# Patient Record
Sex: FEMALE | Race: WHITE | NOT HISPANIC OR LATINO | Employment: OTHER | URBAN - METROPOLITAN AREA
[De-identification: names, ages, dates, MRNs, and addresses within clinical notes are randomized per-mention and may not be internally consistent; named-entity substitution may affect disease eponyms.]

---

## 2017-01-03 DIAGNOSIS — F41.9 ANXIETY: ICD-10-CM

## 2017-01-03 DIAGNOSIS — G47.00 INSOMNIA, UNSPECIFIED TYPE: ICD-10-CM

## 2017-01-03 RX ORDER — ZOLPIDEM TARTRATE 10 MG/1
10 TABLET ORAL NIGHTLY PRN
Qty: 30 TABLET | Refills: 0 | OUTPATIENT
Start: 2017-01-03 | End: 2017-01-27 | Stop reason: SDUPTHER

## 2017-01-03 RX ORDER — LORAZEPAM 1 MG/1
1 TABLET ORAL EVERY 8 HOURS PRN
Qty: 90 TABLET | Refills: 0 | OUTPATIENT
Start: 2017-01-03 | End: 2017-02-28 | Stop reason: SDUPTHER

## 2017-01-03 NOTE — TELEPHONE ENCOUNTER
----- Message from Rodriguez Harrell sent at 1/3/2017  9:35 AM EST -----  Contact: pt  Pt calling and would like a refill on RX sent into Pharmacy. Please advise.     zolpidem (AMBIEN) 10 MG tablet    LORazepam (ATIVAN) 1 MG tablet      [facility] to :  [facility] [place]     Pt# [phone]

## 2017-01-04 ENCOUNTER — TELEPHONE (OUTPATIENT)
Dept: GASTROENTEROLOGY | Facility: CLINIC | Age: 62
End: 2017-01-04

## 2017-01-04 NOTE — TELEPHONE ENCOUNTER
----- Message from Los Zhou sent at 1/4/2017 10:18 AM EST -----  Regarding: REFILL MEDS  Contact: [phone]   PT CALLED FOR REFILL ON hyoscyamine (LEVSIN) 0.125 MG SL tablet    PHARM#[phone]

## 2017-01-04 NOTE — TELEPHONE ENCOUNTER
Call to pt.  Advise that due to annual appt.  Scheduled with RAJEEV Payne, for 1/13/17 @ 1300.  Hyoscyamine escribed to [facility].

## 2017-01-05 ENCOUNTER — TELEPHONE (OUTPATIENT)
Dept: INTERNAL MEDICINE | Facility: CLINIC | Age: 62
End: 2017-01-05

## 2017-01-05 DIAGNOSIS — F41.9 ANXIETY: ICD-10-CM

## 2017-01-05 DIAGNOSIS — G47.00 INSOMNIA, UNSPECIFIED TYPE: ICD-10-CM

## 2017-01-05 NOTE — TELEPHONE ENCOUNTER
----- Message from Kitty Barrgia sent at 1/5/2017  2:31 PM EST -----  Contact: Pt (Blake)  Pt called for a refill with   - LORazepam (ATIVAN) 1 MG tablet    - zolpidem (AMBIEN) 10 MG tablet       Receipt wasn't confirmed by pharmacy.       [facility] [address] [place] [address] [facility] [phone] [facility] [phone]

## 2017-01-10 ENCOUNTER — OFFICE VISIT (OUTPATIENT)
Dept: INTERNAL MEDICINE | Facility: CLINIC | Age: 62
End: 2017-01-10

## 2017-01-10 VITALS
WEIGHT: 202.6 LBS | BODY MASS INDEX: 28.36 KG/M2 | HEIGHT: 71 IN | DIASTOLIC BLOOD PRESSURE: 84 MMHG | SYSTOLIC BLOOD PRESSURE: 144 MMHG

## 2017-01-10 DIAGNOSIS — M79.7 FIBROMYALGIA, PRIMARY: ICD-10-CM

## 2017-01-10 DIAGNOSIS — G89.29 OTHER CHRONIC PAIN: Primary | ICD-10-CM

## 2017-01-10 DIAGNOSIS — G43.009 MIGRAINE WITHOUT AURA AND WITHOUT STATUS MIGRAINOSUS, NOT INTRACTABLE: ICD-10-CM

## 2017-01-10 DIAGNOSIS — G47.00 INSOMNIA, UNSPECIFIED TYPE: ICD-10-CM

## 2017-01-10 DIAGNOSIS — J30.2 SEASONAL ALLERGIC RHINITIS, UNSPECIFIED ALLERGIC RHINITIS TRIGGER: ICD-10-CM

## 2017-01-10 DIAGNOSIS — K58.0 IRRITABLE BOWEL SYNDROME WITH DIARRHEA: ICD-10-CM

## 2017-01-10 DIAGNOSIS — E55.9 VITAMIN D DEFICIENCY: ICD-10-CM

## 2017-01-10 DIAGNOSIS — F41.9 ANXIETY: ICD-10-CM

## 2017-01-10 PROCEDURE — 99214 OFFICE O/P EST MOD 30 MIN: CPT | Performed by: INTERNAL MEDICINE

## 2017-01-10 RX ORDER — BUSPIRONE HYDROCHLORIDE 30 MG/1
30 TABLET ORAL 2 TIMES DAILY
Qty: 60 TABLET | Refills: 6 | Status: SHIPPED | OUTPATIENT
Start: 2017-01-10 | End: 2017-10-10

## 2017-01-10 RX ORDER — BUSPIRONE HYDROCHLORIDE 30 MG/1
30 TABLET ORAL 2 TIMES DAILY
Qty: 60 TABLET | Refills: 6 | Status: SHIPPED | OUTPATIENT
Start: 2017-01-10 | End: 2017-01-10 | Stop reason: SDUPTHER

## 2017-01-10 RX ORDER — THYROID,PORK 48.75 MG
1 TABLET ORAL DAILY
COMMUNITY
Start: 2016-10-31 | End: 2017-10-10

## 2017-01-10 RX ORDER — CREAM BASE NO. 9
CREAM (GRAM) MISCELLANEOUS
Refills: 1 | COMMUNITY
Start: 2016-12-05 | End: 2018-06-07

## 2017-01-10 NOTE — PROGRESS NOTES
"Subjective   Maggie Malhotra is a 61 y.o. female here for   Chief Complaint   Patient presents with   • Med Management     4 month follow-up for continued use of Zolpidem, Hydrocodone, & Lexapro   • Headache   • Allergic Rhinitis   .    Vitals:    01/10/17 1426   BP: 144/84   BP Location: Left arm   Patient Position: Sitting   Cuff Size: Adult   Weight: 202 lb 9.6 oz (91.9 kg)   Height: 71\" (180.3 cm)       Anxiety   Presents for follow-up visit. Symptoms include excessive worry, insomnia and nervous/anxious behavior. Patient reports no palpitations or shortness of breath. Symptoms occur most days. The severity of symptoms is moderate. The quality of sleep is fair.       Insomnia   This is a chronic problem. The current episode started more than 1 year ago. The problem occurs constantly. The problem has been unchanged. Associated symptoms include fatigue. Pertinent negatives include no chills or fever.        Encounter Diagnoses   Name Primary?   • Other chronic pain Yes   • Seasonal allergic rhinitis, unspecified allergic rhinitis trigger    • Insomnia, unspecified type    • Vitamin D deficiency    • Migraine without aura and without status migrainosus, not intractable    • Irritable bowel syndrome with diarrhea    • Fibromyalgia, primary    • Anxiety        The following portions of the patient's history were reviewed and updated as appropriate: allergies, current medications, past social history and problem list.    Review of Systems   Constitutional: Positive for fatigue. Negative for chills and fever.   HENT: Positive for postnasal drip.    Respiratory: Negative for shortness of breath.    Cardiovascular: Negative for palpitations and leg swelling.   Allergic/Immunologic: Positive for environmental allergies.   Psychiatric/Behavioral: Positive for sleep disturbance. Negative for dysphoric mood. The patient is nervous/anxious and has insomnia.        Objective   Physical Exam   Constitutional: She is oriented " to person, place, and time. She appears well-developed and well-nourished. No distress.   Cardiovascular: Normal rate, regular rhythm and normal heart sounds.    Pulmonary/Chest: No respiratory distress. She has no wheezes. She has no rales. She exhibits no tenderness.   Musculoskeletal: Normal range of motion. She exhibits no edema.   Neurological: She is alert and oriented to person, place, and time. No cranial nerve deficit. She exhibits normal muscle tone. Coordination normal.   Psychiatric: She has a normal mood and affect. Her behavior is normal.   Nursing note and vitals reviewed.      Assessment/Plan   Problem List Items Addressed This Visit        Unprioritized    Chronic pain - Primary    Migraine    IBS (irritable bowel syndrome)    Relevant Medications    busPIRone (BUSPAR) 30 MG tablet    Vitamin D deficiency    Insomnia    Allergic rhinitis    Fibromyalgia, primary      Other Visit Diagnoses     Anxiety        Relevant Medications    busPIRone (BUSPAR) 30 MG tablet       Diffuse joint pain - needing 1/2-1 hydrocodone daily.   Allergic rhinitis - need mucinex bid & daily antihis.    Insomnia - still needing ambien nightly.   Anxiety is chronic (every night at 8 pm), but she wants to try new medication.  Trial of buspar bid (may not need ativan).  Call if problems.     She is getting multiple meds from dr bartholomew.  I do not have research on the safety of these alternative medications.  She feels better with these meds, & wants to continue them.  She will need to chk breasts carefully weekly & continue yearly mammo (discussed today).

## 2017-01-10 NOTE — MR AVS SNAPSHOT
Maggie Malhotra   1/10/2017 2:00 PM   Office Visit    Dept Phone:  [phone]   Encounter #:  [id number]    Provider:  Kimberly Lozano MD   Department:  [facility] INTERNAL MEDICINE                Your Full Care Plan              Today's Medication Changes          These changes are accurate as of: 1/10/17  3:22 PM.  If you have any questions, ask your nurse or doctor.               New Medication(s)Ordered:     busPIRone 30 MG tablet   Commonly known as:  BUSPAR   Take 1 tablet by mouth 2 (Two) Times a Day.   Started by:  Kimberly Lozano MD         Medication(s)that have changed:     ThyroidTHROID 48.75 MG tablet tablet   Generic drug:  Thyroid   Take 1 tablet by mouth Daily.   What changed:  Another medication with the same name was removed. Continue taking this medication, and follow the directions you see here.   Changed by:  Kimberly Lozano MD       Unable to find   Take 1 each by mouth daily. Compound Drug-Progesterone 150 mg   What changed:  Another medication with the same name was removed. Continue taking this medication, and follow the directions you see here.   Changed by:  Curt Churchill MA         Stop taking medication(s)listed here:     ciprofloxacin 250 MG tablet   Commonly known as:  CIPRO   Stopped by:  Kimberly Lozano MD           dicyclomine 10 MG capsule   Commonly known as:  BENTYL   Stopped by:  Kimberly Lozano MD           NEXIUM 40 MG capsule   Generic drug:  esomeprazole   Stopped by:  Kimberly Lozano MD           phenazopyridine 200 MG tablet   Commonly known as:  PYRIDIUM   Stopped by:  Kimberly Lozano MD                Where to Get Your Medications      These medications were sent to [facility] [address] [place] [address] [facility] - [phone]  - [phone]   [id number] [place] [address]     Phone:  [phone]     busPIRone 30 MG tablet                  Your Updated Medication  List          This list is accurate as of: 1/10/17  3:22 PM.  Always use your most recent med list.                busPIRone 30 MG tablet   Commonly known as:  BUSPAR   Take 1 tablet by mouth 2 (Two) Times a Day.       DHEA PO       escitalopram 20 MG tablet   Commonly known as:  LEXAPRO   1.5 pill daily       gabapentin 600 MG tablet   Commonly known as:  NEURONTIN   TAKE 1 TABLET DAILY       HRT Base cream       * HRT SUPPORT PO       * HRT SUPPORT PO       * HRT SUPPORT PO       HYDROcodone-acetaminophen  MG per tablet   Commonly known as:  NORCO   Take 1 tablet by mouth Every 6 (Six) Hours As Needed for moderate pain (4-6).       hyoscyamine 0.125 MG SL tablet   Commonly known as:  LEVSIN   Dissolve one to 2 tabs every 6 hrs sublingual for abdominal spasms       LORazepam 1 MG tablet   Commonly known as:  ATIVAN   Take 1 tablet by mouth Every 8 (Eight) Hours As Needed for anxiety.       multivitamin tablet tablet       pregabalin 100 MG capsule   Commonly known as:  LYRICA   Take 1 capsule by mouth 3 (three) times a day.       promethazine 25 MG tablet   Commonly known as:  PHENERGAN   TAKE 1 TABLET EVERY 6 HOURS AS NEEDED FOR NAUSEA OR VOMITING       ThyroidTHROID 48.75 MG tablet tablet   Generic drug:  Thyroid       Unable to find       zolpidem 10 MG tablet   Commonly known as:  AMBIEN   Take 1 tablet by mouth At Night As Needed for sleep.       * Notice:  This list has 3 medication(s) that are the same as other medications prescribed for you. Read the directions carefully, and ask your doctor or other care provider to review them with you.            You Were Diagnosed With        Codes Comments    Other chronic pain    -  Primary ICD-10-CM: G89.29  ICD-9-CM: 338.29     Seasonal allergic rhinitis, unspecified allergic rhinitis trigger     ICD-10-CM: J30.2  ICD-9-CM: 477.9     Insomnia, unspecified type     ICD-10-CM: G47.00  ICD-9-CM: 780.52     Vitamin D deficiency     ICD-10-CM: E55.9  ICD-9-CM:  268.9     Migraine without aura and without status migrainosus, not intractable     ICD-10-CM: G43.009  ICD-9-CM: 346.10     Irritable bowel syndrome with diarrhea     ICD-10-CM: K58.0  ICD-9-CM: 564.1     Fibromyalgia, primary     ICD-10-CM: M79.7  ICD-9-CM: 729.1     Anxiety     ICD-10-CM: F41.9  ICD-9-CM: 300.00       Instructions     None    Patient Instructions History      Upcoming Appointments     Visit Type Date Time Department    OFFICE VISIT 1/10/2017  2:00 PM [facility]    FOLLOW UP 1/13/2017  1:00 PM [facility]    FOLLOW UP 5/11/2017  2:00 PM [employer] Signup     Our records indicate that you have an active [employer] account.    You can view your After Visit Summary by going to Maimaibao and logging in with your [employer] username and password.  If you don't have a [employer] username and password but a parent or guardian has access to your record, the parent or guardian should login with their own [employer] username and password and access your record to view the After Visit Summary.    If you have questions, you can email [email or web address] or call [phone] to talk to our [employer] staff.  Remember, [employer] is NOT to be used for urgent needs.  For medical emergencies, dial 911.               Other Info from Your Visit           Your Appointments     Jan 13, 2017  1:00 PM EST   Follow Up with RAJEEV Carter   [facility] GASTROENTEROLOG[facility] (--)    [address]  [facility] [address] [phone]           Arrive 15 minutes prior to appointment.            May 11, 2017  2:00 PM EDT   Follow Up with Kimberly Lozano MD   [facility] INTERNAL MEDICINE (--)    [address]  [facility] [address] [phone]           Arrive 15 minutes prior to appointment.              Allergies     Celexa [Citalopram Hydrobromide]  Other (See Comments)    Headache    Codeine       "Cymbalta [Duloxetine Hcl]  Nausea Only, Other (See Comments)    Fatigue/Pain    Erythromycin      Influenza Vaccines      Midazolam        Reason for Visit     Med Management 4 month follow-up for continued use of Zolpidem, Hydrocodone, & Lexapro    Headache     Allergic Rhinitis           Vital Signs     Blood Pressure Height Weight Last Menstrual Period Body Mass Index Smoking Status    144/84 (BP Location: Left arm, Patient Position: Sitting, Cuff Size: Adult) 71\" (180.3 cm) 202 lb 9.6 oz (91.9 kg) 01/01/2006 28.26 kg/m2 Never Smoker      Problems and Diagnoses Noted     Nasal inflammation due to allergen    Chronic pain    Fibromyalgia, primary    Spasmodic colon    Difficulty falling or staying asleep    Migraines    Vitamin D deficiency    Anxiety problem            "

## 2017-01-13 ENCOUNTER — OFFICE VISIT (OUTPATIENT)
Dept: GASTROENTEROLOGY | Facility: CLINIC | Age: 62
End: 2017-01-13

## 2017-01-13 VITALS
DIASTOLIC BLOOD PRESSURE: 84 MMHG | SYSTOLIC BLOOD PRESSURE: 144 MMHG | WEIGHT: 199 LBS | BODY MASS INDEX: 27.86 KG/M2 | HEIGHT: 71 IN

## 2017-01-13 DIAGNOSIS — K58.0 IRRITABLE BOWEL SYNDROME WITH DIARRHEA: Primary | ICD-10-CM

## 2017-01-13 PROCEDURE — 99213 OFFICE O/P EST LOW 20 MIN: CPT | Performed by: NURSE PRACTITIONER

## 2017-01-13 RX ORDER — DICYCLOMINE HCL 20 MG
20 TABLET ORAL
Qty: 90 TABLET | Refills: 5
Start: 2017-01-13 | End: 2017-02-12

## 2017-01-13 NOTE — MR AVS SNAPSHOT
Joandylan FRIAS Roscoe   1/13/2017 1:00 PM   Office Visit    Dept Phone:  [phone]   Encounter #:  [id number]    Provider:  RAJEEV Carter   Department:  [facility]STRO[facility]OLOG[facility]                Your Full Care Plan              Today's Medication Changes          These changes are accurate as of: 1/13/17  1:46 PM.  If you have any questions, ask your nurse or doctor.               New Medication(s)Ordered:     dicyclomine 20 MG tablet   Commonly known as:  BENTYL   Take 1 tablet by mouth 4 (Four) Times a Day Before Meals & at Bedtime As Needed (cramping) for up to 30 days.   Started by:  RAJEEV Carter            Where to Get Your Medications      These medications were sent to [facility] - [place], [place] - [address] [place] [address] - [phone] [facility] [phone] [address] [address]     Phone:  [phone]     hyoscyamine 0.125 MG SL tablet         Information about where to get these medications is not yet available     ! Ask your nurse or doctor about these medications     dicyclomine 20 MG tablet                  Your Updated Medication List          This list is accurate as of: 1/13/17  1:46 PM.  Always use your most recent med list.                busPIRone 30 MG tablet   Commonly known as:  BUSPAR   Take 1 tablet by mouth 2 (Two) Times a Day.       DHEA PO       dicyclomine 20 MG tablet   Commonly known as:  BENTYL   Take 1 tablet by mouth 4 (Four) Times a Day Before Meals & at Bedtime As Needed (cramping) for up to 30 days.       escitalopram 20 MG tablet   Commonly known as:  LEXAPRO   1.5 pill daily       gabapentin 600 MG tablet   Commonly known as:  NEURONTIN   TAKE 1 TABLET DAILY       HRT Base cream       * HRT SUPPORT PO       * HRT SUPPORT PO       * HRT SUPPORT PO       HYDROcodone-acetaminophen  MG per tablet   Commonly known as:  NORCO   Take 1 tablet by mouth Every 6 (Six) Hours As Needed  for moderate pain (4-6).       hyoscyamine 0.125 MG SL tablet   Commonly known as:  LEVSIN   Dissolve one to 2 tabs every 6 hrs sublingual for abdominal spasms       LORazepam 1 MG tablet   Commonly known as:  ATIVAN   Take 1 tablet by mouth Every 8 (Eight) Hours As Needed for anxiety.       multivitamin tablet tablet       pregabalin 100 MG capsule   Commonly known as:  LYRICA   Take 1 capsule by mouth 3 (three) times a day.       promethazine 25 MG tablet   Commonly known as:  PHENERGAN   TAKE 1 TABLET EVERY 6 HOURS AS NEEDED FOR NAUSEA OR VOMITING       ThyroidTHROID 48.75 MG tablet tablet   Generic drug:  Thyroid       zolpidem 10 MG tablet   Commonly known as:  AMBIEN   Take 1 tablet by mouth At Night As Needed for sleep.       * Notice:  This list has 3 medication(s) that are the same as other medications prescribed for you. Read the directions carefully, and ask your doctor or other care provider to review them with you.            You Were Diagnosed With        Codes Comments    Irritable bowel syndrome with diarrhea    -  Primary ICD-10-CM: K58.0  ICD-9-CM: 564.1 c-scope 6/14 with IH, tics, HY polyps, tort colon.  Diarrhea resolved.  Using bentyl/levsin prn for cramping.  Sxs better after senior living.  Trial IBGard sugg      Instructions     None    Patient Instructions History      Upcoming Appointments     Visit Type Date Time Department    FOLLOW UP 1/13/2017  1:00 PM [facility]    FOLLOW UP 5/11/2017  2:00 PM [facility]      [employer] Signup     Our records indicate that you have an active [employer] ([employer]) account.    You can view your After Visit Summary by going to Anpro21 and logging in with your [employer] username and password.  If you don't have a [employer] username and password but a parent or guardian has access to your record, the parent or guardian should login with their own [employer] username and password and access your record to view the After Visit  "Summary.    If you have questions, you can email [email or web address] or call [phone] to talk to our MyChart staff.  Remember, [employer] is NOT to be used for urgent needs.  For medical emergencies, dial 911.               Other Info from Your Visit           Your Appointments     May 11, 2017  2:00 PM EDT   Follow Up with Kimberly Lozano MD   [facility] INTERNAL MEDICINE (--)    [address] [place] [address] [phone]           Arrive 15 minutes prior to appointment.              Allergies     Celexa [Citalopram Hydrobromide]  Other (See Comments)    Headache    Codeine      Cymbalta [Duloxetine Hcl]  Nausea Only, Other (See Comments)    Fatigue/Pain    Erythromycin      Influenza Vaccines      Midazolam        Reason for Visit     abdominal spasms ways to help.      Vital Signs     Blood Pressure Height Weight Last Menstrual Period Body Mass Index Smoking Status    144/84 71\" (180.3 cm) 199 lb (90.3 kg) 01/01/2006 27.75 kg/m2 Never Smoker      Problems and Diagnoses Noted     Spasmodic colon        "

## 2017-01-27 DIAGNOSIS — G47.00 INSOMNIA, UNSPECIFIED TYPE: ICD-10-CM

## 2017-01-27 RX ORDER — ZOLPIDEM TARTRATE 10 MG/1
10 TABLET ORAL NIGHTLY PRN
Qty: 30 TABLET | Refills: 0 | OUTPATIENT
Start: 2017-01-27 | End: 2017-02-28 | Stop reason: SDUPTHER

## 2017-01-27 NOTE — TELEPHONE ENCOUNTER
----- Message from Maribel Rothman sent at 1/27/2017  1:09 PM EST -----  Contact: pt - Dr mcgrath's pt - RE: Rx refill  Pt calling and would like a refill on Rx        zolpidem (AMBIEN) 10 MG tablet 30 tablet        Sig - Route: Take 1 tablet by mouth At Night As Needed for sleep. - [address] [address] [facility] - [phone]  - [phone] (Phone)  [phone] (Fax)      Pt # [phone] - Ok to  when ready

## 2017-01-31 ENCOUNTER — TELEPHONE (OUTPATIENT)
Dept: INTERNAL MEDICINE | Facility: CLINIC | Age: 62
End: 2017-01-31

## 2017-01-31 NOTE — TELEPHONE ENCOUNTER
----- Message from Beata Bourgeois sent at 1/31/2017 11:54 AM EST -----  Contact: Patient  Patient called regarding her prescription for     zolpidem (AMBIEN) 10 MG tablet    Our records indicate it was phoned into pharmacy Friday evening, but pharmacy telling patient they have no record of it.  Please advise.     Patient:  [phone]    Pharmacy:  [address] [address] [facility] [phone] [facility] [phone]

## 2017-02-01 DIAGNOSIS — G47.00 INSOMNIA, UNSPECIFIED TYPE: ICD-10-CM

## 2017-02-01 RX ORDER — ZOLPIDEM TARTRATE 10 MG/1
TABLET ORAL
Qty: 30 TABLET | Refills: 0 | OUTPATIENT
Start: 2017-02-01

## 2017-02-07 RX ORDER — DICYCLOMINE HYDROCHLORIDE 10 MG/1
CAPSULE ORAL
Qty: 720 CAPSULE | Refills: 0 | OUTPATIENT
Start: 2017-02-07

## 2017-02-20 DIAGNOSIS — R52 PAIN: ICD-10-CM

## 2017-02-20 RX ORDER — PREGABALIN 100 MG/1
100 CAPSULE ORAL 3 TIMES DAILY
Qty: 90 CAPSULE | Refills: 0 | Status: SHIPPED | OUTPATIENT
Start: 2017-02-20 | End: 2017-05-11 | Stop reason: SDUPTHER

## 2017-02-20 NOTE — TELEPHONE ENCOUNTER
Dr. Vega (covering provider for Dr. Lozano),    Please review refill request to be sent to mail order only.    Last OV: 1/10/17    Last rx given: 16 for 270 tabs w/ 1 refills  Si capsule po tid     Patient reported on 1/10/17 that she take one  (1) 100 mg capsule by mouth daily    ZHANNA: ordered (ready for review)    Thank you,    Denny

## 2017-02-20 NOTE — TELEPHONE ENCOUNTER
----- Message from Beata Bourgeois sent at 2/20/2017 12:36 PM EST -----  Contact: Patient  Patient called stating her mail order pharmacy was to contact us for refill on     pregabalin (LYRICA) 100 MG capsule; will have to be a new Rx, no refills.     Patient:  [phone]    Pharmacy:  [facility] - [address] [phone] [facility] [phone] FX    Patient is running very low on medication.  Can we call in 30-day supply to local pharmacy.  Please advise.      [facility] [address] [place] [address] [facility] [phone] [facility] [phone] FX

## 2017-02-28 DIAGNOSIS — F41.9 ANXIETY: ICD-10-CM

## 2017-02-28 DIAGNOSIS — G47.00 INSOMNIA, UNSPECIFIED TYPE: ICD-10-CM

## 2017-02-28 RX ORDER — ZOLPIDEM TARTRATE 10 MG/1
TABLET ORAL
Qty: 30 TABLET | Refills: 0 | OUTPATIENT
Start: 2017-02-28

## 2017-02-28 RX ORDER — LORAZEPAM 1 MG/1
1 TABLET ORAL EVERY 8 HOURS PRN
Qty: 90 TABLET | Refills: 0 | OUTPATIENT
Start: 2017-02-28 | End: 2017-04-04 | Stop reason: SDUPTHER

## 2017-02-28 RX ORDER — ZOLPIDEM TARTRATE 10 MG/1
10 TABLET ORAL NIGHTLY PRN
Qty: 30 TABLET | Refills: 0 | OUTPATIENT
Start: 2017-02-28 | End: 2017-03-23 | Stop reason: SDUPTHER

## 2017-02-28 RX ORDER — LORAZEPAM 1 MG/1
TABLET ORAL
Qty: 90 TABLET | Refills: 0 | OUTPATIENT
Start: 2017-02-28

## 2017-02-28 NOTE — TELEPHONE ENCOUNTER
----- Message from Beata Bourgeois sent at 2/28/2017 11:21 AM EST -----  Contact: Patient  Dr. Lozano patient    Patient called requesting refills on     LORazepam (ATIVAN) 1 MG tablet  zolpidem (AMBIEN) 10 MG tablet    30-day supply please.     Patient:  [phone]    Pharmacy:  [address] [address] [facility] [phone] [facility] [phone]

## 2017-02-28 NOTE — TELEPHONE ENCOUNTER
Called in lorazepam 1mg and zolpidem 10 mg into [facility] pharmacy. Left voicemail on Rx voicemail.

## 2017-03-23 DIAGNOSIS — G47.00 INSOMNIA, UNSPECIFIED TYPE: ICD-10-CM

## 2017-03-23 RX ORDER — ZOLPIDEM TARTRATE 10 MG/1
10 TABLET ORAL NIGHTLY PRN
Qty: 30 TABLET | Refills: 0 | OUTPATIENT
Start: 2017-03-23 | End: 2017-04-25 | Stop reason: SDUPTHER

## 2017-03-23 NOTE — TELEPHONE ENCOUNTER
----- Message from Rodriguez Harrell sent at 3/23/2017 12:57 PM EDT -----  Contact: pt  Pt calling and would like a refill on RX sent into Pharmacy. Please advise.     zolpidem (AMBIEN) 10 MG tablet    Send to :  [facility] [address] [place] [address] [facility] [phone] [facility] [phone] FX.    Pt# [phone]

## 2017-03-23 NOTE — TELEPHONE ENCOUNTER
Please review refill request:    Last OV: 1-10-17  Last Refill: 2/28/17  ZHANNA: Good until 5-20-17    Thank you,    Von

## 2017-04-04 DIAGNOSIS — F41.9 ANXIETY: ICD-10-CM

## 2017-04-04 RX ORDER — LORAZEPAM 1 MG/1
1 TABLET ORAL EVERY 8 HOURS PRN
Qty: 90 TABLET | Refills: 0 | OUTPATIENT
Start: 2017-04-04 | End: 2017-05-11 | Stop reason: SDUPTHER

## 2017-04-04 NOTE — TELEPHONE ENCOUNTER
----- Message from Aniyah Espino MA sent at 4/4/2017 10:11 AM EDT -----  Pt calling for refill    Lorazepam 1mg    [facility] #[phone]    lov 1/10  Next appt 5/11

## 2017-04-04 NOTE — TELEPHONE ENCOUNTER
Please review refill request.    Last OV:  1/10/17    Last Refill: 2/28/17    ZHANNA: Good until 5/20/17    Thank you,    Von

## 2017-04-13 DIAGNOSIS — G89.29 OTHER CHRONIC PAIN: ICD-10-CM

## 2017-04-13 RX ORDER — HYDROCODONE BITARTRATE AND ACETAMINOPHEN 10; 325 MG/1; MG/1
1 TABLET ORAL EVERY 6 HOURS PRN
Qty: 100 TABLET | Refills: 0 | Status: SHIPPED | OUTPATIENT
Start: 2017-04-13 | End: 2017-07-26 | Stop reason: SDUPTHER

## 2017-04-13 NOTE — TELEPHONE ENCOUNTER
Please review refill request:    Last OV: 1/10/17    Last Refill: 12/16/16    ZHANNA: Good until 5/20/17    Thank you,    Von

## 2017-04-13 NOTE — TELEPHONE ENCOUNTER
----- Message from Beata Bourgeois sent at 4/13/2017 10:01 AM EDT -----  Contact: Patient  Patient called requesting refill on     HYDROcodone-acetaminophen (NORCO)  MG per tablet    Please call when ready to be picked up    Patient:  [phone]

## 2017-04-25 DIAGNOSIS — G47.00 INSOMNIA, UNSPECIFIED TYPE: ICD-10-CM

## 2017-04-25 RX ORDER — ZOLPIDEM TARTRATE 10 MG/1
10 TABLET ORAL NIGHTLY PRN
Qty: 30 TABLET | Refills: 0 | OUTPATIENT
Start: 2017-04-25 | End: 2017-05-30 | Stop reason: SDUPTHER

## 2017-04-25 NOTE — TELEPHONE ENCOUNTER
----- Message from Rodriguez Harrell sent at 4/25/2017 10:58 AM EDT -----  Contact: pt  Pt calling and would like a refill on RX sent into Pharmacy. Please advise.     zolpidem (AMBIEN) 10 MG tablet    Send to :  [facility] [address] [place] [address] [facility] [phone] [facility] [phone] FX    Pt# [phone] ok to gilma

## 2017-05-11 ENCOUNTER — OFFICE VISIT (OUTPATIENT)
Dept: INTERNAL MEDICINE | Facility: CLINIC | Age: 62
End: 2017-05-11

## 2017-05-11 VITALS
WEIGHT: 194 LBS | BODY MASS INDEX: 27.16 KG/M2 | DIASTOLIC BLOOD PRESSURE: 90 MMHG | SYSTOLIC BLOOD PRESSURE: 134 MMHG | HEIGHT: 71 IN

## 2017-05-11 DIAGNOSIS — IMO0001 ELEVATED BLOOD PRESSURE: ICD-10-CM

## 2017-05-11 DIAGNOSIS — G89.29 OTHER CHRONIC PAIN: ICD-10-CM

## 2017-05-11 DIAGNOSIS — M79.7 FIBROMYALGIA, PRIMARY: ICD-10-CM

## 2017-05-11 DIAGNOSIS — G43.009 MIGRAINE WITHOUT AURA AND WITHOUT STATUS MIGRAINOSUS, NOT INTRACTABLE: ICD-10-CM

## 2017-05-11 DIAGNOSIS — G47.00 INSOMNIA, UNSPECIFIED TYPE: Primary | ICD-10-CM

## 2017-05-11 DIAGNOSIS — F41.9 ANXIETY: ICD-10-CM

## 2017-05-11 DIAGNOSIS — R52 PAIN: ICD-10-CM

## 2017-05-11 DIAGNOSIS — F39 MOOD DISORDER (HCC): ICD-10-CM

## 2017-05-11 PROCEDURE — 99214 OFFICE O/P EST MOD 30 MIN: CPT | Performed by: INTERNAL MEDICINE

## 2017-05-11 RX ORDER — GABAPENTIN 600 MG/1
600 TABLET ORAL 3 TIMES DAILY
Qty: 270 TABLET | Refills: 3 | Status: SHIPPED | OUTPATIENT
Start: 2017-05-11 | End: 2017-10-10

## 2017-05-11 RX ORDER — TRAZODONE HYDROCHLORIDE 100 MG/1
100 TABLET ORAL NIGHTLY
Qty: 30 TABLET | Refills: 6 | Status: SHIPPED | OUTPATIENT
Start: 2017-05-11 | End: 2017-10-10

## 2017-05-11 RX ORDER — PREGABALIN 100 MG/1
100 CAPSULE ORAL 2 TIMES DAILY
Qty: 180 CAPSULE | Refills: 3 | Status: SHIPPED | OUTPATIENT
Start: 2017-05-11 | End: 2017-10-10

## 2017-05-11 RX ORDER — LORAZEPAM 1 MG/1
1 TABLET ORAL EVERY 8 HOURS PRN
Qty: 90 TABLET | Refills: 3 | OUTPATIENT
Start: 2017-05-11 | End: 2017-05-18 | Stop reason: SDUPTHER

## 2017-05-18 DIAGNOSIS — F41.9 ANXIETY: ICD-10-CM

## 2017-05-21 DIAGNOSIS — F39 MOOD DISORDER (HCC): ICD-10-CM

## 2017-05-22 RX ORDER — LORAZEPAM 1 MG/1
TABLET ORAL
Qty: 90 TABLET | Refills: 1 | OUTPATIENT
Start: 2017-05-22 | End: 2017-07-31 | Stop reason: SDUPTHER

## 2017-05-22 RX ORDER — ESCITALOPRAM OXALATE 20 MG/1
TABLET ORAL
Qty: 135 TABLET | Refills: 1 | Status: SHIPPED | OUTPATIENT
Start: 2017-05-22 | End: 2017-10-10

## 2017-05-30 DIAGNOSIS — G47.00 INSOMNIA, UNSPECIFIED TYPE: ICD-10-CM

## 2017-05-31 RX ORDER — ZOLPIDEM TARTRATE 10 MG/1
10 TABLET ORAL NIGHTLY PRN
Qty: 30 TABLET | Refills: 0 | OUTPATIENT
Start: 2017-05-31 | End: 2017-06-26 | Stop reason: SDUPTHER

## 2017-06-26 DIAGNOSIS — G47.00 INSOMNIA, UNSPECIFIED TYPE: ICD-10-CM

## 2017-06-30 ENCOUNTER — TELEPHONE (OUTPATIENT)
Dept: INTERNAL MEDICINE | Facility: CLINIC | Age: 62
End: 2017-06-30

## 2017-06-30 RX ORDER — ZOLPIDEM TARTRATE 10 MG/1
TABLET ORAL
Qty: 30 TABLET | Refills: 2 | OUTPATIENT
Start: 2017-06-30 | End: 2017-09-27 | Stop reason: SDUPTHER

## 2017-06-30 NOTE — TELEPHONE ENCOUNTER
----- Message from Beata Bourgeois sent at 6/30/2017  9:14 AM EDT -----  Contact: Patient  Patient called inquiring about refill on     zolpidem (AMBIEN) 10 MG tablet    Please advise.  Patient has 2 pills left.      Patient:  [phone]; can leave voice mail, and would like to be called when script called in.    Pharmacy:  [address] [address] [facility] [phone] [facility] [phone]

## 2017-07-26 DIAGNOSIS — G89.29 OTHER CHRONIC PAIN: ICD-10-CM

## 2017-07-26 NOTE — TELEPHONE ENCOUNTER
----- Message from Rodriguez Harrell sent at 7/26/2017 12:46 PM EDT -----  Contact: pt  Pt calling would like refill on rx. Please call when ready for pickup.    HYDROcodone-acetaminophen (NORCO)  MG per tablet          Pt would also like a refill for her zolpidem (AMBIEN) 10 MG tablet,       to [facility] [address] [place] [address] [facility] [phone] [facility] [phone]     [phone]

## 2017-07-26 NOTE — TELEPHONE ENCOUNTER
Please review refill request:    Last OV: 5/11/17    Last prescribed: 4/13/17    ZHANNA: Good until 9/21/17    Thank you

## 2017-07-27 RX ORDER — HYDROCODONE BITARTRATE AND ACETAMINOPHEN 10; 325 MG/1; MG/1
1 TABLET ORAL EVERY 6 HOURS PRN
Qty: 100 TABLET | Refills: 0 | Status: SHIPPED | OUTPATIENT
Start: 2017-07-27 | End: 2017-10-10 | Stop reason: SDUPTHER

## 2017-07-31 DIAGNOSIS — F41.9 ANXIETY: ICD-10-CM

## 2017-07-31 RX ORDER — LORAZEPAM 1 MG/1
1 TABLET ORAL EVERY 8 HOURS PRN
Qty: 90 TABLET | Refills: 1 | OUTPATIENT
Start: 2017-07-31 | End: 2017-10-10 | Stop reason: CLARIF

## 2017-07-31 NOTE — TELEPHONE ENCOUNTER
Please review refill request:    Last OV: 5/11/17    Last Prescribed: 5/22/17    ZHANNA: Good until 9/21/17    Thank you

## 2017-07-31 NOTE — TELEPHONE ENCOUNTER
----- Message from Tash Morales sent at 7/31/2017 11:35 AM EDT -----  Pt is requesting a refill on her LORazepam (ATIVAN) 1 MG tablet. [facility] is the [facility] chart. Pt's call back number is [phone]. Thank you!

## 2017-08-18 ENCOUNTER — TELEPHONE (OUTPATIENT)
Dept: INTERNAL MEDICINE | Facility: CLINIC | Age: 62
End: 2017-08-18

## 2017-08-18 DIAGNOSIS — F41.9 ANXIETY: ICD-10-CM

## 2017-08-18 DIAGNOSIS — M62.838 MUSCLE SPASM: ICD-10-CM

## 2017-08-18 DIAGNOSIS — G62.9 NEUROPATHY: Primary | ICD-10-CM

## 2017-08-18 RX ORDER — DIAZEPAM 5 MG/1
5 TABLET ORAL EVERY 12 HOURS PRN
Qty: 60 TABLET | Refills: 0 | OUTPATIENT
Start: 2017-08-18 | End: 2017-09-19 | Stop reason: SDUPTHER

## 2017-08-18 NOTE — TELEPHONE ENCOUNTER
----- Message from Beata Bourgeois sent at 8/18/2017 11:31 AM EDT -----  Contact: Patient  Patient called requesting refill on     LORazepam (ATIVAN) 1 MG tablet    She would like Dr. Lozano to call her regarding her anxiety.  Is there anything else to take besides the lorazepam?  Please advise.     Patient:  [phone]    Pharmacy:  [address] [address] [facility] [phone] [facility] [phone]

## 2017-08-18 NOTE — TELEPHONE ENCOUNTER
I called and spoke to Ms. Malhotra to see about her requesting a refill on Ativan since a refill was just called in on 7/31/17 of 90 tablets w/ 1 additional refill.     She stated that she she has been taking 4 tablets daily because she is becoming very anxious at night around 8 pm and having abdominal spasms. I told her that she is to take up to 3 tablets daily only if needed. She states that she is down to 8 pills.     I told her that her pharmacy will not fill her last refill any sooner since she just got a refill a little over two weeks ago and she was giving a 30 day supply (90 tablets).     Please Advise.    Thank you

## 2017-08-18 NOTE — TELEPHONE ENCOUNTER
Discussed at length.  She took ativan 4/day b/c severe IBS - no better with levsin & bentyl.  I advised her NOT to take anxiety pill for IBS.  She should have come into office if problems.  She states she has been doing very well prior to this.  She gets muscle cramps & still has anxiety (but too early to get refill of lorazepam).  Ok to stop lorazepam & try low dose valium 5mg - 1/2 to one bid.  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  Valium will prevent withdrawal symptoms.  She needs to see neuro (referred today) for persistent neuropathy in spite of multiple meds.  She also needs to see psychiatry for treatment of anxiety.  I had given her buspar last January to help her wean off lorazepam, but she did not remember taking buspar.  Then she states it didn't help her anxiety.  I advised again - need to wean off controlled meds!!  She may need to go to pain center.

## 2017-09-19 DIAGNOSIS — G62.9 NEUROPATHY: ICD-10-CM

## 2017-09-19 DIAGNOSIS — F41.9 ANXIETY: ICD-10-CM

## 2017-09-19 DIAGNOSIS — M62.838 MUSCLE SPASM: ICD-10-CM

## 2017-09-19 RX ORDER — DIAZEPAM 5 MG/1
5 TABLET ORAL EVERY 12 HOURS PRN
Qty: 60 TABLET | Refills: 0 | OUTPATIENT
Start: 2017-09-19 | End: 2017-10-23 | Stop reason: SDUPTHER

## 2017-09-19 NOTE — TELEPHONE ENCOUNTER
Please review refill request for Diazepam 5 mg:    Ms. Malhotra was also prescribed Lorazepam on 7/31/17 #90 w/ 1 refill    Last OV: 5/11/12017 (next OV 10/10/17)    Last Prescribed: 8/18/17    ZHANNA: Ordered    Thank you,    Denny

## 2017-09-19 NOTE — TELEPHONE ENCOUNTER
----- Message from Aniyah Espino MA sent at 9/19/2017  1:35 PM EDT -----  Pt calling for refill    Diazepam 5mg    lov 5/11  Next appt 10/10    [facility] # [phone]

## 2017-09-27 ENCOUNTER — TELEPHONE (OUTPATIENT)
Dept: INTERNAL MEDICINE | Facility: CLINIC | Age: 62
End: 2017-09-27

## 2017-09-27 DIAGNOSIS — G47.00 INSOMNIA, UNSPECIFIED TYPE: ICD-10-CM

## 2017-09-27 RX ORDER — ZOLPIDEM TARTRATE 10 MG/1
10 TABLET ORAL NIGHTLY PRN
Qty: 30 TABLET | Refills: 2 | OUTPATIENT
Start: 2017-09-27 | End: 2017-12-19 | Stop reason: SDUPTHER

## 2017-09-27 NOTE — TELEPHONE ENCOUNTER
Patient calling to obtain refill for generic Ambien. Due the 28th.     Last OV 5/11  Next OV 10/10/2017  Gautam 9/20/2017    Uses [facility].

## 2017-10-10 ENCOUNTER — OFFICE VISIT (OUTPATIENT)
Dept: INTERNAL MEDICINE | Facility: CLINIC | Age: 62
End: 2017-10-10

## 2017-10-10 VITALS
RESPIRATION RATE: 17 BRPM | HEART RATE: 82 BPM | BODY MASS INDEX: 24.81 KG/M2 | DIASTOLIC BLOOD PRESSURE: 82 MMHG | SYSTOLIC BLOOD PRESSURE: 126 MMHG | WEIGHT: 177.2 LBS | OXYGEN SATURATION: 97 % | HEIGHT: 71 IN

## 2017-10-10 DIAGNOSIS — G89.29 OTHER CHRONIC PAIN: Primary | ICD-10-CM

## 2017-10-10 DIAGNOSIS — G47.00 INSOMNIA, UNSPECIFIED TYPE: ICD-10-CM

## 2017-10-10 DIAGNOSIS — R11.0 NAUSEA: ICD-10-CM

## 2017-10-10 DIAGNOSIS — E55.9 VITAMIN D DEFICIENCY: ICD-10-CM

## 2017-10-10 DIAGNOSIS — G89.29 OTHER CHRONIC PAIN: ICD-10-CM

## 2017-10-10 DIAGNOSIS — R41.3 MEMORY DISORDER: ICD-10-CM

## 2017-10-10 DIAGNOSIS — F39 MOOD DISORDER (HCC): ICD-10-CM

## 2017-10-10 PROCEDURE — 99214 OFFICE O/P EST MOD 30 MIN: CPT | Performed by: INTERNAL MEDICINE

## 2017-10-10 RX ORDER — PROMETHAZINE HYDROCHLORIDE 25 MG/1
25 TABLET ORAL EVERY 6 HOURS PRN
Qty: 20 TABLET | Refills: 1 | Status: SHIPPED | OUTPATIENT
Start: 2017-10-10 | End: 2018-07-27 | Stop reason: SDUPTHER

## 2017-10-10 RX ORDER — MULTIVITAMIN WITH IRON
1 TABLET ORAL DAILY
COMMUNITY
End: 2019-08-21

## 2017-10-10 RX ORDER — HYDROCODONE BITARTRATE AND ACETAMINOPHEN 10; 325 MG/1; MG/1
1 TABLET ORAL EVERY 6 HOURS PRN
Qty: 100 TABLET | Refills: 0 | Status: SHIPPED | OUTPATIENT
Start: 2017-10-10 | End: 2018-01-29 | Stop reason: SDUPTHER

## 2017-10-10 NOTE — PROGRESS NOTES
"Subjective   Maggie Malhotra is a 62 y.o. female here for   Chief Complaint   Patient presents with   • Anxiety     5 month follow-up   • Insomnia   • Pain   .    Vitals:    10/10/17 1121   BP: 126/82   BP Location: Left arm   Patient Position: Sitting   Cuff Size: Adult   Pulse: 82   Resp: 17   SpO2: 97%   Weight: 177 lb 3.2 oz (80.4 kg)   Height: 71\" (180.3 cm)       Body mass index is 24.71 kg/(m^2).    Anxiety   Presents for follow-up visit. Symptoms include insomnia and nervous/anxious behavior. Patient reports no chest pain, depressed mood, palpitations or shortness of breath.       Insomnia   This is a chronic problem. The current episode started more than 1 year ago. The problem has been gradually improving. Associated symptoms include arthralgias, fatigue and myalgias. Pertinent negatives include no chest pain, chills, coughing or fever.   Pain   This is a chronic problem. The current episode started more than 1 year ago. The problem occurs constantly. The problem has been unchanged. Associated symptoms include arthralgias, fatigue and myalgias. Pertinent negatives include no chest pain, chills, coughing or fever.   Fatigue   This is a chronic problem. The current episode started more than 1 year ago. The problem occurs constantly. The problem has been unchanged. Associated symptoms include arthralgias, fatigue and myalgias. Pertinent negatives include no chest pain, chills, coughing or fever.        The following portions of the patient's history were reviewed and updated as appropriate: allergies, current medications, past social history and problem list.    Review of Systems   Constitutional: Positive for fatigue. Negative for chills and fever.   Respiratory: Negative for cough, shortness of breath and wheezing.    Cardiovascular: Negative for chest pain, palpitations and leg swelling.   Musculoskeletal: Positive for arthralgias and myalgias.   Psychiatric/Behavioral: Positive for sleep disturbance. " Negative for dysphoric mood. The patient is nervous/anxious and has insomnia.      She stopped lexapro & gabapentin.    Objective   Physical Exam   Constitutional: She appears well-developed and well-nourished. No distress.   Cardiovascular: Normal rate, regular rhythm and normal heart sounds.    Pulmonary/Chest: No respiratory distress. She has no wheezes. She has no rales. She exhibits no tenderness.   Musculoskeletal: She exhibits no edema.   Psychiatric: She has a normal mood and affect. Her behavior is normal.   Nursing note and vitals reviewed.      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    Mood disorder  Comments:  still with anxiety, but no worse off lexapro & gabapentin    Insomnia, unspecified type  Comments:  better with low sugar/gluten diet     Other chronic pain  -     HYDROcodone-acetaminophen (NORCO)  MG per tablet; Take 1 tablet by mouth Every 6 (Six) Hours As Needed for Moderate Pain .    Nausea  Comments:  sporadically-ok to use phenergan prn  Orders:  -     promethazine (PHENERGAN) 25 MG tablet; Take 1 tablet by mouth Every 6 (Six) Hours As Needed for Nausea or Vomiting.    Vitamin D deficiency  Comments:  D=34    Memory disorder  Comments:  neuro recommends neuropsych testing & brain MRI if sx not better with referral to dr hutchinson    Other orders  -     Progesterone Micronized (PROGESTERONE PO); Take 600 mg by mouth Every Night.  -     Magnesium 250 MG tablet; Take 1 tablet by mouth Daily.  -     POTASSIUM PO; Take 50 mg by mouth Daily.     All records from dr rankin reviewed today.

## 2017-10-23 DIAGNOSIS — F41.9 ANXIETY: ICD-10-CM

## 2017-10-23 DIAGNOSIS — M62.838 MUSCLE SPASM: ICD-10-CM

## 2017-10-23 DIAGNOSIS — G62.9 NEUROPATHY: ICD-10-CM

## 2017-10-23 RX ORDER — DIAZEPAM 5 MG/1
5 TABLET ORAL EVERY 12 HOURS PRN
Qty: 60 TABLET | Refills: 0 | OUTPATIENT
Start: 2017-10-23 | End: 2017-11-22 | Stop reason: SDUPTHER

## 2017-10-23 NOTE — TELEPHONE ENCOUNTER
Please review refill request:    Last OV: 10/10/17    Last Prescribed: 9/19/17 # 60 w/ 0 refills    ZHANNA: Good until 12/19/17    Thank you

## 2017-10-23 NOTE — TELEPHONE ENCOUNTER
----- Message from Aniyah Espino MA sent at 10/23/2017 11:26 AM EDT -----  Pt calling for refill    Diazepam 5mg    lov 10/100  Next appt 1/2018    [facility] [place] #[phone]    Pt#[phone]

## 2017-11-22 DIAGNOSIS — G62.9 NEUROPATHY: ICD-10-CM

## 2017-11-22 DIAGNOSIS — M62.838 MUSCLE SPASM: ICD-10-CM

## 2017-11-22 DIAGNOSIS — F41.9 ANXIETY: ICD-10-CM

## 2017-11-22 RX ORDER — DIAZEPAM 5 MG/1
5 TABLET ORAL EVERY 12 HOURS PRN
Qty: 60 TABLET | Refills: 0 | OUTPATIENT
Start: 2017-11-22 | End: 2017-12-19 | Stop reason: SDUPTHER

## 2017-11-22 NOTE — TELEPHONE ENCOUNTER
----- Message from Aniyah Yeh sent at 11/22/2017  9:45 AM EST -----  Contact: Patient  Patient called requesting refill. Please advise.    diazePAM (VALIUM) 5 MG tablet Hormone Cream Base (HRT BASE  zolpidem (AMBIEN) 10 MG tablet      [address] [address] [facility] [phone] [facility] [phone] FX    Pt# [phone]

## 2017-11-22 NOTE — TELEPHONE ENCOUNTER
Please review refill request:    Last Ov:10/10/17    Last Prescribed: Valium 5 mg 10/23/17    Patient was prescribed Zolpidem on 9/27/17 # 30 w/ 2 refills (She still has refills on file)    ZHANNA: Good until 12/19/17    Thank you,    Von

## 2017-12-19 ENCOUNTER — TELEPHONE (OUTPATIENT)
Dept: INTERNAL MEDICINE | Facility: CLINIC | Age: 62
End: 2017-12-19

## 2017-12-19 DIAGNOSIS — G47.00 INSOMNIA, UNSPECIFIED TYPE: ICD-10-CM

## 2017-12-19 DIAGNOSIS — F41.9 ANXIETY: ICD-10-CM

## 2017-12-19 DIAGNOSIS — G62.9 NEUROPATHY: ICD-10-CM

## 2017-12-19 DIAGNOSIS — M62.838 MUSCLE SPASM: ICD-10-CM

## 2017-12-19 RX ORDER — ZOLPIDEM TARTRATE 10 MG/1
10 TABLET ORAL NIGHTLY PRN
Qty: 30 TABLET | Refills: 2 | OUTPATIENT
Start: 2017-12-19 | End: 2018-03-15 | Stop reason: SDUPTHER

## 2017-12-19 RX ORDER — DIAZEPAM 5 MG/1
5 TABLET ORAL EVERY 12 HOURS PRN
Qty: 60 TABLET | Refills: 0 | OUTPATIENT
Start: 2017-12-19 | End: 2018-01-19 | Stop reason: SDUPTHER

## 2017-12-19 NOTE — TELEPHONE ENCOUNTER
----- Message from Aniyah Espino MA sent at 12/19/2017  2:04 PM EST -----  Pt calling for refill    Ambien 10mg  Diazepam 5mg    lov 10/10  Next appt 1/30/2018    [facility] # [phone]

## 2018-01-18 DIAGNOSIS — F41.9 ANXIETY: ICD-10-CM

## 2018-01-18 DIAGNOSIS — M62.838 MUSCLE SPASM: ICD-10-CM

## 2018-01-18 DIAGNOSIS — G62.9 NEUROPATHY: ICD-10-CM

## 2018-01-18 NOTE — TELEPHONE ENCOUNTER
----- Message from Beata Bourgeois sent at 1/18/2018  9:55 AM EST -----  Contact: Patient   Patient called requesting refill on     diazePAM (VALIUM) 5 MG tablet      Patient:  [phone]    [address] [address] [facility] [phone] [facility] [phone]

## 2018-01-19 RX ORDER — DIAZEPAM 5 MG/1
5 TABLET ORAL EVERY 12 HOURS PRN
Qty: 60 TABLET | Refills: 0 | OUTPATIENT
Start: 2018-01-19 | End: 2018-03-07 | Stop reason: SDUPTHER

## 2018-01-19 NOTE — TELEPHONE ENCOUNTER
Please review refill request:    Last OV:  10/10/17    Last Prescribed: 12/19/17    ZHANNA: Ordered    Thank you,    Von

## 2018-01-29 DIAGNOSIS — G89.29 OTHER CHRONIC PAIN: ICD-10-CM

## 2018-01-29 RX ORDER — HYDROCODONE BITARTRATE AND ACETAMINOPHEN 10; 325 MG/1; MG/1
1 TABLET ORAL EVERY 6 HOURS PRN
Qty: 100 TABLET | Refills: 0 | Status: SHIPPED | OUTPATIENT
Start: 2018-01-29 | End: 2018-05-21 | Stop reason: SDUPTHER

## 2018-01-29 NOTE — TELEPHONE ENCOUNTER
----- Message from Kitty Barriga sent at 1/29/2018  2:40 PM EST -----  Contact: pt  Pt has gone back to work.    Pt calling would like refill on     RX: HYDROcodone-acetaminophen (NORCO)  MG per tablet    Please call when ready for pickup      Pt#[phone] can leave message.    Advised 2-3 days before refill is ready and WE WILL CALL

## 2018-01-29 NOTE — TELEPHONE ENCOUNTER
Please review refill request:    Last OV: 10/10/17    Last Prescribed: 10/10/17    ZHANNA: Ordered    Thank you,    Von

## 2018-02-07 ENCOUNTER — OFFICE VISIT (OUTPATIENT)
Dept: INTERNAL MEDICINE | Facility: CLINIC | Age: 63
End: 2018-02-07

## 2018-02-07 VITALS
BODY MASS INDEX: 25.23 KG/M2 | DIASTOLIC BLOOD PRESSURE: 98 MMHG | SYSTOLIC BLOOD PRESSURE: 142 MMHG | WEIGHT: 180.2 LBS | HEIGHT: 71 IN

## 2018-02-07 DIAGNOSIS — F39 MOOD DISORDER (HCC): ICD-10-CM

## 2018-02-07 DIAGNOSIS — Z11.59 SCREENING FOR VIRAL DISEASE: ICD-10-CM

## 2018-02-07 DIAGNOSIS — R41.3 MEMORY DISORDER: ICD-10-CM

## 2018-02-07 DIAGNOSIS — R03.0 BLOOD PRESSURE ELEVATED WITHOUT HISTORY OF HTN: ICD-10-CM

## 2018-02-07 DIAGNOSIS — G89.29 OTHER CHRONIC PAIN: Primary | ICD-10-CM

## 2018-02-07 DIAGNOSIS — R53.82 CHRONIC FATIGUE: ICD-10-CM

## 2018-02-07 DIAGNOSIS — J30.2 SEASONAL ALLERGIC RHINITIS, UNSPECIFIED CHRONICITY, UNSPECIFIED TRIGGER: ICD-10-CM

## 2018-02-07 DIAGNOSIS — M79.7 FIBROMYALGIA, PRIMARY: ICD-10-CM

## 2018-02-07 DIAGNOSIS — G47.00 INSOMNIA, UNSPECIFIED TYPE: ICD-10-CM

## 2018-02-07 DIAGNOSIS — Z78.0 MENOPAUSE: ICD-10-CM

## 2018-02-07 PROCEDURE — 99214 OFFICE O/P EST MOD 30 MIN: CPT | Performed by: INTERNAL MEDICINE

## 2018-02-07 RX ORDER — NORTRIPTYLINE HYDROCHLORIDE 25 MG/1
1 CAPSULE ORAL DAILY
COMMUNITY
Start: 2018-02-06 | End: 2018-06-07 | Stop reason: DRUGHIGH

## 2018-02-07 NOTE — PROGRESS NOTES
"Subjective   Maggie Malhotra is a 62 y.o. female here for   Chief Complaint   Patient presents with   • Anxiety   • Insomnia   • Pain   .    Vitals:    02/07/18 0857 02/07/18 0930   BP: 148/100 142/98   BP Location: Left arm    Patient Position: Sitting    Cuff Size: Adult    Weight: 81.7 kg (180 lb 3.2 oz)    Height: 180.3 cm (71\")        Body mass index is 25.13 kg/(m^2).    Anxiety   Presents for follow-up visit. Symptoms include insomnia and nervous/anxious behavior (better). Patient reports no chest pain, palpitations or shortness of breath.       Insomnia   This is a chronic problem. The current episode started more than 1 year ago. The problem has been unchanged. Associated symptoms include arthralgias (knees) and fatigue. Pertinent negatives include no chest pain, chills, coughing or fever.   Pain   This is a chronic problem. The current episode started more than 1 year ago. The problem occurs constantly. The problem has been unchanged. Associated symptoms include arthralgias (knees) and fatigue. Pertinent negatives include no chest pain, chills, coughing or fever.        The following portions of the patient's history were reviewed and updated as appropriate: allergies, current medications, past social history and problem list.    Review of Systems   Constitutional: Positive for fatigue. Negative for chills and fever.   Respiratory: Negative for cough, shortness of breath and wheezing.    Cardiovascular: Negative for chest pain, palpitations and leg swelling.   Musculoskeletal: Positive for arthralgias (knees) and back pain.   Psychiatric/Behavioral: Positive for dysphoric mood (mild) and sleep disturbance. The patient is nervous/anxious (better) and has insomnia.        Objective   Physical Exam   Constitutional: She appears well-developed and well-nourished. No distress.   Cardiovascular: Normal rate, regular rhythm and normal heart sounds.    Pulmonary/Chest: No respiratory distress. She has no " wheezes. She has no rales. She exhibits no tenderness.   Musculoskeletal: She exhibits no edema.   Psychiatric: She has a normal mood and affect. Her behavior is normal.   Nursing note and vitals reviewed.      Assessment/Plan   Diagnoses and all orders for this visit:    Other chronic pain  Comments:  reggie knees & low back - needing hydrocodone 1/2-1 daily - call if worse    Seasonal allergic rhinitis, unspecified chronicity, unspecified trigger  Comments:  stable    Fibromyalgia, primary  Comments:  still with pain & fatigue    Insomnia, unspecified type  Comments:  still needing ambien nightly    Memory disorder  Comments:  improved since return to work    Mood disorder  Comments:  improved - ok to use valium prn but not if driving - call if problems    Blood pressure elevated without history of HTN  Comments:  need low salt diet & daily exercise - need weekly chk & call if bp over 140/90  Orders:  -     CBC Auto Differential; Future  -     Comprehensive Metabolic Panel; Future  -     Lipid Panel; Future  -     Urinalysis With / Microscopic If Indicated - Urine, Clean Catch; Future    Chronic fatigue  -     CBC Auto Differential; Future  -     Comprehensive Metabolic Panel; Future  -     TSH; Future    Screening for viral disease  -     Hepatitis C antibody; Future    Menopause  -     DEXA Bone Density Axial; Future    Other orders  -     nortriptyline (PAMELOR) 25 MG capsule; Take 1 capsule by mouth Daily.  -     Progesterone Micronized (PROGESTERONE PO); TAKE TWO CAPSULES BY MOUTH AT BEDTIME  -     Unable to find; Compound Drug Testosterone 20mg/g / Estradiol 1.4  -     cholecalciferol (VITAMIN D3) 47040 units capsule; Take 10,000 Units by mouth Daily.

## 2018-02-16 ENCOUNTER — LAB (OUTPATIENT)
Dept: LAB | Facility: HOSPITAL | Age: 63
End: 2018-02-16

## 2018-02-16 DIAGNOSIS — R53.82 CHRONIC FATIGUE: ICD-10-CM

## 2018-02-16 DIAGNOSIS — Z11.59 SCREENING FOR VIRAL DISEASE: ICD-10-CM

## 2018-02-16 DIAGNOSIS — R03.0 BLOOD PRESSURE ELEVATED WITHOUT HISTORY OF HTN: ICD-10-CM

## 2018-02-16 LAB
ALBUMIN SERPL-MCNC: 4.1 G/DL (ref 3.5–5.2)
ALBUMIN/GLOB SERPL: 1.5 G/DL
ALP SERPL-CCNC: 61 U/L (ref 39–117)
ALT SERPL W P-5'-P-CCNC: 17 U/L (ref 1–33)
ANION GAP SERPL CALCULATED.3IONS-SCNC: 9.8 MMOL/L
AST SERPL-CCNC: 24 U/L (ref 1–32)
BACTERIA UR QL AUTO: ABNORMAL /HPF
BASOPHILS # BLD AUTO: 0.04 10*3/MM3 (ref 0–0.2)
BASOPHILS NFR BLD AUTO: 0.6 % (ref 0–1.5)
BILIRUB SERPL-MCNC: 0.4 MG/DL (ref 0.1–1.2)
BILIRUB UR QL STRIP: NEGATIVE
BUN BLD-MCNC: 8 MG/DL (ref 8–23)
BUN/CREAT SERPL: 9.2 (ref 7–25)
CALCIUM SPEC-SCNC: 9.1 MG/DL (ref 8.6–10.5)
CHLORIDE SERPL-SCNC: 96 MMOL/L (ref 98–107)
CHOLEST SERPL-MCNC: 146 MG/DL (ref 0–200)
CLARITY UR: CLEAR
CO2 SERPL-SCNC: 29.2 MMOL/L (ref 22–29)
COLOR UR: YELLOW
CREAT BLD-MCNC: 0.87 MG/DL (ref 0.57–1)
DEPRECATED RDW RBC AUTO: 45.6 FL (ref 37–54)
EOSINOPHIL # BLD AUTO: 0.26 10*3/MM3 (ref 0–0.7)
EOSINOPHIL NFR BLD AUTO: 4 % (ref 0.3–6.2)
ERYTHROCYTE [DISTWIDTH] IN BLOOD BY AUTOMATED COUNT: 12.3 % (ref 11.7–13)
GFR SERPL CREATININE-BSD FRML MDRD: 66 ML/MIN/1.73
GLOBULIN UR ELPH-MCNC: 2.8 GM/DL
GLUCOSE BLD-MCNC: 103 MG/DL (ref 65–99)
GLUCOSE UR STRIP-MCNC: NEGATIVE MG/DL
HCT VFR BLD AUTO: 43.6 % (ref 35.6–45.5)
HCV AB SER DONR QL: NORMAL
HDLC SERPL-MCNC: 73 MG/DL (ref 40–60)
HGB BLD-MCNC: 14.4 G/DL (ref 11.9–15.5)
HGB UR QL STRIP.AUTO: ABNORMAL
HYALINE CASTS UR QL AUTO: ABNORMAL /LPF
IMM GRANULOCYTES # BLD: 0 10*3/MM3 (ref 0–0.03)
IMM GRANULOCYTES NFR BLD: 0 % (ref 0–0.5)
KETONES UR QL STRIP: NEGATIVE
LDLC SERPL CALC-MCNC: 62 MG/DL (ref 0–100)
LDLC/HDLC SERPL: 0.85 {RATIO}
LEUKOCYTE ESTERASE UR QL STRIP.AUTO: NEGATIVE
LYMPHOCYTES # BLD AUTO: 2.04 10*3/MM3 (ref 0.9–4.8)
LYMPHOCYTES NFR BLD AUTO: 31.7 % (ref 19.6–45.3)
MCH RBC QN AUTO: 33.2 PG (ref 26.9–32)
MCHC RBC AUTO-ENTMCNC: 33 G/DL (ref 32.4–36.3)
MCV RBC AUTO: 100.5 FL (ref 80.5–98.2)
MONOCYTES # BLD AUTO: 0.77 10*3/MM3 (ref 0.2–1.2)
MONOCYTES NFR BLD AUTO: 12 % (ref 5–12)
NEUTROPHILS # BLD AUTO: 3.33 10*3/MM3 (ref 1.9–8.1)
NEUTROPHILS NFR BLD AUTO: 51.7 % (ref 42.7–76)
NITRITE UR QL STRIP: NEGATIVE
PH UR STRIP.AUTO: 7.5 [PH] (ref 5–8)
PLATELET # BLD AUTO: 274 10*3/MM3 (ref 140–500)
PMV BLD AUTO: 9.1 FL (ref 6–12)
POTASSIUM BLD-SCNC: 4.5 MMOL/L (ref 3.5–5.2)
PROT SERPL-MCNC: 6.9 G/DL (ref 6–8.5)
PROT UR QL STRIP: NEGATIVE
RBC # BLD AUTO: 4.34 10*6/MM3 (ref 3.9–5.2)
RBC # UR: ABNORMAL /HPF
REF LAB TEST METHOD: ABNORMAL
SODIUM BLD-SCNC: 135 MMOL/L (ref 136–145)
SP GR UR STRIP: <=1.005 (ref 1–1.03)
SQUAMOUS #/AREA URNS HPF: ABNORMAL /HPF
TRIGL SERPL-MCNC: 56 MG/DL (ref 0–150)
TSH SERPL DL<=0.05 MIU/L-ACNC: 3.75 MIU/ML (ref 0.27–4.2)
UROBILINOGEN UR QL STRIP: ABNORMAL
VLDLC SERPL-MCNC: 11.2 MG/DL (ref 5–40)
WBC NRBC COR # BLD: 6.44 10*3/MM3 (ref 4.5–10.7)
WBC UR QL AUTO: ABNORMAL /HPF

## 2018-02-16 PROCEDURE — 84443 ASSAY THYROID STIM HORMONE: CPT

## 2018-02-16 PROCEDURE — 36415 COLL VENOUS BLD VENIPUNCTURE: CPT

## 2018-02-16 PROCEDURE — 86803 HEPATITIS C AB TEST: CPT

## 2018-02-16 PROCEDURE — 80061 LIPID PANEL: CPT

## 2018-02-16 PROCEDURE — 80053 COMPREHEN METABOLIC PANEL: CPT

## 2018-02-16 PROCEDURE — 81001 URINALYSIS AUTO W/SCOPE: CPT

## 2018-02-16 PROCEDURE — 85025 COMPLETE CBC W/AUTO DIFF WBC: CPT

## 2018-03-07 DIAGNOSIS — G62.9 NEUROPATHY: ICD-10-CM

## 2018-03-07 DIAGNOSIS — M62.838 MUSCLE SPASM: ICD-10-CM

## 2018-03-07 DIAGNOSIS — F41.9 ANXIETY: ICD-10-CM

## 2018-03-07 RX ORDER — DIAZEPAM 5 MG/1
5 TABLET ORAL EVERY 12 HOURS PRN
Qty: 60 TABLET | Refills: 2 | OUTPATIENT
Start: 2018-03-07 | End: 2018-04-30 | Stop reason: SDUPTHER

## 2018-03-07 NOTE — TELEPHONE ENCOUNTER
----- Message from Maribel Rothman sent at 3/7/2018 10:26 AM EST -----  Contact: pt - Dr Lozano's pt - RE: script  Pt calling [facility] would like a refill on Rx      diazePAM (VALIUM) 5 MG tablet 60 tablet      Sig - Route: Take 1 tablet by mouth Every 12 (Twelve) Hours As Needed for Anxiety or Sleep. - [address] [address] [facility] [phone] [facility] [phone] FX    Pt # [phone]

## 2018-03-15 DIAGNOSIS — G47.00 INSOMNIA, UNSPECIFIED TYPE: ICD-10-CM

## 2018-03-15 RX ORDER — ZOLPIDEM TARTRATE 10 MG/1
10 TABLET ORAL NIGHTLY PRN
Qty: 30 TABLET | Refills: 2 | OUTPATIENT
Start: 2018-03-15 | End: 2018-06-07 | Stop reason: SDUPTHER

## 2018-03-15 NOTE — TELEPHONE ENCOUNTER
----- Message from Kitty Barriga sent at 3/15/2018  9:34 AM EDT -----  Contact: pt  Pt is calling for a refill     FOR  zolpidem (AMBIEN) 10 MG tablet      Patient requests RX SENT TO   [facility] [address] [place] [id number] [facility] [phone] [facility] [phone] FX      Caller# [phone]    Please and thank you.

## 2018-04-23 ENCOUNTER — TELEPHONE (OUTPATIENT)
Dept: INTERNAL MEDICINE | Facility: CLINIC | Age: 63
End: 2018-04-23

## 2018-04-30 DIAGNOSIS — G62.9 NEUROPATHY: ICD-10-CM

## 2018-04-30 DIAGNOSIS — M62.838 MUSCLE SPASM: ICD-10-CM

## 2018-04-30 DIAGNOSIS — F41.9 ANXIETY: ICD-10-CM

## 2018-04-30 RX ORDER — DIAZEPAM 5 MG/1
TABLET ORAL
Qty: 60 TABLET | Refills: 2 | OUTPATIENT
Start: 2018-04-30 | End: 2018-09-17 | Stop reason: SDUPTHER

## 2018-05-21 DIAGNOSIS — G89.29 OTHER CHRONIC PAIN: ICD-10-CM

## 2018-05-21 RX ORDER — HYDROCODONE BITARTRATE AND ACETAMINOPHEN 10; 325 MG/1; MG/1
1 TABLET ORAL EVERY 6 HOURS PRN
Qty: 100 TABLET | Refills: 0 | Status: SHIPPED | OUTPATIENT
Start: 2018-05-21 | End: 2018-09-04 | Stop reason: SDUPTHER

## 2018-05-21 NOTE — TELEPHONE ENCOUNTER
----- Message from Kitty Barriga sent at 5/21/2018 10:38 AM EDT -----  Contact: Pt  Pt calling would like refill on     RX: HYDROcodone-acetaminophen (NORCO)  MG per tablet    Please call when ready for pickup      Pt#[phone]  LM     Advised 2-3 days before refill is ready and WE WILL CALL

## 2018-06-07 ENCOUNTER — OFFICE VISIT (OUTPATIENT)
Dept: INTERNAL MEDICINE | Facility: CLINIC | Age: 63
End: 2018-06-07

## 2018-06-07 VITALS
DIASTOLIC BLOOD PRESSURE: 82 MMHG | HEIGHT: 71 IN | BODY MASS INDEX: 26.12 KG/M2 | SYSTOLIC BLOOD PRESSURE: 136 MMHG | WEIGHT: 186.6 LBS

## 2018-06-07 DIAGNOSIS — F39 MOOD DISORDER (HCC): ICD-10-CM

## 2018-06-07 DIAGNOSIS — D75.89 INCREASED MCV: ICD-10-CM

## 2018-06-07 DIAGNOSIS — G89.29 OTHER CHRONIC PAIN: Primary | ICD-10-CM

## 2018-06-07 DIAGNOSIS — R53.82 CHRONIC FATIGUE: ICD-10-CM

## 2018-06-07 DIAGNOSIS — R03.0 BLOOD PRESSURE ELEVATED WITHOUT HISTORY OF HTN: ICD-10-CM

## 2018-06-07 DIAGNOSIS — G47.00 INSOMNIA, UNSPECIFIED TYPE: ICD-10-CM

## 2018-06-07 DIAGNOSIS — M79.7 FIBROMYALGIA, PRIMARY: ICD-10-CM

## 2018-06-07 LAB
FOLATE SERPL-MCNC: 17.58 NG/ML (ref 4.78–24.2)
TSH SERPL DL<=0.05 MIU/L-ACNC: 1.59 MIU/ML (ref 0.27–4.2)
VIT B12 BLD-MCNC: 1246 PG/ML (ref 211–946)

## 2018-06-07 PROCEDURE — 82607 VITAMIN B-12: CPT | Performed by: INTERNAL MEDICINE

## 2018-06-07 PROCEDURE — 84443 ASSAY THYROID STIM HORMONE: CPT | Performed by: INTERNAL MEDICINE

## 2018-06-07 PROCEDURE — 82746 ASSAY OF FOLIC ACID SERUM: CPT | Performed by: INTERNAL MEDICINE

## 2018-06-07 PROCEDURE — 36415 COLL VENOUS BLD VENIPUNCTURE: CPT | Performed by: INTERNAL MEDICINE

## 2018-06-07 PROCEDURE — 99214 OFFICE O/P EST MOD 30 MIN: CPT | Performed by: INTERNAL MEDICINE

## 2018-06-07 RX ORDER — ZOLPIDEM TARTRATE 10 MG/1
10 TABLET ORAL NIGHTLY PRN
Qty: 30 TABLET | Refills: 2 | OUTPATIENT
Start: 2018-06-07 | End: 2018-09-04 | Stop reason: SDUPTHER

## 2018-06-07 RX ORDER — NORTRIPTYLINE HYDROCHLORIDE 50 MG/1
50 CAPSULE ORAL NIGHTLY
COMMUNITY
Start: 2018-04-19 | End: 2018-12-06

## 2018-06-07 RX ORDER — NORTRIPTYLINE HYDROCHLORIDE 50 MG/1
50 CAPSULE ORAL NIGHTLY
COMMUNITY
End: 2018-06-07
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
## 2018-06-11 DIAGNOSIS — G47.00 INSOMNIA, UNSPECIFIED TYPE: ICD-10-CM

## 2018-06-11 RX ORDER — ZOLPIDEM TARTRATE 10 MG/1
TABLET ORAL
Qty: 30 TABLET | Refills: 2 | OUTPATIENT
Start: 2018-06-11

## 2018-07-26 ENCOUNTER — TELEPHONE (OUTPATIENT)
Dept: INTERNAL MEDICINE | Facility: CLINIC | Age: 63
End: 2018-07-26

## 2018-07-26 NOTE — TELEPHONE ENCOUNTER
----- Message from Maribel Rothman sent at 7/26/2018 12:56 PM EDT -----  Contact: pt - Dr Lozano's pt - RE: Rx refill  Pt calling and would like a return call regarding pt having a stomach bug, Pt has vomiting, diarrhea, cramping no fever. Pt is drinking 7-up. Pt would like a Rx called to pt's pharmacy. Could you please call Rx to pharmacy? Please advise. Thanks      promethazine (PHENERGAN) 25 MG tablet 20 tablet     Sig - Route: Take 1 tablet by mouth Every 6 (Six) Hours As Needed for Nausea or Vomiting. - Oral   Sent to pharmacy as: promethazine 25 MG PO tablet     [address] [address] [facility] [phone] [facility] [phone] FX    Pt # [phone]

## 2018-07-27 DIAGNOSIS — R11.0 NAUSEA: ICD-10-CM

## 2018-07-27 RX ORDER — PROMETHAZINE HYDROCHLORIDE 25 MG/1
25 TABLET ORAL EVERY 6 HOURS PRN
Qty: 20 TABLET | Refills: 0 | OUTPATIENT
Start: 2018-07-27 | End: 2018-12-06

## 2018-09-04 DIAGNOSIS — G47.00 INSOMNIA, UNSPECIFIED TYPE: ICD-10-CM

## 2018-09-04 DIAGNOSIS — G89.29 OTHER CHRONIC PAIN: ICD-10-CM

## 2018-09-04 RX ORDER — HYDROCODONE BITARTRATE AND ACETAMINOPHEN 10; 325 MG/1; MG/1
1 TABLET ORAL EVERY 8 HOURS PRN
Qty: 15 TABLET | Refills: 0 | Status: SHIPPED | OUTPATIENT
Start: 2018-09-04 | End: 2018-10-09 | Stop reason: SDUPTHER

## 2018-09-04 RX ORDER — ZOLPIDEM TARTRATE 10 MG/1
10 TABLET ORAL NIGHTLY PRN
Qty: 30 TABLET | Refills: 2 | OUTPATIENT
Start: 2018-09-04 | End: 2018-11-29 | Stop reason: SDUPTHER

## 2018-09-04 NOTE — TELEPHONE ENCOUNTER
----- Message from Maribel Rothman sent at 9/4/2018 11:40 AM EDT -----  Contact: pt - Dr Lozano's pt - RE: Rx refill  Pt calling and would like a refill on Rx    zolpidem (AMBIEN) 10 MG tablet 30 tablet     Sig - Route: Take 1 tablet by mouth At Night As Needed for Sleep. - Oral     HYDROcodone-acetaminophen (NORCO)  MG per tablet 100 tablet     Pt # [phone]  pls lm if no answer    Last OV   06/07  Next OV 11/13  ZHANNA done 05/21  please review med refill and advise

## 2018-09-17 DIAGNOSIS — F41.9 ANXIETY: ICD-10-CM

## 2018-09-17 DIAGNOSIS — M62.838 MUSCLE SPASM: ICD-10-CM

## 2018-09-17 DIAGNOSIS — G62.9 NEUROPATHY: ICD-10-CM

## 2018-09-17 RX ORDER — DIAZEPAM 5 MG/1
TABLET ORAL
Qty: 60 TABLET | Refills: 2 | OUTPATIENT
Start: 2018-09-17 | End: 2019-02-21 | Stop reason: SDUPTHER

## 2018-09-19 ENCOUNTER — HOSPITAL ENCOUNTER (EMERGENCY)
Facility: HOSPITAL | Age: 63
Discharge: HOME | End: 2018-09-19
Attending: EMERGENCY MEDICINE | Admitting: EMERGENCY MEDICINE

## 2018-09-19 ENCOUNTER — APPOINTMENT (OUTPATIENT)
Dept: GENERAL RADIOLOGY | Facility: HOSPITAL | Age: 63
End: 2018-09-19

## 2018-09-19 ENCOUNTER — APPOINTMENT (OUTPATIENT)
Dept: CARDIOLOGY | Facility: HOSPITAL | Age: 63
End: 2018-09-19
Attending: EMERGENCY MEDICINE

## 2018-09-19 VITALS
HEART RATE: 96 BPM | TEMPERATURE: 98.6 F | HEIGHT: 71 IN | OXYGEN SATURATION: 99 % | WEIGHT: 190 LBS | RESPIRATION RATE: 18 BRPM | SYSTOLIC BLOOD PRESSURE: 152 MMHG | DIASTOLIC BLOOD PRESSURE: 99 MMHG | BODY MASS INDEX: 26.6 KG/M2

## 2018-09-19 DIAGNOSIS — R55 SYNCOPE, UNSPECIFIED SYNCOPE TYPE: Primary | ICD-10-CM

## 2018-09-19 LAB
AMPHET+METHAMPHET UR QL: NEGATIVE
ANION GAP SERPL CALCULATED.3IONS-SCNC: 13.3 MMOL/L
BACTERIA UR QL AUTO: ABNORMAL /HPF
BARBITURATES UR QL SCN: NEGATIVE
BASOPHILS # BLD AUTO: 0.03 10*3/MM3 (ref 0–0.2)
BASOPHILS NFR BLD AUTO: 0.4 % (ref 0–1.5)
BENZODIAZ UR QL SCN: POSITIVE
BILIRUB UR QL STRIP: NEGATIVE
BUN BLD-MCNC: 12 MG/DL (ref 8–23)
BUN/CREAT SERPL: 12.8 (ref 7–25)
CALCIUM SPEC-SCNC: 8.7 MG/DL (ref 8.6–10.5)
CANNABINOIDS SERPL QL: NEGATIVE
CHLORIDE SERPL-SCNC: 97 MMOL/L (ref 98–107)
CLARITY UR: CLEAR
CO2 SERPL-SCNC: 26.7 MMOL/L (ref 22–29)
COCAINE UR QL: NEGATIVE
COLOR UR: YELLOW
CREAT BLD-MCNC: 0.94 MG/DL (ref 0.57–1)
D DIMER PPP FEU-MCNC: 0.54 MCGFEU/ML (ref 0–0.49)
DEPRECATED RDW RBC AUTO: 46.3 FL (ref 37–54)
EOSINOPHIL # BLD AUTO: 0.17 10*3/MM3 (ref 0–0.7)
EOSINOPHIL NFR BLD AUTO: 2.4 % (ref 0.3–6.2)
ERYTHROCYTE [DISTWIDTH] IN BLOOD BY AUTOMATED COUNT: 12.6 % (ref 11.7–13)
GFR SERPL CREATININE-BSD FRML MDRD: 60 ML/MIN/1.73
GLUCOSE BLD-MCNC: 140 MG/DL (ref 65–99)
GLUCOSE UR STRIP-MCNC: NEGATIVE MG/DL
HCT VFR BLD AUTO: 43.5 % (ref 35.6–45.5)
HGB BLD-MCNC: 14.5 G/DL (ref 11.9–15.5)
HGB UR QL STRIP.AUTO: NEGATIVE
HYALINE CASTS UR QL AUTO: ABNORMAL /LPF
IMM GRANULOCYTES # BLD: 0 10*3/MM3 (ref 0–0.03)
IMM GRANULOCYTES NFR BLD: 0 % (ref 0–0.5)
INR PPP: 0.97 (ref 0.9–1.1)
KETONES UR QL STRIP: NEGATIVE
LEUKOCYTE ESTERASE UR QL STRIP.AUTO: ABNORMAL
LYMPHOCYTES # BLD AUTO: 2.21 10*3/MM3 (ref 0.9–4.8)
LYMPHOCYTES NFR BLD AUTO: 31.4 % (ref 19.6–45.3)
MCH RBC QN AUTO: 33.1 PG (ref 26.9–32)
MCHC RBC AUTO-ENTMCNC: 33.3 G/DL (ref 32.4–36.3)
MCV RBC AUTO: 99.3 FL (ref 80.5–98.2)
METHADONE UR QL SCN: NEGATIVE
MONOCYTES # BLD AUTO: 0.69 10*3/MM3 (ref 0.2–1.2)
MONOCYTES NFR BLD AUTO: 9.8 % (ref 5–12)
NEUTROPHILS # BLD AUTO: 3.94 10*3/MM3 (ref 1.9–8.1)
NEUTROPHILS NFR BLD AUTO: 56 % (ref 42.7–76)
NITRITE UR QL STRIP: NEGATIVE
OPIATES UR QL: POSITIVE
OXYCODONE UR QL SCN: NEGATIVE
PH UR STRIP.AUTO: 7.5 [PH] (ref 5–8)
PLATELET # BLD AUTO: 227 10*3/MM3 (ref 140–500)
PMV BLD AUTO: 9.2 FL (ref 6–12)
POTASSIUM BLD-SCNC: 3.9 MMOL/L (ref 3.5–5.2)
PROT UR QL STRIP: ABNORMAL
PROTHROMBIN TIME: 12.7 SECONDS (ref 11.7–14.2)
RBC # BLD AUTO: 4.38 10*6/MM3 (ref 3.9–5.2)
RBC # UR: ABNORMAL /HPF
REF LAB TEST METHOD: ABNORMAL
SODIUM BLD-SCNC: 137 MMOL/L (ref 136–145)
SP GR UR STRIP: 1.01 (ref 1–1.03)
SQUAMOUS #/AREA URNS HPF: ABNORMAL /HPF
TROPONIN T SERPL-MCNC: <0.01 NG/ML (ref 0–0.03)
UROBILINOGEN UR QL STRIP: ABNORMAL
WBC NRBC COR # BLD: 7.04 10*3/MM3 (ref 4.5–10.7)
WBC UR QL AUTO: ABNORMAL /HPF

## 2018-09-19 PROCEDURE — 81001 URINALYSIS AUTO W/SCOPE: CPT | Performed by: EMERGENCY MEDICINE

## 2018-09-19 PROCEDURE — 99284 EMERGENCY DEPT VISIT MOD MDM: CPT

## 2018-09-19 PROCEDURE — 80307 DRUG TEST PRSMV CHEM ANLYZR: CPT | Performed by: EMERGENCY MEDICINE

## 2018-09-19 PROCEDURE — 93005 ELECTROCARDIOGRAM TRACING: CPT | Performed by: EMERGENCY MEDICINE

## 2018-09-19 PROCEDURE — 71046 X-RAY EXAM CHEST 2 VIEWS: CPT

## 2018-09-19 PROCEDURE — 85379 FIBRIN DEGRADATION QUANT: CPT | Performed by: EMERGENCY MEDICINE

## 2018-09-19 PROCEDURE — 80048 BASIC METABOLIC PNL TOTAL CA: CPT | Performed by: EMERGENCY MEDICINE

## 2018-09-19 PROCEDURE — 84484 ASSAY OF TROPONIN QUANT: CPT | Performed by: EMERGENCY MEDICINE

## 2018-09-19 PROCEDURE — 85025 COMPLETE CBC W/AUTO DIFF WBC: CPT | Performed by: EMERGENCY MEDICINE

## 2018-09-19 PROCEDURE — 0296T HC EXT ECG > 48HR TO 21 DAY RCRD W/CONECT INTL RCRD: CPT

## 2018-09-19 PROCEDURE — 93010 ELECTROCARDIOGRAM REPORT: CPT | Performed by: INTERNAL MEDICINE

## 2018-09-19 PROCEDURE — 85610 PROTHROMBIN TIME: CPT | Performed by: EMERGENCY MEDICINE

## 2018-09-19 RX ORDER — SODIUM CHLORIDE 0.9 % (FLUSH) 0.9 %
10 SYRINGE (ML) INJECTION AS NEEDED
Status: DISCONTINUED | OUTPATIENT
Start: 2018-09-19 | End: 2018-09-19 | Stop reason: HOSPADM

## 2018-09-19 NOTE — ED PROVIDER NOTES
" EMERGENCY DEPARTMENT ENCOUNTER    Room Number:  12/12  Date seen:  9/19/2018  Time seen: 9:27 AM  PCP: Kimberly Lozano MD  Historian: patient, patient's co-workers      HPI:  Chief Complaint: syncope  Context: Maggie Malhotra is a 63 y.o. female who presents to the ED s/p multiple syncopal episodes while at work. Pt states that she was reading notes and she becaem very hot and lightheaded suddenly. Her co-workers state that she was sitting at her desk at her computer and she \"slumped over\". They were able to arouse her but then she would \"go out of it\" again. This happened multiple times so they put her on a stretcher and brought her down to the ER. Pt does not think she ever lost consciousness entirely. She is also very diaphoretic. Pt's co-workers state that she complained of feeling \"a little dizzy\" before work.  She has no other complaints at this time.     Pain Location: n/a  Radiation: n/a  Quality: syncope  Intensity/Severity: moderate  Duration: just prior to arrival  Onset quality: sudden  Timing: constant  Progression: improving  Aggravating Factors: none  Alleviating Factors: none  Previous Episodes: none  Treatment before arrival: none  Associated Symptoms: lightheadedness, diaphoresis, dizziness    PAST MEDICAL HISTORY  Active Ambulatory Problems     Diagnosis Date Noted   • Chronic pain 04/19/2016   • IBS (irritable bowel syndrome) 04/19/2016   • Vitamin D deficiency 04/19/2016   • Insomnia 04/19/2016   • Allergic rhinitis 04/19/2016   • Fibromyalgia, primary 01/10/2017   • Mood disorder (CMS/[facility]) 05/11/2017   • Elevated blood pressure 05/11/2017   • Memory disorder 10/10/2017   • Blood pressure elevated without history of HTN 02/07/2018   • Chronic fatigue 02/07/2018   • Increased MCV 06/07/2018     Resolved Ambulatory Problems     Diagnosis Date Noted   • Migraine 04/19/2016     Past Medical History:   Diagnosis Date   • Allergic rhinitis    • Anxiety    • Backache    • Broken foot 06/01/2002 "   • Chronic pain    • Depression    • Fatigue    • Fibromyalgia, primary    • Guillain Barré syndrome (CMS/HCC)    • H/O mammogram 01/01/2012   • H/O: pneumonia    • History of broken leg 01/01/1998   • Hypotension    • IBS (irritable bowel syndrome)    • Insomnia    • Migraine    • Multiple joint pain    • Night sweat    • Vitamin D deficiency          PAST SURGICAL HISTORY  Past Surgical History:   Procedure Laterality Date   • BREAST AUGMENTATION BILATERAL MASTOPEXY Bilateral 01/01/1998   • COLONOSCOPY N/A 05/18/2010    Dr. Gurmeet Justice   • COLONOSCOPY W/ BIOPSIES  06/27/2014    Non-thrombosed external hemorrhoids, Diverticulosis in the sigmoid colon, two 4 to 5 mm polyps in the sigmoid colon and the descending colon (pathology: Hyperplastic polyp), Tortuous colon, Internal hemorrhoids   • ENDOSCOPY N/A 06/27/2014    Z-line irregular, 38 cm from the incisors, Gastritis, Gastric polyps, Duodenal erosions without bleeding    • ENDOSCOPY N/A 05/18/2010    Dr. Gurmeet Justice   • HYSTERECTOMY N/A 02/01/2006   • LIPOSUCTION N/A 04/01/2001   • PAP SMEAR N/A 07/10/2013   • TOE SURGERY Right 01/01/1995   • TONSILLECTOMY N/A 01/01/1964   • TRACHEAL SURGERY N/A 01/01/1986    [facility]   • TRACHEOSTOMY           FAMILY HISTORY  Family History   Problem Relation Age of Onset   • Heart failure Father    • Other Father         Lung failure         SOCIAL HISTORY  Social History     Social History   • Marital status:      Spouse name: N/A   • Number of children: N/A   • Years of education: N/A     Occupational History   • Not on file.     Social History Main Topics   • Smoking status: Never Smoker   • Smokeless tobacco: Never Used   • Alcohol use 1.8 oz/week     3 Cans of beer per week      Comment: Moderate   • Drug use: No   • Sexual activity: Not Currently     Partners: Male     Other Topics Concern   • Not on file     Social History Narrative   • No narrative on file         ALLERGIES  Celexa [citalopram  hydrobromide]; Codeine; Cymbalta [duloxetine hcl]; Erythromycin; Influenza vaccines; and Midazolam        REVIEW OF SYSTEMS  Review of Systems   Constitutional: Positive for diaphoresis. Negative for fever.   HENT: Negative for sore throat.    Respiratory: Negative for shortness of breath.    Cardiovascular: Negative for chest pain.   Gastrointestinal: Negative for abdominal pain.   Endocrine: Negative for polyuria.   Genitourinary: Negative for dysuria.   Musculoskeletal: Negative for neck pain.   Skin: Negative for rash.   Neurological: Positive for dizziness, syncope and light-headedness. Negative for headaches.   All other systems reviewed and are negative.           PHYSICAL EXAM  ED Triage Vitals [09/19/18 0926]   Temp Heart Rate Resp BP SpO2   -- 103 18 131/87 --      Temp src Heart Rate Source Patient Position BP Location FiO2 (%)   -- Monitor -- -- --         GENERAL: diaphoretic  HENT: nares patent  EYES: no scleral icterus  CV: regular rhythm, tachycardic, no murmurs, 2+ radial pulses bilaterally  RESPIRATORY: normal effort, CTAB  ABDOMEN: soft, non-tender  MUSCULOSKELETAL: no deformity  NEURO: alert, PHILLIPS, FC  SKIN: warm, dry    Vital signs and nursing notes reviewed.          LAB RESULTS  Recent Results (from the past 24 hour(s))   Basic Metabolic Panel    Collection Time: 09/19/18 10:00 AM   Result Value Ref Range    Glucose 140 (H) 65 - 99 mg/dL    BUN 12 8 - 23 mg/dL    Creatinine 0.94 0.57 - 1.00 mg/dL    Sodium 137 136 - 145 mmol/L    Potassium 3.9 3.5 - 5.2 mmol/L    Chloride 97 (L) 98 - 107 mmol/L    CO2 26.7 22.0 - 29.0 mmol/L    Calcium 8.7 8.6 - 10.5 mg/dL    eGFR Non African Amer 60 (L) >60 mL/min/1.73    BUN/Creatinine Ratio 12.8 7.0 - 25.0    Anion Gap 13.3 mmol/L   Protime-INR    Collection Time: 09/19/18 10:00 AM   Result Value Ref Range    Protime 12.7 11.7 - 14.2 Seconds    INR 0.97 0.90 - 1.10   Troponin    Collection Time: 09/19/18 10:00 AM   Result Value Ref Range    Troponin T <0.010  0.000 - 0.030 ng/mL   D-dimer, Quantitative    Collection Time: 09/19/18 10:00 AM   Result Value Ref Range    D-Dimer, Quantitative 0.54 (H) 0.00 - 0.49 MCGFEU/mL   CBC Auto Differential    Collection Time: 09/19/18 10:00 AM   Result Value Ref Range    WBC 7.04 4.50 - 10.70 10*3/mm3    RBC 4.38 3.90 - 5.20 10*6/mm3    Hemoglobin 14.5 11.9 - 15.5 g/dL    Hematocrit 43.5 35.6 - 45.5 %    MCV 99.3 (H) 80.5 - 98.2 fL    MCH 33.1 (H) 26.9 - 32.0 pg    MCHC 33.3 32.4 - 36.3 g/dL    RDW 12.6 11.7 - 13.0 %    RDW-SD 46.3 37.0 - 54.0 fl    MPV 9.2 6.0 - 12.0 fL    Platelets 227 140 - 500 10*3/mm3    Neutrophil % 56.0 42.7 - 76.0 %    Lymphocyte % 31.4 19.6 - 45.3 %    Monocyte % 9.8 5.0 - 12.0 %    Eosinophil % 2.4 0.3 - 6.2 %    Basophil % 0.4 0.0 - 1.5 %    Immature Grans % 0.0 0.0 - 0.5 %    Neutrophils, Absolute 3.94 1.90 - 8.10 10*3/mm3    Lymphocytes, Absolute 2.21 0.90 - 4.80 10*3/mm3    Monocytes, Absolute 0.69 0.20 - 1.20 10*3/mm3    Eosinophils, Absolute 0.17 0.00 - 0.70 10*3/mm3    Basophils, Absolute 0.03 0.00 - 0.20 10*3/mm3    Immature Grans, Absolute 0.00 0.00 - 0.03 10*3/mm3   Urine Drug Screen - Urine, Clean Catch    Collection Time: 09/19/18 11:44 AM   Result Value Ref Range    Amphet/Methamphet, Screen Negative Negative    Barbiturates Screen, Urine Negative Negative    Benzodiazepine Screen, Urine Positive (A) Negative    Cocaine Screen, Urine Negative Negative    Opiate Screen Positive (A) Negative    THC, Screen, Urine Negative Negative    Methadone Screen, Urine Negative Negative    Oxycodone Screen, Urine Negative Negative   Urinalysis With Microscopic If Indicated (No Culture) - Urine, Clean Catch    Collection Time: 09/19/18 11:44 AM   Result Value Ref Range    Color, UA Yellow Yellow, Straw    Appearance, UA Clear Clear    pH, UA 7.5 5.0 - 8.0    Specific Gravity, UA 1.014 1.005 - 1.030    Glucose, UA Negative Negative    Ketones, UA Negative Negative    Bilirubin, UA Negative Negative    Blood, UA  Negative Negative    Protein, UA 30 mg/dL (1+) (A) Negative    Leuk Esterase, UA Trace (A) Negative    Nitrite, UA Negative Negative    Urobilinogen, UA 0.2 E.U./dL 0.2 - 1.0 E.U./dL   Urinalysis, Microscopic Only - Urine, Clean Catch    Collection Time: 09/19/18 11:44 AM   Result Value Ref Range    RBC, UA 3-5 (A) None Seen, 0-2 /HPF    WBC, UA 3-5 (A) None Seen, 0-2 /HPF    Bacteria, UA 1+ (A) None Seen /HPF    Squamous Epithelial Cells, UA 7-12 (A) None Seen, 0-2 /HPF    Hyaline Casts, UA 0-2 None Seen /LPF    Methodology Manual Light Microscopy        Ordered the above labs and reviewed the results.        RADIOLOGY  XR Chest 2 View   Final Result         Reviewed CXR which shows nothing acute. Independently viewed by me. Interpreted by radiologist.      Ordered the above noted radiological studies. Reviewed by me in PACS.          PROCEDURES  Procedures        EKG:           EKG time: 0926  Rhythm/Rate: SR, 100  P waves and NV: ALYIN  QRS, axis: low voltage   ST and T waves: normal     Interpreted Contemporaneously by me, independently viewed  AYLIN new, rate faster compared to prior 5/18/11      MEDICATIONS GIVEN IN ER  Medications   sodium chloride 0.9 % flush 10 mL (not administered)       PROGRESS AND CONSULTS     0933  Ordered labs, EKG, and CXR for further evaluation.    1056  Rechecked Pt who is resting comfortably. She appears well at this time. Informed her that her EKG and labs are unremarkable. I informed Pt that I would like to keep her to be monitored overnight. Pt would like to be discharged and follow up with cardiology in stead. I informed Pt that I will speak to [facility] and see if they would e ok with this plan and possibly place Pt on a heart monitor. Pt understands and agrees to these plans. All questions answered.       1058  Placed call to cardiology for consult.     1100  At bedside to perform cardiac US. She has a good EF and no pericardial effusions.     1109  Discussed Pt's case with Dr. Arango  (cardiology) who strongly recommends that Pt stay in the hospital. If Pt insists that she go home, then she would like Pt to be placed with an event monitor.     1113  Rechecked Pt and informed her of Dr. Arango's recommendations. Pt agrees to stay in the hospital to be monitored.   Placed call to A for admission.     1130  Pt came out of her room and told me that she does not want to stay. She would like to be discharged with an event monitor instead and she will follow up with cardiology herself. I emphasized that Pt is at risk for a life threatening cardiac event. She verbalized understanding of these risks and would still like to be discharged.   Ordered UA and DUS for further evaluation.    1320  An event monitor is unavailable. A zio-patch will be placed instead.    1343  Zio patch being placed on patient. She will be discharged after placement is complete.    1423  Rechecked Pt and discussed plans for discharge. Instructed Pt to call cardiology to schedule a follow up apt. Instructed her to return to the ER if she has another syncopal episode or if she develops CP or other new or worsening symptoms. Pt understands and agrees to all plans. All questions answered.     MEDICAL DECISION MAKING      MDM  Number of Diagnoses or Management Options  Syncope, unspecified syncope type:   Diagnosis management comments: DDX includes aortic stenosis, arrhytmia, PE, ACS, TAD, SAH. She reports having no associated sx such as CP, SOA, HA or abd pain. Age adjusted dimer is negative. Given recurrent syncope and first time episode, I recommended admission. Pt declines. She initially agreed but then later said she's too claustrophobic to be admitted and in a room. She has unsual behavior and seems to provide suboptimal information. She states that she does not smoke yet her colleague told me that she smokes regularly. She also has UDS positive for benzos and opioids. I believe that patient may have drug dependency contributing to  her behavior. I discussed in detail r/b/a of admission vs home. She adamantly wants home so I called cardiology. They recommended admission too but if she wants to go home, they want event monitor. I was able to get a Zio patch. Gave good return precautions.        Amount and/or Complexity of Data Reviewed  Clinical lab tests: ordered and reviewed (Troponin negative. D-dimer=0.54, UDS positive for benzos and opiates)  Tests in the radiology section of CPT®: ordered and reviewed (CXR shows nothing acute.)  Tests in the medicine section of CPT®: ordered and reviewed (See EKG note.)  Discuss the patient with other providers: yes (Dr. Arango)  Independent visualization of images, tracings, or specimens: yes (CXR negative)               DIAGNOSIS  Final diagnoses:   Syncope, unspecified syncope type         DISPOSITION  DISCHARGE    Patient discharged in stable condition.    Reviewed implications of results, diagnosis, meds, responsibility to follow up, warning signs and symptoms of possible worsening, potential complications and reasons to return to ER, including new or worsening symptoms.    Patient/Family voiced understanding of above instructions.    Discussed plan for discharge, as there is no emergent indication for admission. Patient referred to primary care provider for BP management due to today's BP. Pt/family is agreeable and understands need for follow up and repeat testing.  Pt is aware that discharge does not mean that nothing is wrong but it indicates no emergency is present that requires admission and they must continue care with follow-up as given below or physician of their choice.     FOLLOW-UP  [facility]  [address]  [facility] [id number] [phone]  Schedule an appointment as soon as possible for a visit   for follow up of your Zio patch report and syncope         Medication List      No changes were made to your prescriptions during this  visit.             Latest Documented Vital Signs:  As of 2:24 PM  BP- 152/99 HR- 96 Temp- 98.6 °F (37 °C) (Tympanic) O2 sat- 100%        --  Documentation assistance provided by celestina Hartman for Dr. Rock MD.  Information recorded by the scribe was done at my direction and has been verified and validated by me.     Charlotte Hartmna  09/19/18 3655       Jairo Wilkerson II, MD  09/19/18 [id number]

## 2018-09-28 DIAGNOSIS — G89.29 OTHER CHRONIC PAIN: ICD-10-CM

## 2018-09-28 RX ORDER — HYDROCODONE BITARTRATE AND ACETAMINOPHEN 10; 325 MG/1; MG/1
1 TABLET ORAL EVERY 8 HOURS PRN
Refills: 0 | OUTPATIENT
Start: 2018-09-28

## 2018-10-08 ENCOUNTER — TELEPHONE (OUTPATIENT)
Dept: CARDIOLOGY | Facility: CLINIC | Age: 63
End: 2018-10-08

## 2018-10-09 ENCOUNTER — OFFICE VISIT (OUTPATIENT)
Dept: INTERNAL MEDICINE | Facility: CLINIC | Age: 63
End: 2018-10-09

## 2018-10-09 VITALS
OXYGEN SATURATION: 96 % | HEART RATE: 100 BPM | BODY MASS INDEX: 26.22 KG/M2 | WEIGHT: 188 LBS | DIASTOLIC BLOOD PRESSURE: 92 MMHG | SYSTOLIC BLOOD PRESSURE: 142 MMHG

## 2018-10-09 DIAGNOSIS — J30.2 SEASONAL ALLERGIC RHINITIS, UNSPECIFIED TRIGGER: ICD-10-CM

## 2018-10-09 DIAGNOSIS — R03.0 BLOOD PRESSURE ELEVATED WITHOUT HISTORY OF HTN: ICD-10-CM

## 2018-10-09 DIAGNOSIS — R42 DIZZINESS: Primary | ICD-10-CM

## 2018-10-09 DIAGNOSIS — G89.29 OTHER CHRONIC PAIN: ICD-10-CM

## 2018-10-09 DIAGNOSIS — R53.82 CHRONIC FATIGUE: ICD-10-CM

## 2018-10-09 DIAGNOSIS — G47.00 INSOMNIA, UNSPECIFIED TYPE: ICD-10-CM

## 2018-10-09 DIAGNOSIS — M79.7 FIBROMYALGIA, PRIMARY: ICD-10-CM

## 2018-10-09 PROCEDURE — 99214 OFFICE O/P EST MOD 30 MIN: CPT | Performed by: INTERNAL MEDICINE

## 2018-10-09 RX ORDER — HYDROCODONE BITARTRATE AND ACETAMINOPHEN 10; 325 MG/1; MG/1
1 TABLET ORAL EVERY 8 HOURS PRN
Qty: 100 TABLET | Refills: 0 | Status: SHIPPED | OUTPATIENT
Start: 2018-10-09 | End: 2019-01-31 | Stop reason: SDUPTHER

## 2018-10-09 NOTE — PROGRESS NOTES
Subjective   Maggie Malhotar is a 63 y.o. female here for   Chief Complaint   Patient presents with   • Pain   • Insomnia   • URI   .    Vitals:    10/09/18 1437   BP: 142/92   BP Location: Left arm   Patient Position: Sitting   Cuff Size: Adult   Pulse: 100   SpO2: 96%   Weight: 85.3 kg (188 lb)       Body mass index is 26.22 kg/m².    Pain   This is a chronic problem. The current episode started more than 1 year ago. The problem occurs constantly. The problem has been unchanged. Associated symptoms include congestion, coughing and fatigue. Pertinent negatives include no chest pain, chills or fever.   Insomnia   This is a chronic problem. The current episode started more than 1 year ago. The problem occurs constantly. The problem has been unchanged. Associated symptoms include congestion, coughing and fatigue. Pertinent negatives include no chest pain, chills or fever.   URI    This is a new problem. The current episode started yesterday. The problem has been unchanged. There has been no fever. Associated symptoms include congestion, coughing and wheezing. Pertinent negatives include no chest pain, ear pain or sneezing.        The following portions of the patient's history were reviewed and updated as appropriate: allergies, current medications, past social history and problem list.    Review of Systems   Constitutional: Positive for fatigue. Negative for chills and fever.   HENT: Positive for congestion and postnasal drip. Negative for ear pain and sneezing.    Respiratory: Positive for cough, shortness of breath and wheezing.    Cardiovascular: Negative for chest pain, palpitations and leg swelling.   Psychiatric/Behavioral: Negative for dysphoric mood and sleep disturbance. The patient has insomnia. The patient is not nervous/anxious.      She had dizziness - in ER - holter abnormal - she wants further eval.    Objective   Physical Exam   Constitutional: She appears well-developed and well-nourished. No  distress.   HENT:   Head: Normocephalic.   Right Ear: External ear normal. Tympanic membrane is bulging. Tympanic membrane is not erythematous.   Left Ear: External ear normal. Tympanic membrane is bulging. Tympanic membrane is not erythematous.   Nose: Right sinus exhibits no frontal sinus tenderness. Left sinus exhibits no frontal sinus tenderness.   Mouth/Throat: Oropharynx is clear and moist. No oropharyngeal exudate.   Neck: Normal range of motion. Neck supple.   Cardiovascular: Normal rate, regular rhythm and normal heart sounds.    Pulmonary/Chest: Effort normal and breath sounds normal. No respiratory distress. She has no wheezes. She has no rales. She exhibits no tenderness.   Musculoskeletal: She exhibits no edema.   Lymphadenopathy:     She has no cervical adenopathy.   Psychiatric: She has a normal mood and affect. Her behavior is normal.   Nursing note and vitals reviewed.      Assessment/Plan   Diagnoses and all orders for this visit:    Dizziness  Comments:  in the past - seen in ER 9/19- cardiac? - need eval by cardiologist (holter done in ER) - go back to ER if recurrent sx - need carotid u/s (info given)  Orders:  -     Ambulatory Referral to Cardiology    Fibromyalgia, primary  Comments:  chronic pain - ok to use hydrocodone sparingly    Seasonal allergic rhinitis, unspecified trigger  Comments:  head & chest congestion, but normal exam today - need to suppress cough (tessalon perles at home) -need max allergy meds -call if worse    Other chronic pain  Comments:  ok to use hydrocodone sparingly  Orders:  -     HYDROcodone-acetaminophen (NORCO)  MG per tablet; Take 1 tablet by mouth Every 8 (Eight) Hours As Needed for Severe Pain .    Chronic fatigue    Blood pressure elevated without history of HTN  Comments:  need low salt diet -call if bp over 130/80    Insomnia, unspecified type    Other orders  -     custom compounded cream; Apply  topically to the appropriate area as directed.  Estrogen/testosterone

## 2018-10-11 PROCEDURE — 0298T HOLTER MONITOR - 72 HOUR UP TO 21 DAY: CPT | Performed by: INTERNAL MEDICINE

## 2018-10-17 ENCOUNTER — TRANSCRIBE ORDERS (OUTPATIENT)
Dept: ADMINISTRATIVE | Facility: HOSPITAL | Age: 63
End: 2018-10-17

## 2018-10-17 DIAGNOSIS — Z13.6 SCREENING FOR CARDIOVASCULAR CONDITION: Primary | ICD-10-CM

## 2018-10-25 ENCOUNTER — TELEPHONE (OUTPATIENT)
Dept: CARDIOLOGY | Facility: CLINIC | Age: 63
End: 2018-10-25

## 2018-10-25 ENCOUNTER — HOSPITAL ENCOUNTER (OUTPATIENT)
Dept: CARDIOLOGY | Facility: HOSPITAL | Age: 63
Discharge: HOME | End: 2018-10-25
Attending: INTERNAL MEDICINE | Admitting: INTERNAL MEDICINE

## 2018-10-25 VITALS
HEIGHT: 70 IN | DIASTOLIC BLOOD PRESSURE: 94 MMHG | BODY MASS INDEX: 27.06 KG/M2 | SYSTOLIC BLOOD PRESSURE: 152 MMHG | WEIGHT: 189 LBS | HEART RATE: 87 BPM

## 2018-10-25 DIAGNOSIS — Z13.6 SCREENING FOR CARDIOVASCULAR CONDITION: ICD-10-CM

## 2018-10-25 LAB
BH CV VAS BP LEFT ARM: NORMAL MMHG
BH CV VAS BP RIGHT ARM: NORMAL MMHG
BH CV XLRA MEAS LEFT ICA/CCA RATIO: 0.71
BH CV XLRA MEAS LEFT MID CCA PSV: NORMAL CM/SEC
BH CV XLRA MEAS LEFT MID ICA PSV: NORMAL CM/SEC
BH CV XLRA MEAS LEFT PROX ECA PSV: NORMAL CM/SEC
BH CV XLRA MEAS RIGHT ICA/CCA RATIO: 0.69
BH CV XLRA MEAS RIGHT MID CCA PSV: NORMAL CM/SEC
BH CV XLRA MEAS RIGHT MID ICA PSV: NORMAL CM/SEC
BH CV XLRA MEAS RIGHT PROX ECA PSV: NORMAL CM/SEC

## 2018-10-25 PROCEDURE — 93799 UNLISTED CV SVC/PROCEDURE: CPT

## 2018-11-29 DIAGNOSIS — G47.00 INSOMNIA, UNSPECIFIED TYPE: ICD-10-CM

## 2018-11-30 RX ORDER — ZOLPIDEM TARTRATE 10 MG/1
TABLET ORAL
Qty: 30 TABLET | Refills: 2 | OUTPATIENT
Start: 2018-11-30 | End: 2019-02-21 | Stop reason: SDUPTHER

## 2018-12-06 ENCOUNTER — OFFICE VISIT (OUTPATIENT)
Dept: CARDIOLOGY | Facility: CLINIC | Age: 63
End: 2018-12-06

## 2018-12-06 VITALS
DIASTOLIC BLOOD PRESSURE: 100 MMHG | HEIGHT: 71 IN | RESPIRATION RATE: 16 BRPM | WEIGHT: 196 LBS | SYSTOLIC BLOOD PRESSURE: 158 MMHG | HEART RATE: 96 BPM | BODY MASS INDEX: 27.44 KG/M2

## 2018-12-06 DIAGNOSIS — M79.7 FIBROMYALGIA, PRIMARY: ICD-10-CM

## 2018-12-06 DIAGNOSIS — R42 DIZZINESS: ICD-10-CM

## 2018-12-06 DIAGNOSIS — G61.0 GUILLAIN-BARRE SYNDROME (HCC): ICD-10-CM

## 2018-12-06 DIAGNOSIS — I10 ESSENTIAL HYPERTENSION: Primary | ICD-10-CM

## 2018-12-06 PROCEDURE — 99204 OFFICE O/P NEW MOD 45 MIN: CPT | Performed by: INTERNAL MEDICINE

## 2018-12-06 PROCEDURE — 93000 ELECTROCARDIOGRAM COMPLETE: CPT | Performed by: INTERNAL MEDICINE

## 2018-12-06 NOTE — PROGRESS NOTES
PATIENTINFORMATION    Date of Office Visit: 2018  Encounter Provider: Adriana Maravilla MD  Place of Service: [facility] CARDIOLOGY  Patient Name: Maggie Malhotra  : 1955    Subjective:     Encounter Date:2018      Patient ID: Maggie Malhotra is a 63 y.o. female.      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 She has no known heart disease. She had a stress echo in  which showed normal LV systolic function without significant valvular heart disease, and she had no evidence of ischemia. She had an exercise stress test in  which was normal. In 2018, she was at work. She had just eaten breakfast. She had sudden onset of feeling unwell. She was in a cold sweat. Co-workers said she was really pale. She was dizzy. She felt a little short of breath but did not have any palpitations. She went to the emergency room where her troponin was negative. Basic metabolic panel showed a blood sugar of 140 but was otherwise pretty unremarkable, and CBC was unremarkable. D-dimer was mildly elevated. She was discharged with a Zio patch which was normal. She had carotid artery ultrasound which was also normal. She has not had any recurrence of these symptoms.      Review of Systems   Constitution: Positive for malaise/fatigue. Negative for fever, weight gain and weight loss.   HENT: Negative for ear pain, hearing loss, nosebleeds and sore throat.    Eyes: Negative for double vision, pain, vision loss in left eye and vision loss in right eye.   Cardiovascular:        See history of present illness.   Respiratory: Negative for cough, shortness of breath, sleep disturbances due to breathing, snoring and wheezing.    Endocrine: Positive for heat intolerance. Negative for cold intolerance and polyuria.   Skin: Negative for itching, poor wound healing and rash.  "  Musculoskeletal: Positive for joint pain and myalgias. Negative for joint swelling.   Gastrointestinal: Negative for abdominal pain, diarrhea, hematochezia, nausea and vomiting.   Genitourinary: Negative for hematuria and hesitancy.   Neurological: Negative for numbness, paresthesias and seizures.   Psychiatric/Behavioral: Negative for depression. The patient is not nervous/anxious.            ECG 12 Lead  Date/Time: 12/6/2018 10:37 AM  Performed by: Adriana Maravilla MD  Authorized by: Adriana Maravilla MD   Comparison: compared with previous ECG from 5/18/2011  Similar to previous ECG  Rhythm: sinus rhythm  BPM: 96  Conduction: conduction normal  ST Segments: ST segments normal  T Waves: T waves normal  Other findings: PRWP  Clinical impression: normal ECG               Objective:     /100 (BP Location: Right arm, Patient Position: Sitting, Cuff Size: Adult)   Pulse 96   Resp 16   Ht 180.3 cm (71\")   Wt 88.9 kg (196 lb)   LMP 01/01/2006 (LMP Unknown)   BMI 27.34 kg/m²  Body mass index is 27.34 kg/m².     Physical Exam   Constitutional: She appears well-developed.   HENT:   Head: Normocephalic and atraumatic.   Eyes: Conjunctivae and lids are normal. Pupils are equal, round, and reactive to light. Lids are everted and swept, no foreign bodies found.   Neck: Normal range of motion. No JVD present. Carotid bruit is not present. No tracheal deviation present. No thyroid mass present.   Cardiovascular: Normal rate, regular rhythm and normal heart sounds.   Pulses:       Dorsalis pedis pulses are 2+ on the right side, and 2+ on the left side.   Pulmonary/Chest: Effort normal and breath sounds normal.   Abdominal: Normal appearance and bowel sounds are normal.   Musculoskeletal: Normal range of motion.   Neurological: She is alert. She has normal strength.   Skin: Skin is warm, dry and intact.   Psychiatric: She has a normal mood and affect. Her behavior is normal.   Vitals reviewed.        "   Assessment/Plan:       1.  Dizziness.  She had this episode of dizziness associated with breaking out into a cold sweat and being pale.  She did not have palpitations with it.  She did wear a Zio patch which did not show arrhythmia, but she did not have a reoccurrence of the symptoms while the Zio patch was on.  I am going to check an echocardiogram.  If that is normal, I thierry not do any further cardiac workup at this time.  2.  History of Guillain-Peterson syndrome with residual fibromyalgia and neuropathy.    I will go over the results of her echo when it is available and see her back as needed if that is normal.      Orders Placed This Encounter   Procedures   • ECG 12 Lead     This order was created via procedure documentation   • Adult Transthoracic Echo Complete W/ Cont if Necessary Per Protocol     Standing Status:   Future     Standing Expiration Date:   12/6/2019     Order Specific Question:   Reason for exam?     Answer:   Palpitations        Discharge Medications           Accurate as of 12/6/18 11:49 AM. If you have any questions, ask your nurse or doctor.               Continue These Medications      Instructions Start Date   cholecalciferol 39583 units capsule  Commonly known as:  VITAMIN D3   10,000 Units, Oral, Daily      CUSTOM CREAM BUILDER   Topical, Estrogen/testosterone       CUSTOM MED BUILDER   Testosterone 20mg/g / Estradiol 1.4      DHEA PO   1 tablet, Oral, Daily      diazePAM 5 MG tablet  Commonly known as:  VALIUM   TAKE 1 TABLET BY MOUTH EVERY 12 HOURS AS NEEDED FOR ANXIETY OR SLEEP      HYDROcodone-acetaminophen  MG per tablet  Commonly known as:  NORCO   1 tablet, Oral, Every 8 Hours PRN      Magnesium 250 MG tablet   1 tablet, Oral, Daily      multivitamin tablet tablet   1 tablet, Oral, Daily      POTASSIUM PO   50 mg, Oral, Daily      PROGESTERONE PO   TAKE TWO CAPSULES BY MOUTH AT BEDTIME      zolpidem 10 MG tablet  Commonly known as:  AMBIEN   TAKE 1 TABLET BY MOUTH NIGHTLY  AS NEEDED FOR SLEEP         Stop These Medications    nortriptyline 50 MG capsule  Commonly known as:  PAMELOR  Stopped by:  Adriana Maravilla MD     promethazine 25 MG tablet  Commonly known as:  PHENERGAN  Stopped by:  MD Adriana Pickett MD  12/06/18  11:49 AM

## 2018-12-12 ENCOUNTER — TELEPHONE (OUTPATIENT)
Dept: CARDIOLOGY | Facility: CLINIC | Age: 63
End: 2018-12-12

## 2018-12-12 ENCOUNTER — HOSPITAL ENCOUNTER (OUTPATIENT)
Dept: CARDIOLOGY | Facility: HOSPITAL | Age: 63
Discharge: HOME | End: 2018-12-12
Attending: INTERNAL MEDICINE | Admitting: INTERNAL MEDICINE

## 2018-12-12 VITALS
HEART RATE: 129 BPM | DIASTOLIC BLOOD PRESSURE: 98 MMHG | BODY MASS INDEX: 27.44 KG/M2 | SYSTOLIC BLOOD PRESSURE: 152 MMHG | OXYGEN SATURATION: 98 % | WEIGHT: 196 LBS | HEIGHT: 71 IN

## 2018-12-12 DIAGNOSIS — R42 DIZZINESS: ICD-10-CM

## 2018-12-12 DIAGNOSIS — I10 ESSENTIAL HYPERTENSION: ICD-10-CM

## 2018-12-12 LAB
AORTIC DIMENSIONLESS INDEX: 0.7 (DI)
BH CV ECHO MEAS - ACS: 2 CM
BH CV ECHO MEAS - AO MAX PG (FULL): 4.1 MMHG
BH CV ECHO MEAS - AO MAX PG: 9.1 MMHG
BH CV ECHO MEAS - AO MEAN PG (FULL): 1 MMHG
BH CV ECHO MEAS - AO MEAN PG: 4 MMHG
BH CV ECHO MEAS - AO ROOT AREA (BSA CORRECTED): 1.7
BH CV ECHO MEAS - AO ROOT AREA: 9.6 CM^2
BH CV ECHO MEAS - AO ROOT DIAM: 3.5 CM
BH CV ECHO MEAS - AO V2 MAX: 151 CM/SEC
BH CV ECHO MEAS - AO V2 MEAN: 97.1 CM/SEC
BH CV ECHO MEAS - AO V2 VTI: 26.6 CM
BH CV ECHO MEAS - AVA(I,A): 2.4 CM^2
BH CV ECHO MEAS - AVA(I,D): 2.4 CM^2
BH CV ECHO MEAS - AVA(V,A): 2.6 CM^2
BH CV ECHO MEAS - AVA(V,D): 2.6 CM^2
BH CV ECHO MEAS - BSA(HAYCOCK): 2.1 M^2
BH CV ECHO MEAS - BSA: 2.1 M^2
BH CV ECHO MEAS - BZI_BMI: 27.3 KILOGRAMS/M^2
BH CV ECHO MEAS - BZI_METRIC_HEIGHT: 180.3 CM
BH CV ECHO MEAS - BZI_METRIC_WEIGHT: 88.9 KG
BH CV ECHO MEAS - EDV(CUBED): 68.9 ML
BH CV ECHO MEAS - EDV(MOD-SP2): 72 ML
BH CV ECHO MEAS - EDV(MOD-SP4): 77 ML
BH CV ECHO MEAS - EDV(TEICH): 74.2 ML
BH CV ECHO MEAS - EF(CUBED): 77.3 %
BH CV ECHO MEAS - EF(MOD-BP): 67 %
BH CV ECHO MEAS - EF(MOD-SP2): 72.2 %
BH CV ECHO MEAS - EF(MOD-SP4): 66.2 %
BH CV ECHO MEAS - EF(TEICH): 69.9 %
BH CV ECHO MEAS - ESV(CUBED): 15.6 ML
BH CV ECHO MEAS - ESV(MOD-SP2): 20 ML
BH CV ECHO MEAS - ESV(MOD-SP4): 26 ML
BH CV ECHO MEAS - ESV(TEICH): 22.3 ML
BH CV ECHO MEAS - FS: 39 %
BH CV ECHO MEAS - IVS/LVPW: 1
BH CV ECHO MEAS - IVSD: 0.9 CM
BH CV ECHO MEAS - LAT PEAK E' VEL: 10 CM/SEC
BH CV ECHO MEAS - LV DIASTOLIC VOL/BSA (35-75): 36.8 ML/M^2
BH CV ECHO MEAS - LV MASS(C)D: 114.1 GRAMS
BH CV ECHO MEAS - LV MASS(C)DI: 54.6 GRAMS/M^2
BH CV ECHO MEAS - LV MAX PG: 5 MMHG
BH CV ECHO MEAS - LV MEAN PG: 3 MMHG
BH CV ECHO MEAS - LV SYSTOLIC VOL/BSA (12-30): 12.4 ML/M^2
BH CV ECHO MEAS - LV V1 MAX: 112 CM/SEC
BH CV ECHO MEAS - LV V1 MEAN: 74.3 CM/SEC
BH CV ECHO MEAS - LV V1 VTI: 18.8 CM
BH CV ECHO MEAS - LVIDD: 4.1 CM
BH CV ECHO MEAS - LVIDS: 2.5 CM
BH CV ECHO MEAS - LVLD AP2: 7.9 CM
BH CV ECHO MEAS - LVLD AP4: 7.8 CM
BH CV ECHO MEAS - LVLS AP2: 6.3 CM
BH CV ECHO MEAS - LVLS AP4: 7 CM
BH CV ECHO MEAS - LVOT AREA (M): 3.5 CM^2
BH CV ECHO MEAS - LVOT AREA: 3.5 CM^2
BH CV ECHO MEAS - LVOT DIAM: 2.1 CM
BH CV ECHO MEAS - LVPWD: 0.9 CM
BH CV ECHO MEAS - MED PEAK E' VEL: 10 CM/SEC
BH CV ECHO MEAS - MV A DUR: 0.12 SEC
BH CV ECHO MEAS - MV A MAX VEL: 120 CM/SEC
BH CV ECHO MEAS - MV DEC SLOPE: 723.5 CM/SEC^2
BH CV ECHO MEAS - MV DEC TIME: 0.12 SEC
BH CV ECHO MEAS - MV E MAX VEL: 79.5 CM/SEC
BH CV ECHO MEAS - MV E/A: 0.66
BH CV ECHO MEAS - MV MAX PG: 8 MMHG
BH CV ECHO MEAS - MV MEAN PG: 3 MMHG
BH CV ECHO MEAS - MV P1/2T MAX VEL: 77.3 CM/SEC
BH CV ECHO MEAS - MV P1/2T: 31.3 MSEC
BH CV ECHO MEAS - MV V2 MAX: 141 CM/SEC
BH CV ECHO MEAS - MV V2 MEAN: 76.8 CM/SEC
BH CV ECHO MEAS - MV V2 VTI: 18 CM
BH CV ECHO MEAS - MVA P1/2T LCG: 2.8 CM^2
BH CV ECHO MEAS - MVA(P1/2T): 7 CM^2
BH CV ECHO MEAS - MVA(VTI): 3.6 CM^2
BH CV ECHO MEAS - PA ACC TIME: 0.15 SEC
BH CV ECHO MEAS - PA MAX PG (FULL): 2.2 MMHG
BH CV ECHO MEAS - PA MAX PG: 4.8 MMHG
BH CV ECHO MEAS - PA PR(ACCEL): 12.4 MMHG
BH CV ECHO MEAS - PA V2 MAX: 109 CM/SEC
BH CV ECHO MEAS - PULM A REVS DUR: 0.11 SEC
BH CV ECHO MEAS - PULM A REVS VEL: 56.3 CM/SEC
BH CV ECHO MEAS - PULM DIAS VEL: 44.9 CM/SEC
BH CV ECHO MEAS - PULM S/D: 1.6
BH CV ECHO MEAS - PULM SYS VEL: 73.3 CM/SEC
BH CV ECHO MEAS - PVA(V,A): 1.5 CM^2
BH CV ECHO MEAS - PVA(V,D): 1.5 CM^2
BH CV ECHO MEAS - QP/QS: 0.37
BH CV ECHO MEAS - RV MAX PG: 2.6 MMHG
BH CV ECHO MEAS - RV MEAN PG: 1 MMHG
BH CV ECHO MEAS - RV V1 MAX: 80.5 CM/SEC
BH CV ECHO MEAS - RV V1 MEAN: 47.3 CM/SEC
BH CV ECHO MEAS - RV V1 VTI: 12.1 CM
BH CV ECHO MEAS - RVOT AREA: 2 CM^2
BH CV ECHO MEAS - RVOT DIAM: 1.6 CM
BH CV ECHO MEAS - SI(AO): 122.4 ML/M^2
BH CV ECHO MEAS - SI(CUBED): 25.5 ML/M^2
BH CV ECHO MEAS - SI(LVOT): 31.1 ML/M^2
BH CV ECHO MEAS - SI(MOD-SP2): 24.9 ML/M^2
BH CV ECHO MEAS - SI(MOD-SP4): 24.4 ML/M^2
BH CV ECHO MEAS - SI(TEICH): 24.8 ML/M^2
BH CV ECHO MEAS - SV(AO): 255.9 ML
BH CV ECHO MEAS - SV(CUBED): 53.3 ML
BH CV ECHO MEAS - SV(LVOT): 65.1 ML
BH CV ECHO MEAS - SV(MOD-SP2): 52 ML
BH CV ECHO MEAS - SV(MOD-SP4): 51 ML
BH CV ECHO MEAS - SV(RVOT): 24.3 ML
BH CV ECHO MEAS - SV(TEICH): 51.9 ML
BH CV ECHO MEAS - TAPSE (>1.6): 2.1 CM2
BH CV ECHO MEASUREMENTS AVERAGE E/E' RATIO: 7.95
BH CV VAS BP RIGHT ARM: NORMAL MMHG
BH CV XLRA - RV BASE: 2.6 CM
BH CV XLRA - TDI S': 29 CM/SEC
LEFT ATRIUM VOLUME INDEX: 14 ML/M2
LEFT ATRIUM VOLUME: 26 CM3
MAXIMAL PREDICTED HEART RATE: 158 BPM
STRESS TARGET HR: 134 BPM

## 2018-12-12 PROCEDURE — 93306 TTE W/DOPPLER COMPLETE: CPT

## 2018-12-12 PROCEDURE — 93306 TTE W/DOPPLER COMPLETE: CPT | Performed by: INTERNAL MEDICINE

## 2018-12-12 RX ORDER — CARVEDILOL 6.25 MG/1
6.25 TABLET ORAL 2 TIMES DAILY
Qty: 60 TABLET | Refills: 11 | Status: SHIPPED | OUTPATIENT
Start: 2018-12-12 | End: 2019-08-15

## 2018-12-12 NOTE — TELEPHONE ENCOUNTER
Echo 12/12/18  · Left ventricular systolic function is normal.  · Left ventricular diastolic dysfunction (grade I) consistent with impaired relaxation.  · Calculated EF = 67.0%.      I called and spoke with her.  I gave her the echo results.  She is nervous about taking the 6.25 of Coreg twice a day.  When she went in for echo today her blood pressure was really high and she was tachycardic but she thinks it might be because she overdid it yesterday.  I told her was okay to cut the tablets in half and try starting at 3.125 twice a day.    She needs a follow-up with me or Anna in January some time.  I think she prefers the [facility] location.

## 2018-12-12 NOTE — TELEPHONE ENCOUNTER
Called and spoke with patient she has already left [facility]. Notified her of the addition of coreg.  Patient stated she thinks she was just nervous this am and that is what increased her bp.  instructed patient to check bp twice a day and let us know if she becomes dizzy or if her bp is too low.    Discussed with Dr Maravilla that the patient is already gone from [facility] and she stated we will wait on the EKG for now and see how the coreg does.    Aniyah Prado RN  Triage nurse

## 2018-12-12 NOTE — TELEPHONE ENCOUNTER
"12/12/18  9:05 AM  Maggie Malhotra  1955    Home Phone [phone]   Mobile [phone]       Maggie Malhotra is a patient of Dr Maravilla.  Dmitriy from the Echo lab at [place] is calling in to report pts bp is 186/102 hr 129.  He stated the patient is \"feeling fine,\" he will proceed with the Echo    Patient is not on any bp meds    Aniyah Prado RN  Triage nurse    "

## 2019-01-24 ENCOUNTER — OFFICE VISIT (OUTPATIENT)
Dept: CARDIOLOGY | Facility: CLINIC | Age: 64
End: 2019-01-24

## 2019-01-24 VITALS
SYSTOLIC BLOOD PRESSURE: 142 MMHG | BODY MASS INDEX: 27.44 KG/M2 | HEIGHT: 71 IN | DIASTOLIC BLOOD PRESSURE: 86 MMHG | WEIGHT: 196 LBS | RESPIRATION RATE: 16 BRPM | HEART RATE: 85 BPM

## 2019-01-24 DIAGNOSIS — I10 ESSENTIAL HYPERTENSION: Primary | ICD-10-CM

## 2019-01-24 DIAGNOSIS — K58.0 IRRITABLE BOWEL SYNDROME WITH DIARRHEA: ICD-10-CM

## 2019-01-24 PROCEDURE — 93000 ELECTROCARDIOGRAM COMPLETE: CPT | Performed by: INTERNAL MEDICINE

## 2019-01-24 PROCEDURE — 99213 OFFICE O/P EST LOW 20 MIN: CPT | Performed by: INTERNAL MEDICINE

## 2019-01-24 NOTE — PROGRESS NOTES
PATIENTINFORMATION    Date of Office Visit: 2019  Encounter Provider: Adriana Maravilla MD  Place of Service: [facility] CARDIOLOGY  Patient Name: Maggie Malhotra  : 1955    Subjective:     Encounter Date:2019      Patient ID: Maggie Malhotra is a 63 y.o. female.      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 This has left her wit some neuropathy and fibromyalgia. She has no known heart disease. She had a stress echo in  which showed normal LV systolic function without significant valvular heart disease, and she had no evidence of ischemia. She had an exercise stress test in  which was normal. In 2018, she was at work. She had just eaten breakfast. She had sudden onset of feeling unwell. She was in a cold sweat. Co-workers said she was really pale. She was dizzy. She felt a little short of breath but did not have any palpitations. She went to the emergency room where her troponin was negative. Basic metabolic panel showed a blood sugar of 140 but was otherwise pretty unremarkable, and CBC was unremarkable. D-dimer was mildly elevated. She was discharged with a Zio patch which was normal. She had carotid artery ultrasound which was also normal.  In 2018 she had an echocardiogram which showed normal LV systolic function with ejection fraction of 67% and grade 1 diastolic dysfunction.    She denies any palpitations or shortness of breath.  She gets edema in her and intermittently which she attributes to her history of the Guyon barré syndrome.    Review of Systems   Constitution: Positive for malaise/fatigue. Negative for fever, weight gain and weight loss.   HENT: Negative for ear pain, hearing loss, nosebleeds and sore throat.    Eyes: Negative for double vision, pain, vision loss in left eye and vision loss in right eye.   Cardiovascular:        See history of present illness.  "  Respiratory: Negative for cough, shortness of breath, sleep disturbances due to breathing, snoring and wheezing.    Endocrine: Positive for heat intolerance. Negative for cold intolerance and polyuria.   Skin: Negative for itching, poor wound healing and rash.   Musculoskeletal: Positive for joint pain and myalgias. Negative for joint swelling.   Gastrointestinal: Negative for abdominal pain, diarrhea, hematochezia, nausea and vomiting.   Genitourinary: Negative for hematuria and hesitancy.   Neurological: Negative for numbness, paresthesias and seizures.   Psychiatric/Behavioral: Negative for depression. The patient is not nervous/anxious.            ECG 12 Lead  Date/Time: 1/24/2019 12:28 PM  Performed by: Adriana Maravilla MD  Authorized by: Adriana Maravilla MD   Comparison: compared with previous ECG from 12/6/2018  Similar to previous ECG  Rhythm: sinus rhythm  BPM: 85  Conduction: conduction normal  ST Segments: ST segments normal  T Waves: T waves normal  Other findings: PRWP  Clinical impression: abnormal ECG               Objective:     /86 (BP Location: Right arm, Patient Position: Sitting, Cuff Size: Adult)   Pulse 85   Resp 16   Ht 180.3 cm (71\")   Wt 88.9 kg (196 lb)   LMP 01/01/2006 (LMP Unknown)   BMI 27.34 kg/m²  Body mass index is 27.34 kg/m².     Physical Exam   Constitutional: She appears well-developed.   HENT:   Head: Normocephalic and atraumatic.   Eyes: Conjunctivae and lids are normal. Pupils are equal, round, and reactive to light. Lids are everted and swept, no foreign bodies found.   Neck: Normal range of motion. No JVD present. Carotid bruit is not present. No tracheal deviation present. No thyroid mass present.   Cardiovascular: Normal rate, regular rhythm and normal heart sounds.   Pulses:       Dorsalis pedis pulses are 2+ on the right side, and 2+ on the left side.   Pulmonary/Chest: Effort normal and breath sounds normal.   Abdominal: Normal appearance and bowel sounds " are normal.   Musculoskeletal: Normal range of motion.   Neurological: She is alert. She has normal strength.   Skin: Skin is warm, dry and intact.   Psychiatric: She has a normal mood and affect. Her behavior is normal.   Vitals reviewed.          Assessment/Plan:     1.  Dizziness.  She had this episode of dizziness associated with breaking out into a cold sweat and being pale.  She did not have palpitations with it.  She did wear a Zio patch which did not show arrhythmia, but she did not have a reoccurrence of the symptoms while the Zio patch was on.   she had a normal echocardiogram December 2018.  Long as she's not having a recurrence, I'm not going to do anything different at this time.  2.  Hypertension.      Orders Placed This Encounter   Procedures   • ECG 12 Lead     This order was created via procedure documentation        Discharge Medications           Accurate as of 1/24/19 11:59 PM. If you have any questions, ask your nurse or doctor.               Continue These Medications      Instructions Start Date   carvedilol 6.25 MG tablet  Commonly known as:  COREG   6.25 mg, Oral, 2 Times Daily      cholecalciferol 55064 units capsule  Commonly known as:  VITAMIN D3   10,000 Units, Oral, Daily      CUSTOM CREAM BUILDER   Topical, Estrogen/testosterone       CUSTOM MED BUILDER   Testosterone 20mg/g / Estradiol 1.4      diazePAM 5 MG tablet  Commonly known as:  VALIUM   TAKE 1 TABLET BY MOUTH EVERY 12 HOURS AS NEEDED FOR ANXIETY OR SLEEP      HYDROcodone-acetaminophen  MG per tablet  Commonly known as:  NORCO   1 tablet, Oral, Every 8 Hours PRN      Magnesium 250 MG tablet   1 tablet, Oral, Daily      multivitamin tablet tablet   1 tablet, Oral, Daily      POTASSIUM PO   50 mg, Oral, Daily      PROGESTERONE PO   TAKE TWO CAPSULES BY MOUTH AT BEDTIME      zolpidem 10 MG tablet  Commonly known as:  AMBIEN   TAKE 1 TABLET BY MOUTH NIGHTLY AS NEEDED FOR SLEEP         Stop These Medications    DHEA  PO  Stopped by:  MD Adriana Pickett MD  01/28/19  1:35 PM

## 2019-01-28 PROBLEM — I10 ESSENTIAL HYPERTENSION: Status: ACTIVE | Noted: 2017-05-11

## 2019-01-31 ENCOUNTER — OFFICE VISIT (OUTPATIENT)
Dept: INTERNAL MEDICINE | Facility: CLINIC | Age: 64
End: 2019-01-31

## 2019-01-31 VITALS
SYSTOLIC BLOOD PRESSURE: 124 MMHG | BODY MASS INDEX: 27.41 KG/M2 | RESPIRATION RATE: 16 BRPM | DIASTOLIC BLOOD PRESSURE: 80 MMHG | OXYGEN SATURATION: 98 % | TEMPERATURE: 98.5 F | HEART RATE: 89 BPM | HEIGHT: 71 IN | WEIGHT: 195.8 LBS

## 2019-01-31 DIAGNOSIS — Z00.00 ANNUAL PHYSICAL EXAM: Primary | ICD-10-CM

## 2019-01-31 DIAGNOSIS — R53.82 CHRONIC FATIGUE: ICD-10-CM

## 2019-01-31 DIAGNOSIS — J30.2 SEASONAL ALLERGIC RHINITIS, UNSPECIFIED TRIGGER: ICD-10-CM

## 2019-01-31 DIAGNOSIS — G89.29 OTHER CHRONIC PAIN: ICD-10-CM

## 2019-01-31 DIAGNOSIS — G61.0 GUILLAIN-BARRE SYNDROME (HCC): ICD-10-CM

## 2019-01-31 DIAGNOSIS — E55.9 VITAMIN D DEFICIENCY: ICD-10-CM

## 2019-01-31 DIAGNOSIS — G47.00 INSOMNIA, UNSPECIFIED TYPE: ICD-10-CM

## 2019-01-31 DIAGNOSIS — M79.7 FIBROMYALGIA, PRIMARY: ICD-10-CM

## 2019-01-31 DIAGNOSIS — K58.0 IRRITABLE BOWEL SYNDROME WITH DIARRHEA: ICD-10-CM

## 2019-01-31 DIAGNOSIS — I10 ESSENTIAL HYPERTENSION: ICD-10-CM

## 2019-01-31 DIAGNOSIS — F41.9 ANXIETY: ICD-10-CM

## 2019-01-31 DIAGNOSIS — Z12.11 COLON CANCER SCREENING: ICD-10-CM

## 2019-01-31 PROBLEM — F39 MOOD DISORDER: Status: RESOLVED | Noted: 2017-05-11 | Resolved: 2019-01-31

## 2019-01-31 LAB
ALBUMIN SERPL-MCNC: 4 G/DL (ref 3.5–5.2)
ALBUMIN/GLOB SERPL: 1.4 G/DL
ALP SERPL-CCNC: 57 U/L (ref 39–117)
ALT SERPL W P-5'-P-CCNC: 39 U/L (ref 1–33)
AMORPH URATE CRY URNS QL MICRO: ABNORMAL /HPF
ANION GAP SERPL CALCULATED.3IONS-SCNC: 10.1 MMOL/L
AST SERPL-CCNC: 41 U/L (ref 1–32)
BACTERIA UR QL AUTO: ABNORMAL /HPF
BILIRUB SERPL-MCNC: 0.4 MG/DL (ref 0.1–1.2)
BILIRUB UR QL STRIP: NEGATIVE
BUN BLD-MCNC: 10 MG/DL (ref 8–23)
BUN/CREAT SERPL: 10.5 (ref 7–25)
CALCIUM SPEC-SCNC: 10.2 MG/DL (ref 8.6–10.5)
CHLORIDE SERPL-SCNC: 95 MMOL/L (ref 98–107)
CHOLEST SERPL-MCNC: 169 MG/DL (ref 0–200)
CLARITY UR: ABNORMAL
CO2 SERPL-SCNC: 29.9 MMOL/L (ref 22–29)
COLOR UR: YELLOW
CREAT BLD-MCNC: 0.95 MG/DL (ref 0.57–1)
GFR SERPL CREATININE-BSD FRML MDRD: 60 ML/MIN/1.73
GLOBULIN UR ELPH-MCNC: 2.8 GM/DL
GLUCOSE BLD-MCNC: 92 MG/DL (ref 65–99)
GLUCOSE UR STRIP-MCNC: NEGATIVE MG/DL
HDLC SERPL-MCNC: 65 MG/DL (ref 40–60)
HEMOCCULT STL QL IA: NEGATIVE
HGB UR QL STRIP.AUTO: ABNORMAL
HYALINE CASTS UR QL AUTO: ABNORMAL /LPF
KETONES UR QL STRIP: NEGATIVE
LDLC SERPL CALC-MCNC: 62 MG/DL (ref 0–100)
LDLC/HDLC SERPL: 0.96 {RATIO}
LEUKOCYTE ESTERASE UR QL STRIP.AUTO: NEGATIVE
MUCOUS THREADS URNS QL MICRO: ABNORMAL /HPF
NITRITE UR QL STRIP: NEGATIVE
PH UR STRIP.AUTO: 7 [PH] (ref 5–8)
POTASSIUM BLD-SCNC: 4.2 MMOL/L (ref 3.5–5.2)
PROT SERPL-MCNC: 6.8 G/DL (ref 6–8.5)
PROT UR QL STRIP: NEGATIVE
RBC # UR: ABNORMAL /HPF
REF LAB TEST METHOD: ABNORMAL
SODIUM BLD-SCNC: 135 MMOL/L (ref 136–145)
SP GR UR STRIP: 1.02 (ref 1–1.03)
SQUAMOUS #/AREA URNS HPF: ABNORMAL /HPF
TRIGL SERPL-MCNC: 209 MG/DL (ref 0–150)
UROBILINOGEN UR QL STRIP: ABNORMAL
VLDLC SERPL-MCNC: 41.8 MG/DL (ref 5–40)
WBC UR QL AUTO: ABNORMAL /HPF

## 2019-01-31 PROCEDURE — 80061 LIPID PANEL: CPT | Performed by: INTERNAL MEDICINE

## 2019-01-31 PROCEDURE — 36415 COLL VENOUS BLD VENIPUNCTURE: CPT | Performed by: INTERNAL MEDICINE

## 2019-01-31 PROCEDURE — 81001 URINALYSIS AUTO W/SCOPE: CPT | Performed by: INTERNAL MEDICINE

## 2019-01-31 PROCEDURE — 82274 ASSAY TEST FOR BLOOD FECAL: CPT | Performed by: INTERNAL MEDICINE

## 2019-01-31 PROCEDURE — 80053 COMPREHEN METABOLIC PANEL: CPT | Performed by: INTERNAL MEDICINE

## 2019-01-31 PROCEDURE — 99396 PREV VISIT EST AGE 40-64: CPT | Performed by: INTERNAL MEDICINE

## 2019-01-31 RX ORDER — NORTRIPTYLINE HYDROCHLORIDE 50 MG/1
1 CAPSULE ORAL DAILY
COMMUNITY
Start: 2019-01-25 | End: 2019-06-07

## 2019-01-31 RX ORDER — HYDROCODONE BITARTRATE AND ACETAMINOPHEN 10; 325 MG/1; MG/1
1 TABLET ORAL EVERY 8 HOURS PRN
Qty: 100 TABLET | Refills: 0 | Status: SHIPPED | OUTPATIENT
Start: 2019-01-31 | End: 2019-05-28 | Stop reason: SDUPTHER

## 2019-01-31 NOTE — PROGRESS NOTES
"Subjective   Maggie Malhotra is a 63 y.o. female here for   Chief Complaint   Patient presents with   • Annual Exam   • Anxiety   • Insomnia   • Pain   .    Vitals:    01/31/19 1032   BP: 124/80   BP Location: Right arm   Patient Position: Sitting   Cuff Size: Adult   Pulse: 89   Resp: 16   Temp: 98.5 °F (36.9 °C)   TempSrc: Temporal   SpO2: 98%   Weight: 88.8 kg (195 lb 12.8 oz)   Height: 180.3 cm (71\")       Body mass index is 27.31 kg/m².    History of Present Illness     The following portions of the patient's history were reviewed and updated as appropriate: allergies, current medications, past social history and problem list.    Review of Systems   Constitutional: Positive for fatigue. Negative for appetite change, chills, fever and unexpected weight change.   HENT: Positive for congestion and postnasal drip. Negative for ear pain, mouth sores, sore throat, tinnitus, trouble swallowing and voice change.    Eyes: Negative for pain and visual disturbance.   Respiratory: Negative for cough, choking, shortness of breath and wheezing.    Cardiovascular: Negative for chest pain, palpitations and leg swelling.   Gastrointestinal: Negative for abdominal pain, blood in stool, constipation, diarrhea, nausea and vomiting.   Endocrine: Negative for cold intolerance, heat intolerance, polydipsia and polyuria.   Genitourinary: Positive for enuresis. Negative for difficulty urinating, dysuria, flank pain, frequency, hematuria and urgency.   Musculoskeletal: Positive for arthralgias, back pain, myalgias, neck pain and neck stiffness. Negative for gait problem and joint swelling.   Skin: Negative for color change and rash.   Allergic/Immunologic: Positive for environmental allergies. Negative for food allergies and immunocompromised state.   Neurological: Positive for numbness. Negative for dizziness, tremors, seizures, syncope, speech difficulty, weakness and headaches.   Hematological: Negative for adenopathy. Does not " bruise/bleed easily.   Psychiatric/Behavioral: Positive for sleep disturbance. Negative for agitation, confusion, decreased concentration, dysphoric mood and suicidal ideas. The patient is nervous/anxious.        Objective   Physical Exam   Constitutional: She appears well-developed and well-nourished.   HENT:   Right Ear: Hearing, tympanic membrane, external ear and ear canal normal.   Left Ear: Hearing, tympanic membrane, external ear and ear canal normal.   Nose: Right sinus exhibits no maxillary sinus tenderness and no frontal sinus tenderness. Left sinus exhibits no maxillary sinus tenderness and no frontal sinus tenderness.   Eyes: Conjunctivae, EOM and lids are normal. Pupils are equal, round, and reactive to light.   Neck: Trachea normal. Neck supple. No JVD present. Carotid bruit is not present. No tracheal deviation present. No thyroid mass and no thyromegaly present.   Cardiovascular: Normal rate, regular rhythm, S1 normal and S2 normal. Exam reveals no gallop and no friction rub.   No murmur heard.  Pulses:       Carotid pulses are 2+ on the right side, and 2+ on the left side.       Radial pulses are 2+ on the right side, and 2+ on the left side.        Dorsalis pedis pulses are 2+ on the right side, and 2+ on the left side.        Posterior tibial pulses are 2+ on the right side, and 2+ on the left side.   Pulmonary/Chest: Effort normal and breath sounds normal. Chest wall is not dull to percussion. Right breast exhibits no inverted nipple, no mass, no nipple discharge, no skin change and no tenderness. Left breast exhibits no inverted nipple, no mass, no nipple discharge, no skin change and no tenderness.   Abdominal: Soft. Normal aorta and bowel sounds are normal. She exhibits no abdominal bruit. There is no hepatosplenomegaly. There is no tenderness. There is no rebound and no guarding. No hernia.   Genitourinary: Rectum normal. Rectal exam shows no mass and guaiac negative stool.   Musculoskeletal:  Normal range of motion. She exhibits no edema.   Lymphadenopathy:     She has no cervical adenopathy.     She has no axillary adenopathy.        Right: No supraclavicular adenopathy present.        Left: No supraclavicular adenopathy present.   Neurological: She is alert. She has normal strength. No cranial nerve deficit or sensory deficit. She displays a negative Romberg sign.   Reflex Scores:       Patellar reflexes are 2+ on the right side and 2+ on the left side.  Skin: Skin is warm and dry.   Nursing note and vitals reviewed.      Assessment/Plan   Diagnoses and all orders for this visit:    Annual physical exam  -     Comprehensive Metabolic Panel; Future  -     Lipid Panel; Future  -     Urinalysis With Microscopic If Indicated (No Culture) - Urine, Clean Catch; Future  -     Comprehensive Metabolic Panel  -     Lipid Panel  -     Urinalysis With Microscopic If Indicated (No Culture) - Urine, Clean Catch  -     Urinalysis, Microscopic Only - Urine, Clean Catch; Future  -     Urinalysis, Microscopic Only - Urine, Clean Catch    Other chronic pain  Comments:  ok to use hydrocodone sparingly  Orders:  -     HYDROcodone-acetaminophen (NORCO)  MG per tablet; Take 1 tablet by mouth Every 8 (Eight) Hours As Needed for Severe Pain .    Essential hypertension  Comments:  controlled with carvediol - call if bp over 140/90    Seasonal allergic rhinitis, unspecified trigger  Comments:  controlled    Vitamin D deficiency    Irritable bowel syndrome with diarrhea  Comments:  controlled    Guillain-Sherman syndrome (CMS/HCC)  Comments:  f/u with neuro (not getting vaccines anymore)    Fibromyalgia, primary  Comments:  still with sx - ok to use pain med prn only    Insomnia, unspecified type  Comments:  controlled    Anxiety  Comments:  controlled    Chronic fatigue  Comments:  call if worse    Colon cancer screening  -     POC FECAL OCCULT BLOOD BY IMMUNOASSAY    Other orders  -     nortriptyline (PAMELOR) 50 MG  capsule; Take 1 capsule by mouth Daily.     CPE today (no pap b/c done by GYN).      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

## 2019-01-31 NOTE — PATIENT INSTRUCTIONS
Health Maintenance, Female  Adopting a healthy lifestyle and getting preventive care can go a long way to promote health and wellness. Talk with your health care provider about what schedule of regular examinations is right for you. This is a good chance for you to check in with your provider about disease prevention and staying healthy.  In between checkups, there are plenty of things you can do on your own. Experts have done a lot of research about which lifestyle changes and preventive measures are most likely to keep you healthy. Ask your health care provider for more information.  Weight and diet  Eat a healthy diet  · Be sure to include plenty of vegetables, fruits, low-fat dairy products, and lean protein.  · Do not eat a lot of foods high in solid fats, added sugars, or salt.  · Get regular exercise. This is one of the most important things you can do for your health.  ? Most adults should exercise for at least 150 minutes each week. The exercise should increase your heart rate and make you sweat (moderate-intensity exercise).  ? Most adults should also do strengthening exercises at least twice a week. This is in addition to the moderate-intensity exercise.    Maintain a healthy weight  · Body mass index (BMI) is a measurement that can be used to identify possible weight problems. It estimates body fat based on height and weight. Your health care provider can help determine your BMI and help you achieve or maintain a healthy weight.  · For females 20 years of age and older:  ? A BMI below 18.5 is considered underweight.  ? A BMI of 18.5 to 24.9 is normal.  ? A BMI of 25 to 29.9 is considered overweight.  ? A BMI of 30 and above is considered obese.    Watch levels of cholesterol and blood lipids  · You should start having your blood tested for lipids and cholesterol at 20 years of age, then have this test every 5 years.  · You may need to have your cholesterol levels checked more often if:  ? Your lipid or  cholesterol levels are high.  ? You are older than 50 years of age.  ? You are at high risk for heart disease.    Cancer screening  Lung Cancer  · Lung cancer screening is recommended for adults 55-80 years old who are at high risk for lung cancer because of a history of smoking.  · A yearly low-dose CT scan of the lungs is recommended for people who:  ? Currently smoke.  ? Have quit within the past 15 years.  ? Have at least a 30-pack-year history of smoking. A pack year is smoking an average of one pack of cigarettes a day for 1 year.  · Yearly screening should continue until it has been 15 years since you quit.  · Yearly screening should stop if you develop a health problem that would prevent you from having lung cancer treatment.    Breast Cancer  · Practice breast self-awareness. This means understanding how your breasts normally appear and feel.  · It also means doing regular breast self-exams. Let your health care provider know about any changes, no matter how small.  · If you are in your 20s or 30s, you should have a clinical breast exam (CBE) by a health care provider every 1-3 years as part of a regular health exam.  · If you are 40 or older, have a CBE every year. Also consider having a breast X-ray (mammogram) every year.  · If you have a family history of breast cancer, talk to your health care provider about genetic screening.  · If you are at high risk for breast cancer, talk to your health care provider about having an MRI and a mammogram every year.  · Breast cancer gene (BRCA) assessment is recommended for women who have family members with BRCA-related cancers. BRCA-related cancers include:  ? Breast.  ? Ovarian.  ? Tubal.  ? Peritoneal cancers.  · Results of the assessment will determine the need for genetic counseling and BRCA1 and BRCA2 testing.    Cervical Cancer  Your health care provider may recommend that you be screened regularly for cancer of the pelvic organs (ovaries, uterus, and  vagina). This screening involves a pelvic examination, including checking for microscopic changes to the surface of your cervix (Pap test). You may be encouraged to have this screening done every 3 years, beginning at age 21.  · For women ages 30-65, health care providers may recommend pelvic exams and Pap testing every 3 years, or they may recommend the Pap and pelvic exam, combined with testing for human papilloma virus (HPV), every 5 years. Some types of HPV increase your risk of cervical cancer. Testing for HPV may also be done on women of any age with unclear Pap test results.  · Other health care providers may not recommend any screening for nonpregnant women who are considered low risk for pelvic cancer and who do not have symptoms. Ask your health care provider if a screening pelvic exam is right for you.  · If you have had past treatment for cervical cancer or a condition that could lead to cancer, you need Pap tests and screening for cancer for at least 20 years after your treatment. If Pap tests have been discontinued, your risk factors (such as having a new sexual partner) need to be reassessed to determine if screening should resume. Some women have medical problems that increase the chance of getting cervical cancer. In these cases, your health care provider may recommend more frequent screening and Pap tests.    Colorectal Cancer  · This type of cancer can be detected and often prevented.  · Routine colorectal cancer screening usually begins at 50 years of age and continues through 75 years of age.  · Your health care provider may recommend screening at an earlier age if you have risk factors for colon cancer.  · Your health care provider may also recommend using home test kits to check for hidden blood in the stool.  · A small camera at the end of a tube can be used to examine your colon directly (sigmoidoscopy or colonoscopy). This is done to check for the earliest forms of colorectal  cancer.  · Routine screening usually begins at age 50.  · Direct examination of the colon should be repeated every 5-10 years through 75 years of age. However, you may need to be screened more often if early forms of precancerous polyps or small growths are found.    Skin Cancer  · Check your skin from head to toe regularly.  · Tell your health care provider about any new moles or changes in moles, especially if there is a change in a mole's shape or color.  · Also tell your health care provider if you have a mole that is larger than the size of a pencil eraser.  · Always use sunscreen. Apply sunscreen liberally and repeatedly throughout the day.  · Protect yourself by wearing long sleeves, pants, a wide-brimmed hat, and sunglasses whenever you are outside.    Heart disease, diabetes, and high blood pressure  · High blood pressure causes heart disease and increases the risk of stroke. High blood pressure is more likely to develop in:  ? People who have blood pressure in the high end of the normal range (130-139/85-89 mm Hg).  ? People who are overweight or obese.  ? People who are .  · If you are 18-39 years of age, have your blood pressure checked every 3-5 years. If you are 40 years of age or older, have your blood pressure checked every year. You should have your blood pressure measured twice--once when you are at a hospital or clinic, and once when you are not at a hospital or clinic. Record the average of the two measurements. To check your blood pressure when you are not at a hospital or clinic, you can use:  ? An automated blood pressure machine at a pharmacy.  ? A home blood pressure monitor.  · If you are between 55 years and 79 years old, ask your health care provider if you should take aspirin to prevent strokes.  · Have regular diabetes screenings. This involves taking a blood sample to check your fasting blood sugar level.  ? If you are at a normal weight and have a low risk for  diabetes, have this test once every three years after 45 years of age.  ? If you are overweight and have a high risk for diabetes, consider being tested at a younger age or more often.  Preventing infection  Hepatitis B  · If you have a higher risk for hepatitis B, you should be screened for this virus. You are considered at high risk for hepatitis B if:  ? You were born in a country where hepatitis B is common. Ask your health care provider which countries are considered high risk.  ? Your parents were born in a high-risk country, and you have not been immunized against hepatitis B (hepatitis B vaccine).  ? You have HIV or AIDS.  ? You use needles to inject street drugs.  ? You live with someone who has hepatitis B.  ? You have had sex with someone who has hepatitis B.  ? You get hemodialysis treatment.  ? You take certain medicines for conditions, including cancer, organ transplantation, and autoimmune conditions.    Hepatitis C  · Blood testing is recommended for:  ? Everyone born from 1945 through 1965.  ? Anyone with known risk factors for hepatitis C.    Sexually transmitted infections (STIs)  · You should be screened for sexually transmitted infections (STIs) including gonorrhea and chlamydia if:  ? You are sexually active and are younger than 24 years of age.  ? You are older than 24 years of age and your health care provider tells you that you are at risk for this type of infection.  ? Your sexual activity has changed since you were last screened and you are at an increased risk for chlamydia or gonorrhea. Ask your health care provider if you are at risk.  · If you do not have HIV, but are at risk, it may be recommended that you take a prescription medicine daily to prevent HIV infection. This is called pre-exposure prophylaxis (PrEP). You are considered at risk if:  ? You are sexually active and do not regularly use condoms or know the HIV status of your partner(s).  ? You take drugs by injection.  ? You  are sexually active with a partner who has HIV.    Talk with your health care provider about whether you are at high risk of being infected with HIV. If you choose to begin PrEP, you should first be tested for HIV. You should then be tested every 3 months for as long as you are taking PrEP.  Pregnancy  · If you are premenopausal and you may become pregnant, ask your health care provider about preconception counseling.  · If you may become pregnant, take 400 to 800 micrograms (mcg) of folic acid every day.  · If you want to prevent pregnancy, talk to your health care provider about birth control (contraception).  Osteoporosis and menopause  · Osteoporosis is a disease in which the bones lose minerals and strength with aging. This can result in serious bone fractures. Your risk for osteoporosis can be identified using a bone density scan.  · If you are 65 years of age or older, or if you are at risk for osteoporosis and fractures, ask your health care provider if you should be screened.  · Ask your health care provider whether you should take a calcium or vitamin D supplement to lower your risk for osteoporosis.  · Menopause may have certain physical symptoms and risks.  · Hormone replacement therapy may reduce some of these symptoms and risks.  Talk to your health care provider about whether hormone replacement therapy is right for you.  Follow these instructions at home:  · Schedule regular health, dental, and eye exams.  · Stay current with your immunizations.  · Do not use any tobacco products including cigarettes, chewing tobacco, or electronic cigarettes.  · If you are pregnant, do not drink alcohol.  · If you are breastfeeding, limit how much and how often you drink alcohol.  · Limit alcohol intake to no more than 1 drink per day for nonpregnant women. One drink equals 12 ounces of beer, 5 ounces of wine, or 1½ ounces of hard liquor.  · Do not use street drugs.  · Do not share needles.  · Ask your health care  provider for help if you need support or information about quitting drugs.  · Tell your health care provider if you often feel depressed.  · Tell your health care provider if you have ever been abused or do not feel safe at home.  This information is not intended to replace advice given to you by your health care provider. Make sure you discuss any questions you have with your health care provider.  Document Released: 07/02/2012 Document Revised: 05/25/2017 Document Reviewed: 09/20/2016  [employer] Interactive Patient Education © 2018 [employer]

## 2019-02-21 DIAGNOSIS — M62.838 MUSCLE SPASM: ICD-10-CM

## 2019-02-21 DIAGNOSIS — G47.00 INSOMNIA, UNSPECIFIED TYPE: ICD-10-CM

## 2019-02-21 DIAGNOSIS — G62.9 NEUROPATHY: ICD-10-CM

## 2019-02-21 DIAGNOSIS — F41.9 ANXIETY: ICD-10-CM

## 2019-02-21 RX ORDER — ZOLPIDEM TARTRATE 5 MG/1
5 TABLET ORAL NIGHTLY PRN
Qty: 30 TABLET | Refills: 2 | OUTPATIENT
Start: 2019-02-21 | End: 2019-05-21 | Stop reason: SDUPTHER

## 2019-02-21 RX ORDER — DIAZEPAM 5 MG/1
5 TABLET ORAL EVERY 12 HOURS PRN
Qty: 60 TABLET | Refills: 2 | OUTPATIENT
Start: 2019-02-21 | End: 2019-09-16 | Stop reason: SDUPTHER

## 2019-02-21 NOTE — TELEPHONE ENCOUNTER
Please review refill request:    Last OV: 1/31/19    Last Prescribed: Diazepam 9/17/18 & Zolpidem 11/30/18    ZHANNA: Ordered    Thank you,    Von

## 2019-02-21 NOTE — TELEPHONE ENCOUNTER
----- Message from Rubia Hidalgo sent at 2/21/2019  1:56 PM EST -----  Contact: Patient  Patient requesting refills on    zolpidem (AMBIEN) 10 MG tablet  diazePAM (VALIUM) 5 MG tablet    Patient:[phone]    Pharmacy:[address] [address] [facility] [phone] [facility] [phone]

## 2019-03-14 ENCOUNTER — TELEPHONE (OUTPATIENT)
Dept: GASTROENTEROLOGY | Facility: CLINIC | Age: 64
End: 2019-03-14

## 2019-03-14 ENCOUNTER — OFFICE VISIT (OUTPATIENT)
Dept: GASTROENTEROLOGY | Facility: CLINIC | Age: 64
End: 2019-03-14

## 2019-03-14 VITALS
HEIGHT: 71 IN | TEMPERATURE: 98.6 F | DIASTOLIC BLOOD PRESSURE: 84 MMHG | SYSTOLIC BLOOD PRESSURE: 134 MMHG | BODY MASS INDEX: 27.1 KG/M2 | WEIGHT: 193.6 LBS

## 2019-03-14 DIAGNOSIS — R19.7 DIARRHEA, UNSPECIFIED TYPE: Primary | ICD-10-CM

## 2019-03-14 DIAGNOSIS — R12 HEARTBURN: ICD-10-CM

## 2019-03-14 DIAGNOSIS — R10.32 LLQ PAIN: ICD-10-CM

## 2019-03-14 PROCEDURE — 99214 OFFICE O/P EST MOD 30 MIN: CPT | Performed by: NURSE PRACTITIONER

## 2019-03-14 RX ORDER — PANTOPRAZOLE SODIUM 40 MG/1
40 TABLET, DELAYED RELEASE ORAL DAILY
Qty: 90 TABLET | Refills: 3 | Status: SHIPPED | OUTPATIENT
Start: 2019-03-14 | End: 2019-05-31 | Stop reason: SINTOL

## 2019-03-14 NOTE — PROGRESS NOTES
Chief Complaint   Patient presents with   • Abdominal Cramping   • Irritable Bowel Syndrome     HPI    Maggie Malhotra is a  63 y.o. female here for a follow up visit for irritable bowel syndrome.  At this is an established patient, new to me, followed by Dr. Justice.  This patient was last seen in our office in 2017.  IBS had been maintained with antispasmodics.  Prior colonoscopy was performed in June 2014 with findings of internal hemorrhoids, diverticulosis, hyperplastic polyp.    On visit today she complains of worsening irritable bowel symptoms over the past year, much worse over the past 2 months.  She is struggling with diarrhea daily mainly after meals.  3-4 liquid stools per day.  Denies rectal bleeding.  There is associated bloating and lower abdominal cramping.  There is left lower quadrant tenderness.  She reports little improvement with oral Bentyl.  She is using Imodium which does help.    She is also experiencing issues with nausea and heartburn.  She tried over-the-counter Nexium for 1 week which did help.  Her appetite comes and goes.  She is eating bland foods.  Denies vomiting or dysphagia.        Past Medical History:   Diagnosis Date   • Allergic rhinitis    • Anxiety    • Backache    • Blood pressure elevated without history of HTN    • Broken foot 06/01/2002    LT   • Chronic pain    • Depression    • Dizziness    • Fatigue    • Fibromyalgia, primary    • Guillain Barré syndrome (CMS/HCC)    • H/O mammogram 01/01/2012    Dr. Rendon   • H/O: pneumonia    • History of broken leg 01/01/1998    LT   • Hypotension    • IBS (irritable bowel syndrome)    • Insomnia    • Memory disorder    • Migraine    • Mood disorder (CMS/HCC)    • Multiple joint pain    • Night sweat    • Syncope    • Vitamin D deficiency        Past Surgical History:   Procedure Laterality Date   • BREAST AUGMENTATION BILATERAL MASTOPEXY Bilateral 01/01/1998   • COLONOSCOPY N/A 05/18/2010    Dr. Gurmeet Justice   •  COLONOSCOPY W/ BIOPSIES  06/27/2014    Non-thrombosed external hemorrhoids, Diverticulosis in the sigmoid colon, two 4 to 5 mm polyps in the sigmoid colon and the descending colon (pathology: Hyperplastic polyp), Tortuous colon, Internal hemorrhoids   • ENDOSCOPY N/A 06/27/2014    Z-line irregular, 38 cm from the incisors, Gastritis, Gastric polyps, Duodenal erosions without bleeding    • ENDOSCOPY N/A 05/18/2010    Dr. Gurmeet Justice   • HYSTERECTOMY N/A 02/01/2006   • LIPOSUCTION N/A 04/01/2001   • PAP SMEAR N/A 07/10/2013   • TOE SURGERY Right 01/01/1995   • TONSILLECTOMY N/A 01/01/1964   • TRACHEAL SURGERY N/A 01/01/1986    [facility]   • TRACHEOSTOMY         Scheduled Meds:  Outpatient Encounter Medications as of 3/14/2019   Medication Sig Dispense Refill   • carvedilol (COREG) 6.25 MG tablet Take 1 tablet by mouth 2 (Two) Times a Day. 60 tablet 11   • cholecalciferol (VITAMIN D3) 36013 units capsule Take 10,000 Units by mouth Daily.     • custom medication builder Testosterone 20mg/g / Estradiol 1.4  5   • diazePAM (VALIUM) 5 MG tablet Take 1 tablet by mouth Every 12 (Twelve) Hours As Needed for Anxiety or Sleep. 60 tablet 2   • HYDROcodone-acetaminophen (NORCO)  MG per tablet Take 1 tablet by mouth Every 8 (Eight) Hours As Needed for Severe Pain . 100 tablet 0   • Magnesium 250 MG tablet Take 1 tablet by mouth Daily.     • multivitamin (DAILY ES) tablet tablet Take 1 tablet by mouth daily.     • nortriptyline (PAMELOR) 50 MG capsule Take 1 capsule by mouth Daily.     • POTASSIUM PO Take 50 mg by mouth Daily.     • Progesterone Micronized (PROGESTERONE PO) TAKE TWO CAPSULES BY MOUTH AT BEDTIME  5   • zolpidem (AMBIEN) 5 MG tablet Take 1 tablet by mouth At Night As Needed for Sleep. 30 tablet 2   • hyoscyamine (LEVSIN) 0.125 MG SL tablet Take 1 tablet by mouth 4 (Four) Times a Day With Meals & at Bedtime. 120 tablet 5   • pantoprazole (PROTONIX) 40 MG EC tablet Take 1 tablet by mouth Daily. 90  tablet 3     No facility-administered encounter medications on file as of 3/14/2019.        Continuous Infusions:  No current facility-administered medications for this visit.     PRN Meds:.    Allergies   Allergen Reactions   • Codeine    • Erythromycin    • Influenza Vaccines    • Celexa [Citalopram Hydrobromide] Other (See Comments)     Headache   • Cymbalta [Duloxetine Hcl] Nausea Only and Other (See Comments)     Fatigue/Pain   • Midazolam Itching       Social History     Socioeconomic History   • Marital status:      Spouse name: Not on file   • Number of children: Not on file   • Years of education: Not on file   • Highest education level: Not on file   Social Needs   • Financial resource strain: Not on file   • Food insecurity - worry: Not on file   • Food insecurity - inability: Not on file   • Transportation needs - medical: Not on file   • Transportation needs - non-medical: Not on file   Occupational History   • Not on file   Tobacco Use   • Smoking status: Never Smoker   • Smokeless tobacco: Never Used   • Tobacco comment: occasional caffiene   Substance and Sexual Activity   • Alcohol use: Yes     Alcohol/week: 1.8 oz     Types: 3 Cans of beer per week     Comment: Moderate   • Drug use: No     Comment: Diazepam/NORCO   • Sexual activity: Not Currently     Partners: Male   Other Topics Concern   • Not on file   Social History Narrative   • Not on file       Family History   Problem Relation Age of Onset   • Heart failure Father    • Other Father         Lung failure       Review of Systems   Constitutional: Negative for activity change, appetite change, fatigue, fever and unexpected weight change.   HENT: Negative for trouble swallowing.    Respiratory: Negative for apnea, cough, choking, chest tightness, shortness of breath and wheezing.    Cardiovascular: Negative for chest pain, palpitations and leg swelling.   Gastrointestinal: Positive for abdominal pain, diarrhea and nausea. Negative for  abdominal distention, anal bleeding, blood in stool, constipation, rectal pain and vomiting.       Vitals:    03/14/19 1309   BP: 134/84   Temp: 98.6 °F (37 °C)       Physical Exam   Constitutional: She is oriented to person, place, and time. She appears well-developed and well-nourished.   Eyes: Pupils are equal, round, and reactive to light.   Neck: Normal range of motion.   Cardiovascular: Normal rate, regular rhythm and normal heart sounds.   Pulmonary/Chest: Effort normal and breath sounds normal. No respiratory distress. She has no wheezes.   Abdominal: Soft. Bowel sounds are normal. She exhibits no distension and no mass. There is tenderness. There is no guarding. No hernia.   Left lower quadrant tenderness with palpation.   Musculoskeletal: Normal range of motion.   Neurological: She is alert and oriented to person, place, and time.   Skin: Skin is warm and dry. Capillary refill takes less than 2 seconds.   Psychiatric: She has a normal mood and affect. Her behavior is normal.       No images are attached to the encounter.    Maggie was seen today for abdominal cramping and irritable bowel syndrome.    Diagnoses and all orders for this visit:    Diarrhea, unspecified type  -     CBC & Differential  -     TSH  -     Comprehensive Metabolic Panel  -     CT Abdomen Pelvis With Contrast; Future    LLQ pain  -     CBC & Differential  -     TSH  -     Comprehensive Metabolic Panel  -     CT Abdomen Pelvis With Contrast; Future    Heartburn    Other orders  -     hyoscyamine (LEVSIN) 0.125 MG SL tablet; Take 1 tablet by mouth 4 (Four) Times a Day With Meals & at Bedtime.  -     pantoprazole (PROTONIX) 40 MG EC tablet; Take 1 tablet by mouth Daily.      Assessment    #1 diarrhea, differential broad including inflammatory bowel disease, irritable bowel syndrome, celiac disease, chronic infection, dietary, microscopic colitis, bile salt absorption, thyroid disorder, other.   #2 left lower quadrant pain, she has a  history of diverticulosis.  Symptoms are certainly concerning for diverticulitis.  #3 heartburn, she did notice improvement in heartburn symptoms with over-the-counter PPI for 1 week.    Plan    Prom[facility] IBcause stool and serology.  CT of abdomen and pelvis to rule abnormalities.  CBC  CMP  TSH  Check stools for occult blood.  Trial of sublingual antispasmodic.  Start PPI therapy as above.  Follow-up 6 weeks.

## 2019-03-14 NOTE — TELEPHONE ENCOUNTER
[facility] full requisition, copy of insurance card,  info on [facility], and Prom[facility] Links program to pt with instructions.  Verb understanding.

## 2019-03-15 ENCOUNTER — TELEPHONE (OUTPATIENT)
Dept: GASTROENTEROLOGY | Facility: CLINIC | Age: 64
End: 2019-03-15

## 2019-03-15 LAB
ALBUMIN SERPL-MCNC: 4.5 G/DL (ref 3.5–5.2)
ALBUMIN/GLOB SERPL: 1.7 G/DL
ALP SERPL-CCNC: 60 U/L (ref 39–117)
ALT SERPL-CCNC: 38 U/L (ref 1–33)
AST SERPL-CCNC: 42 U/L (ref 1–32)
BASOPHILS # BLD AUTO: 0.03 10*3/MM3 (ref 0–0.2)
BASOPHILS NFR BLD AUTO: 0.5 % (ref 0–1.5)
BILIRUB SERPL-MCNC: 0.6 MG/DL (ref 0.1–1.2)
BUN SERPL-MCNC: 13 MG/DL (ref 8–23)
BUN/CREAT SERPL: 13.7 (ref 7–25)
CALCIUM SERPL-MCNC: 9.7 MG/DL (ref 8.6–10.5)
CHLORIDE SERPL-SCNC: 95 MMOL/L (ref 98–107)
CO2 SERPL-SCNC: 24.9 MMOL/L (ref 22–29)
CREAT SERPL-MCNC: 0.95 MG/DL (ref 0.57–1)
EOSINOPHIL # BLD AUTO: 0.17 10*3/MM3 (ref 0–0.4)
EOSINOPHIL NFR BLD AUTO: 2.7 % (ref 0.3–6.2)
ERYTHROCYTE [DISTWIDTH] IN BLOOD BY AUTOMATED COUNT: 13.2 % (ref 12.3–15.4)
GLOBULIN SER CALC-MCNC: 2.6 GM/DL
GLUCOSE SERPL-MCNC: 96 MG/DL (ref 65–99)
HCT VFR BLD AUTO: 45.1 % (ref 34–46.6)
HGB BLD-MCNC: 14.7 G/DL (ref 12–15.9)
IMM GRANULOCYTES # BLD AUTO: 0.01 10*3/MM3 (ref 0–0.05)
IMM GRANULOCYTES NFR BLD AUTO: 0.2 % (ref 0–0.5)
LYMPHOCYTES # BLD AUTO: 1.66 10*3/MM3 (ref 0.7–3.1)
LYMPHOCYTES NFR BLD AUTO: 26.4 % (ref 19.6–45.3)
MCH RBC QN AUTO: 32.7 PG (ref 26.6–33)
MCHC RBC AUTO-ENTMCNC: 32.6 G/DL (ref 31.5–35.7)
MCV RBC AUTO: 100.2 FL (ref 79–97)
MONOCYTES # BLD AUTO: 0.48 10*3/MM3 (ref 0.1–0.9)
MONOCYTES NFR BLD AUTO: 7.6 % (ref 5–12)
NEUTROPHILS # BLD AUTO: 3.94 10*3/MM3 (ref 1.4–7)
NEUTROPHILS NFR BLD AUTO: 62.6 % (ref 42.7–76)
NRBC BLD AUTO-RTO: 0 /100 WBC (ref 0–0)
PLATELET # BLD AUTO: 275 10*3/MM3 (ref 140–450)
POTASSIUM SERPL-SCNC: 4.5 MMOL/L (ref 3.5–5.2)
PROT SERPL-MCNC: 7.1 G/DL (ref 6–8.5)
RBC # BLD AUTO: 4.5 10*6/MM3 (ref 3.77–5.28)
SODIUM SERPL-SCNC: 136 MMOL/L (ref 136–145)
TSH SERPL DL<=0.005 MIU/L-ACNC: 1.78 MIU/ML (ref 0.27–4.2)
WBC # BLD AUTO: 6.29 10*3/MM3 (ref 3.4–10.8)

## 2019-03-15 NOTE — TELEPHONE ENCOUNTER
----- Message from RAJEEV Patrick sent at 3/15/2019  9:32 AM EDT -----  Please notify patient that lab work shows normal blood counts, no signs of anemia.  She does have slightly elevated liver enzymes.  Lets wait and see what CT of abdomen and pelvis shows.

## 2019-03-15 NOTE — TELEPHONE ENCOUNTER
Call to pt.  Advise per INGRID Dougherty that lab work shows normal blood counts, no signs of anemia.  Does have slightly elevated liver enzymes.  Will wait and see what CT shows.  Verb understanding.

## 2019-03-22 ENCOUNTER — TELEPHONE (OUTPATIENT)
Dept: INTERNAL MEDICINE | Facility: CLINIC | Age: 64
End: 2019-03-22

## 2019-03-22 RX ORDER — PROMETHAZINE HYDROCHLORIDE 25 MG/1
25 TABLET ORAL EVERY 6 HOURS PRN
Qty: 20 TABLET | Refills: 1 | Status: SHIPPED | OUTPATIENT
Start: 2019-03-22 | End: 2019-09-29 | Stop reason: SDUPTHER

## 2019-03-22 NOTE — TELEPHONE ENCOUNTER
----- Message from Margie Tucker sent at 3/22/2019 12:53 PM EDT -----  Pt calling to request refill on her promethazine  25mg    Pt#[phone]    [address] [address] [facility] [phone]  - [phone] FX

## 2019-03-26 ENCOUNTER — HOSPITAL ENCOUNTER (OUTPATIENT)
Dept: CT IMAGING | Facility: HOSPITAL | Age: 64
Discharge: HOME | End: 2019-03-26
Admitting: NURSE PRACTITIONER

## 2019-03-26 DIAGNOSIS — R19.7 DIARRHEA, UNSPECIFIED TYPE: ICD-10-CM

## 2019-03-26 DIAGNOSIS — R10.32 LLQ PAIN: ICD-10-CM

## 2019-03-26 PROCEDURE — 82565 ASSAY OF CREATININE: CPT

## 2019-03-26 PROCEDURE — 25010000002 IOPAMIDOL 61 % SOLUTION: Performed by: NURSE PRACTITIONER

## 2019-03-26 PROCEDURE — 74177 CT ABD & PELVIS W/CONTRAST: CPT

## 2019-03-26 RX ADMIN — IOPAMIDOL 85 ML: 612 INJECTION, SOLUTION INTRAVENOUS at 13:22

## 2019-03-26 NOTE — TELEPHONE ENCOUNTER
It is noted that [facility] stool results under Media Tab.    Call to pt.  States completed serology yesterday.

## 2019-03-27 ENCOUNTER — TELEPHONE (OUTPATIENT)
Dept: GASTROENTEROLOGY | Facility: CLINIC | Age: 64
End: 2019-03-27

## 2019-03-27 LAB — CREAT BLDA-MCNC: 0.9 MG/DL (ref 0.6–1.3)

## 2019-03-27 PROCEDURE — 82270 OCCULT BLOOD FECES: CPT | Performed by: INTERNAL MEDICINE

## 2019-03-27 NOTE — PROGRESS NOTES
Can we reach out to the patient see how she is feeling.  If still symptomatic lets set her up for EGD and colonoscopy with Dr. Justice.  Could potentially be done at [facility].

## 2019-03-27 NOTE — PROGRESS NOTES
Pool--please inform the patient that CT of abdomen and pelvis shows gallstones but no evidence of inflammation or obstruction.  There is diverticulosis but no evidence of diverticulitis.  I did reviewed prometheus stool was negative. I ordered the serology but do not see that this was done, can we f/u on that.     Dr. Justice--- just wanted to keep you in the loop about this patient. Prometheus stool was negative. May need EGD and colonoscopy.  Last time her colon was studied was 2014 with internal hemorrhoids, diverticulosis, hyperplastic polyp. Your thoughts?

## 2019-04-02 ENCOUNTER — TELEPHONE (OUTPATIENT)
Dept: GASTROENTEROLOGY | Facility: CLINIC | Age: 64
End: 2019-04-02

## 2019-04-02 NOTE — TELEPHONE ENCOUNTER
----- Message from RAJEEV Patrick sent at 3/27/2019  1:19 PM EDT -----  Can we reach out to the patient see how she is feeling.  If still symptomatic lets set her up for EGD and colonoscopy with Dr. Justice.  Could potentially be done at [facility].

## 2019-04-02 NOTE — TELEPHONE ENCOUNTER
----- Message from RAJEEV Patrick sent at 3/27/2019  9:24 AM EDT -----  Pool--please inform the patient that CT of abdomen and pelvis shows gallstones but no evidence of inflammation or obstruction.  There is diverticulosis but no evidence of diverticulitis.  I did reviewed prometheus stool was negative. I ordered the se  rology but do not see that this was done, can we f/u on that.     Dr. Justice--- just wanted to keep you in the loop about this patient. Prometheus stool was negative. May need EGD and colonoscopy.  Last time her colon was studied was 2014 with internal hemorrhoids, diverticulosis, hyperplastic polyp. Your though  ts?

## 2019-04-03 NOTE — TELEPHONE ENCOUNTER
Call to pt.  Advise  per INGRID Dougherty that CT abd/pelvis shows gallstones, but no evidence of inflammation or obstruction.  There is diverticulosis, but no evidence of diverticulitis.      Advise per Dr Justice that [facility] serology and stool test all negative. If still symptomatic, agree with offering EGD and c/s.    Pt verb understanding.  States is having same symptoms as with o/v of 3/14. Will think about scopes and call back.

## 2019-04-03 NOTE — TELEPHONE ENCOUNTER
[facility] serology and stool tests all negative, if patient still symptomatic, agree with offering EGD and colonoscopy.

## 2019-05-21 DIAGNOSIS — G47.00 INSOMNIA, UNSPECIFIED TYPE: ICD-10-CM

## 2019-05-22 RX ORDER — ZOLPIDEM TARTRATE 5 MG/1
TABLET ORAL
Qty: 30 TABLET | Refills: 2 | OUTPATIENT
Start: 2019-05-22 | End: 2019-08-21 | Stop reason: SDUPTHER

## 2019-05-22 NOTE — TELEPHONE ENCOUNTER
Please review refill request:    Last OV:1/31/19    Last Prescribed: 2/21/19    ZHANNA: Ordered    Thank you,    Von

## 2019-05-28 DIAGNOSIS — G89.29 OTHER CHRONIC PAIN: ICD-10-CM

## 2019-05-28 RX ORDER — HYDROCODONE BITARTRATE AND ACETAMINOPHEN 10; 325 MG/1; MG/1
1 TABLET ORAL EVERY 8 HOURS PRN
Qty: 100 TABLET | Refills: 0 | Status: SHIPPED | OUTPATIENT
Start: 2019-05-28 | End: 2019-08-21 | Stop reason: SDUPTHER

## 2019-05-28 NOTE — TELEPHONE ENCOUNTER
Please review refill request:    Last OV: 1/31/19    Last Prescribed: 1/31/19    ZHANNA: Ordered    Thank you,    Von

## 2019-05-28 NOTE — TELEPHONE ENCOUNTER
----- Message from Beata Bourgeois sent at 5/28/2019 12:14 PM EDT -----  Contact: Patient  Patient called requesting refill on    HYDROcodone-acetaminophen (NORCO)  MG per tablet.  Please advise.    Patient:  [phone]

## 2019-05-31 ENCOUNTER — OFFICE VISIT (OUTPATIENT)
Dept: GASTROENTEROLOGY | Facility: CLINIC | Age: 64
End: 2019-05-31

## 2019-05-31 VITALS
BODY MASS INDEX: 27.64 KG/M2 | WEIGHT: 197.4 LBS | SYSTOLIC BLOOD PRESSURE: 126 MMHG | TEMPERATURE: 98.5 F | HEIGHT: 71 IN | DIASTOLIC BLOOD PRESSURE: 80 MMHG

## 2019-05-31 DIAGNOSIS — K21.9 GASTROESOPHAGEAL REFLUX DISEASE, ESOPHAGITIS PRESENCE NOT SPECIFIED: ICD-10-CM

## 2019-05-31 DIAGNOSIS — R11.0 NAUSEA: ICD-10-CM

## 2019-05-31 DIAGNOSIS — R74.8 ELEVATED LIVER ENZYMES: ICD-10-CM

## 2019-05-31 DIAGNOSIS — K58.0 IRRITABLE BOWEL SYNDROME WITH DIARRHEA: Primary | ICD-10-CM

## 2019-05-31 PROCEDURE — 99214 OFFICE O/P EST MOD 30 MIN: CPT | Performed by: NURSE PRACTITIONER

## 2019-05-31 RX ORDER — DICYCLOMINE HCL 20 MG
20 TABLET ORAL
Qty: 90 TABLET | Refills: 5 | Status: SHIPPED | OUTPATIENT
Start: 2019-05-31 | End: 2019-06-30

## 2019-06-07 ENCOUNTER — PRIOR AUTHORIZATION (OUTPATIENT)
Dept: GASTROENTEROLOGY | Facility: CLINIC | Age: 64
End: 2019-06-07

## 2019-06-07 ENCOUNTER — OFFICE VISIT (OUTPATIENT)
Dept: INTERNAL MEDICINE | Facility: CLINIC | Age: 64
End: 2019-06-07

## 2019-06-07 VITALS
SYSTOLIC BLOOD PRESSURE: 140 MMHG | DIASTOLIC BLOOD PRESSURE: 90 MMHG | HEIGHT: 71 IN | WEIGHT: 197.4 LBS | BODY MASS INDEX: 27.64 KG/M2

## 2019-06-07 DIAGNOSIS — F33.9 MONOPOLAR DEPRESSION (HCC): ICD-10-CM

## 2019-06-07 DIAGNOSIS — G47.00 INSOMNIA, UNSPECIFIED TYPE: ICD-10-CM

## 2019-06-07 DIAGNOSIS — R79.89 ELEVATED LIVER FUNCTION TESTS: ICD-10-CM

## 2019-06-07 DIAGNOSIS — R53.82 CHRONIC FATIGUE: ICD-10-CM

## 2019-06-07 DIAGNOSIS — G89.29 OTHER CHRONIC PAIN: ICD-10-CM

## 2019-06-07 DIAGNOSIS — I10 ESSENTIAL HYPERTENSION: Primary | ICD-10-CM

## 2019-06-07 DIAGNOSIS — F41.9 ANXIETY: ICD-10-CM

## 2019-06-07 PROBLEM — R03.0 BLOOD PRESSURE ELEVATED WITHOUT HISTORY OF HTN: Status: RESOLVED | Noted: 2018-02-07 | Resolved: 2019-06-07

## 2019-06-07 PROCEDURE — 99214 OFFICE O/P EST MOD 30 MIN: CPT | Performed by: INTERNAL MEDICINE

## 2019-06-07 NOTE — PROGRESS NOTES
"Subjective   Magige Malhotra is a 64 y.o. female here for   Chief Complaint   Patient presents with   • Anxiety     4 month follow-up    • Insomnia   • Pain   .    Vitals:    06/07/19 1326 06/07/19 1410   BP: 164/98 140/90   BP Location: Left arm    Patient Position: Sitting    Cuff Size: Adult    Weight: 89.5 kg (197 lb 6.4 oz)    Height: 180.3 cm (71\")        Body mass index is 27.53 kg/m².    Anxiety   Presents for follow-up visit. Symptoms include insomnia and nervous/anxious behavior. Patient reports no chest pain, nausea, palpitations or shortness of breath. Symptoms occur constantly.       Insomnia   This is a chronic problem. The current episode started more than 1 year ago. The problem occurs constantly. The problem has been unchanged. Associated symptoms include arthralgias (all over pain) and fatigue. Pertinent negatives include no abdominal pain, chest pain, chills, coughing, fever, nausea or vomiting.   Pain   This is a chronic problem. The current episode started more than 1 year ago. The problem occurs constantly. The problem has been unchanged. Associated symptoms include arthralgias (all over pain) and fatigue. Pertinent negatives include no abdominal pain, chest pain, chills, coughing, fever, nausea or vomiting.        The following portions of the patient's history were reviewed and updated as appropriate: allergies, current medications, past social history and problem list.    Review of Systems   Constitutional: Positive for fatigue. Negative for chills and fever.   Respiratory: Negative for cough, shortness of breath and wheezing.    Cardiovascular: Negative for chest pain, palpitations and leg swelling.   Gastrointestinal: Positive for diarrhea (better with Kaopectate). Negative for abdominal pain, blood in stool, constipation, nausea and vomiting.   Musculoskeletal: Positive for arthralgias (all over pain) and back pain (better).   Psychiatric/Behavioral: Positive for dysphoric mood and " sleep disturbance. The patient is nervous/anxious and has insomnia.        Objective   Physical Exam   Constitutional: She appears well-developed and well-nourished. No distress.   Cardiovascular: Normal rate, regular rhythm and normal heart sounds.   Pulmonary/Chest: No respiratory distress. She has no wheezes. She has no rales. She exhibits no tenderness.   Musculoskeletal: She exhibits no edema.   Neurological: She is alert. No cranial nerve deficit.   Psychiatric: She has a normal mood and affect. Her behavior is normal.   Nursing note and vitals reviewed.      Assessment/Plan   Diagnoses and all orders for this visit:    Essential hypertension  Comments:  slightly high today - need daily bp chk - call if bp over 140/90 (may need higher dose coreg)    Insomnia, unspecified type  Comments:  she states she needs ambien q - she was warned about black box warnings -will stop if any side effect    Anxiety  Comments:  & dep - trial of trintellix - call if problems  Orders:  -     Vortioxetine HBr (TRINTELLIX) 10 MG tablet; Take 10 mg by mouth Daily.    Chronic fatigue    Other chronic pain  Comments:  use hydrocodone sparingly (1/4-1/2 daily)    Monopolar depression (CMS/HCC)  Comments:  trial of trintellix - call if problems  Orders:  -     Vortioxetine HBr (TRINTELLIX) 10 MG tablet; Take 10 mg by mouth Daily.    Elevated liver function tests  Comments:  need to limit tylenol & alcohol - rechk 2 mos    Other orders  -     TESTOSTERONE COMPOUNDING KIT TD; APPLY TWO clicks topically TWICE DAILY AS DIRECTED  -     progesterone (PROMETRIUM) 200 MG capsule; Take 200 mg by mouth Daily.

## 2019-06-11 ENCOUNTER — TELEPHONE (OUTPATIENT)
Dept: GASTROENTEROLOGY | Facility: CLINIC | Age: 64
End: 2019-06-11

## 2019-06-11 NOTE — TELEPHONE ENCOUNTER
See o/v of 5/31.    Call to pt.  States has not received xifaxan rx.  Call to [facility] @ [phone] and spoke with Kelin.  Xifaxan coming in today- will be able to fill.    Call to pt to advise of above.  Verb understanding.

## 2019-06-11 NOTE — TELEPHONE ENCOUNTER
----- Message from Los Zhou sent at 6/11/2019  9:03 AM EDT -----  Regarding: medication   Contact: [phone]  Pt is calling for a refill on her Xifaxan

## 2019-08-15 ENCOUNTER — OFFICE VISIT (OUTPATIENT)
Dept: CARDIOLOGY | Facility: CLINIC | Age: 64
End: 2019-08-15

## 2019-08-15 VITALS
WEIGHT: 197 LBS | DIASTOLIC BLOOD PRESSURE: 98 MMHG | SYSTOLIC BLOOD PRESSURE: 130 MMHG | RESPIRATION RATE: 16 BRPM | HEART RATE: 104 BPM | HEIGHT: 71 IN | BODY MASS INDEX: 27.58 KG/M2

## 2019-08-15 DIAGNOSIS — G61.0 GUILLAIN-BARRE SYNDROME (HCC): ICD-10-CM

## 2019-08-15 DIAGNOSIS — I10 ESSENTIAL HYPERTENSION: Primary | ICD-10-CM

## 2019-08-15 PROCEDURE — 99213 OFFICE O/P EST LOW 20 MIN: CPT | Performed by: INTERNAL MEDICINE

## 2019-08-15 PROCEDURE — 93000 ELECTROCARDIOGRAM COMPLETE: CPT | Performed by: INTERNAL MEDICINE

## 2019-08-15 RX ORDER — CARVEDILOL PHOSPHATE 40 MG/1
40 CAPSULE, EXTENDED RELEASE ORAL DAILY
Qty: 30 CAPSULE | Refills: 11 | Status: SHIPPED | OUTPATIENT
Start: 2019-08-15 | End: 2019-09-04 | Stop reason: ALTCHOICE

## 2019-08-15 NOTE — PROGRESS NOTES
PATIENTINFORMATION    Date of Office Visit: 08/15/2019  Encounter Provider: Adriana Maravilla MD  Place of Service: [facility] CARDIOLOGY  Patient Name: Maggie Malhotra  : 1955    Subjective:     Encounter Date:08/15/2019      Patient ID: Maggie Malhotra is a 64 y.o. female.      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 She has no known heart disease. She had a stress echo in  which showed normal LV systolic function without significant valvular heart disease, and she had no evidence of ischemia. She had an exercise stress test in  which was normal. In 2018, she was at work. She had just eaten breakfast. She had sudden onset of feeling unwell. She was in a cold sweat. Co-workers said she was really pale. She was dizzy. She felt a little short of breath but did not have any palpitations. She went to the emergency room where her troponin was negative. Basic metabolic panel showed a blood sugar of 140 but was otherwise pretty unremarkable, and CBC was unremarkable. D-dimer was mildly elevated. She was discharged with a Zio patch which was normal. She had carotid artery ultrasound which was also normal.  In 2018 she had an echocardiogram which showed normal LV systolic function with ejection fraction of 67% and grade 1 diastolic dysfunction.     She comes in today for followup of her hypertension.  She has been taking carvedilol 6.25 mg twice a day.  She brings in a list of her blood pressures which show that she is generally running a bit high, especially the diastolic number.  Today she is also tachycardic in sinus tachycardia at a heart rate of 104.  She is not having any symptoms.  She denies palpitations, shortness of breath, edema, lightheadedness, chest pain or fatigue .  She was asking if there is a longer acting medication to help  "with compliance.          Review of Systems   Constitution: Negative for fever, malaise/fatigue, weight gain and weight loss.   HENT: Negative for ear pain, hearing loss, nosebleeds and sore throat.    Eyes: Negative for double vision, pain, vision loss in left eye and vision loss in right eye.   Cardiovascular:        See history of present illness.   Respiratory: Negative for cough, shortness of breath, sleep disturbances due to breathing, snoring and wheezing.    Endocrine: Negative for cold intolerance, heat intolerance and polyuria.   Skin: Negative for itching, poor wound healing and rash.   Musculoskeletal: Negative for joint pain, joint swelling and myalgias.   Gastrointestinal: Negative for abdominal pain, diarrhea, hematochezia, nausea and vomiting.   Genitourinary: Negative for hematuria and hesitancy.   Neurological: Negative for numbness, paresthesias and seizures.   Psychiatric/Behavioral: Negative for depression. The patient is not nervous/anxious.            ECG 12 Lead  Date/Time: 8/15/2019 1:47 PM  Performed by: Adriana Maravilla MD  Authorized by: Adriana Maravilla MD                  Objective:     /98 (BP Location: Right arm, Patient Position: Sitting)   Pulse 104   Resp 16   Ht 180.3 cm (71\")   Wt 89.4 kg (197 lb)   LMP 01/01/2006 (LMP Unknown)   BMI 27.48 kg/m²  Body mass index is 27.48 kg/m².     Physical Exam   Constitutional: She appears well-developed.   HENT:   Head: Normocephalic and atraumatic.   Eyes: Conjunctivae and lids are normal. Pupils are equal, round, and reactive to light. Lids are everted and swept, no foreign bodies found.   Neck: Normal range of motion. No JVD present. Carotid bruit is not present. No tracheal deviation present. No thyroid mass present.   Cardiovascular: Normal rate, regular rhythm and normal heart sounds.   Pulses:       Dorsalis pedis pulses are 2+ on the right side, and 2+ on the left side.   Pulmonary/Chest: Effort normal and breath sounds " normal.   Abdominal: Normal appearance and bowel sounds are normal.   Musculoskeletal: Normal range of motion.   Neurological: She is alert. She has normal strength.   Skin: Skin is warm, dry and intact.   Psychiatric: She has a normal mood and affect. Her behavior is normal.   Vitals reviewed.      Lab Review: I reviewed her most recent lab work and she was not anemic.      Assessment/Plan:        1.  Dizziness.  She had this episode of dizziness associated with breaking out into a cold sweat and being pale.  She did not have palpitations with it.  She did wear a Zio patch which did not show arrhythmia, but she did not have a reoccurrence of the symptoms while the Zio patch was on. She had a normal echocardiogram December 2018.  She has not had a reoccurrence  2.  Hypertension.  Her blood pressure remains high.  I am going to try her on Coreg CR 40 mg a day.  I encouraged her to call me in a couple weeks with some blood pressure numbers and I will make any additional changes needed prior to seeing her back.  I told her if the Coreg CR is too expensive to let me know and I will put her back on the short acting carvedilol at 6.M start 12.5 mg twice a day.  I still think carvedilol is the best medication for her given that she also has some baseline tachycardia.     I will see her back in 4 weeks.    Orders Placed This Encounter   Procedures   • ECG 12 Lead     This order was created via procedure documentation        Discharge Medications           Accurate as of 8/15/19  2:16 PM. If you have any questions, ask your nurse or doctor.               New Medications      Instructions Start Date   carvedilol CR 40 MG 24 hr capsule  Commonly known as:  COREG CR  Started by:  Adriana Maravilla MD   40 mg, Oral, Daily         Continue These Medications      Instructions Start Date   cholecalciferol 83029 units capsule  Commonly known as:  VITAMIN D3   10,000 Units, Oral, Daily      diazePAM 5 MG tablet  Commonly known as:   VALIUM   5 mg, Oral, Every 12 Hours PRN      HYDROcodone-acetaminophen  MG per tablet  Commonly known as:  NORCO   1 tablet, Oral, Every 8 Hours PRN      Magnesium 250 MG tablet   1 tablet, Oral, Daily      multivitamin tablet tablet   1 tablet, Oral, Daily      POTASSIUM PO   50 mg, Oral, Daily      progesterone 200 MG capsule  Commonly known as:  PROMETRIUM   200 mg, Oral, Daily      promethazine 25 MG tablet  Commonly known as:  PHENERGAN   25 mg, Oral, Every 6 Hours PRN      TESTOSTERONE COMPOUNDING KIT TD   APPLY TWO clicks topically TWICE DAILY AS DIRECTED      zolpidem 5 MG tablet  Commonly known as:  AMBIEN   TAKE 1 TABLET BY MOUTH AT BEDTIME         Stop These Medications    carvedilol 6.25 MG tablet  Commonly known as:  COREG  Stopped by:  Adriana Maravilla MD     Vortioxetine HBr 10 MG tablet  Commonly known as:  TRINTELLIX  Stopped by:  MD Adriana Pickett MD  08/15/19  2:16 PM

## 2019-08-21 ENCOUNTER — OFFICE VISIT (OUTPATIENT)
Dept: INTERNAL MEDICINE | Facility: CLINIC | Age: 64
End: 2019-08-21

## 2019-08-21 VITALS
HEIGHT: 71 IN | DIASTOLIC BLOOD PRESSURE: 98 MMHG | SYSTOLIC BLOOD PRESSURE: 144 MMHG | BODY MASS INDEX: 27.33 KG/M2 | WEIGHT: 195.2 LBS

## 2019-08-21 DIAGNOSIS — G47.00 INSOMNIA, UNSPECIFIED TYPE: ICD-10-CM

## 2019-08-21 DIAGNOSIS — F41.9 ANXIETY: ICD-10-CM

## 2019-08-21 DIAGNOSIS — G89.29 OTHER CHRONIC PAIN: ICD-10-CM

## 2019-08-21 DIAGNOSIS — F33.9 MONOPOLAR DEPRESSION (HCC): ICD-10-CM

## 2019-08-21 DIAGNOSIS — R53.82 CHRONIC FATIGUE: ICD-10-CM

## 2019-08-21 DIAGNOSIS — K58.0 IRRITABLE BOWEL SYNDROME WITH DIARRHEA: ICD-10-CM

## 2019-08-21 DIAGNOSIS — I10 ESSENTIAL HYPERTENSION: Primary | ICD-10-CM

## 2019-08-21 PROCEDURE — 99214 OFFICE O/P EST MOD 30 MIN: CPT | Performed by: INTERNAL MEDICINE

## 2019-08-21 RX ORDER — HYDROCODONE BITARTRATE AND ACETAMINOPHEN 10; 325 MG/1; MG/1
1 TABLET ORAL EVERY 8 HOURS PRN
Qty: 100 TABLET | Refills: 0 | Status: SHIPPED | OUTPATIENT
Start: 2019-08-21 | End: 2019-12-20 | Stop reason: SDUPTHER

## 2019-08-21 RX ORDER — DICYCLOMINE HCL 20 MG
1 TABLET ORAL 4 TIMES DAILY PRN
Refills: 5 | COMMUNITY
Start: 2019-07-16 | End: 2021-04-08

## 2019-08-21 RX ORDER — ZOLPIDEM TARTRATE 5 MG/1
TABLET ORAL
Qty: 30 TABLET | Refills: 2 | Status: SHIPPED | OUTPATIENT
Start: 2019-08-21 | End: 2019-11-24 | Stop reason: SDUPTHER

## 2019-08-21 NOTE — PROGRESS NOTES
"Subjective   Maggie Malhotra is a 64 y.o. female here for   Chief Complaint   Patient presents with   • Anxiety     2 month follow-up   • Insomnia   • Pain   • Hypertension   .    Vitals:    08/21/19 0905 08/21/19 0946   BP: (!) 142/102 144/98   BP Location: Left arm    Patient Position: Sitting    Cuff Size: Adult    Weight: 88.5 kg (195 lb 3.2 oz)    Height: 180.3 cm (71\")        Body mass index is 27.22 kg/m².    Insomnia   This is a chronic problem. The current episode started more than 1 year ago. The problem occurs constantly. The problem has been unchanged. Associated symptoms include fatigue and neck pain. Pertinent negatives include no chills, coughing or fever.   Pain   This is a chronic problem. The current episode started more than 1 year ago. The problem occurs constantly. The problem has been unchanged. Associated symptoms include fatigue and neck pain. Pertinent negatives include no chills, coughing or fever.   Hypertension   This is a chronic problem. The current episode started more than 1 year ago. The problem has been waxing and waning since onset. The problem is controlled. Associated symptoms include neck pain.   Fatigue   This is a chronic problem. The current episode started more than 1 year ago. The problem occurs constantly. The problem has been unchanged. Associated symptoms include fatigue and neck pain. Pertinent negatives include no chills, coughing or fever.        The following portions of the patient's history were reviewed and updated as appropriate: allergies, current medications, past social history and problem list.    Review of Systems   Constitutional: Positive for fatigue. Negative for chills and fever.   Respiratory: Negative for cough and wheezing.    Cardiovascular: Negative for leg swelling.   Musculoskeletal: Positive for back pain, neck pain and neck stiffness.   Psychiatric/Behavioral: Positive for dysphoric mood (mild) and sleep disturbance. The patient has insomnia.  "       Objective   Physical Exam   Constitutional: She appears well-developed and well-nourished. No distress.   Cardiovascular: Normal rate, regular rhythm and normal heart sounds.   Pulmonary/Chest: No respiratory distress. She has no wheezes. She has no rales. She exhibits no tenderness.   Musculoskeletal: She exhibits no edema.   Psychiatric: She has a normal mood and affect. Her behavior is normal.   Nursing note and vitals reviewed.      Assessment/Plan   Diagnoses and all orders for this visit:    Essential hypertension  Comments:  cardiologist incr coreg 2 days ago - need daily bp chk & call cardiology if still high    Other chronic pain  Comments:  still needing lortab 1/d  Orders:  -     HYDROcodone-acetaminophen (NORCO)  MG per tablet; Take 1 tablet by mouth Every 8 (Eight) Hours As Needed for Severe Pain .  -     Vortioxetine HBr (TRINTELLIX) 20 MG tablet; Take 20 mg by mouth Daily.    Insomnia, unspecified type  Comments:  still needing ambien 3-4x weekly - discussed blk box warnings - she will stop if any side effects  Orders:  -     zolpidem (AMBIEN) 5 MG tablet; 1/2-1 qhs prn only - not to be taken with hydrocodone    Anxiety  Comments:  ok with occ valium - no hydrocodone with valium or ambien  Orders:  -     Vortioxetine HBr (TRINTELLIX) 20 MG tablet; Take 20 mg by mouth Daily.    Chronic fatigue  Comments:  call if worse    Monopolar depression (CMS/HCC)  Comments:  not controlled with trintellix 10mg - she requests higher dose - may incr to 15mg/d, then 20mg/d  Orders:  -     Vortioxetine HBr (TRINTELLIX) 20 MG tablet; Take 20 mg by mouth Daily.    Irritable bowel syndrome with diarrhea  Comments:  seen by GI 2019 - tests ok - will get c-scope & EGD later this yr  Orders:  -     dicyclomine (BENTYL) 20 MG tablet; Take 1 tablet by mouth 4 (Four) Times a Day As Needed.  -     Vortioxetine HBr (TRINTELLIX) 20 MG tablet; Take 20 mg by mouth Daily.    Insomnia, unspecified type  Comments:  ok  with ambien 1/2 qhs - not to use with valium or hydrocodone  Orders:  -     zolpidem (AMBIEN) 5 MG tablet; 1/2-1 qhs prn only - not to be taken with hydrocodone    Other chronic pain  Comments:  ok to use hydrocodone sparingly  Orders:  -     HYDROcodone-acetaminophen (NORCO)  MG per tablet; Take 1 tablet by mouth Every 8 (Eight) Hours As Needed for Severe Pain .  -     Vortioxetine HBr (TRINTELLIX) 20 MG tablet; Take 20 mg by mouth Daily.       Need mammo & bone density.

## 2019-08-30 ENCOUNTER — TELEPHONE (OUTPATIENT)
Dept: CARDIOLOGY | Facility: CLINIC | Age: 64
End: 2019-08-30

## 2019-08-30 NOTE — TELEPHONE ENCOUNTER
Pt calls to report that she has had multiple complications from increasing her dose of carvedilol as directed. Can you contact the get vitals and additional information    [phone]

## 2019-08-30 NOTE — TELEPHONE ENCOUNTER
"08/30/19  2:09 PM  Maggie Malhotra  1955  Home Phone [phone]   Mobile [phone]       Maggie Malhotra is a patient of Dr Maravilla who called the office to report BP issues along with symptoms such as fatigue/HA/occasional N/V, dizziness, being in a \"fog\". Has SOA with exertion.  Patient place on Carvedilol 40mg daily and started on 8/17 and that's when symptoms started.  8/27  142/94-90  8/28  139/98-93  8/29   118/100-? -patient decreased her dose to 6.25 mg-took 3  8/30 Did not have a value yet for today but took another 6.25mg this am and plans on taking another this evening.    Please advise on how to proceed    Thank you  Chio Mercado RN  [facility] Triage Nurse    "

## 2019-08-30 NOTE — TELEPHONE ENCOUNTER
Ok to stop coreg CR 40 and go back to her previous dose of coreg BID. Monitor BP and call next week with numbers.

## 2019-09-04 RX ORDER — CARVEDILOL 12.5 MG/1
12.5 TABLET ORAL 2 TIMES DAILY
Qty: 180 TABLET | Refills: 3 | Status: SHIPPED | OUTPATIENT
Start: 2019-09-04 | End: 2021-06-15

## 2019-09-16 DIAGNOSIS — F41.9 ANXIETY: ICD-10-CM

## 2019-09-16 DIAGNOSIS — M62.838 MUSCLE SPASM: ICD-10-CM

## 2019-09-16 DIAGNOSIS — G62.9 NEUROPATHY: ICD-10-CM

## 2019-09-16 RX ORDER — DIAZEPAM 5 MG/1
TABLET ORAL
Qty: 60 TABLET | Refills: 2 | OUTPATIENT
Start: 2019-09-16 | End: 2020-02-11

## 2019-09-19 ENCOUNTER — OFFICE VISIT (OUTPATIENT)
Dept: CARDIOLOGY | Facility: CLINIC | Age: 64
End: 2019-09-19

## 2019-09-19 VITALS
RESPIRATION RATE: 16 BRPM | SYSTOLIC BLOOD PRESSURE: 116 MMHG | DIASTOLIC BLOOD PRESSURE: 92 MMHG | HEIGHT: 71 IN | HEART RATE: 85 BPM | BODY MASS INDEX: 27.72 KG/M2 | WEIGHT: 198 LBS

## 2019-09-19 DIAGNOSIS — I10 ESSENTIAL HYPERTENSION: Primary | ICD-10-CM

## 2019-09-19 PROCEDURE — 99213 OFFICE O/P EST LOW 20 MIN: CPT | Performed by: INTERNAL MEDICINE

## 2019-09-19 PROCEDURE — 93000 ELECTROCARDIOGRAM COMPLETE: CPT | Performed by: INTERNAL MEDICINE

## 2019-09-19 NOTE — PROGRESS NOTES
PATIENTINFORMATION    Date of Office Visit: 2019  Encounter Provider: Adriana Maravilla MD  Place of Service: [facility] CARDIOLOGY  Patient Name: Maggie Malhotra  : 1955    Subjective:     Encounter Date:2019      Patient ID: Maggie Malhotra is a 64 y.o. female.      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 This has left her wit some neuropathy and fibromyalgia. She has no known heart disease. She had a stress echo in  which showed normal LV systolic function without significant valvular heart disease, and she had no evidence of ischemia. She had an exercise stress test in  which was normal. In 2018, she was at work. She had just eaten breakfast. She had sudden onset of feeling unwell. She was in a cold sweat. Co-workers said she was really pale. She was dizzy. She felt a little short of breath but did not have any palpitations. She went to the emergency room where her troponin was negative. Basic metabolic panel showed a blood sugar of 140 but was otherwise pretty unremarkable, and CBC was unremarkable. D-dimer was mildly elevated. She was discharged with a Zio patch which was normal. She had carotid artery ultrasound which was also normal.  In 2018 she had an echocardiogram which showed normal LV systolic function with ejection fraction of 67% and grade 1 diastolic dysfunction.     We have been working to control her blood pressure.  At her last visit, I tried switching her to long-acting Coreg but she felt foggy and in a haze.  I put her back on the short acting carvedilol and was able to increase the dose to 12.5 mg twice a day.  She is been tolerating this well.  Her blood pressure is doing better in the last few days.  She still has some high diastolic numbers.  She has been feeling good and staying active in the yard without any exertional symptoms.  She also works out in  "her home gym.    Review of Systems   Constitution: Positive for malaise/fatigue. Negative for fever, weight gain and weight loss.   HENT: Negative for ear pain, hearing loss, nosebleeds and sore throat.    Eyes: Negative for double vision, pain, vision loss in left eye and vision loss in right eye.   Cardiovascular:        See history of present illness.   Respiratory: Negative for cough, shortness of breath, sleep disturbances due to breathing, snoring and wheezing.    Endocrine: Negative for cold intolerance, heat intolerance and polyuria.   Skin: Negative for itching, poor wound healing and rash.   Musculoskeletal: Positive for joint pain. Negative for joint swelling and myalgias.   Gastrointestinal: Negative for abdominal pain, diarrhea, hematochezia, nausea and vomiting.   Genitourinary: Negative for hematuria and hesitancy.   Neurological: Negative for numbness, paresthesias and seizures.   Psychiatric/Behavioral: Negative for depression. The patient is not nervous/anxious.            ECG 12 Lead  Date/Time: 9/19/2019 11:56 AM  Performed by: Adriana Maravilla MD  Authorized by: Adriana Maravilla MD   Comparison: compared with previous ECG from 8/15/2019  Comparison to previous ECG: Heart rate is slower  Rhythm: sinus rhythm  BPM: 85  Conduction: conduction normal  ST Segments: ST segments normal  T Waves: T waves normal    Clinical impression: normal ECG               Objective:     /92 (BP Location: Right arm, Patient Position: Sitting, Cuff Size: Adult)   Pulse 85   Resp 16   Ht 180.3 cm (71\")   Wt 89.8 kg (198 lb)   LMP 01/01/2006 (LMP Unknown)   BMI 27.62 kg/m²  Body mass index is 27.62 kg/m².     Physical Exam   Constitutional: She appears well-developed.   HENT:   Head: Normocephalic and atraumatic.   Eyes: Conjunctivae and lids are normal. Pupils are equal, round, and reactive to light. Lids are everted and swept, no foreign bodies found.   Neck: Normal range of motion. No JVD present. " Carotid bruit is not present. No tracheal deviation present. No thyroid mass present.   Cardiovascular: Normal rate, regular rhythm and normal heart sounds.   Pulses:       Dorsalis pedis pulses are 2+ on the right side, and 2+ on the left side.   Pulmonary/Chest: Effort normal and breath sounds normal.   Abdominal: Normal appearance and bowel sounds are normal.   Musculoskeletal: Normal range of motion.   Neurological: She is alert. She has normal strength.   Skin: Skin is warm, dry and intact.   Psychiatric: She has a normal mood and affect. Her behavior is normal.   Vitals reviewed.        Assessment/Plan:       1.  Dizziness.  She had this episode of dizziness associated with breaking out into a cold sweat and being pale.  She did not have palpitations with it.  She did wear a Zio patch which did not show arrhythmia, but she did not have a reoccurrence of the symptoms while the Zio patch was on. She had a normal echocardiogram December 2018.  She has not had a reoccurrence  2.  Hypertension.    She did not tolerate Coreg CR.  She is doing well on carvedilol 12.5 mg twice a day.  Encouraged her to monitor her blood pressure.  If it remains high, I think an afterload reducer with amlodipine or an angiotensin receptor blocker would be a good medicine to use and conjunction with the carvedilol.  3.  Sinus tachycardia.  Better. TSH normal 3/19    I will see her back in about 8 weeks.    Orders Placed This Encounter   Procedures   • ECG 12 Lead     This order was created via procedure documentation        Discharge Medications           Accurate as of 9/19/19 12:30 PM. If you have any questions, ask your nurse or doctor.               Continue These Medications      Instructions Start Date   carvedilol 12.5 MG tablet  Commonly known as:  COREG   12.5 mg, Oral, 2 Times Daily      cholecalciferol 86402 units capsule  Commonly known as:  VITAMIN D3   10,000 Units, Oral, Daily      diazePAM 5 MG tablet  Commonly known as:   VALIUM   TAKE 1 TABLET BY MOUTH EVERY 12 HOURS AS NEEDED FOR ANXIETY      dicyclomine 20 MG tablet  Commonly known as:  BENTYL   1 tablet, Oral, 4 Times Daily PRN      HYDROcodone-acetaminophen  MG per tablet  Commonly known as:  NORCO   1 tablet, Oral, Every 8 Hours PRN      multivitamin tablet tablet   1 tablet, Oral, Daily      POTASSIUM PO   50 mg, Oral, Daily      progesterone 200 MG capsule  Commonly known as:  PROMETRIUM   200 mg, Oral, Daily      promethazine 25 MG tablet  Commonly known as:  PHENERGAN   25 mg, Oral, Every 6 Hours PRN      TESTOSTERONE COMPOUNDING KIT TD   APPLY TWO clicks topically TWICE DAILY AS DIRECTED      zolpidem 5 MG tablet  Commonly known as:  AMBIEN   1/2-1 qhs prn only - not to be taken with hydrocodone         Stop These Medications    Vortioxetine HBr 20 MG tablet  Commonly known as:  TRINTELLIX  Stopped by:  MD Adriana Pickett MD  09/19/19  12:30 PM

## 2019-09-23 RX ORDER — PROMETHAZINE HYDROCHLORIDE 25 MG/1
TABLET ORAL
Qty: 20 TABLET | Refills: 1 | OUTPATIENT
Start: 2019-09-23

## 2019-09-30 RX ORDER — PROMETHAZINE HYDROCHLORIDE 25 MG/1
TABLET ORAL
Qty: 20 TABLET | Refills: 1 | Status: SHIPPED | OUTPATIENT
Start: 2019-09-30 | End: 2019-11-19 | Stop reason: SDUPTHER

## 2019-10-02 ENCOUNTER — OFFICE VISIT (OUTPATIENT)
Dept: GASTROENTEROLOGY | Facility: CLINIC | Age: 64
End: 2019-10-02

## 2019-10-02 VITALS
WEIGHT: 197.6 LBS | HEIGHT: 71 IN | BODY MASS INDEX: 27.66 KG/M2 | TEMPERATURE: 98.7 F | SYSTOLIC BLOOD PRESSURE: 158 MMHG | DIASTOLIC BLOOD PRESSURE: 100 MMHG

## 2019-10-02 DIAGNOSIS — R10.31 BILATERAL LOWER ABDOMINAL CRAMPING: ICD-10-CM

## 2019-10-02 DIAGNOSIS — R11.2 NAUSEA AND VOMITING, INTRACTABILITY OF VOMITING NOT SPECIFIED, UNSPECIFIED VOMITING TYPE: ICD-10-CM

## 2019-10-02 DIAGNOSIS — K21.9 GASTROESOPHAGEAL REFLUX DISEASE, ESOPHAGITIS PRESENCE NOT SPECIFIED: ICD-10-CM

## 2019-10-02 DIAGNOSIS — R19.7 DIARRHEA, UNSPECIFIED TYPE: ICD-10-CM

## 2019-10-02 DIAGNOSIS — R10.32 BILATERAL LOWER ABDOMINAL CRAMPING: ICD-10-CM

## 2019-10-02 DIAGNOSIS — R10.13 DYSPEPSIA: Primary | ICD-10-CM

## 2019-10-02 PROCEDURE — 99214 OFFICE O/P EST MOD 30 MIN: CPT | Performed by: NURSE PRACTITIONER

## 2019-10-02 RX ORDER — ESOMEPRAZOLE MAGNESIUM 40 MG/1
40 CAPSULE, DELAYED RELEASE ORAL 2 TIMES DAILY
Qty: 60 CAPSULE | Refills: 5 | Status: SHIPPED | OUTPATIENT
Start: 2019-10-02 | End: 2020-11-03

## 2019-10-02 NOTE — PROGRESS NOTES
Chief Complaint   Patient presents with   • Vomiting     HPI    Maggie Malhotra is a  64 y.o. female here for a follow up visit for dyspepsia, nausea and vomiting.    This is an established patient of Dr. Justice's known to me.  We have followed her for irritable bowel syndrome diarrhea predominant and GERD on PPI therapy.  Dr. Justice did recommend bidirectional endoscopic evaluation for further evaluation of her symptoms but patient declined on office visit in May.  At that time antispasmodics were renewed and she was treated with a round of Xifaxan.    Today patient reports onset of nausea and vomiting over the past month.  Worse over the past several days.  She is vomiting at least twice a week.  There is associated dyspeptic symptoms with acid reflux and excessive belching.  Patient tells me her Nexium needs to be renewed but does report compliance with 40 mg taken once daily.  She is avoiding NSAIDs.  She is picking up a prescription for Phenergan today.  Patient denies sick contacts but she does work part-time in healthcare setting.    Still with frequent episodes of diarrhea.  There is generalized abdominal cramping slightly improved with antispasmodics.  Currently taking over-the-counter Imodium with variable results.  Denies rectal pain or rectal bleeding.  No change in symptoms with course of Xifaxan in May.    Patient takes hydrocodone for fibromyalgia.    Review of prior work-up as follows:    IBcause stool serology was normal (4/19).  Recent (3/26/19) CT abdomen pelvis gallstones with no evidence of inflammation or obstruction.  Diverticulosis without evidence of diverticulitis.  Prior Hemoccult cards x3 were negative (3/19).    Prior colonoscopy June 2014 with findings of internal hemorrhoids, diverticulosis, hyperplastic polyp.  EGD in 2014 with gastritis, gastric polyps, duodenal erosions      Past Medical History:   Diagnosis Date   • Allergic rhinitis    • Anxiety    • Backache    • Blood  pressure elevated without history of HTN    • Broken foot 06/01/2002    LT   • Chronic pain    • Depression    • Dizziness    • Fatigue    • Fibromyalgia, primary    • Guillain Barré syndrome (CMS/HCC)    • H/O mammogram 01/01/2012    Dr. Rendon   • H/O: pneumonia    • History of broken leg 01/01/1998    LT   • Hypotension    • IBS (irritable bowel syndrome)    • Insomnia    • Memory disorder    • Migraine    • Mood disorder (CMS/HCC)    • Multiple joint pain    • Night sweat    • Syncope    • Vitamin D deficiency        Past Surgical History:   Procedure Laterality Date   • BREAST AUGMENTATION BILATERAL MASTOPEXY Bilateral 01/01/1998   • COLONOSCOPY N/A 05/18/2010    Dr. Gurmeet Justice   • COLONOSCOPY W/ BIOPSIES  06/27/2014    Non-thrombosed external hemorrhoids, Diverticulosis in the sigmoid colon, two 4 to 5 mm polyps in the sigmoid colon and the descending colon (pathology: Hyperplastic polyp), Tortuous colon, Internal hemorrhoids   • ENDOSCOPY N/A 06/27/2014    Z-line irregular, 38 cm from the incisors, Gastritis, Gastric polyps, Duodenal erosions without bleeding    • ENDOSCOPY N/A 05/18/2010    Dr. Gurmeet Justice   • HYSTERECTOMY N/A 02/01/2006   • LIPOSUCTION N/A 04/01/2001   • PAP SMEAR N/A 07/10/2013   • TOE SURGERY Right 01/01/1995   • TONSILLECTOMY N/A 01/01/1964   • TRACHEAL SURGERY N/A 01/01/1986    [facility]   • TRACHEOSTOMY         Scheduled Meds:  Outpatient Encounter Medications as of 10/2/2019   Medication Sig Dispense Refill   • carvedilol (COREG) 12.5 MG tablet Take 1 tablet by mouth 2 (Two) Times a Day. 180 tablet 3   • cholecalciferol (VITAMIN D3) 66737 units capsule Take 10,000 Units by mouth Daily.     • diazePAM (VALIUM) 5 MG tablet TAKE 1 TABLET BY MOUTH EVERY 12 HOURS AS NEEDED FOR ANXIETY 60 tablet 2   • dicyclomine (BENTYL) 20 MG tablet Take 1 tablet by mouth 4 (Four) Times a Day As Needed.  5   • HYDROcodone-acetaminophen (NORCO)  MG per tablet Take 1 tablet by mouth  Every 8 (Eight) Hours As Needed for Severe Pain . 100 tablet 0   • multivitamin (DAILY ES) tablet tablet Take 1 tablet by mouth daily.     • POTASSIUM PO Take 50 mg by mouth Daily.     • progesterone (PROMETRIUM) 200 MG capsule Take 200 mg by mouth Daily.  3   • promethazine (PHENERGAN) 25 MG tablet TAKE 1 TABLET BY MOUTH EVERY 6 HOURS AS NEEDED FOR NAUSEA OR VOMITING 20 tablet 1   • TESTOSTERONE COMPOUNDING KIT TD APPLY TWO clicks topically TWICE DAILY AS DIRECTED  5   • zolpidem (AMBIEN) 5 MG tablet 1/2-1 qhs prn only - not to be taken with hydrocodone 30 tablet 2   • esomeprazole (nexIUM) 40 MG capsule Take 1 capsule by mouth 2 (Two) Times a Day. 60 capsule 5     No facility-administered encounter medications on file as of 10/2/2019.        Continuous Infusions:  No current facility-administered medications for this visit.     PRN Meds:.    Allergies   Allergen Reactions   • Codeine    • Erythromycin    • Influenza Vaccines    • Pantoprazole Other (See Comments)     Chills and lethargy   • Celexa [Citalopram Hydrobromide] Other (See Comments)     Headache   • Cymbalta [Duloxetine Hcl] Nausea Only and Other (See Comments)     Fatigue/Pain   • Midazolam Itching       Social History     Socioeconomic History   • Marital status:      Spouse name: Not on file   • Number of children: Not on file   • Years of education: Not on file   • Highest education level: Not on file   Tobacco Use   • Smoking status: Never Smoker   • Smokeless tobacco: Never Used   • Tobacco comment: occasional caffiene   Substance and Sexual Activity   • Alcohol use: Yes     Alcohol/week: 1.8 oz     Types: 3 Cans of beer per week     Comment: Moderate   • Drug use: No     Types: Hydrocodone, Benzodiazepines, Other     Comment: Diazepam/NORCO/Testosterone (compounded cream)   • Sexual activity: Not Currently     Partners: Male       Family History   Problem Relation Age of Onset   • Heart failure Father    • Other Father         Lung  failure       Review of Systems   Constitutional: Negative for activity change, appetite change, fatigue, fever and unexpected weight change.   HENT: Negative for trouble swallowing.    Respiratory: Negative for apnea, cough, choking, chest tightness, shortness of breath and wheezing.    Cardiovascular: Negative for chest pain, palpitations and leg swelling.   Gastrointestinal: Positive for diarrhea, nausea and vomiting. Negative for abdominal distention, abdominal pain, anal bleeding, blood in stool, constipation and rectal pain.        Abdominal cramping       Vitals:    10/02/19 1400   BP: 158/100   Temp: 98.7 °F (37.1 °C)       Physical Exam   Constitutional: She is oriented to person, place, and time. She appears well-developed and well-nourished.   Eyes: Pupils are equal, round, and reactive to light.   Cardiovascular: Normal rate, regular rhythm and normal heart sounds.   Pulmonary/Chest: Effort normal and breath sounds normal. No respiratory distress. She has no wheezes.   Abdominal: Soft. Bowel sounds are normal. She exhibits no distension and no mass. There is no tenderness. There is no guarding. No hernia.   Musculoskeletal: Normal range of motion.   Neurological: She is alert and oriented to person, place, and time.   Skin: Skin is warm and dry. Capillary refill takes less than 2 seconds.   Psychiatric: She has a normal mood and affect. Her behavior is normal.       No images are attached to the encounter.    Maggie was seen today for vomiting.    Diagnoses and all orders for this visit:    Dyspepsia  -     CBC & Differential  -     Comprehensive Metabolic Panel  -     Celiac Comprehensive Panel  -     TSH  -     Case Request; Standing  -     Obtain Informed Consent; Standing  -     Verify Bowel Prep Was Successful; Standing  -     Give Tap Water Enema If Bowel Prep Insufficient; Standing  -     Case Request    Nausea and vomiting, intractability of vomiting not specified, unspecified vomiting type  -      CBC & Differential  -     Comprehensive Metabolic Panel  -     Celiac Comprehensive Panel  -     TSH  -     Case Request; Standing  -     Obtain Informed Consent; Standing  -     Verify Bowel Prep Was Successful; Standing  -     Give Tap Water Enema If Bowel Prep Insufficient; Standing  -     Case Request    Diarrhea, unspecified type  -     Case Request; Standing  -     Obtain Informed Consent; Standing  -     Verify Bowel Prep Was Successful; Standing  -     Give Tap Water Enema If Bowel Prep Insufficient; Standing  -     Case Request    Bilateral lower abdominal cramping  -     Case Request; Standing  -     Obtain Informed Consent; Standing  -     Verify Bowel Prep Was Successful; Standing  -     Give Tap Water Enema If Bowel Prep Insufficient; Standing  -     Case Request    Gastroesophageal reflux disease, esophagitis presence not specified  -     Case Request; Standing  -     Obtain Informed Consent; Standing  -     Verify Bowel Prep Was Successful; Standing  -     Give Tap Water Enema If Bowel Prep Insufficient; Standing  -     Case Request    Other orders  -     esomeprazole (nexIUM) 40 MG capsule; Take 1 capsule by mouth 2 (Two) Times a Day.    Assessment/plan    64-year-old patient of Dr. Justice's who we have followed for GERD and irritable bowel syndrome of which we have recommend bidirectional endoscopic evaluation on last office visit but the patient declined.  Now patient presents back to the office with worsening of symptoms now with vomiting, nausea and dyspepsia.  Still with baseline diarrhea no improvement with course of Xifaxan.  Patient is now agreeable to proceeding with a EGD and colonoscopy.  I do recommend that she take Nexium twice daily in the interim.  Avoid NSAIDs.    Patient was very reluctant to getting lab work today but I did express the need to rule out anemia, electrolyte imbalances, etc.  CBC, CMP, TSH, celiac panel today.    I do recommend that she go to the emergency room if  symptoms worsen.    Hopefully she will follow through with procedures.    Further recommendations and follow-up pending the aforementioned work-up.

## 2019-10-03 ENCOUNTER — TELEPHONE (OUTPATIENT)
Dept: GASTROENTEROLOGY | Facility: CLINIC | Age: 64
End: 2019-10-03

## 2019-10-03 LAB
ALBUMIN SERPL-MCNC: 4.6 G/DL (ref 3.5–5.2)
ALBUMIN/GLOB SERPL: 1.8 G/DL
ALP SERPL-CCNC: 64 U/L (ref 39–117)
ALT SERPL-CCNC: 34 U/L (ref 1–33)
AST SERPL-CCNC: 26 U/L (ref 1–32)
BASOPHILS # BLD AUTO: 0.04 10*3/MM3 (ref 0–0.2)
BASOPHILS NFR BLD AUTO: 0.5 % (ref 0–1.5)
BILIRUB SERPL-MCNC: 0.4 MG/DL (ref 0.2–1.2)
BUN SERPL-MCNC: 9 MG/DL (ref 8–23)
BUN/CREAT SERPL: 10.5 (ref 7–25)
CALCIUM SERPL-MCNC: 9.4 MG/DL (ref 8.6–10.5)
CHLORIDE SERPL-SCNC: 94 MMOL/L (ref 98–107)
CO2 SERPL-SCNC: 24.4 MMOL/L (ref 22–29)
CREAT SERPL-MCNC: 0.86 MG/DL (ref 0.57–1)
ENDOMYSIUM IGA SER QL: NEGATIVE
EOSINOPHIL # BLD AUTO: 0.05 10*3/MM3 (ref 0–0.4)
EOSINOPHIL NFR BLD AUTO: 0.6 % (ref 0.3–6.2)
ERYTHROCYTE [DISTWIDTH] IN BLOOD BY AUTOMATED COUNT: 12.7 % (ref 12.3–15.4)
GLIADIN PEPTIDE IGA SER-ACNC: 3 UNITS (ref 0–19)
GLIADIN PEPTIDE IGG SER-ACNC: 2 UNITS (ref 0–19)
GLOBULIN SER CALC-MCNC: 2.6 GM/DL
GLUCOSE SERPL-MCNC: 98 MG/DL (ref 65–99)
HCT VFR BLD AUTO: 44.9 % (ref 34–46.6)
HGB BLD-MCNC: 15 G/DL (ref 12–15.9)
IGA SERPL-MCNC: 104 MG/DL (ref 87–352)
IMM GRANULOCYTES # BLD AUTO: 0.03 10*3/MM3 (ref 0–0.05)
IMM GRANULOCYTES NFR BLD AUTO: 0.4 % (ref 0–0.5)
LYMPHOCYTES # BLD AUTO: 1.58 10*3/MM3 (ref 0.7–3.1)
LYMPHOCYTES NFR BLD AUTO: 20.1 % (ref 19.6–45.3)
MCH RBC QN AUTO: 33.8 PG (ref 26.6–33)
MCHC RBC AUTO-ENTMCNC: 33.4 G/DL (ref 31.5–35.7)
MCV RBC AUTO: 101.1 FL (ref 79–97)
MONOCYTES # BLD AUTO: 0.68 10*3/MM3 (ref 0.1–0.9)
MONOCYTES NFR BLD AUTO: 8.7 % (ref 5–12)
NEUTROPHILS # BLD AUTO: 5.47 10*3/MM3 (ref 1.7–7)
NEUTROPHILS NFR BLD AUTO: 69.7 % (ref 42.7–76)
NRBC BLD AUTO-RTO: 0 /100 WBC (ref 0–0.2)
PLATELET # BLD AUTO: 326 10*3/MM3 (ref 140–450)
POTASSIUM SERPL-SCNC: 4.5 MMOL/L (ref 3.5–5.2)
PROT SERPL-MCNC: 7.2 G/DL (ref 6–8.5)
RBC # BLD AUTO: 4.44 10*6/MM3 (ref 3.77–5.28)
SODIUM SERPL-SCNC: 136 MMOL/L (ref 136–145)
TSH SERPL DL<=0.005 MIU/L-ACNC: 1.39 UIU/ML (ref 0.27–4.2)
TTG IGA SER-ACNC: <2 U/ML (ref 0–3)
TTG IGG SER-ACNC: <2 U/ML (ref 0–5)
WBC # BLD AUTO: 7.85 10*3/MM3 (ref 3.4–10.8)

## 2019-10-03 NOTE — TELEPHONE ENCOUNTER
----- Message from Michelle Parry, RN sent at 10/3/2019  1:01 PM EDT -----  Regarding: FW: OFFICE VISIT   Contact: [phone]  Vinh pt   ----- Message -----  From: Kirby Davila  Sent: 10/3/2019  11:33 AM  To: Anthony Tristan [facility] [address]  Subject: OFFICE VISIT                                     PT HAS QUESTIONS ABOUT OFFICE VISIT ABOUT YESTERDAY

## 2019-10-03 NOTE — TELEPHONE ENCOUNTER
Called pt and pt reports she has been having lots of nausea and vomiting and does not think she can wait until 11/12 for her scopes.  Call given to Ashlee in scheduling who was able to reschedule pt to a sooner date.

## 2019-10-08 ENCOUNTER — PRIOR AUTHORIZATION (OUTPATIENT)
Dept: GASTROENTEROLOGY | Facility: CLINIC | Age: 64
End: 2019-10-08

## 2019-10-08 NOTE — TELEPHONE ENCOUNTER
PA submitted through [facility] for Esomeprazole Magnesium 40MG dr capsules.  PA has been approved

## 2019-10-09 ENCOUNTER — OUTSIDE FACILITY SERVICE (OUTPATIENT)
Dept: GASTROENTEROLOGY | Facility: CLINIC | Age: 64
End: 2019-10-09

## 2019-10-09 PROCEDURE — 43239 EGD BIOPSY SINGLE/MULTIPLE: CPT | Performed by: INTERNAL MEDICINE

## 2019-10-09 PROCEDURE — 45380 COLONOSCOPY AND BIOPSY: CPT | Performed by: INTERNAL MEDICINE

## 2019-10-15 ENCOUNTER — TELEPHONE (OUTPATIENT)
Dept: GASTROENTEROLOGY | Facility: CLINIC | Age: 64
End: 2019-10-15

## 2019-10-15 NOTE — TELEPHONE ENCOUNTER
"Call to pt  Advise per path report that duodenal bx unremarkable.  Nonspecific gastritis.  GE junction with esophagitis, negative for Lozano's.    Two polyps that were removed were not cancerous, but precancerous.  Remaining bx uremarkable.    Advise per DR Justice that would continue current med .  Need f/u c/s in 3 yrs.  Office f/u annually or call sooner as needed.    Verb understanding.  State tried nexium BID, but \"didn't really help, so I went back to daily\".  Episodes nausea 2-3x/wk.  Diarrhea \"every time I go to [place]\".  Denies blood.  Foul odor.  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  Discouraged re: ongoing symptoms and no definitive dx.      Looking forward to discussing all with Dr Justice on 10/21.  Advise that appears she cancelled that appt.  Pt requesting f/u with Dr Justice only - attempt overbook without success.    Update to Dr Justice.  Message to Manager.     C/s for 10/9/22 and o/v for 10/2/20 placed in recall.   "

## 2019-10-15 NOTE — TELEPHONE ENCOUNTER
----- Message from Gurmeet MCKEON MD sent at 10/14/2019  4:48 PM EDT -----  Regarding: Biopsy results  Okay to call results, would continue current medical regimen.  Patient needs follow-up colonoscopy in 3 years.  Office follow-up annually or call sooner as needed.  ----- Message -----  From: Toshia, Rajesh Incoming  Sent: 10/14/2019  12:09 PM  To: Gurmeet MCKEON MD

## 2019-10-16 NOTE — TELEPHONE ENCOUNTER
Dr. Justice,    Can I overbook this patient on 10/23? That puts you at 10 patients for that day.    Let me know,  Christa

## 2019-10-23 ENCOUNTER — OFFICE VISIT (OUTPATIENT)
Dept: GASTROENTEROLOGY | Facility: CLINIC | Age: 64
End: 2019-10-23

## 2019-10-23 VITALS
HEIGHT: 71 IN | TEMPERATURE: 98.3 F | SYSTOLIC BLOOD PRESSURE: 138 MMHG | BODY MASS INDEX: 27.24 KG/M2 | WEIGHT: 194.6 LBS | DIASTOLIC BLOOD PRESSURE: 90 MMHG

## 2019-10-23 DIAGNOSIS — K58.0 IRRITABLE BOWEL SYNDROME WITH DIARRHEA: ICD-10-CM

## 2019-10-23 DIAGNOSIS — R11.2 NAUSEA AND VOMITING, INTRACTABILITY OF VOMITING NOT SPECIFIED, UNSPECIFIED VOMITING TYPE: ICD-10-CM

## 2019-10-23 DIAGNOSIS — K21.9 GASTROESOPHAGEAL REFLUX DISEASE, ESOPHAGITIS PRESENCE NOT SPECIFIED: Primary | ICD-10-CM

## 2019-10-23 PROCEDURE — 99214 OFFICE O/P EST MOD 30 MIN: CPT | Performed by: INTERNAL MEDICINE

## 2019-10-23 NOTE — PROGRESS NOTES
Chief Complaint   Patient presents with   • Follow-up   • Diarrhea   • Vomiting        Maggie Malhotra is a  64 y.o. female here for a follow up visit for nausea with vomiting and diarrhea.    HPI we reviewed the patient's extensive work-up to date including endoscopic evaluation, radiographic studies, stool studies, and lab work.  She continues to complain of diarrhea as well as bile-stained vomiting.  We discussed further evaluation in the form of gastric emptying study and hydrogen breath testing to rule out small intestinal bacterial overgrowth.  I did offer cholestyramine but she wishes to complete the tests first.  She has been treated for IBS with antispasmodics.  I advised her not to use probiotics at this time.  She does note a foul odor to her stools as well as excess gas production.  Hopefully these tests will better define her condition.    Past Medical History:   Diagnosis Date   • Allergic rhinitis    • Anxiety    • Backache    • Blood pressure elevated without history of HTN    • Broken foot 06/01/2002    LT   • Chronic pain    • Depression    • Dizziness    • Fatigue    • Fibromyalgia, primary    • Guillain Barré syndrome (CMS/HCC)    • H/O mammogram 01/01/2012    Dr. Rendon   • H/O: pneumonia    • History of broken leg 01/01/1998    LT   • Hypotension    • IBS (irritable bowel syndrome)    • Insomnia    • Memory disorder    • Migraine    • Mood disorder (CMS/HCC)    • Multiple joint pain    • Night sweat    • Syncope    • Vitamin D deficiency        Current Outpatient Medications   Medication Sig Dispense Refill   • carvedilol (COREG) 12.5 MG tablet Take 1 tablet by mouth 2 (Two) Times a Day. 180 tablet 3   • cholecalciferol (VITAMIN D3) 93864 units capsule Take 10,000 Units by mouth Daily.     • diazePAM (VALIUM) 5 MG tablet TAKE 1 TABLET BY MOUTH EVERY 12 HOURS AS NEEDED FOR ANXIETY 60 tablet 2   • dicyclomine (BENTYL) 20 MG tablet Take 1 tablet by mouth 4 (Four) Times a Day As Needed.  5   •  esomeprazole (nexIUM) 40 MG capsule Take 1 capsule by mouth 2 (Two) Times a Day. 60 capsule 5   • HYDROcodone-acetaminophen (NORCO)  MG per tablet Take 1 tablet by mouth Every 8 (Eight) Hours As Needed for Severe Pain . 100 tablet 0   • multivitamin (DAILY ES) tablet tablet Take 1 tablet by mouth daily.     • POTASSIUM PO Take 50 mg by mouth Daily.     • progesterone (PROMETRIUM) 200 MG capsule Take 200 mg by mouth Daily.  3   • promethazine (PHENERGAN) 25 MG tablet TAKE 1 TABLET BY MOUTH EVERY 6 HOURS AS NEEDED FOR NAUSEA OR VOMITING 20 tablet 1   • TESTOSTERONE COMPOUNDING KIT TD APPLY TWO clicks topically TWICE DAILY AS DIRECTED  5   • zolpidem (AMBIEN) 5 MG tablet 1/2-1 qhs prn only - not to be taken with hydrocodone 30 tablet 2     No current facility-administered medications for this visit.        PRN Meds:.    Allergies   Allergen Reactions   • Codeine    • Erythromycin    • Influenza Vaccines    • Pantoprazole Other (See Comments)     Chills and lethargy   • Celexa [Citalopram Hydrobromide] Other (See Comments)     Headache   • Cymbalta [Duloxetine Hcl] Nausea Only and Other (See Comments)     Fatigue/Pain   • Midazolam Itching       Social History     Socioeconomic History   • Marital status:      Spouse name: Not on file   • Number of children: Not on file   • Years of education: Not on file   • Highest education level: Not on file   Tobacco Use   • Smoking status: Never Smoker   • Smokeless tobacco: Never Used   • Tobacco comment: occasional caffiene   Substance and Sexual Activity   • Alcohol use: Yes     Alcohol/week: 1.8 oz     Types: 3 Cans of beer per week     Comment: Moderate   • Drug use: No     Types: Hydrocodone, Benzodiazepines, Other     Comment: Diazepam/NORCO/Testosterone (compounded cream)   • Sexual activity: Not Currently     Partners: Male       Family History   Problem Relation Age of Onset   • Heart failure Father    • Other Father         Lung failure       Review of  Systems   Constitutional: Negative for activity change, appetite change, fatigue and unexpected weight change.   HENT: Negative for congestion, facial swelling, sore throat, trouble swallowing and voice change.    Eyes: Negative for photophobia and visual disturbance.   Respiratory: Negative for cough and choking.    Cardiovascular: Negative for chest pain.   Gastrointestinal: Positive for diarrhea, nausea and vomiting. Negative for abdominal distention, abdominal pain, anal bleeding, blood in stool, constipation and rectal pain.   Endocrine: Negative for polyphagia.   Musculoskeletal: Negative for arthralgias, gait problem and joint swelling.   Skin: Negative for color change, pallor and rash.   Allergic/Immunologic: Negative for food allergies.   Neurological: Negative for speech difficulty and headaches.   Hematological: Does not bruise/bleed easily.   Psychiatric/Behavioral: Negative for agitation, confusion and sleep disturbance.       Vitals:    10/23/19 1614   BP: 138/90   Temp: 98.3 °F (36.8 °C)       Physical Exam   Constitutional: She is oriented to person, place, and time. She appears well-developed and well-nourished.   HENT:   Head: Normocephalic.   Mouth/Throat: Oropharynx is clear and moist.   Eyes: Conjunctivae and EOM are normal.   Neck: Normal range of motion.   Cardiovascular: Normal rate and regular rhythm.   Pulmonary/Chest: Breath sounds normal.   Abdominal: Soft. Bowel sounds are normal.   Musculoskeletal: Normal range of motion.   Neurological: She is alert and oriented to person, place, and time.   Skin: Skin is warm and dry.   Psychiatric: She has a normal mood and affect. Her behavior is normal.       ASSESSMENT   #1 GERD: On PPI  #2 nausea with vomiting: Concern of possible delayed emptying  #3 IBS-D      PLAN  Schedule gastric emptying study  Obtain hydrogen breath test to rule out small intestinal bacterial overgrowth  Consider cholestyramine for diarrhea control once studies  complete      ICD-10-CM ICD-9-CM   1. Gastroesophageal reflux disease, esophagitis presence not specified K21.9 530.81   2. Nausea and vomiting, intractability of vomiting not specified, unspecified vomiting type R11.2 787.01   3. Irritable bowel syndrome with diarrhea K58.0 564.1

## 2019-10-23 NOTE — TELEPHONE ENCOUNTER
Colon cancer screening Direct Access Screening Colonscopy (DASC) program physician order form faxed to [facility] care coordination team at [phone], along with DASC inform form, copy of insurance card+ ID. And full H + P    Rx phoned into pharmacy v/m.

## 2019-10-24 ENCOUNTER — TELEPHONE (OUTPATIENT)
Dept: GASTROENTEROLOGY | Facility: CLINIC | Age: 64
End: 2019-10-24

## 2019-10-24 NOTE — TELEPHONE ENCOUNTER
Returned pt's call and advised that we have ordered her sibo testing and advised that the company will mail her the kit.  Advised pt to call us if she has not received it in a couple of weeks. Pt verb understanding.

## 2019-11-11 ENCOUNTER — TELEPHONE (OUTPATIENT)
Dept: GASTROENTEROLOGY | Facility: CLINIC | Age: 64
End: 2019-11-11

## 2019-11-11 NOTE — TELEPHONE ENCOUNTER
Called pt and advised per Dr Justice that the hydrogen breath test for sibo was neg for bacterial overgrowth .    Pt verb understanding .  Pt states that her ges is scheduled for tomorrow. And pt made appt for 11/19/2019.  Pt also reports continued nausea , vomiting and diarrhea.

## 2019-11-11 NOTE — TELEPHONE ENCOUNTER
----- Message from Gurmeet MCKEON MD sent at 11/9/2019  4:02 PM EST -----  Regarding: Hydrogen breath test for SIBO  Okay to call results, no evidence of bacterial overgrowth on hydrogen breath testing.  Also proposed gastric emptying study, has this been completed?  ----- Message -----  From: Interface, Scans Incoming  Sent: 11/6/2019  11:36 AM  To: Gurmeet MCKEON MD

## 2019-11-12 ENCOUNTER — HOSPITAL ENCOUNTER (OUTPATIENT)
Dept: NUCLEAR MEDICINE | Facility: HOSPITAL | Age: 64
Discharge: HOME | End: 2019-11-12

## 2019-11-12 DIAGNOSIS — R11.2 NAUSEA AND VOMITING, INTRACTABILITY OF VOMITING NOT SPECIFIED, UNSPECIFIED VOMITING TYPE: ICD-10-CM

## 2019-11-12 PROCEDURE — 78264 GASTRIC EMPTYING IMG STUDY: CPT

## 2019-11-12 PROCEDURE — 0 TECHNETIUM SULFUR COLLOID: Performed by: INTERNAL MEDICINE

## 2019-11-12 PROCEDURE — A9541 TC99M SULFUR COLLOID: HCPCS | Performed by: INTERNAL MEDICINE

## 2019-11-12 RX ADMIN — TECHNETIUM TC 99M SULFUR COLLOID 1 DOSE: KIT at 07:10

## 2019-11-13 ENCOUNTER — TELEPHONE (OUTPATIENT)
Dept: GASTROENTEROLOGY | Facility: CLINIC | Age: 64
End: 2019-11-13

## 2019-11-13 RX ORDER — CHOLESTYRAMINE 4 G/9G
4 POWDER, FOR SUSPENSION ORAL DAILY
Qty: 30 PACKET | Refills: 3 | Status: SHIPPED | OUTPATIENT
Start: 2019-11-13 | End: 2020-02-11 | Stop reason: HOSPADM

## 2019-11-13 NOTE — TELEPHONE ENCOUNTER
----- Message from Gurmeet MCKEON MD sent at 11/13/2019 12:17 PM EST -----  Regarding: Gastric emptying study results  Okay to call results, essentially normal.  With negative SIBO testing and negative gastric emptying study will proceed with trial of cholestyramine 4 g daily to address diarrhea.  ----- Message -----  From: Interface, Rad Results [place] In  Sent: 11/12/2019   3:03 PM  To: Gurmeet MCKEON MD

## 2019-11-13 NOTE — TELEPHONE ENCOUNTER
Called pt and advised per Dr Justice that her gastric emptying study results were essentially normal.  With neg sibo testing and neg gastric emptying study, he recommends she proceed with a trial of cholestyramine 4 gm daily to address diarrhea.  Advised pt we will send this to her [facility] pharmacy. Pt verb understanding.

## 2019-11-15 ENCOUNTER — TELEPHONE (OUTPATIENT)
Dept: GASTROENTEROLOGY | Facility: CLINIC | Age: 64
End: 2019-11-15

## 2019-11-15 NOTE — TELEPHONE ENCOUNTER
"Call to pt.  Advise per Dr Justice that with persistent N&V and what sounds like e-lyte imbalance or at least dehydration, pt absolutely needs to go see PCP or to ER to have appropriate lab work done and eval administered.  Would not be able to accommodate pt in office given Dr Justice's current schedule, therefore best recommendation if that pt be seen today as instructed.      Lengthy discussion re: above.  Dr Justice reiterates imperative that pt be evaluated today.  Further discussion with emphasis to pt that needs to be seen today.  States \"will take under advisement\".    Update to Dr Justice.   "

## 2019-11-15 NOTE — TELEPHONE ENCOUNTER
----- Message from Prudence Simmons Rep sent at 11/15/2019 10:11 AM EST -----  Regarding: meds  Contact: [phone]  Pt says the meds are not working.

## 2019-11-15 NOTE — TELEPHONE ENCOUNTER
With persistent nausea and vomiting and what sounds like electrolyte imbalance or at least dehydration, patient absolutely needs to go see primary care tender or to the emergency room to have appropriate lab work done and evaluation administered.  We would not be able to accommodate her in the office setting given my current schedule, therefore best recommendation is that she be seen today as outlined above.

## 2019-11-15 NOTE — TELEPHONE ENCOUNTER
"Call to pt. States N&V worsening - has barely been able to eat.   Emesis yellow/green. Phenergan doesn't really help.  States feels weak and shaky - unable to work.  Afraid to get in shower because afraid will fall.   has to help walk.      Has been taking cholestyramine as instructed with call of 11/13 - not having diarrhea, but N&V worsening.      Advise if weak and shaky - needs to go to ER for eval/hydration etc.  Pt unwilling to go - states trying to eat \"a little something\" but afraid about worsening symptoms.     Advise will send message to DR Justice, but in the meantime - if symptoms worsen - need to go to ER.    "

## 2019-11-19 ENCOUNTER — OFFICE VISIT (OUTPATIENT)
Dept: GASTROENTEROLOGY | Facility: CLINIC | Age: 64
End: 2019-11-19

## 2019-11-19 VITALS
BODY MASS INDEX: 27.22 KG/M2 | HEIGHT: 71 IN | SYSTOLIC BLOOD PRESSURE: 126 MMHG | TEMPERATURE: 97.7 F | DIASTOLIC BLOOD PRESSURE: 84 MMHG | WEIGHT: 194.4 LBS

## 2019-11-19 DIAGNOSIS — R11.2 NAUSEA AND VOMITING, INTRACTABILITY OF VOMITING NOT SPECIFIED, UNSPECIFIED VOMITING TYPE: ICD-10-CM

## 2019-11-19 DIAGNOSIS — K21.9 GASTROESOPHAGEAL REFLUX DISEASE, ESOPHAGITIS PRESENCE NOT SPECIFIED: ICD-10-CM

## 2019-11-19 DIAGNOSIS — R11.0 NAUSEA: ICD-10-CM

## 2019-11-19 DIAGNOSIS — R19.7 DIARRHEA, UNSPECIFIED TYPE: Primary | ICD-10-CM

## 2019-11-19 LAB
ALBUMIN SERPL-MCNC: 5 G/DL (ref 3.5–5.2)
ALBUMIN/GLOB SERPL: 2.1 G/DL
ALP SERPL-CCNC: 75 U/L (ref 39–117)
ALT SERPL-CCNC: 60 U/L (ref 1–33)
AST SERPL-CCNC: 73 U/L (ref 1–32)
BILIRUB SERPL-MCNC: 0.5 MG/DL (ref 0.2–1.2)
BUN SERPL-MCNC: 6 MG/DL (ref 8–23)
BUN/CREAT SERPL: 7.4 (ref 7–25)
CALCIUM SERPL-MCNC: 10 MG/DL (ref 8.6–10.5)
CHLORIDE SERPL-SCNC: 92 MMOL/L (ref 98–107)
CO2 SERPL-SCNC: 28.6 MMOL/L (ref 22–29)
CREAT SERPL-MCNC: 0.81 MG/DL (ref 0.57–1)
ERYTHROCYTE [DISTWIDTH] IN BLOOD BY AUTOMATED COUNT: 12.4 % (ref 12.3–15.4)
GLOBULIN SER CALC-MCNC: 2.4 GM/DL
GLUCOSE SERPL-MCNC: 97 MG/DL (ref 65–99)
HCT VFR BLD AUTO: 42.8 % (ref 34–46.6)
HGB BLD-MCNC: 14.4 G/DL (ref 12–15.9)
MAGNESIUM SERPL-MCNC: 1.8 MG/DL (ref 1.6–2.4)
MCH RBC QN AUTO: 33.8 PG (ref 26.6–33)
MCHC RBC AUTO-ENTMCNC: 33.6 G/DL (ref 31.5–35.7)
MCV RBC AUTO: 100.5 FL (ref 79–97)
PHOSPHATE SERPL-MCNC: 4.2 MG/DL (ref 2.5–4.5)
PLATELET # BLD AUTO: 276 10*3/MM3 (ref 140–450)
POTASSIUM SERPL-SCNC: 4.2 MMOL/L (ref 3.5–5.2)
PROT SERPL-MCNC: 7.4 G/DL (ref 6–8.5)
RBC # BLD AUTO: 4.26 10*6/MM3 (ref 3.77–5.28)
SODIUM SERPL-SCNC: 137 MMOL/L (ref 136–145)
WBC # BLD AUTO: 4.72 10*3/MM3 (ref 3.4–10.8)

## 2019-11-19 PROCEDURE — 99214 OFFICE O/P EST MOD 30 MIN: CPT | Performed by: INTERNAL MEDICINE

## 2019-11-19 RX ORDER — PROMETHAZINE HYDROCHLORIDE 25 MG/1
25 TABLET ORAL EVERY 6 HOURS PRN
Qty: 20 TABLET | Refills: 1 | Status: SHIPPED | OUTPATIENT
Start: 2019-11-19 | End: 2020-02-20

## 2019-11-19 NOTE — PROGRESS NOTES
Chief Complaint   Patient presents with   • Vomiting   • Diarrhea   • colon spasm        Maggie Malhotra is a  64 y.o. female here for a follow up visit for nausea with vomiting, diarrhea, bowel spasms    HPI this 64-year-old white female patient of Dr. Kimberly Lozano returns in follow-up since her last visit October 23.  She states her diarrhea has improved but she has reduced her dietary intake because of nausea with vomiting.  She is had extensive work-up including endoscopic evaluations, scans, labs, and studies for overgrowth as well as malabsorption.  She was prescribed cholestyramine but only took it intermittently.  She requests prescription for Phenergan.  She complains of lesions in her scalp and I referred her to her dermatologist to address this.  Also advised she discuss her condition with her primary care tender to see if there is any insight as to other etiologies to explain her symptoms.  I will obtain routine lab work and call with her results.  She asked about neurologic evaluation and I will provide her with the names of neurologists currently at [facility].  She was counseled to go to the emergency room when she called expressing significant problems with dizziness and shakiness 4 days ago but did not comply with this.    Past Medical History:   Diagnosis Date   • Allergic rhinitis    • Anxiety    • Backache    • Blood pressure elevated without history of HTN    • Broken foot 06/01/2002    LT   • Chronic pain    • Depression    • Dizziness    • Fatigue    • Fibromyalgia, primary    • Guillain Barré syndrome (CMS/HCC)    • H/O mammogram 01/01/2012    Dr. Rendon   • H/O: pneumonia    • History of broken leg 01/01/1998    LT   • Hypotension    • IBS (irritable bowel syndrome)    • Insomnia    • Memory disorder    • Migraine    • Mood disorder (CMS/HCC)    • Multiple joint pain    • Night sweat    • Syncope    • Vitamin D deficiency        Current Outpatient Medications   Medication Sig  Dispense Refill   • esomeprazole (nexIUM) 40 MG capsule Take 1 capsule by mouth 2 (Two) Times a Day. 60 capsule 5   • multivitamin (DAILY ES) tablet tablet Take 1 tablet by mouth daily.     • promethazine (PHENERGAN) 25 MG tablet TAKE 1 TABLET BY MOUTH EVERY 6 HOURS AS NEEDED FOR NAUSEA OR VOMITING 20 tablet 1   • carvedilol (COREG) 12.5 MG tablet Take 1 tablet by mouth 2 (Two) Times a Day. 180 tablet 3   • cholecalciferol (VITAMIN D3) 64587 units capsule Take 10,000 Units by mouth Daily.     • cholestyramine (QUESTRAN) 4 GM/DOSE powder Take 1 packet by mouth Daily. mixed with a liquid 30 packet 3   • diazePAM (VALIUM) 5 MG tablet TAKE 1 TABLET BY MOUTH EVERY 12 HOURS AS NEEDED FOR ANXIETY 60 tablet 2   • dicyclomine (BENTYL) 20 MG tablet Take 1 tablet by mouth 4 (Four) Times a Day As Needed.  5   • HYDROcodone-acetaminophen (NORCO)  MG per tablet Take 1 tablet by mouth Every 8 (Eight) Hours As Needed for Severe Pain . 100 tablet 0   • POTASSIUM PO Take 50 mg by mouth Daily.     • progesterone (PROMETRIUM) 200 MG capsule Take 200 mg by mouth Daily.  3   • TESTOSTERONE COMPOUNDING KIT TD APPLY TWO clicks topically TWICE DAILY AS DIRECTED  5   • zolpidem (AMBIEN) 5 MG tablet 1/2-1 qhs prn only - not to be taken with hydrocodone 30 tablet 2     No current facility-administered medications for this visit.        PRN Meds:.    Allergies   Allergen Reactions   • Influenza Vaccines    • Pantoprazole Other (See Comments)     Chills and lethargy   • Celexa [Citalopram Hydrobromide] Other (See Comments)     Headache   • Cymbalta [Duloxetine Hcl] Nausea Only and Other (See Comments)     Fatigue/Pain   • Midazolam Itching       Social History     Socioeconomic History   • Marital status:      Spouse name: Not on file   • Number of children: Not on file   • Years of education: Not on file   • Highest education level: Not on file   Tobacco Use   • Smoking status: Never Smoker   • Smokeless tobacco: Never Used   •  Tobacco comment: occasional caffiene   Substance and Sexual Activity   • Alcohol use: Yes     Alcohol/week: 1.8 oz     Types: 3 Cans of beer per week     Comment: Moderate   • Drug use: No     Types: Hydrocodone, Benzodiazepines, Other     Comment: Diazepam/NORCO/Testosterone (compounded cream)   • Sexual activity: Not Currently     Partners: Male       Family History   Problem Relation Age of Onset   • Heart failure Father    • Other Father         Lung failure       Review of Systems   Constitutional: Negative for activity change, appetite change, fatigue and unexpected weight change.   HENT: Negative for congestion, facial swelling, sore throat, trouble swallowing and voice change.    Eyes: Negative for photophobia and visual disturbance.   Respiratory: Negative for cough and choking.    Cardiovascular: Negative for chest pain.   Gastrointestinal: Positive for diarrhea, nausea and vomiting. Negative for abdominal distention, abdominal pain, anal bleeding, blood in stool, constipation and rectal pain.   Endocrine: Negative for polyphagia.   Musculoskeletal: Negative for arthralgias, gait problem and joint swelling.   Skin: Negative for color change, pallor and rash.   Allergic/Immunologic: Negative for food allergies.   Neurological: Negative for speech difficulty and headaches.   Hematological: Does not bruise/bleed easily.   Psychiatric/Behavioral: Negative for agitation, confusion and sleep disturbance.       Vitals:    11/19/19 0910   BP: 126/84   Temp: 97.7 °F (36.5 °C)       Physical Exam   Constitutional: She is oriented to person, place, and time. She appears well-developed and well-nourished.   HENT:   Head: Normocephalic.   Mouth/Throat: Oropharynx is clear and moist.   Eyes: Conjunctivae and EOM are normal.   Neck: Normal range of motion.   Cardiovascular: Normal rate and regular rhythm.   Pulmonary/Chest: Breath sounds normal.   Abdominal: Soft. Bowel sounds are normal.   Musculoskeletal: Normal range  of motion.   Neurological: She is alert and oriented to person, place, and time.   Skin: Skin is warm and dry.   Psychiatric: She has a normal mood and affect. Her behavior is normal.       ASSESSMENT   #1 nausea with vomiting: Work-up negative to date, may be some central effect  #2 diarrhea: Currently subsiding with diminished p.o. Intake  #3 GERD      PLAN  CBC, CMP, magnesium and phosphorus levels, thyroid function studies.  Fill prescription for Phenergan No. 20 with 1 refill  Patient to communicate with primary care tender and dermatologist  Will call with lab results when available            ICD-10-CM ICD-9-CM   1. Diarrhea, unspecified type R19.7 787.91   2. Gastroesophageal reflux disease, esophagitis presence not specified K21.9 530.81   3. Nausea R11.0 787.02   4. Nausea and vomiting, intractability of vomiting not specified, unspecified vomiting type R11.2 787.01

## 2019-11-20 LAB
FT4I SERPL CALC-MCNC: 2 (ref 1.2–4.9)
T3RU NFR SERPL: 29 % (ref 24–39)
T4 SERPL-MCNC: 6.9 UG/DL (ref 4.5–12)
TSH SERPL DL<=0.005 MIU/L-ACNC: 2.45 UIU/ML (ref 0.45–4.5)

## 2019-11-24 ENCOUNTER — RESULTS ENCOUNTER (OUTPATIENT)
Dept: GASTROENTEROLOGY | Facility: CLINIC | Age: 64
End: 2019-11-24

## 2019-11-24 DIAGNOSIS — R19.7 DIARRHEA, UNSPECIFIED TYPE: ICD-10-CM

## 2019-11-24 DIAGNOSIS — G47.00 INSOMNIA, UNSPECIFIED TYPE: ICD-10-CM

## 2019-11-24 DIAGNOSIS — R11.2 NAUSEA AND VOMITING, INTRACTABILITY OF VOMITING NOT SPECIFIED, UNSPECIFIED VOMITING TYPE: ICD-10-CM

## 2019-11-26 ENCOUNTER — TELEPHONE (OUTPATIENT)
Dept: GASTROENTEROLOGY | Facility: CLINIC | Age: 64
End: 2019-11-26

## 2019-11-26 RX ORDER — ZOLPIDEM TARTRATE 5 MG/1
TABLET ORAL
Qty: 30 TABLET | Refills: 2 | OUTPATIENT
Start: 2019-11-26 | End: 2019-12-26

## 2019-11-26 NOTE — TELEPHONE ENCOUNTER
Serum bilirubin levels were normal, steatosis is consistent with fat in her liver.  She may wish to adhere to a low-fat diet.  Again, would recheck LFTs in 6 weeks.

## 2019-11-26 NOTE — TELEPHONE ENCOUNTER
Called pt and advised per Dr Justice advised per Dr Justice that with her liver functions elevated in the face of steatosis , would recheck lft's in 6 wks.     Pt verb understanding. She reports Dr Justice  was going to review her labs and testing and was going to look at if she had increased bile production.  She is asking had he reviewed her labs and she is asking if she has a fatty liver.  Advised pt will send message to Dr Justice.

## 2019-11-26 NOTE — TELEPHONE ENCOUNTER
----- Message from Gurmeet MCKEON MD sent at 11/26/2019 12:43 PM EST -----  Regarding: Lab results  Okay to call results, with mild transaminase elevation in the face of steatosis, would recheck LFTs in 6 weeks.  ----- Message -----  From: Interface, Reflab Results In  Sent: 11/19/2019   8:07 PM  To: Gurmeet MCKEON MD

## 2019-12-20 DIAGNOSIS — G89.29 OTHER CHRONIC PAIN: ICD-10-CM

## 2019-12-20 RX ORDER — HYDROCODONE BITARTRATE AND ACETAMINOPHEN 10; 325 MG/1; MG/1
1 TABLET ORAL EVERY 8 HOURS PRN
Qty: 100 TABLET | Refills: 0 | Status: SHIPPED | OUTPATIENT
Start: 2019-12-20 | End: 2020-04-21 | Stop reason: SDUPTHER

## 2019-12-20 NOTE — TELEPHONE ENCOUNTER
Please review refill request:    Last OV: 8/21/19 NEXT 1/15/20    Last Prescribed: 8/21/19    ZHANNA: Ordered    Thank you,    Von

## 2019-12-20 NOTE — TELEPHONE ENCOUNTER
Pt needs a refill on  HYDROcodone-acetaminophen (NORCO)  MG per tablet. Please call pt when prescription is ready for pickup. Pt stated that if she does not answer, it is okay to leave a message.

## 2019-12-20 NOTE — TELEPHONE ENCOUNTER
[facility]  Neurocritical Care  Progress Note    Admit Date: 12/16/2019  Service Date: 12/20/2019  Length of Stay: 4    Subjective:     Chief Complaint: Embolic stroke involving right middle cerebral artery    History of Present Illness: The patient is a 78 y.o. male who  was admitted as a transfer from OSH for Right MCA stroke syndrome. Patient had an urgent thrombectomy s/p  right MCA stroke syndrome. PMH significant for HTN, T2DM and HLD. Patient stated that around 2:30 pm on 12/16/19 he was driving and felt weak. He drove home and was taken to the ER where he was evaluated for a stroke. He had outside CTA which was reported to have R ICA stenosis at bifurcation with 70% stenosis and right MCA occlusion. Patient was transferred to [facility] for possible intervention. Patient had a right femoral sheath placed and his Angio  revealed 90% R ICA stenosis and R M1 occlusion. The R ICA was treated by thrombectomy and carotid stenting with TICI 3 reperfusion.   Patient was admitted to the [facility] for higher level of care post thrombectomy.         Hospital Course: 12/17/19: Patient admitted to [facility] for higher level of care s/p thrombectomy and right carotid artery stenting with TICI 3 reperfusion  12/18/2019Recent MRI with heme transformation abg Q8, repeat scan stable, mannitol  12/19/2019 intubation repeat CTH  12/20: febrile-broaden abx coverage, SBT, add hydralazine 50 q8h, KUB and mag citrate      Review of Systems   Unable to obtain a complete ROS due to level of consciousness.  Objective:     Vitals:  Temp: 99.2 °F (37.3 °C)  Pulse: (!) 113  Rhythm: normal sinus rhythm  BP: 121/60  MAP (mmHg): 86  Resp: (!) 29  SpO2: 97 %  Oxygen Concentration (%): 70  O2 Device (Oxygen Therapy): ventilator  Vent Mode: SIMV  Set Rate: 16 bmp  Pressure Support: 10 cmH20  PEEP/CPAP: 7 cmH20  Peak Airway Pressure: 23 cmH2O  Mean Airway Pressure: 11 cmH20  Plateau Pressure: 0 cmH20    Temp  Min: 98.9 °F (37.2 °C)  Max: 101.4  Okay for overbook at the end of the day.   °F (38.6 °C)  Pulse  Min: 93  Max: 119  BP  Min: 121/60  Max: 176/109  MAP (mmHg)  Min: 81  Max: 134  Resp  Min: 7  Max: 31  ETCO2 (mmHg)  Min: 25 mmHg  Max: 25 mmHg  SpO2  Min: 92 %  Max: 99 %  Oxygen Concentration (%)  Min: 70  Max: 70    12/19 0701 - 12/20 0700  In: 2544.4 [I.V.:2184.4]  Out: 1310 [Urine:1310]   Unmeasured Output  Stool Occurrence: 0       Physical Exam  GA:comfortable, no acute distress.   HEENT: No scleral icterus or JVD.   Pulmonary: Diminished to auscultation A/L.   Cardiac: RRR S1 & S2 w/o rubs/murmurs/gallops.   Abdominal: Bowel sounds present x 4. No appreciable hepatosplenomegaly.  Skin: No jaundice, rashes, or visible lesions.  Neuro:  --GCS: E3 VT1 M6  --Mental Status:  Opens eyes to stimulation, touch, follows simple commands on the R side, tracks  --Pupils 3mm, PERRL.   --Corneal reflex, gag, cough intact.  --LUE withdraws to pain  --RUE spont  --LLE withdraws  --RLE spont    Medications:  Continuous  fentanyl Last Rate: 50 mcg/hr (12/20/19 1300)   niCARdipine Last Rate: 10 mg/hr (12/20/19 1300)   Scheduled  albuterol-ipratropium 3 mL Q4H   amLODIPine 10 mg Daily   atorvastatin 80 mg QHS   chlorhexidine 15 mL BID   famotidine (PF) 20 mg BID   hydrALAZINE 25 mg Q8H   levetiracetam oral soln 500 mg BID   magnesium citrate 296 mL Once   piperacillin-tazobactam (ZOSYN) IVPB 4.5 g Q8H   polyethylene glycol 17 g BID   senna-docusate 8.6-50 mg 1 tablet BID   [START ON 12/21/2019] vancomycin (VANCOCIN) IVPB 1,500 mg Q12H   PRN  acetaminophen 650 mg Q6H PRN   Dextrose 10% Bolus 12.5 g PRN   Dextrose 10% Bolus 25 g PRN   fentaNYL 50 mcg Q2H PRN   glucagon (human recombinant) 1 mg PRN   hydrALAZINE 10 mg Q6H PRN   insulin aspart U-100 1-10 Units Q4H PRN   labetalol 10 mg Q4H PRN   magnesium oxide 800 mg PRN   magnesium oxide 800 mg PRN   ondansetron 4 mg Q6H PRN   potassium chloride 10% 40 mEq PRN   potassium chloride 10% 40 mEq PRN   potassium chloride 10% 60 mEq PRN   potassium, sodium  phosphates 2 packet PRN   potassium, sodium phosphates 2 packet PRN   potassium, sodium phosphates 2 packet PRN   sodium chloride 0.9% 10 mL PRN   vancomycin - pharmacy to dose  pharmacy to manage frequency     Today I personally reviewed pertinent medications, lines/drains/airways, imaging, laboratory results,     Diet  No diet orders on file      Assessment/Plan:     Neuro  * Embolic stroke involving right middle cerebral artery   - 79 y/o male admitted for R MCA stroke syndrome POD 1 s/p Thrombectomy TICI 3 reperfusion   - Neurochecks q1hr    - Continue cardene to maintain SBP < 140  Discontinue ASA/Plavix d/t heme transformation  F/U CTH 2/17 stable  2/18 CTH stable           Cytotoxic brain edema  2/2 R MCA  Neuro checks Q 1 hr  Maintain eunatremia  Maintain Euglyemia    Midline shift of brain  2/2 IPH as noted on MRI imaging: mass effect on the right lateral ventricle and approximately 5 mm right-to-left midline shift  Continue Neuro checks Q1 hr    Vasogenic brain edema  2/2 IPH as noted on MRI: intraparenchymal hemorrhage within the right frontal and temporal lobes with associated vasogenic edema resulting in mass effect on the right lateral ventricle  Neuro checks Q1 hr      Acute spont intraparenchymal hemorrhage assoc w/ coagulopathy  12/16 Patient s/p thrombectomy with R ICA stent placement. ASA and Plavix initiated  12/17 MRI notes: intraparenchymal hemorrhage within the right frontal and temporal lobes with associated vasogenic edema resulting in mass effect on the right lateral ventricle and approximately 5 mm right-to-left midline shift.  12/17 ASA Plavix discontinued  Vascular Stroke following  NSGY consulted  12/16 2% initiated to maintain Na 145-155  Na Q6 (disc. Grouped into BMP)  Mannitol x1  12/17 changed 2% to 3%  Q6 Na changed to BMP Q6, Serum Os  12/20: off HTS    Cerebrovascular accident (CVA)  See embolic stroke above    Pulmonary  CAP (community acquired pneumonia)  -febrile  -broaden abx  regimen to vancomycin and zosyn   -follow cx and senitivities      Acute respiratory failure with hypoxia  Encounter for intubation d/t increased somnolence.   CXR daily  ABG Daily  Famotidine  chlorhexidine     Cardiac/Vascular  Mixed hyperlipidemia   - Continue Atorvastatin 80mg HS  Monitor Lipid Panel    Essential hypertension  Continuous Cardiac Monitoring  Vital Signs Q1 hour  Systolic (24hrs), Av , Min:120 , Max:176   Cardene to Maintain SBP < 140  CXR  Lungs hypoaerated.  Increase in the pulmonary vascular interstitial and alveolar markings Findings most consistent with congestive failure   ECHO 65%  EKG : Atrial fibrillation with rapid ventricular response with premature ventricular or aberrantly conducted complexes  : add hydralazine            Endocrine  Type 2 diabetes mellitus with neurologic complication, without long-term current use of insulin  A1c 7.7  RISS   POCT Q4  Nutritional Consult for dietary /caloric needs  Start TF  -see management for R MCA/IPH             GI  Constipation  --KUB  --mag citrate          The patient is being Prophylaxed for:  Venous Thromboembolism with: Mechanical  Stress Ulcer with: H2B  Ventilator Pneumonia with: chlorhexidine oral care    Activity Orders          None        Full Code    Anna Simon NP  Neurocritical Care  [facility]    Critical care time > 30 min.

## 2019-12-24 DIAGNOSIS — G47.00 INSOMNIA, UNSPECIFIED TYPE: ICD-10-CM

## 2019-12-26 RX ORDER — ZOLPIDEM TARTRATE 5 MG/1
TABLET ORAL
Qty: 30 TABLET | Refills: 2 | OUTPATIENT
Start: 2019-12-26 | End: 2020-03-25

## 2020-01-13 ENCOUNTER — TELEPHONE (OUTPATIENT)
Dept: INTERNAL MEDICINE | Facility: CLINIC | Age: 65
End: 2020-01-13

## 2020-01-13 NOTE — TELEPHONE ENCOUNTER
Called LVM to reschedule appointment 1/15 due to PCP out of office.   If patient calls back please reschedule to Next available or APRN.

## 2020-02-04 ENCOUNTER — OFFICE VISIT (OUTPATIENT)
Dept: INTERNAL MEDICINE | Facility: CLINIC | Age: 65
End: 2020-02-04

## 2020-02-04 VITALS
BODY MASS INDEX: 27.97 KG/M2 | SYSTOLIC BLOOD PRESSURE: 136 MMHG | HEIGHT: 71 IN | WEIGHT: 199.8 LBS | DIASTOLIC BLOOD PRESSURE: 94 MMHG

## 2020-02-04 DIAGNOSIS — F41.9 ANXIETY: ICD-10-CM

## 2020-02-04 DIAGNOSIS — I10 ESSENTIAL HYPERTENSION: Primary | ICD-10-CM

## 2020-02-04 DIAGNOSIS — G89.29 OTHER CHRONIC PAIN: ICD-10-CM

## 2020-02-04 DIAGNOSIS — M79.7 FIBROMYALGIA, PRIMARY: ICD-10-CM

## 2020-02-04 DIAGNOSIS — R19.7 DIARRHEA, UNSPECIFIED TYPE: ICD-10-CM

## 2020-02-04 DIAGNOSIS — G47.00 INSOMNIA, UNSPECIFIED TYPE: ICD-10-CM

## 2020-02-04 PROCEDURE — 99214 OFFICE O/P EST MOD 30 MIN: CPT | Performed by: INTERNAL MEDICINE

## 2020-02-04 RX ORDER — CHOLESTYRAMINE 4 G/9G
POWDER, FOR SUSPENSION ORAL
Refills: 3 | COMMUNITY
Start: 2019-11-13 | End: 2020-02-04

## 2020-02-04 RX ORDER — VORTIOXETINE 20 MG/1
1 TABLET, FILM COATED ORAL DAILY
COMMUNITY
Start: 2019-11-01 | End: 2020-06-30 | Stop reason: SINTOL

## 2020-02-04 NOTE — PROGRESS NOTES
"Subjective   Maggie Malhotra is a 64 y.o. female here for   Chief Complaint   Patient presents with   • Anxiety     5 month follow-up   • Hypertension   • Insomnia   • Pain   .    Vitals:    02/04/20 1404   BP: 136/94   BP Location: Left arm   Patient Position: Sitting   Cuff Size: Adult   Weight: 90.6 kg (199 lb 12.8 oz)   Height: 180.3 cm (71\")       Body mass index is 27.87 kg/m².    Anxiety   Presents for follow-up visit. Symptoms include excessive worry, insomnia, nausea (better) and nervous/anxious behavior. Patient reports no chest pain, palpitations or shortness of breath.       Hypertension   This is a chronic problem. The current episode started more than 1 year ago. The problem is unchanged. The problem is controlled. Associated symptoms include anxiety. Pertinent negatives include no chest pain, palpitations or shortness of breath.   Insomnia   This is a chronic problem. The problem occurs constantly. The problem has been unchanged. Associated symptoms include arthralgias, congestion, fatigue and nausea (better). Pertinent negatives include no chest pain, chills, coughing or fever.   Pain   This is a chronic problem. The current episode started more than 1 year ago. The problem occurs constantly. The problem has been unchanged. Associated symptoms include arthralgias, congestion, fatigue and nausea (better). Pertinent negatives include no chest pain, chills, coughing or fever.        The following portions of the patient's history were reviewed and updated as appropriate: allergies, current medications, past social history and problem list.    Review of Systems   Constitutional: Positive for fatigue. Negative for chills and fever.   HENT: Positive for congestion and postnasal drip.    Respiratory: Negative for cough, shortness of breath and wheezing.    Cardiovascular: Negative for chest pain, palpitations and leg swelling.   Gastrointestinal: Positive for diarrhea (better) and nausea (better). "   Musculoskeletal: Positive for arthralgias and back pain.   Allergic/Immunologic: Positive for environmental allergies.   Psychiatric/Behavioral: Positive for sleep disturbance. Negative for dysphoric mood. The patient is nervous/anxious and has insomnia.        Objective   Physical Exam   Constitutional: She appears well-developed and well-nourished. No distress.   Cardiovascular: Normal rate, regular rhythm and normal heart sounds.   Pulmonary/Chest: No respiratory distress. She has no wheezes. She has no rales. She exhibits no tenderness.   Musculoskeletal: She exhibits no edema.   Psychiatric: She has a normal mood and affect. Her behavior is normal.   Nursing note and vitals reviewed.      Assessment/Plan   Diagnoses and all orders for this visit:    Essential hypertension  Comments:  slightly high - need weekly chk & call if bp over 140/90 - keep fu with cardiology    Anxiety  Comments:  fairly well controlled - continue trintellix -will need to see psychiatrist if worse in future    Insomnia, unspecified type  Comments:  call if worse    Fibromyalgia, primary  Comments:  unchanged    Other chronic pain  Comments:  continue norco 1/2 daily - no more than 1/day    Diarrhea, unspecified type  Comments:  f/u with GI     Other orders  -     TESTOSTERONE COMPOUNDING KIT TD; APPLY TWO clicks topically TWICE DAILY AS DIRECTED  -     TRINTELLIX 20 MG tablet; Take 1 tablet by mouth Daily.  -     cholestyramine (QUESTRAN) 4 g packet; DISSOLVE & TAKE 1 PACKET BY MOUTH ONCE DAILY. MIX WITH A LIQUID       Need mammo & bone d - return for CPE.      Her BP machine showed similar BP #'s today.

## 2020-02-10 DIAGNOSIS — G62.9 NEUROPATHY: ICD-10-CM

## 2020-02-10 DIAGNOSIS — F41.9 ANXIETY: ICD-10-CM

## 2020-02-10 DIAGNOSIS — M62.838 MUSCLE SPASM: ICD-10-CM

## 2020-02-11 ENCOUNTER — OFFICE VISIT (OUTPATIENT)
Dept: CARDIOLOGY | Facility: CLINIC | Age: 65
End: 2020-02-11

## 2020-02-11 VITALS
DIASTOLIC BLOOD PRESSURE: 80 MMHG | HEART RATE: 79 BPM | OXYGEN SATURATION: 98 % | HEIGHT: 71 IN | SYSTOLIC BLOOD PRESSURE: 120 MMHG | WEIGHT: 199 LBS | BODY MASS INDEX: 27.86 KG/M2

## 2020-02-11 DIAGNOSIS — I10 ESSENTIAL HYPERTENSION: Primary | ICD-10-CM

## 2020-02-11 PROCEDURE — 93000 ELECTROCARDIOGRAM COMPLETE: CPT | Performed by: INTERNAL MEDICINE

## 2020-02-11 PROCEDURE — 99213 OFFICE O/P EST LOW 20 MIN: CPT | Performed by: INTERNAL MEDICINE

## 2020-02-11 RX ORDER — DIAZEPAM 5 MG/1
TABLET ORAL
Qty: 60 TABLET | Refills: 0 | OUTPATIENT
Start: 2020-02-11 | End: 2020-03-18

## 2020-02-11 NOTE — PROGRESS NOTES
PATIENTINFORMATION    Date of Office Visit: 20  Encounter Provider: Adriana Maravilla MD  Place of Service: [facility] CARDIOLOGY  Patient Name: Maggie Malhotra  : 1955    Subjective:         Patient ID: Maggie Malhotra is a 64 y.o. female.      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 This has left her wit some neuropathy and fibromyalgia. She has no known heart disease. She had a stress echo in  which showed normal LV systolic function without significant valvular heart disease, and she had no evidence of ischemia. She had an exercise stress test in  which was normal. In 2018, she was at work. She had just eaten breakfast. She had sudden onset of feeling unwell. She was in a cold sweat. Co-workers said she was really pale. She was dizzy. She felt a little short of breath but did not have any palpitations. She went to the emergency room where her troponin was negative. Basic metabolic panel showed a blood sugar of 140 but was otherwise pretty unremarkable, and CBC was unremarkable. D-dimer was mildly elevated. She was discharged with a Zio patch which was normal. She had carotid artery ultrasound which was also normal.  In 2018 she had an echocardiogram which showed normal LV systolic function with ejection fraction of 67% and grade 1 diastolic dysfunction.    I have been working on some changes to her blood pressure medication to try to get her blood pressure under control.  We did have a problem where she had symptoms when her blood pressure got too low.  She was having a lot of GI issues in the fall and was unable to keep her blood pressure medicine down so she rescheduled her appointment.  She said she had a lot of test for GI issues and they found some allergies but nothing significant.  She denies any chest pain.  She has some shortness of breath which she attributes to nasal  "congestion.  She denies palpitations or edema.  She is trying to exercise more but was really struggling with that because of her GI problems.    Review of Systems   Constitution: Positive for malaise/fatigue. Negative for fever, weight gain and weight loss.   HENT: Negative for ear pain, hearing loss, nosebleeds and sore throat.    Eyes: Negative for double vision, pain, vision loss in left eye and vision loss in right eye.   Cardiovascular:        See history of present illness.   Respiratory: Positive for cough. Negative for shortness of breath, sleep disturbances due to breathing, snoring and wheezing.    Endocrine: Positive for heat intolerance. Negative for cold intolerance and polyuria.   Skin: Negative for itching, poor wound healing and rash.   Musculoskeletal: Positive for joint pain. Negative for joint swelling and myalgias.   Gastrointestinal: Positive for diarrhea and vomiting. Negative for abdominal pain, hematochezia and nausea.   Genitourinary: Negative for hematuria and hesitancy.   Neurological: Negative for numbness, paresthesias and seizures.   Psychiatric/Behavioral: Negative for depression. The patient is nervous/anxious.            ECG 12 Lead  Date/Time: 2/11/2020 11:20 AM  Performed by: Adriana Maravilla MD  Authorized by: Adriana Maravilla MD   Comparison: compared with previous ECG from 9/19/1979  Similar to previous ECG  Rhythm: sinus rhythm  BPM: 79  Conduction: conduction normal  ST Segments: ST segments normal  T Waves: T waves normal    Clinical impression: normal ECG               Objective:     /80 (BP Location: Right arm)   Pulse 79   Ht 180.3 cm (71\")   Wt 90.3 kg (199 lb)   LMP 01/01/2006 (LMP Unknown)   SpO2 98%   BMI 27.75 kg/m²  Body mass index is 27.75 kg/m².     Physical Exam   Constitutional: She appears well-developed.   HENT:   Head: Normocephalic and atraumatic.   Eyes: Pupils are equal, round, and reactive to light. Conjunctivae and lids are normal. Lids are " everted and swept, no foreign bodies found.   Neck: Normal range of motion. No JVD present. Carotid bruit is not present. No tracheal deviation present. No thyroid mass present.   Cardiovascular: Normal rate, regular rhythm and normal heart sounds.   Pulses:       Dorsalis pedis pulses are 2+ on the right side, and 2+ on the left side.   Pulmonary/Chest: Effort normal and breath sounds normal.   Abdominal: Normal appearance and bowel sounds are normal.   Musculoskeletal: Normal range of motion.   Neurological: She is alert. She has normal strength.   Skin: Skin is warm, dry and intact.   Psychiatric: She has a normal mood and affect. Her behavior is normal.   Vitals reviewed.          Assessment/Plan:       1.  Hypertension.  Her blood pressure looks great here today.  I want to continue the carvedilol 12.5 mg twice a day.  She is concerned because her blood pressure was elevated last week at her primary doctor's office.  I will have her come in next week for a blood pressure check.  We also checked her machine today and it was not accurate compared to the readings that we got.  2.  History of sinus tachycardia.  Normal TSH in March 2019  3.  Dizzy episodes.  This was worked up in the past and no source was identified.  4.  Anxiety    I am going to tentatively plan on having her follow-up in 6 months.  She will have a blood pressure check next week.  I will see her back sooner if I have to adjust her medication.    Orders Placed This Encounter   Procedures   • ECG 12 Lead     This order was created via procedure documentation        Discharge Medications           Accurate as of February 11, 2020 11:21 AM. If you have any questions, ask your nurse or doctor.               Changes to Medications      Instructions Start Date   esomeprazole 40 MG capsule  Commonly known as:  nexIUM  What changed:  when to take this   40 mg, Oral, 2 Times Daily         Continue These Medications      Instructions Start Date   carvedilol  12.5 MG tablet  Commonly known as:  COREG   12.5 mg, Oral, 2 Times Daily      cholecalciferol 250 MCG (62349 UT) capsule  Commonly known as:  VITAMIN D3   10,000 Units, Oral, Daily      diazePAM 5 MG tablet  Commonly known as:  VALIUM   TAKE 1 TABLET BY MOUTH EVERY 12 HOURS AS NEEDED FOR ANXIETY      dicyclomine 20 MG tablet  Commonly known as:  BENTYL   1 tablet, Oral, 4 Times Daily PRN      HYDROcodone-acetaminophen  MG per tablet  Commonly known as:  NORCO   1 tablet, Oral, Every 8 Hours PRN      multivitamin tablet tablet   1 tablet, Oral, Daily      POTASSIUM PO   50 mg, Oral, Daily      progesterone 200 MG capsule  Commonly known as:  PROMETRIUM   200 mg, Oral, Daily      promethazine 25 MG tablet  Commonly known as:  PHENERGAN   25 mg, Oral, Every 6 Hours PRN      TESTOSTERONE COMPOUNDING KIT TD   APPLY TWO clicks topically TWICE DAILY AS DIRECTED      TRINTELLIX 20 MG tablet  Generic drug:  Vortioxetine HBr   1 tablet, Oral, Daily      zolpidem 5 MG tablet  Commonly known as:  AMBIEN   TAKE 1/2 TO 1 (ONE-HALF TO ONE) TABLET BY MOUTH AT BEDTIME AS NEEDED ONLY (DO NOT TAKE WITH HYDROCODONE)         Stop These Medications    cholestyramine 4 GM/DOSE powder  Commonly known as:  QUESTRAN  Stopped by:  MD Adriana iPckett MD  02/11/20  11:21 AM

## 2020-02-20 RX ORDER — PROMETHAZINE HYDROCHLORIDE 25 MG/1
TABLET ORAL
Qty: 20 TABLET | Refills: 2 | Status: SHIPPED | OUTPATIENT
Start: 2020-02-20 | End: 2020-07-01

## 2020-02-20 NOTE — TELEPHONE ENCOUNTER
Okay for Phenergan 25 mg #20 with 2 refills.  Will need follow-up before further medicine will be provided.

## 2020-03-18 DIAGNOSIS — F41.9 ANXIETY: ICD-10-CM

## 2020-03-18 DIAGNOSIS — M62.838 MUSCLE SPASM: ICD-10-CM

## 2020-03-18 DIAGNOSIS — G62.9 NEUROPATHY: ICD-10-CM

## 2020-03-18 RX ORDER — DIAZEPAM 5 MG/1
TABLET ORAL
Qty: 60 TABLET | Refills: 0 | OUTPATIENT
Start: 2020-03-18 | End: 2020-05-05

## 2020-03-25 DIAGNOSIS — G47.00 INSOMNIA, UNSPECIFIED TYPE: ICD-10-CM

## 2020-03-25 RX ORDER — ZOLPIDEM TARTRATE 5 MG/1
TABLET ORAL
Qty: 30 TABLET | Refills: 0 | OUTPATIENT
Start: 2020-03-25 | End: 2020-04-30

## 2020-03-25 NOTE — TELEPHONE ENCOUNTER
Last office visit: 02/04/2020  Last fill date: 12/26/2019 with 2 refills  Next office visit: 06/30/2020  Last Gautam: 03/18/2020

## 2020-04-10 DIAGNOSIS — K58.0 IRRITABLE BOWEL SYNDROME WITH DIARRHEA: ICD-10-CM

## 2020-04-10 RX ORDER — DICYCLOMINE HCL 20 MG
TABLET ORAL
Qty: 90 TABLET | Refills: 0 | OUTPATIENT
Start: 2020-04-10

## 2020-04-21 DIAGNOSIS — G89.29 OTHER CHRONIC PAIN: ICD-10-CM

## 2020-04-21 NOTE — TELEPHONE ENCOUNTER
Pt called and requested a refill on the selected med. If Pt needs to  Rx in office please let her know.    [facility] confirmed    Pt callback- [phone]

## 2020-04-22 RX ORDER — HYDROCODONE BITARTRATE AND ACETAMINOPHEN 10; 325 MG/1; MG/1
1 TABLET ORAL EVERY 8 HOURS PRN
Qty: 100 TABLET | Refills: 0 | Status: SHIPPED | OUTPATIENT
Start: 2020-04-22 | End: 2020-08-24 | Stop reason: SDUPTHER

## 2020-04-29 DIAGNOSIS — G47.00 INSOMNIA, UNSPECIFIED TYPE: ICD-10-CM

## 2020-04-30 RX ORDER — ZOLPIDEM TARTRATE 5 MG/1
TABLET ORAL
Qty: 30 TABLET | Refills: 0 | Status: SHIPPED | OUTPATIENT
Start: 2020-04-30 | End: 2020-06-08

## 2020-04-30 NOTE — TELEPHONE ENCOUNTER
Would you please send for Dr. Lozano pt since Dr. Lozano out today?  (sorry-I have sent several to Dr. Jones already!)  She was last seen 2/4/20, next visit 6/30/2020.   Gautam done 3/18/2020.  Thank you.

## 2020-05-05 DIAGNOSIS — G62.9 NEUROPATHY: ICD-10-CM

## 2020-05-05 DIAGNOSIS — M62.838 MUSCLE SPASM: ICD-10-CM

## 2020-05-05 DIAGNOSIS — F41.9 ANXIETY: ICD-10-CM

## 2020-05-05 RX ORDER — DIAZEPAM 5 MG/1
TABLET ORAL
Qty: 60 TABLET | Refills: 0 | Status: SHIPPED | OUTPATIENT
Start: 2020-05-05 | End: 2020-07-24

## 2020-05-29 ENCOUNTER — TELEPHONE (OUTPATIENT)
Dept: GASTROENTEROLOGY | Facility: CLINIC | Age: 65
End: 2020-05-29

## 2020-06-07 DIAGNOSIS — G47.00 INSOMNIA, UNSPECIFIED TYPE: ICD-10-CM

## 2020-06-08 RX ORDER — ZOLPIDEM TARTRATE 5 MG/1
TABLET ORAL
Qty: 30 TABLET | Refills: 0 | Status: SHIPPED | OUTPATIENT
Start: 2020-06-08 | End: 2020-06-25

## 2020-06-24 DIAGNOSIS — G47.00 INSOMNIA, UNSPECIFIED TYPE: ICD-10-CM

## 2020-06-25 RX ORDER — ZOLPIDEM TARTRATE 5 MG/1
TABLET ORAL
Qty: 30 TABLET | Refills: 0 | Status: SHIPPED | OUTPATIENT
Start: 2020-06-25 | End: 2020-08-11

## 2020-06-25 NOTE — TELEPHONE ENCOUNTER
Please review refill request:    Last OV: 2/4/2020-Next 6/30/2020    Last Prescribed: 6/8/2020    ZHANNA: Ordered    Thank you,    Denny Minaya

## 2020-06-30 ENCOUNTER — OFFICE VISIT (OUTPATIENT)
Dept: INTERNAL MEDICINE | Facility: CLINIC | Age: 65
End: 2020-06-30

## 2020-06-30 VITALS
WEIGHT: 204 LBS | HEART RATE: 93 BPM | SYSTOLIC BLOOD PRESSURE: 118 MMHG | HEIGHT: 71 IN | BODY MASS INDEX: 28.56 KG/M2 | DIASTOLIC BLOOD PRESSURE: 82 MMHG | OXYGEN SATURATION: 98 %

## 2020-06-30 DIAGNOSIS — K21.9 GASTROESOPHAGEAL REFLUX DISEASE, ESOPHAGITIS PRESENCE NOT SPECIFIED: ICD-10-CM

## 2020-06-30 DIAGNOSIS — J30.2 SEASONAL ALLERGIC RHINITIS, UNSPECIFIED TRIGGER: ICD-10-CM

## 2020-06-30 DIAGNOSIS — K58.0 IRRITABLE BOWEL SYNDROME WITH DIARRHEA: ICD-10-CM

## 2020-06-30 DIAGNOSIS — F41.9 ANXIETY: ICD-10-CM

## 2020-06-30 DIAGNOSIS — I10 ESSENTIAL HYPERTENSION: ICD-10-CM

## 2020-06-30 DIAGNOSIS — Z78.0 MENOPAUSE: ICD-10-CM

## 2020-06-30 DIAGNOSIS — M79.7 FIBROMYALGIA, PRIMARY: ICD-10-CM

## 2020-06-30 DIAGNOSIS — G47.00 INSOMNIA, UNSPECIFIED TYPE: ICD-10-CM

## 2020-06-30 DIAGNOSIS — R53.82 CHRONIC FATIGUE: ICD-10-CM

## 2020-06-30 DIAGNOSIS — E55.9 VITAMIN D DEFICIENCY: ICD-10-CM

## 2020-06-30 DIAGNOSIS — G89.29 OTHER CHRONIC PAIN: ICD-10-CM

## 2020-06-30 DIAGNOSIS — G61.0 GUILLAIN-BARRE SYNDROME (HCC): ICD-10-CM

## 2020-06-30 DIAGNOSIS — R19.7 DIARRHEA, UNSPECIFIED TYPE: ICD-10-CM

## 2020-06-30 DIAGNOSIS — R79.89 ELEVATED LIVER FUNCTION TESTS: ICD-10-CM

## 2020-06-30 DIAGNOSIS — Z00.00 ANNUAL PHYSICAL EXAM: Primary | ICD-10-CM

## 2020-06-30 PROBLEM — R11.2 NAUSEA AND VOMITING: Status: RESOLVED | Noted: 2019-10-02 | Resolved: 2020-06-30

## 2020-06-30 PROBLEM — R10.32 BILATERAL LOWER ABDOMINAL CRAMPING: Status: RESOLVED | Noted: 2019-10-02 | Resolved: 2020-06-30

## 2020-06-30 PROBLEM — R10.31 BILATERAL LOWER ABDOMINAL CRAMPING: Status: RESOLVED | Noted: 2019-10-02 | Resolved: 2020-06-30

## 2020-06-30 PROCEDURE — 99396 PREV VISIT EST AGE 40-64: CPT | Performed by: INTERNAL MEDICINE

## 2020-06-30 RX ORDER — GABAPENTIN 300 MG/1
CAPSULE ORAL
Qty: 90 CAPSULE | Refills: 1 | Status: SHIPPED | OUTPATIENT
Start: 2020-06-30 | End: 2020-12-14

## 2020-06-30 NOTE — PROGRESS NOTES
"Subjective   Magige Malhotra is a 65 y.o. female here for   Chief Complaint   Patient presents with   • Annual Exam     Physical   .    Vitals:    06/30/20 1300   BP: 118/82   BP Location: Left arm   Patient Position: Sitting   Cuff Size: Adult   Pulse: 93   SpO2: 98%   Weight: 92.5 kg (204 lb)   Height: 180.3 cm (71\")       Body mass index is 28.45 kg/m².    History of Present Illness     The following portions of the patient's history were reviewed and updated as appropriate: allergies, current medications, past social history and problem list.    Review of Systems   Constitutional: Positive for fatigue. Negative for appetite change, chills, fever and unexpected weight change.   HENT: Positive for congestion and postnasal drip. Negative for ear pain, mouth sores, sinus pain, sore throat, tinnitus, trouble swallowing and voice change.    Eyes: Negative for pain and visual disturbance.   Respiratory: Negative for cough, choking, shortness of breath and wheezing.    Cardiovascular: Negative for chest pain, palpitations and leg swelling.   Gastrointestinal: Negative for abdominal pain, blood in stool, constipation, diarrhea, nausea and vomiting.   Endocrine: Positive for heat intolerance. Negative for cold intolerance, polydipsia and polyuria.   Genitourinary: Negative for difficulty urinating, dysuria, enuresis, flank pain, frequency, hematuria, urgency and vaginal bleeding.   Musculoskeletal: Positive for arthralgias (knee & hip). Negative for back pain, gait problem, joint swelling, myalgias, neck pain and neck stiffness.   Skin: Negative for color change and rash.   Allergic/Immunologic: Positive for environmental allergies. Negative for food allergies and immunocompromised state.   Neurological: Negative for dizziness, tremors, seizures, syncope, speech difficulty, weakness, numbness and headaches.   Hematological: Negative for adenopathy. Does not bruise/bleed easily.   Psychiatric/Behavioral: Negative for " agitation, confusion, decreased concentration, dysphoric mood, sleep disturbance and suicidal ideas. The patient is nervous/anxious.      She requests gabapentin to use at night prn.    Objective   Physical Exam   Constitutional: She appears well-developed and well-nourished.   HENT:   Right Ear: Hearing, tympanic membrane, external ear and ear canal normal.   Left Ear: Hearing, tympanic membrane, external ear and ear canal normal.   Nose: Right sinus exhibits no maxillary sinus tenderness and no frontal sinus tenderness. Left sinus exhibits no maxillary sinus tenderness and no frontal sinus tenderness.   Eyes: Pupils are equal, round, and reactive to light. Conjunctivae, EOM and lids are normal.   Neck: Trachea normal. Neck supple. No JVD present. Carotid bruit is not present. No tracheal deviation present. No thyroid mass and no thyromegaly present.   Cardiovascular: Normal rate, regular rhythm, S1 normal and S2 normal. Exam reveals no gallop and no friction rub.   No murmur heard.  Pulses:       Carotid pulses are 2+ on the right side, and 2+ on the left side.       Radial pulses are 2+ on the right side, and 2+ on the left side.        Dorsalis pedis pulses are 2+ on the right side, and 2+ on the left side.        Posterior tibial pulses are 2+ on the right side, and 2+ on the left side.   Pulmonary/Chest: Effort normal and breath sounds normal. Chest wall is not dull to percussion. Right breast exhibits no inverted nipple, no mass, no nipple discharge, no skin change and no tenderness. Left breast exhibits no inverted nipple, no mass, no nipple discharge, no skin change and no tenderness.   Abdominal: Soft. Normal aorta and bowel sounds are normal. She exhibits no abdominal bruit. There is no hepatosplenomegaly. There is no tenderness. There is no rebound and no guarding. No hernia.   Musculoskeletal: Normal range of motion. She exhibits no edema, tenderness or deformity.   Lymphadenopathy:     She has no  cervical adenopathy.     She has no axillary adenopathy.        Right: No supraclavicular adenopathy present.        Left: No supraclavicular adenopathy present.   Neurological: She is alert. She has normal strength. No cranial nerve deficit. She displays a negative Romberg sign.   Reflex Scores:       Patellar reflexes are 2+ on the right side and 2+ on the left side.  Skin: Skin is warm and dry.   Nursing note and vitals reviewed.    Gait is wide based with decreased ankle flexion (no change per pt).    Assessment/Plan   Diagnoses and all orders for this visit:    Annual physical exam  -     Lipid Panel; Future  -     CBC & Differential; Future  -     Comprehensive Metabolic Panel; Future    Other chronic pain  -     gabapentin (NEURONTIN) 300 MG capsule; 1 qhs    Irritable bowel syndrome with diarrhea    Vitamin D deficiency    Insomnia, unspecified type  -     gabapentin (NEURONTIN) 300 MG capsule; 1 qhs    Seasonal allergic rhinitis, unspecified trigger  Comments:  ok to see allergist again - shots to help chronic fatigue    Fibromyalgia, primary  -     gabapentin (NEURONTIN) 300 MG capsule; 1 qhs    Diarrhea, unspecified type  Comments:  IBS (better with bentyl) - all tests with GI normal (polyp removed 2019)    Elevated liver function tests    Chronic fatigue  -     Lipid Panel; Future  -     TSH Rfx On Abnormal To Free T4; Future    Essential hypertension  Comments:  controlled - call if bp over 130/80  Orders:  -     Lipid Panel; Future  -     CBC & Differential; Future  -     Comprehensive Metabolic Panel; Future    Anxiety  Comments:  call if worse    Guillain-Cuero syndrome (CMS/HCC)  Comments:  at age 27 -she has had neuro sx since then (no change) - neurologists state there is no treatment    Gastroesophageal reflux disease, esophagitis presence not specified    Menopause  -     DEXA Bone Density Axial; Future     Discussed need to stay up to date on screening exams as indicated on sex, age & risk  factors. I encouraged healthy weight maintenance with diet & exercise. Need good sleep habits & continue to avoid tobacco & excessive alcohol.

## 2020-07-01 RX ORDER — PROMETHAZINE HYDROCHLORIDE 25 MG/1
TABLET ORAL
Qty: 20 TABLET | Refills: 0 | Status: SHIPPED | OUTPATIENT
Start: 2020-07-01 | End: 2020-09-04

## 2020-07-01 NOTE — TELEPHONE ENCOUNTER
Okay on refill for Phenergan 25 mg every 6 hours as needed for nausea and vomiting #20 but no refills as she needs to be seen in the office in follow-up by myself or nurse practitioner before further medication will be administered.

## 2020-07-10 ENCOUNTER — LAB (OUTPATIENT)
Dept: INTERNAL MEDICINE | Facility: CLINIC | Age: 65
End: 2020-07-10

## 2020-07-10 ENCOUNTER — CLINICAL SUPPORT (OUTPATIENT)
Dept: INTERNAL MEDICINE | Facility: CLINIC | Age: 65
End: 2020-07-10

## 2020-07-10 DIAGNOSIS — Z78.0 MENOPAUSE: ICD-10-CM

## 2020-07-10 DIAGNOSIS — I10 ESSENTIAL HYPERTENSION: ICD-10-CM

## 2020-07-10 DIAGNOSIS — R53.82 CHRONIC FATIGUE: ICD-10-CM

## 2020-07-10 DIAGNOSIS — Z00.00 ANNUAL PHYSICAL EXAM: ICD-10-CM

## 2020-07-10 PROCEDURE — 77080 DXA BONE DENSITY AXIAL: CPT | Performed by: INTERNAL MEDICINE

## 2020-07-11 LAB
ALBUMIN SERPL-MCNC: 4.3 G/DL (ref 3.8–4.8)
ALBUMIN/GLOB SERPL: 1.7 {RATIO} (ref 1.2–2.2)
ALP SERPL-CCNC: 60 IU/L (ref 39–117)
ALT SERPL-CCNC: 35 IU/L (ref 0–32)
AST SERPL-CCNC: 32 IU/L (ref 0–40)
BASOPHILS # BLD AUTO: 0 X10E3/UL (ref 0–0.2)
BASOPHILS NFR BLD AUTO: 1 %
BILIRUB SERPL-MCNC: 0.4 MG/DL (ref 0–1.2)
BUN SERPL-MCNC: 12 MG/DL (ref 8–27)
BUN/CREAT SERPL: 13 (ref 12–28)
CALCIUM SERPL-MCNC: 8.9 MG/DL (ref 8.7–10.3)
CHLORIDE SERPL-SCNC: 98 MMOL/L (ref 96–106)
CHOLEST SERPL-MCNC: 165 MG/DL (ref 100–199)
CO2 SERPL-SCNC: 23 MMOL/L (ref 20–29)
CREAT SERPL-MCNC: 0.92 MG/DL (ref 0.57–1)
EOSINOPHIL # BLD AUTO: 0.1 X10E3/UL (ref 0–0.4)
EOSINOPHIL NFR BLD AUTO: 2 %
ERYTHROCYTE [DISTWIDTH] IN BLOOD BY AUTOMATED COUNT: 12.5 % (ref 11.7–15.4)
GLOBULIN SER CALC-MCNC: 2.5 G/DL (ref 1.5–4.5)
GLUCOSE SERPL-MCNC: 106 MG/DL (ref 65–99)
HCT VFR BLD AUTO: 42.1 % (ref 34–46.6)
HDLC SERPL-MCNC: 80 MG/DL
HGB BLD-MCNC: 14.1 G/DL (ref 11.1–15.9)
IMM GRANULOCYTES # BLD AUTO: 0 X10E3/UL (ref 0–0.1)
IMM GRANULOCYTES NFR BLD AUTO: 0 %
LDLC SERPL CALC-MCNC: 71 MG/DL (ref 0–99)
LYMPHOCYTES # BLD AUTO: 2.1 X10E3/UL (ref 0.7–3.1)
LYMPHOCYTES NFR BLD AUTO: 30 %
MCH RBC QN AUTO: 33.8 PG (ref 26.6–33)
MCHC RBC AUTO-ENTMCNC: 33.5 G/DL (ref 31.5–35.7)
MCV RBC AUTO: 101 FL (ref 79–97)
MONOCYTES # BLD AUTO: 0.7 X10E3/UL (ref 0.1–0.9)
MONOCYTES NFR BLD AUTO: 10 %
NEUTROPHILS # BLD AUTO: 3.9 X10E3/UL (ref 1.4–7)
NEUTROPHILS NFR BLD AUTO: 57 %
PLATELET # BLD AUTO: 261 X10E3/UL (ref 150–450)
POTASSIUM SERPL-SCNC: 4.6 MMOL/L (ref 3.5–5.2)
PROT SERPL-MCNC: 6.8 G/DL (ref 6–8.5)
RBC # BLD AUTO: 4.17 X10E6/UL (ref 3.77–5.28)
SODIUM SERPL-SCNC: 136 MMOL/L (ref 134–144)
TRIGL SERPL-MCNC: 70 MG/DL (ref 0–149)
TSH SERPL DL<=0.005 MIU/L-ACNC: 1.94 UIU/ML (ref 0.45–4.5)
VLDLC SERPL CALC-MCNC: 14 MG/DL (ref 5–40)
WBC # BLD AUTO: 6.9 X10E3/UL (ref 3.4–10.8)

## 2020-07-23 DIAGNOSIS — F41.9 ANXIETY: ICD-10-CM

## 2020-07-23 DIAGNOSIS — G62.9 NEUROPATHY: ICD-10-CM

## 2020-07-23 DIAGNOSIS — M62.838 MUSCLE SPASM: ICD-10-CM

## 2020-07-24 RX ORDER — DIAZEPAM 5 MG/1
TABLET ORAL
Qty: 60 TABLET | Refills: 0 | Status: SHIPPED | OUTPATIENT
Start: 2020-07-24 | End: 2020-09-21

## 2020-07-29 ENCOUNTER — APPOINTMENT (OUTPATIENT)
Dept: WOMENS IMAGING | Facility: HOSPITAL | Age: 65
End: 2020-07-29

## 2020-07-29 PROCEDURE — 77067 SCR MAMMO BI INCL CAD: CPT | Performed by: RADIOLOGY

## 2020-07-29 PROCEDURE — 77063 BREAST TOMOSYNTHESIS BI: CPT | Performed by: RADIOLOGY

## 2020-08-10 DIAGNOSIS — G47.00 INSOMNIA, UNSPECIFIED TYPE: ICD-10-CM

## 2020-08-11 RX ORDER — ZOLPIDEM TARTRATE 5 MG/1
TABLET ORAL
Qty: 30 TABLET | Refills: 2 | Status: SHIPPED | OUTPATIENT
Start: 2020-08-11 | End: 2020-11-03

## 2020-08-11 NOTE — TELEPHONE ENCOUNTER
Pt needs to sign a contract at her next visit  Gautam ran 7/27/20    Last OV 6/30/20  Next scheduled for 11/3/20

## 2020-08-24 DIAGNOSIS — G89.29 OTHER CHRONIC PAIN: ICD-10-CM

## 2020-08-24 RX ORDER — HYDROCODONE BITARTRATE AND ACETAMINOPHEN 10; 325 MG/1; MG/1
1 TABLET ORAL EVERY 8 HOURS PRN
Qty: 100 TABLET | Refills: 0 | Status: SHIPPED | OUTPATIENT
Start: 2020-08-24 | End: 2020-12-11 | Stop reason: SDUPTHER

## 2020-08-24 NOTE — TELEPHONE ENCOUNTER
PT IS REQUESTING A REFILL ON HYDROcodone-acetaminophen (NORCO)  MG per tablet    [address] [address] [facility] [phone] [facility] [phone] FX     PT WANTS TO LET DR. DEGROOT KNOW THAT SHE IS HAVE A LEFT HIP REPLACEMENT

## 2020-09-04 RX ORDER — PROMETHAZINE HYDROCHLORIDE 25 MG/1
TABLET ORAL
Qty: 20 TABLET | Refills: 0 | Status: SHIPPED | OUTPATIENT
Start: 2020-09-04 | End: 2020-11-16

## 2020-09-18 DIAGNOSIS — M62.838 MUSCLE SPASM: ICD-10-CM

## 2020-09-18 DIAGNOSIS — F41.9 ANXIETY: ICD-10-CM

## 2020-09-18 DIAGNOSIS — G62.9 NEUROPATHY: ICD-10-CM

## 2020-09-21 RX ORDER — DIAZEPAM 5 MG/1
TABLET ORAL
Qty: 60 TABLET | Refills: 0 | Status: SHIPPED | OUTPATIENT
Start: 2020-09-21 | End: 2020-10-22

## 2020-10-22 DIAGNOSIS — F41.9 ANXIETY: ICD-10-CM

## 2020-10-22 DIAGNOSIS — M62.838 MUSCLE SPASM: ICD-10-CM

## 2020-10-22 DIAGNOSIS — G62.9 NEUROPATHY: ICD-10-CM

## 2020-10-22 RX ORDER — DIAZEPAM 5 MG/1
TABLET ORAL
Qty: 60 TABLET | Refills: 0 | Status: SHIPPED | OUTPATIENT
Start: 2020-10-22 | End: 2020-11-23

## 2020-11-03 ENCOUNTER — OFFICE VISIT (OUTPATIENT)
Dept: INTERNAL MEDICINE | Facility: CLINIC | Age: 65
End: 2020-11-03

## 2020-11-03 VITALS
SYSTOLIC BLOOD PRESSURE: 144 MMHG | BODY MASS INDEX: 28.84 KG/M2 | TEMPERATURE: 98 F | HEIGHT: 71 IN | DIASTOLIC BLOOD PRESSURE: 94 MMHG | OXYGEN SATURATION: 98 % | HEART RATE: 115 BPM | RESPIRATION RATE: 20 BRPM | WEIGHT: 206 LBS

## 2020-11-03 DIAGNOSIS — G89.29 OTHER CHRONIC PAIN: ICD-10-CM

## 2020-11-03 DIAGNOSIS — M25.551 RIGHT HIP PAIN: ICD-10-CM

## 2020-11-03 DIAGNOSIS — G47.00 INSOMNIA, UNSPECIFIED TYPE: ICD-10-CM

## 2020-11-03 DIAGNOSIS — M79.651 RIGHT THIGH PAIN: ICD-10-CM

## 2020-11-03 DIAGNOSIS — F33.9 MONOPOLAR DEPRESSION (HCC): ICD-10-CM

## 2020-11-03 DIAGNOSIS — I10 ESSENTIAL HYPERTENSION: Primary | ICD-10-CM

## 2020-11-03 DIAGNOSIS — F41.9 ANXIETY: ICD-10-CM

## 2020-11-03 PROBLEM — M16.11 ARTHRITIS OF RIGHT HIP: Status: ACTIVE | Noted: 2020-09-10

## 2020-11-03 PROCEDURE — 99214 OFFICE O/P EST MOD 30 MIN: CPT | Performed by: INTERNAL MEDICINE

## 2020-11-03 RX ORDER — ESCITALOPRAM OXALATE 10 MG/1
10 TABLET ORAL DAILY
Qty: 30 TABLET | Refills: 4 | Status: SHIPPED | OUTPATIENT
Start: 2020-11-03 | End: 2021-03-16

## 2020-11-03 RX ORDER — ASPIRIN 81 MG/1
81 TABLET ORAL 2 TIMES DAILY
COMMUNITY
Start: 2020-09-22 | End: 2020-11-03

## 2020-11-03 RX ORDER — AMOXICILLIN 250 MG
2 CAPSULE ORAL 2 TIMES DAILY
COMMUNITY
Start: 2020-09-22 | End: 2020-11-03

## 2020-11-03 NOTE — PROGRESS NOTES
"Subjective   Maggie Malhotra is a 65 y.o. female here for   Chief Complaint   Patient presents with   • Anxiety     4 month follow-up   • Hypertension   • Insomnia   • Pain   .    Vitals:    11/03/20 1135   BP: 144/94   BP Location: Left arm   Patient Position: Sitting   Cuff Size: Adult   Pulse: 115   Resp: 20   Temp: 98 °F (36.7 °C)   TempSrc: Temporal   SpO2: 98%   Weight: 93.4 kg (206 lb)   Height: 180.3 cm (71\")       Body mass index is 28.73 kg/m².    Anxiety  Presents for follow-up visit. Symptoms include excessive worry, insomnia and nervous/anxious behavior. Patient reports no chest pain, palpitations or shortness of breath.       Hypertension  This is a chronic problem. The current episode started more than 1 year ago. The problem is unchanged. The problem is controlled. Associated symptoms include anxiety. Pertinent negatives include no chest pain, palpitations or shortness of breath.   Insomnia  This is a chronic problem. The current episode started more than 1 year ago. The problem occurs constantly. The problem has been unchanged. Associated symptoms include arthralgias (right thigh pain) and fatigue. Pertinent negatives include no chest pain, chills, coughing or fever.   Pain  This is a chronic problem. The current episode started more than 1 year ago. The problem occurs intermittently. The problem has been waxing and waning. Associated symptoms include arthralgias (right thigh pain) and fatigue. Pertinent negatives include no chest pain, chills, coughing or fever.        The following portions of the patient's history were reviewed and updated as appropriate: allergies, current medications, past social history and problem list.    Review of Systems   Constitutional: Positive for fatigue. Negative for chills and fever.   Respiratory: Negative for cough, shortness of breath and wheezing.    Cardiovascular: Negative for chest pain, palpitations and leg swelling.   Musculoskeletal: Positive for " arthralgias (right thigh pain).   Psychiatric/Behavioral: Positive for dysphoric mood and sleep disturbance. The patient is nervous/anxious and has insomnia.        Objective   Physical Exam  Vitals signs and nursing note reviewed.   Constitutional:       General: She is not in acute distress.     Appearance: She is well-developed.   Cardiovascular:      Rate and Rhythm: Normal rate and regular rhythm.      Heart sounds: Normal heart sounds.   Pulmonary:      Effort: No respiratory distress.      Breath sounds: No wheezing or rales.   Chest:      Chest wall: No tenderness.   Psychiatric:         Behavior: Behavior normal.         Assessment/Plan   Diagnoses and all orders for this visit:    Essential hypertension  Comments:  Slightly high blood pressure-need weekly check and call if over 140/90.    Right hip pain  Comments:  Joint pain is better after surgery by dr GEE 9/2020-but nerve pain is significant. She may discuss increasing gabapentin for nerve pain with him.    Right thigh pain  Comments:  Follow-up with Dr. Eric.    Insomnia, unspecified type  Comments:  Consider increasing gabapentin at night if needed.  She will call to discuss when ready.    Anxiety  Comments:  Trial of low-dose Lexapro-call if problems.  I am also recommending she see psychiatry or get genetic testing for the correct antidepressant.  Orders:  -     escitalopram (Lexapro) 10 MG tablet; Take 1 tablet by mouth Daily.    Monopolar depression (CMS/HCC)  Comments:  Recommending genetic testing versus seeing psychiatrist.  Orders:  -     escitalopram (Lexapro) 10 MG tablet; Take 1 tablet by mouth Daily.    Other chronic pain  Comments:  She states she never uses more than 1 hydrocodone daily-continue using this sparingly because of addictive potential.    Other orders  -     Unable to find; APPLY TWO clicks topically TWICE DAILY AS DIRECTED  -     Discontinue: sennosides-docusate (PERICOLACE) 8.6-50 MG per tablet; Take 2 tablets by mouth  2 (two) times a day.  -     Discontinue: aspirin (aspirin) 81 MG EC tablet; Take 81 mg by mouth 2 (two) times a day.  -     Discontinue: POTASSIUM PO; Take 1 tablet by mouth Daily.  -     Discontinue: Bismuth Subsalicylate 262 MG tablet; Take 1 tablet by mouth Daily.     She had tried Celexa in the past and had a headache.  I explained Lexapro is a relative of Celexa so start with a half of a pill at night and stop if headaches.  If tolerated, then she will increase to 1 Lexapro every night.  If she continues having depression then we may add Wellbutrin to the Lexapro.

## 2020-11-16 RX ORDER — PROMETHAZINE HYDROCHLORIDE 25 MG/1
TABLET ORAL
Qty: 20 TABLET | Refills: 0 | Status: SHIPPED | OUTPATIENT
Start: 2020-11-16 | End: 2020-12-28

## 2020-11-23 DIAGNOSIS — F41.9 ANXIETY: ICD-10-CM

## 2020-11-23 DIAGNOSIS — G62.9 NEUROPATHY: ICD-10-CM

## 2020-11-23 DIAGNOSIS — M62.838 MUSCLE SPASM: ICD-10-CM

## 2020-11-23 RX ORDER — DIAZEPAM 5 MG/1
TABLET ORAL
Qty: 60 TABLET | Refills: 0 | Status: SHIPPED | OUTPATIENT
Start: 2020-11-23 | End: 2021-01-11

## 2020-12-11 DIAGNOSIS — G47.00 INSOMNIA, UNSPECIFIED TYPE: ICD-10-CM

## 2020-12-11 DIAGNOSIS — M79.7 FIBROMYALGIA, PRIMARY: ICD-10-CM

## 2020-12-11 DIAGNOSIS — G89.29 OTHER CHRONIC PAIN: ICD-10-CM

## 2020-12-11 NOTE — TELEPHONE ENCOUNTER
Pt is requesting a refill on HYDROcodone-acetaminophen (NORCO)  MG per tablet    [address] [address] [facility] [phone] [facility] [phone]

## 2020-12-12 RX ORDER — HYDROCODONE BITARTRATE AND ACETAMINOPHEN 10; 325 MG/1; MG/1
1 TABLET ORAL EVERY 8 HOURS PRN
Qty: 100 TABLET | Refills: 0 | Status: SHIPPED | OUTPATIENT
Start: 2020-12-12 | End: 2021-03-12 | Stop reason: SDUPTHER

## 2020-12-14 RX ORDER — GABAPENTIN 300 MG/1
CAPSULE ORAL
Qty: 90 CAPSULE | Refills: 1 | Status: SHIPPED | OUTPATIENT
Start: 2020-12-14 | End: 2021-07-19

## 2020-12-28 RX ORDER — PROMETHAZINE HYDROCHLORIDE 25 MG/1
TABLET ORAL
Qty: 20 TABLET | Refills: 0 | Status: SHIPPED | OUTPATIENT
Start: 2020-12-28 | End: 2021-03-08 | Stop reason: SDUPTHER

## 2021-01-10 DIAGNOSIS — M62.838 MUSCLE SPASM: ICD-10-CM

## 2021-01-10 DIAGNOSIS — G62.9 NEUROPATHY: ICD-10-CM

## 2021-01-10 DIAGNOSIS — F41.9 ANXIETY: ICD-10-CM

## 2021-01-11 RX ORDER — DIAZEPAM 5 MG/1
TABLET ORAL
Qty: 60 TABLET | Refills: 0 | Status: SHIPPED | OUTPATIENT
Start: 2021-01-11 | End: 2021-02-15

## 2021-02-01 DIAGNOSIS — G47.00 INSOMNIA, UNSPECIFIED TYPE: ICD-10-CM

## 2021-02-01 RX ORDER — ZOLPIDEM TARTRATE 5 MG/1
TABLET ORAL
Qty: 30 TABLET | Refills: 2 | OUTPATIENT
Start: 2021-02-01

## 2021-02-15 DIAGNOSIS — G62.9 NEUROPATHY: ICD-10-CM

## 2021-02-15 DIAGNOSIS — F41.9 ANXIETY: ICD-10-CM

## 2021-02-15 DIAGNOSIS — M62.838 MUSCLE SPASM: ICD-10-CM

## 2021-02-15 RX ORDER — DIAZEPAM 5 MG/1
TABLET ORAL
Qty: 60 TABLET | Refills: 2 | Status: SHIPPED | OUTPATIENT
Start: 2021-02-15 | End: 2021-05-24

## 2021-02-16 RX ORDER — PROMETHAZINE HYDROCHLORIDE 25 MG/1
TABLET ORAL
Qty: 20 TABLET | Refills: 0 | OUTPATIENT
Start: 2021-02-16

## 2021-03-08 ENCOUNTER — OFFICE VISIT (OUTPATIENT)
Dept: GASTROENTEROLOGY | Facility: CLINIC | Age: 66
End: 2021-03-08

## 2021-03-08 VITALS — HEIGHT: 71 IN | WEIGHT: 195 LBS | TEMPERATURE: 98.6 F | BODY MASS INDEX: 27.3 KG/M2

## 2021-03-08 DIAGNOSIS — K21.9 GASTROESOPHAGEAL REFLUX DISEASE WITHOUT ESOPHAGITIS: Primary | ICD-10-CM

## 2021-03-08 DIAGNOSIS — R11.0 NAUSEA: ICD-10-CM

## 2021-03-08 DIAGNOSIS — D12.6 ADENOMATOUS POLYP OF COLON, UNSPECIFIED PART OF COLON: ICD-10-CM

## 2021-03-08 PROCEDURE — 99443 PR PHYS/QHP TELEPHONE EVALUATION 21-30 MIN: CPT | Performed by: NURSE PRACTITIONER

## 2021-03-08 RX ORDER — ESOMEPRAZOLE MAGNESIUM 40 MG/1
40 CAPSULE, DELAYED RELEASE ORAL 2 TIMES DAILY
Qty: 180 CAPSULE | Refills: 3 | Status: SHIPPED | OUTPATIENT
Start: 2021-03-08 | End: 2023-02-23

## 2021-03-08 RX ORDER — PROMETHAZINE HYDROCHLORIDE 25 MG/1
25 TABLET ORAL EVERY 6 HOURS PRN
Qty: 20 TABLET | Refills: 3 | Status: SHIPPED | OUTPATIENT
Start: 2021-03-08 | End: 2021-07-07

## 2021-03-08 NOTE — PROGRESS NOTES
Chief Complaint   Patient presents with   • Follow-up   • Med Refill       Maggie Malhotra is a  65 y.o. female here for a telephone follow up visit for GERD.    HPI  65-year-old female presents today for telephone follow-up visit for GERD.  She is a patient of Dr. Justice.  She was last seen in the office on 11/2019.  She is new to me today. You have chosen to receive care through a telephone visit. Do you consent to use a telephone visit for your medical care today? YES.     She had an EGD and colonoscopy on 10/2019.  She does have a history of adenomatous colon polyps.  She has a history of GERD and admits as long she takes Nexium 40 mg twice daily she does well.  Occasionally she will have some nausea and likes to take Phenergan 25 mg as needed.  She denies any dysphagia, abdominal pain, nausea and vomiting, diarrhea, constipation, rectal bleeding or melena.  She admits appetite is good and her weight is stable.  Occasionally she will have abdominal cramping and bentyl works well for that.  She admits she has not had to use the bentyl lately.  Past Medical History:   Diagnosis Date   • Allergic rhinitis    • Anxiety    • Backache    • Blood pressure elevated without history of HTN    • Broken foot 06/01/2002    LT   • Chronic pain    • Depression    • Dizziness    • Fatigue    • Fibromyalgia, primary    • Guillain Barré syndrome (CMS/HCC)    • H/O mammogram 01/01/2012    Dr. Rendon   • H/O: pneumonia    • History of broken leg 01/01/1998    LT   • Hypotension    • IBS (irritable bowel syndrome)    • Insomnia    • Memory disorder    • Migraine    • Mood disorder (CMS/HCC)    • Multiple joint pain    • Night sweat    • Syncope    • Vitamin D deficiency        Past Surgical History:   Procedure Laterality Date   • BREAST AUGMENTATION BILATERAL MASTOPEXY Bilateral 01/01/1998   • COLONOSCOPY N/A 05/18/2010    Dr. Gurmeet Justice   • COLONOSCOPY  10/09/2019    Non-thrombosed external hemorrhoids found on perianal  "exam, diverticulosis in the sigmoid colon and in the descending colon, One 4 mm polyp in the mid sigmoid colon, tortuous colon, IH. Path: Tubular adenoma.      • COLONOSCOPY W/ BIOPSIES  06/27/2014    Non-thrombosed external hemorrhoids, Diverticulosis in the sigmoid colon, two 4 to 5 mm polyps in the sigmoid colon and the descending colon (pathology: Hyperplastic polyp), Tortuous colon, Internal hemorrhoids   • ENDOSCOPY N/A 06/27/2014    Z-line irregular, 38 cm from the incisors, Gastritis, Gastric polyps, Duodenal erosions without bleeding    • ENDOSCOPY N/A 05/18/2010    Dr. Gurmeet Justice   • ENDOSCOPY  10/09/2019    Z-line irregular, gastritis, bilious gastric fluid. Path: Reflux pattern esophagitis, chronic inflammation.    • HYSTERECTOMY N/A 02/01/2006   • LIPOSUCTION N/A 04/01/2001   • PAP SMEAR N/A 07/10/2013   • TOE SURGERY Right 01/01/1995   • TONSILLECTOMY N/A 01/01/1964   • TRACHEAL SURGERY N/A 01/01/1986    [facility]   • TRACHEOSTOMY         Scheduled Meds:    Continuous Infusions:No current facility-administered medications for this visit.      PRN Meds:.    Allergies   Allergen Reactions   • Celexa [Citalopram Hydrobromide] Other (See Comments)     Headache   • Cymbalta [Duloxetine Hcl] Nausea Only and Other (See Comments)     Fatigue/Pain   • Influenza Vaccines Unknown - Low Severity   • Midazolam Itching     Other reaction(s): Other (See Comments)  \"makes me very hyper\"   • Pantoprazole Other (See Comments)     Chills and lethargy       Social History     Socioeconomic History   • Marital status:      Spouse name: Not on file   • Number of children: Not on file   • Years of education: Not on file   • Highest education level: Not on file   Tobacco Use   • Smoking status: Never Smoker   • Smokeless tobacco: Never Used   • Tobacco comment: occasional caffiene   Substance and Sexual Activity   • Alcohol use: Yes     Alcohol/week: 3.0 standard drinks     Types: 3 Cans of beer per week     " Comment: Moderate   • Drug use: Never   • Sexual activity: Not Currently     Partners: Male       Family History   Problem Relation Age of Onset   • Heart failure Father    • Other Father         Lung failure       Review of Systems   Constitutional: Negative for appetite change, chills, diaphoresis, fatigue, fever and unexpected weight change.   HENT: Negative for nosebleeds, postnasal drip, sore throat, trouble swallowing and voice change.    Respiratory: Negative for cough, choking, chest tightness, shortness of breath and wheezing.    Cardiovascular: Negative for chest pain, palpitations and leg swelling.   Gastrointestinal: Negative for abdominal distention, abdominal pain, anal bleeding, blood in stool, constipation, diarrhea, nausea, rectal pain and vomiting.   Endocrine: Negative for polydipsia, polyphagia and polyuria.   Musculoskeletal: Negative for gait problem.   Skin: Negative for rash and wound.   Allergic/Immunologic: Negative for food allergies.   Neurological: Negative for dizziness, speech difficulty and light-headedness.   Psychiatric/Behavioral: Negative for confusion, self-injury, sleep disturbance and suicidal ideas.       Vitals:    03/08/21 1350   Temp: 98.6 °F (37 °C)       Physical Exam  Constitutional:       General: She is not in acute distress.  Neurological:      Mental Status: She is oriented to person, place, and time.   Psychiatric:         Mood and Affect: Mood normal.         Thought Content: Thought content normal.         Judgment: Judgment normal.         No radiology results for the last 7 days     Assessment and plan     1. Gastroesophageal reflux disease without esophagitis    2. Nausea    3. Adenomatous polyp of colon, unspecified part of colon    Today's visit was done over the telephone.  Total time on the phone with the patient was 25 minutes.  Overall she seems to doing quite well today.  GERD seems well controlled as long she takes Nexium 40 mg twice daily.  Continue  GERD precautions.  I will go ahead and refill the Phenergan as needed for nausea.  Bowels are moving well.  Continue bentyl as needed.  Patient to call the office with any issues.  Patient to follow-up with me in 1 year.  Patient is agreeable to the plan.

## 2021-03-12 DIAGNOSIS — G89.29 OTHER CHRONIC PAIN: ICD-10-CM

## 2021-03-12 RX ORDER — HYDROCODONE BITARTRATE AND ACETAMINOPHEN 10; 325 MG/1; MG/1
1 TABLET ORAL EVERY 8 HOURS PRN
Qty: 100 TABLET | Refills: 0 | Status: SHIPPED | OUTPATIENT
Start: 2021-03-12 | End: 2021-05-21 | Stop reason: SDUPTHER

## 2021-03-12 NOTE — TELEPHONE ENCOUNTER
Caller: Maggie Malhotra    Relationship: Self    Best call back number: [phone]    Medication needed:   Requested Prescriptions     Pending Prescriptions Disp Refills   • HYDROcodone-acetaminophen (NORCO)  MG per tablet 100 tablet 0     Sig: Take 1 tablet by mouth Every 8 (Eight) Hours As Needed for Severe Pain .       When do you need the refill by: ASAP    What details did the patient provide when requesting the medication: NA    Does the patient have less than a 3 day supply:  [] Yes  [x] No    What is the patient's preferred pharmacy: [facility] [address] [place] [id number] [facility] [phone] [facility] [phone]

## 2021-03-16 DIAGNOSIS — F41.9 ANXIETY: ICD-10-CM

## 2021-03-16 DIAGNOSIS — F33.9 MONOPOLAR DEPRESSION (HCC): ICD-10-CM

## 2021-03-16 RX ORDER — ESCITALOPRAM OXALATE 10 MG/1
TABLET ORAL
Qty: 30 TABLET | Refills: 0 | Status: SHIPPED | OUTPATIENT
Start: 2021-03-16 | End: 2021-04-13

## 2021-03-19 ENCOUNTER — BULK ORDERING (OUTPATIENT)
Dept: CASE MANAGEMENT | Facility: OTHER | Age: 66
End: 2021-03-19

## 2021-03-19 DIAGNOSIS — Z23 IMMUNIZATION DUE: ICD-10-CM

## 2021-04-08 DIAGNOSIS — K58.0 IRRITABLE BOWEL SYNDROME WITH DIARRHEA: ICD-10-CM

## 2021-04-08 RX ORDER — DICYCLOMINE HCL 20 MG
TABLET ORAL
Qty: 90 TABLET | Refills: 0 | Status: SHIPPED | OUTPATIENT
Start: 2021-04-08 | End: 2021-07-06

## 2021-04-13 ENCOUNTER — OFFICE VISIT (OUTPATIENT)
Dept: INTERNAL MEDICINE | Facility: CLINIC | Age: 66
End: 2021-04-13

## 2021-04-13 VITALS
HEART RATE: 94 BPM | SYSTOLIC BLOOD PRESSURE: 124 MMHG | OXYGEN SATURATION: 98 % | TEMPERATURE: 98.4 F | WEIGHT: 199 LBS | DIASTOLIC BLOOD PRESSURE: 82 MMHG | BODY MASS INDEX: 27.86 KG/M2 | HEIGHT: 71 IN

## 2021-04-13 DIAGNOSIS — G89.29 OTHER CHRONIC PAIN: Primary | ICD-10-CM

## 2021-04-13 DIAGNOSIS — R82.90 ABNORMAL URINE FINDINGS: ICD-10-CM

## 2021-04-13 DIAGNOSIS — R53.82 CHRONIC FATIGUE: ICD-10-CM

## 2021-04-13 DIAGNOSIS — R11.14 BILIOUS VOMITING WITH NAUSEA: ICD-10-CM

## 2021-04-13 DIAGNOSIS — K21.9 GASTROESOPHAGEAL REFLUX DISEASE WITHOUT ESOPHAGITIS: ICD-10-CM

## 2021-04-13 DIAGNOSIS — I10 ESSENTIAL HYPERTENSION: ICD-10-CM

## 2021-04-13 DIAGNOSIS — R19.7 DIARRHEA, UNSPECIFIED TYPE: ICD-10-CM

## 2021-04-13 DIAGNOSIS — K58.0 IRRITABLE BOWEL SYNDROME WITH DIARRHEA: ICD-10-CM

## 2021-04-13 DIAGNOSIS — M79.7 FIBROMYALGIA, PRIMARY: ICD-10-CM

## 2021-04-13 DIAGNOSIS — F41.9 ANXIETY: ICD-10-CM

## 2021-04-13 DIAGNOSIS — R10.84 GENERALIZED ABDOMINAL PAIN: ICD-10-CM

## 2021-04-13 DIAGNOSIS — R79.89 ELEVATED LIVER FUNCTION TESTS: ICD-10-CM

## 2021-04-13 DIAGNOSIS — R61 DIAPHORESIS: ICD-10-CM

## 2021-04-13 DIAGNOSIS — K80.20 GALLSTONES: ICD-10-CM

## 2021-04-13 LAB
BACTERIA UR QL AUTO: ABNORMAL /HPF
BASOPHILS # BLD AUTO: 0.02 10*3/MM3 (ref 0–0.2)
BASOPHILS NFR BLD AUTO: 0.5 % (ref 0–1.5)
BILIRUB UR QL STRIP: NEGATIVE
CLARITY UR: ABNORMAL
COLOR UR: YELLOW
DEPRECATED RDW RBC AUTO: 43.7 FL (ref 37–54)
EOSINOPHIL # BLD AUTO: 0.26 10*3/MM3 (ref 0–0.4)
EOSINOPHIL NFR BLD AUTO: 6.6 % (ref 0.3–6.2)
ERYTHROCYTE [DISTWIDTH] IN BLOOD BY AUTOMATED COUNT: 12.3 % (ref 12.3–15.4)
ERYTHROCYTE [SEDIMENTATION RATE] IN BLOOD: 1 MM/HR (ref 0–20)
GLUCOSE UR STRIP-MCNC: NEGATIVE MG/DL
HCT VFR BLD AUTO: 40.4 % (ref 34–46.6)
HGB BLD-MCNC: 14.5 G/DL (ref 12–15.9)
HGB UR QL STRIP.AUTO: ABNORMAL
HYALINE CASTS UR QL AUTO: ABNORMAL /LPF
KETONES UR QL STRIP: ABNORMAL
LEUKOCYTE ESTERASE UR QL STRIP.AUTO: NEGATIVE
LYMPHOCYTES # BLD AUTO: 1.52 10*3/MM3 (ref 0.7–3.1)
LYMPHOCYTES NFR BLD AUTO: 38.5 % (ref 19.6–45.3)
MCH RBC QN AUTO: 35.5 PG (ref 26.6–33)
MCHC RBC AUTO-ENTMCNC: 35.9 G/DL (ref 31.5–35.7)
MCV RBC AUTO: 98.8 FL (ref 79–97)
MONOCYTES # BLD AUTO: 0.48 10*3/MM3 (ref 0.1–0.9)
MONOCYTES NFR BLD AUTO: 12.2 % (ref 5–12)
MUCOUS THREADS URNS QL MICRO: ABNORMAL /HPF
NEUTROPHILS NFR BLD AUTO: 1.67 10*3/MM3 (ref 1.7–7)
NEUTROPHILS NFR BLD AUTO: 42.2 % (ref 42.7–76)
NITRITE UR QL STRIP: NEGATIVE
PH UR STRIP.AUTO: 6.5 [PH] (ref 5–8)
PLATELET # BLD AUTO: 207 10*3/MM3 (ref 140–450)
PMV BLD AUTO: 8.8 FL (ref 6–12)
PROT UR QL STRIP: NEGATIVE
RBC # BLD AUTO: 4.09 10*6/MM3 (ref 3.77–5.28)
RBC # UR: ABNORMAL /HPF
REF LAB TEST METHOD: ABNORMAL
SP GR UR STRIP: 1.01 (ref 1–1.03)
SQUAMOUS #/AREA URNS HPF: ABNORMAL /HPF
UROBILINOGEN UR QL STRIP: ABNORMAL
WBC # BLD AUTO: 3.95 10*3/MM3 (ref 3.4–10.8)
WBC UR QL AUTO: ABNORMAL /HPF

## 2021-04-13 PROCEDURE — 85651 RBC SED RATE NONAUTOMATED: CPT | Performed by: INTERNAL MEDICINE

## 2021-04-13 PROCEDURE — 99214 OFFICE O/P EST MOD 30 MIN: CPT | Performed by: INTERNAL MEDICINE

## 2021-04-13 PROCEDURE — 36415 COLL VENOUS BLD VENIPUNCTURE: CPT | Performed by: INTERNAL MEDICINE

## 2021-04-13 PROCEDURE — 85025 COMPLETE CBC W/AUTO DIFF WBC: CPT | Performed by: INTERNAL MEDICINE

## 2021-04-13 PROCEDURE — 81001 URINALYSIS AUTO W/SCOPE: CPT | Performed by: INTERNAL MEDICINE

## 2021-04-13 PROCEDURE — 93000 ELECTROCARDIOGRAM COMPLETE: CPT | Performed by: INTERNAL MEDICINE

## 2021-04-13 NOTE — PROGRESS NOTES
Chief Complaint  Hypertension, Fatigue, Fibromyalgia, and Pain    Subjective          Maggie Malhotra presents to [facility] for   Hypertension  This is a chronic problem. The current episode started more than 1 year ago. The problem is unchanged. The problem is controlled. Associated symptoms include anxiety, headaches (no change), malaise/fatigue, shortness of breath (with exertion) and sweats. Pertinent negatives include no blurred vision, chest pain, neck pain, orthopnea, palpitations, peripheral edema or PND.   Fatigue  This is a chronic problem. The current episode started more than 1 year ago. The problem occurs constantly. The problem has been rapidly worsening. Associated symptoms include abdominal pain (lower abd pain after meals), anorexia, arthralgias, a change in bowel habit (diarrhea worse for sev mos), chills (occ), congestion, diaphoresis, fatigue, headaches (no change), myalgias, nausea, numbness (legs & hands feel numb), vomiting and weakness (feel weak all over). Pertinent negatives include no chest pain, coughing, fever, joint swelling, neck pain, rash, sore throat, swollen glands, urinary symptoms, vertigo or visual change.   Fibromyalgia  This is a chronic problem. The current episode started more than 1 year ago. The problem occurs constantly. The problem has been gradually worsening. Associated symptoms include abdominal pain (lower abd pain after meals), anorexia, arthralgias, a change in bowel habit (diarrhea worse for sev mos), chills (occ), congestion, diaphoresis, fatigue, headaches (no change), myalgias, nausea, numbness (legs & hands feel numb), vomiting and weakness (feel weak all over). Pertinent negatives include no chest pain, coughing, fever, joint swelling, neck pain, rash, sore throat, swollen glands, urinary symptoms, vertigo or visual change.       Review of Systems   Constitutional: Positive for chills (occ), diaphoresis, fatigue and  "malaise/fatigue. Negative for fever.   HENT: Positive for congestion. Negative for sore throat.    Eyes: Negative for blurred vision.   Respiratory: Positive for shortness of breath (with exertion). Negative for cough.    Cardiovascular: Negative for chest pain, palpitations, orthopnea and PND.   Gastrointestinal: Positive for abdominal pain (lower abd pain after meals), anorexia, change in bowel habit (diarrhea worse for sev mos), nausea and vomiting.   Musculoskeletal: Positive for arthralgias and myalgias. Negative for joint swelling and neck pain.   Skin: Negative for rash.   Neurological: Positive for weakness (feel weak all over), numbness (legs & hands feel numb) and headaches (no change). Negative for vertigo.        Objective   Vital Signs:   /82 (BP Location: Left arm, Patient Position: Sitting, Cuff Size: Adult)   Pulse 94   Temp 98.4 °F (36.9 °C)   Ht 180.3 cm (71\")   Wt 90.3 kg (199 lb)   SpO2 98%   BMI 27.75 kg/m²     Physical Exam  Vitals and nursing note reviewed.   Constitutional:       General: She is not in acute distress.     Appearance: She is well-developed. She is ill-appearing and diaphoretic.   HENT:      Head: Normocephalic.      Right Ear: External ear normal. Tympanic membrane is not erythematous or bulging.      Left Ear: External ear normal. Tympanic membrane is not erythematous or bulging.      Nose:      Right Sinus: No frontal sinus tenderness.      Left Sinus: No frontal sinus tenderness.   Cardiovascular:      Rate and Rhythm: Normal rate and regular rhythm.      Heart sounds: Normal heart sounds.   Pulmonary:      Effort: Pulmonary effort is normal. No respiratory distress.      Breath sounds: Normal breath sounds. No wheezing or rales.   Chest:      Chest wall: No tenderness.   Abdominal:      General: Bowel sounds are normal. There is no distension.      Palpations: Abdomen is soft. There is no mass.      Tenderness: There is no abdominal tenderness. There is no " right CVA tenderness, left CVA tenderness, guarding or rebound.      Hernia: No hernia is present.   Musculoskeletal:      Cervical back: Normal range of motion and neck supple.   Lymphadenopathy:      Cervical: No cervical adenopathy.   Skin:     Coloration: Skin is not jaundiced.      Findings: No bruising, erythema, lesion or rash.   Neurological:      General: No focal deficit present.      Cranial Nerves: No cranial nerve deficit.   Psychiatric:         Behavior: Behavior normal.        Result Review :                 Assessment and Plan    Problem List Items Addressed This Visit        Unprioritized    Chronic pain - Primary    Current Assessment & Plan     Narcotics are dangerous - need eval by specialty clinic.         Relevant Orders    Sedimentation Rate (Completed)    Irritable bowel syndrome with diarrhea    Fibromyalgia, primary    Current Assessment & Plan     Severe & chronic pain & anxiety - need to go to [facility] or [facility] for full evaluation & treatment. Need to stop chronic narcotics!         Essential hypertension    Current Assessment & Plan     Hypertension is unchanged.  Continue current treatment regimen.  Dietary sodium restriction.  Weight loss.  Regular aerobic exercise.  Continue current medications.  Blood pressure will be reassessed in 3 months.         Relevant Orders    Comprehensive Metabolic Panel    Lipid Panel    Anxiety    Current Assessment & Plan     She felt no better with lexapro so she stopped it 3 wks ago         Chronic fatigue    Relevant Orders    CBC & Differential (Completed)    TSH Rfx On Abnormal To Free T4    Elevated liver function tests    Relevant Orders    Comprehensive Metabolic Panel    Diarrhea    Overview     Added automatically from request for surgery [id number]         Current Assessment & Plan     Need full evaluation at [place] or [facility]!         Gastroesophageal reflux disease    Overview     Added automatically from request for  surgery [id number]         Bilious vomiting with nausea    Relevant Orders    Amylase    Lipase    NM Hepatobiliary Without CCK    Generalized abdominal pain    Relevant Orders    CBC & Differential (Completed)    Comprehensive Metabolic Panel    Urinalysis With Microscopic If Indicated (No Culture) - Urine, Clean Catch (Completed)    Amylase    Lipase    NM Hepatobiliary Without CCK    Urinalysis, Microscopic Only - Urine, Clean Catch (Completed)    Urine Culture - Urine, Urine, Clean Catch    Gallstones    Current Assessment & Plan     Need HIDA scan again (normal 4 yrs ago).         Relevant Orders    NM Hepatobiliary Without CCK      Other Visit Diagnoses     Abnormal urine findings        Relevant Orders    Urine Culture - Urine, Urine, Clean Catch          Follow Up   Return in about 3 months (around 7/13/2021) for Recheck.  Patient was given instructions and counseling regarding her condition or for health maintenance advice. Please see specific information pulled into the AVS if appropriate.        ECG 12 Lead    Date/Time: 4/13/2021 5:39 PM  Performed by: Kimberly Lozano MD  Authorized by: Kimberly Lozano MD   Comparison: compared with previous ECG from 9/19/2019  Similar to previous ECG  Rhythm: sinus rhythm  Rate: normal  Conduction: conduction normal  ST Segments: ST segments normal  T Waves: T waves normal  QRS axis: normal  Other: no other findings    Clinical impression: normal ECG

## 2021-04-13 NOTE — ASSESSMENT & PLAN NOTE
Severe & chronic pain & anxiety - need to go to [facility] or [facility] for full evaluation & treatment. Need to stop chronic narcotics!

## 2021-04-13 NOTE — ASSESSMENT & PLAN NOTE
Labs today - need HIDA again (may need abd CT again) - need [place] or [facility] if tests here non-diagnostic

## 2021-04-14 LAB
ALBUMIN SERPL-MCNC: 4.3 G/DL (ref 3.5–5.2)
ALBUMIN/GLOB SERPL: 2 G/DL
ALP SERPL-CCNC: 102 U/L (ref 39–117)
ALT SERPL-CCNC: 128 U/L (ref 1–33)
AMYLASE SERPL-CCNC: 19 U/L (ref 28–100)
AST SERPL-CCNC: 283 U/L (ref 1–32)
BILIRUB SERPL-MCNC: 1.1 MG/DL (ref 0–1.2)
BUN SERPL-MCNC: 7 MG/DL (ref 8–23)
BUN/CREAT SERPL: 8.6 (ref 7–25)
CALCIUM SERPL-MCNC: 9.2 MG/DL (ref 8.6–10.5)
CHLORIDE SERPL-SCNC: 97 MMOL/L (ref 98–107)
CHOLEST SERPL-MCNC: 159 MG/DL (ref 0–200)
CO2 SERPL-SCNC: 25.8 MMOL/L (ref 22–29)
CREAT SERPL-MCNC: 0.81 MG/DL (ref 0.57–1)
GLOBULIN SER CALC-MCNC: 2.1 GM/DL
GLUCOSE SERPL-MCNC: 109 MG/DL (ref 65–99)
HDLC SERPL-MCNC: 83 MG/DL (ref 40–60)
LDLC SERPL CALC-MCNC: 52 MG/DL (ref 0–100)
LIPASE SERPL-CCNC: 43 U/L (ref 13–60)
POTASSIUM SERPL-SCNC: 4.4 MMOL/L (ref 3.5–5.2)
PROT SERPL-MCNC: 6.4 G/DL (ref 6–8.5)
SODIUM SERPL-SCNC: 138 MMOL/L (ref 136–145)
T4 FREE SERPL-MCNC: 1.26 NG/DL (ref 0.93–1.7)
TRIGL SERPL-MCNC: 146 MG/DL (ref 0–150)
TSH SERPL DL<=0.005 MIU/L-ACNC: 4.6 UIU/ML (ref 0.27–4.2)
VLDLC SERPL CALC-MCNC: 24 MG/DL (ref 5–40)

## 2021-04-15 LAB
BACTERIA UR CULT: NORMAL
BACTERIA UR CULT: NORMAL

## 2021-04-16 ENCOUNTER — TELEPHONE (OUTPATIENT)
Dept: INTERNAL MEDICINE | Facility: CLINIC | Age: 66
End: 2021-04-16

## 2021-04-16 NOTE — TELEPHONE ENCOUNTER
Caller: Maggie Malhotra    Relationship: Self    Best call back number: [phone]    What is the best time to reach you: ANYTIME    Who are you requesting to speak with (clinical staff, provider,  specific staff member): CLINICAL STAFF    Do you know the name of the person who called: MAGGIE MALHOTRA     What was the call regarding: PATIENT WOULD LIKE TO DISCUSS TREATMENT OPTIONS WITH DR. DEGROOT CONCERNING RECENT TEST RESULTS    Do you require a callback: YES

## 2021-04-16 NOTE — TELEPHONE ENCOUNTER
Pt was calling about her plan of treatment-I advised the pt to get her scan and Dr Lozano or someone in the office would contact her about the next step once we have the results

## 2021-04-16 NOTE — PROGRESS NOTES
High liver tests - will need CT of abdomen if HIDA is normal, reggie if still sick. Go to ER if worse.  Thyroid is borderline low - recheck 2 mos. Pancreatic test is normal.

## 2021-04-22 ENCOUNTER — APPOINTMENT (OUTPATIENT)
Dept: NUCLEAR MEDICINE | Facility: HOSPITAL | Age: 66
End: 2021-04-22

## 2021-05-03 ENCOUNTER — HOSPITAL ENCOUNTER (OUTPATIENT)
Dept: NUCLEAR MEDICINE | Facility: HOSPITAL | Age: 66
Discharge: HOME | End: 2021-05-03

## 2021-05-03 DIAGNOSIS — R11.14 BILIOUS VOMITING WITH NAUSEA: ICD-10-CM

## 2021-05-03 DIAGNOSIS — R10.84 GENERALIZED ABDOMINAL PAIN: ICD-10-CM

## 2021-05-03 DIAGNOSIS — K80.20 GALLSTONES: ICD-10-CM

## 2021-05-03 PROCEDURE — 0 TECHNETIUM TC 99M MEBROFENIN KIT: Performed by: INTERNAL MEDICINE

## 2021-05-03 PROCEDURE — A9537 TC99M MEBROFENIN: HCPCS | Performed by: INTERNAL MEDICINE

## 2021-05-03 PROCEDURE — 78226 HEPATOBILIARY SYSTEM IMAGING: CPT

## 2021-05-03 RX ORDER — KIT FOR THE PREPARATION OF TECHNETIUM TC 99M MEBROFENIN 45 MG/10ML
1 INJECTION, POWDER, LYOPHILIZED, FOR SOLUTION INTRAVENOUS
Status: COMPLETED | OUTPATIENT
Start: 2021-05-03 | End: 2021-05-03

## 2021-05-03 RX ADMIN — MEBROFENIN 1 DOSE: 45 INJECTION, POWDER, LYOPHILIZED, FOR SOLUTION INTRAVENOUS at 07:15

## 2021-05-04 DIAGNOSIS — R79.89 ELEVATED LIVER FUNCTION TESTS: Primary | ICD-10-CM

## 2021-05-04 DIAGNOSIS — R10.84 GENERALIZED ABDOMINAL PAIN: ICD-10-CM

## 2021-05-04 DIAGNOSIS — R10.2 PELVIC PAIN: ICD-10-CM

## 2021-05-19 ENCOUNTER — HOSPITAL ENCOUNTER (OUTPATIENT)
Dept: CT IMAGING | Facility: HOSPITAL | Age: 66
Discharge: HOME | End: 2021-05-19
Admitting: INTERNAL MEDICINE

## 2021-05-19 DIAGNOSIS — R10.2 PELVIC PAIN: ICD-10-CM

## 2021-05-19 DIAGNOSIS — R10.84 GENERALIZED ABDOMINAL PAIN: ICD-10-CM

## 2021-05-19 DIAGNOSIS — R79.89 ELEVATED LIVER FUNCTION TESTS: ICD-10-CM

## 2021-05-19 PROCEDURE — 74176 CT ABD & PELVIS W/O CONTRAST: CPT

## 2021-05-20 NOTE — PROGRESS NOTES
Pls call - likely fatty liver, but there is a possibility of small liver lesion - we can get better view with MRI or CT with contrast - I will schedule now if agreeable.

## 2021-05-21 DIAGNOSIS — G89.29 OTHER CHRONIC PAIN: ICD-10-CM

## 2021-05-21 DIAGNOSIS — G62.9 NEUROPATHY: ICD-10-CM

## 2021-05-21 DIAGNOSIS — M62.838 MUSCLE SPASM: ICD-10-CM

## 2021-05-21 DIAGNOSIS — F41.9 ANXIETY: ICD-10-CM

## 2021-05-21 RX ORDER — HYDROCODONE BITARTRATE AND ACETAMINOPHEN 10; 325 MG/1; MG/1
1 TABLET ORAL EVERY 8 HOURS PRN
Qty: 100 TABLET | Refills: 0 | Status: SHIPPED | OUTPATIENT
Start: 2021-05-21 | End: 2022-07-11

## 2021-05-21 NOTE — TELEPHONE ENCOUNTER
Caller: Maggie Malhotra    Relationship: Self    Best call back number: [phone]    Medication needed:   Requested Prescriptions     Pending Prescriptions Disp Refills   • HYDROcodone-acetaminophen (NORCO)  MG per tablet 100 tablet 0     Sig: Take 1 tablet by mouth Every 8 (Eight) Hours As Needed for Severe Pain .       When do you need the refill by: 5/21/21    What additional details did the patient provide when requesting the medication: PATIENT HAS 4 PILLS LEFT    Does the patient have less than a 3 day supply:  [x] Yes  [] No    What is the patient's preferred pharmacy: [facility] [address] [place] [id number] [facility] [phone] [facility] [phone]

## 2021-05-24 RX ORDER — DIAZEPAM 5 MG/1
TABLET ORAL
Qty: 60 TABLET | Refills: 0 | Status: SHIPPED | OUTPATIENT
Start: 2021-05-24 | End: 2021-07-06

## 2021-05-27 ENCOUNTER — TELEPHONE (OUTPATIENT)
Dept: INTERNAL MEDICINE | Facility: CLINIC | Age: 66
End: 2021-05-27

## 2021-05-27 DIAGNOSIS — G89.29 OTHER CHRONIC PAIN: Primary | ICD-10-CM

## 2021-05-27 NOTE — TELEPHONE ENCOUNTER
Please advise  Patient would like to be referred to pain management for chronic pain she experiences with multiple diagnosis

## 2021-05-27 NOTE — TELEPHONE ENCOUNTER
Caller: Maggie Malhotra    Relationship: Self    Best call back number: [phone]    What is the medical concern/diagnosis: PAIN ALL OVER BODY     What specialty or service is being requested: PAIN MANAGEMENT     What is the provider, practice or medical service name: [facility]     What is the office location: [place]     What is the office phone number: [phone]

## 2021-05-28 NOTE — TELEPHONE ENCOUNTER
Kimberly Lozano MD [place], Shereha, [place]  Caller: Unspecified (Yesterday,  1:37 PM)  Pls call - is there a pain management there (not just for epidural injections?       5/28/2021-She can be referred to [facility]. They specialize in an array of pain that they can help patient manage outside of injections.

## 2021-06-15 ENCOUNTER — OFFICE VISIT (OUTPATIENT)
Dept: CARDIOLOGY | Facility: CLINIC | Age: 66
End: 2021-06-15

## 2021-06-15 VITALS
HEART RATE: 109 BPM | DIASTOLIC BLOOD PRESSURE: 120 MMHG | HEIGHT: 71 IN | WEIGHT: 198 LBS | TEMPERATURE: 97.4 F | SYSTOLIC BLOOD PRESSURE: 160 MMHG | OXYGEN SATURATION: 98 % | BODY MASS INDEX: 27.72 KG/M2

## 2021-06-15 DIAGNOSIS — I10 ESSENTIAL HYPERTENSION: Primary | ICD-10-CM

## 2021-06-15 DIAGNOSIS — G61.0 GUILLAIN-BARRE SYNDROME (HCC): ICD-10-CM

## 2021-06-15 DIAGNOSIS — R00.0 SINUS TACHYCARDIA: ICD-10-CM

## 2021-06-15 PROCEDURE — 99214 OFFICE O/P EST MOD 30 MIN: CPT | Performed by: INTERNAL MEDICINE

## 2021-06-15 PROCEDURE — 93000 ELECTROCARDIOGRAM COMPLETE: CPT | Performed by: INTERNAL MEDICINE

## 2021-06-15 RX ORDER — PROGESTERONE 200 MG/1
200 CAPSULE ORAL DAILY
COMMUNITY
Start: 2021-06-01 | End: 2021-07-19 | Stop reason: SDUPTHER

## 2021-06-15 RX ORDER — CARVEDILOL PHOSPHATE 40 MG/1
40 CAPSULE, EXTENDED RELEASE ORAL DAILY
Qty: 90 CAPSULE | Refills: 3 | Status: SHIPPED | OUTPATIENT
Start: 2021-06-15 | End: 2022-07-11

## 2021-06-15 NOTE — PROGRESS NOTES
CARDIOLOGY    Adriana Maravilla MD    ENCOUNTER DATE:  06/15/2021    Maggie Malhotra / 66 y.o. / female        CHIEF COMPLAINT / REASON FOR OFFICE VISIT     Hypertension (3 month follow up)      HISTORY OF PRESENT ILLNESS       HPI    Maggie Malhotra is a 66 y.o. 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 She has no known heart disease. She had a stress echo in 2006 which showed normal LV systolic function without significant valvular heart disease, and she had no evidence of ischemia. She had an exercise stress test in 2012 which was normal. In 09/2018, she was at work. She had just eaten breakfast. She had sudden onset of feeling unwell. She was in a cold sweat. Co-workers said she was really pale. She was dizzy. She felt a little short of breath but did not have any palpitations. She went to the emergency room where her troponin was negative. Basic metabolic panel showed a blood sugar of 140 but was otherwise pretty unremarkable, and CBC was unremarkable. D-dimer was mildly elevated. She was discharged with a Zio patch which was normal. She had carotid artery ultrasound which was also normal.  In December 2018 she had an echocardiogram which showed normal LV systolic function with ejection fraction of 67% and grade 1 diastolic dysfunction.    She comes in today for follow-up.  She ran out of carvedilol.  Previously though she brought in a list of blood pressure numbers when she was on carvedilol and her blood pressure was controlled.  Is having a little bit issue with some depression anxiety her right hip needs to be replaced.  Just had some balance issues and is supposed to go in for an MRI of her brain.    REVIEW OF SYSTEMS     Review of Systems   Constitutional: Negative for chills, fever, weight gain and weight loss.   Cardiovascular: Negative for leg swelling.   Respiratory: Negative for cough,  "snoring and wheezing.    Hematologic/Lymphatic: Negative for bleeding problem. Does not bruise/bleed easily.   Skin: Negative for color change.   Musculoskeletal: Positive for joint pain. Negative for falls and myalgias.   Gastrointestinal: Negative for melena.   Genitourinary: Negative for hematuria.   Neurological: Negative for excessive daytime sleepiness.   Psychiatric/Behavioral: Positive for depression. The patient is not nervous/anxious.          VITAL SIGNS     Visit Vitals  BP (!) 160/120 (BP Location: Left arm)   Pulse 109   Temp 97.4 °F (36.3 °C)   Ht 180.3 cm (71\")   Wt 89.8 kg (198 lb)   LMP 01/01/2006 (LMP Unknown)   SpO2 98%   BMI 27.62 kg/m²         Wt Readings from Last 3 Encounters:   06/15/21 89.8 kg (198 lb)   04/13/21 90.3 kg (199 lb)   03/08/21 88.5 kg (195 lb)     Body mass index is 27.62 kg/m².      PHYSICAL EXAMINATION     Constitutional:       General: Not in acute distress.  Neck:      Vascular: No carotid bruit or JVD.   Pulmonary:      Effort: Pulmonary effort is normal.      Breath sounds: Normal breath sounds.   Cardiovascular:      Normal rate. Regular rhythm.      Murmurs: There is no murmur.   Psychiatric:         Mood and Affect: Mood and affect normal.           REVIEWED DATA       ECG 12 Lead    Date/Time: 6/15/2021 1:15 PM  Performed by: Adriana Maravilla MD  Authorized by: Adriana Maravilla MD   Comparison: compared with previous ECG from 2/11/2020  Comparison to previous ECG: Heart rate is faster  Rhythm: sinus tachycardia  BPM: 108  Conduction: conduction normal  ST Segments: ST segments normal  T Waves: T waves normal    Clinical impression: normal ECG              Lipid Panel    Lipid Panel 7/10/20 4/13/21   Total Cholesterol 165 159   Triglycerides 70 146   HDL Cholesterol 80 83 (A)   VLDL Cholesterol 14 24   LDL Cholesterol  71 52   (A) Abnormal value       Comments are available for some flowsheets but are not being displayed.             Lab Results   Component Value " Date    GLUCOSE 92 01/31/2019    BUN 7 (L) 04/13/2021    CREATININE 0.81 04/13/2021    EGFRIFNONA 71 04/13/2021    EGFRIFAFRI 86 04/13/2021    BCR 8.6 04/13/2021    K 4.4 04/13/2021    CO2 25.8 04/13/2021    CALCIUM 9.2 04/13/2021    PROTENTOTREF 6.4 04/13/2021    ALBUMIN 4.30 04/13/2021    LABIL2 2.0 04/13/2021     (H) 04/13/2021     (H) 04/13/2021       ASSESSMENT & PLAN      Diagnosis Plan   1. Essential hypertension     2. Guillain-[place] syndrome (CMS/HCC)     3. Sinus tachycardia         1.  Hypertension.  I refilled carvedilol 40 mg once a day.  She is going to call me if her blood pressure becomes elevated.  2.  History of sinus tachycardia.  Normal TSH in April 2021.  3.    Balance problems.  She is supposed to get an MRI  4.  Anxiety/depression.  Recommend that she speak with Dr. Lozano.  5.  Osteoarthritis of the right hip requiring hip replacement.  She is low risk for cardiovascular event with this surgery.    She is to call me if her blood pressure is elevated.  Otherwise we will see her back next year.      Orders Placed This Encounter   Procedures   • ECG 12 Lead     This order was created via procedure documentation     Order Specific Question:   Release to patient     Answer:   Immediate           MEDICATIONS         Discharge Medications          Accurate as of Tatiana 15, 2021  1:16 PM. If you have any questions, ask your nurse or doctor.            New Medications      Instructions Start Date   carvedilol CR 40 MG 24 hr capsule  Commonly known as: Coreg CR  Started by: Adriana Maravilla MD   40 mg, Oral, Daily         Continue These Medications      Instructions Start Date   diazePAM 5 MG tablet  Commonly known as: VALIUM   Take 1 tablet by mouth twice daily as needed for anxiety      dicyclomine 20 MG tablet  Commonly known as: BENTYL   TAKE 1 TABLET BY MOUTH 4 TIMES DAILY BEFORE MEAL(S) AND  AT  BEDTIME  AS  NEEDED  FOR  CRAMPING      esomeprazole 40 MG capsule  Commonly known as:  nexIUM   40 mg, Oral, 2 Times Daily      gabapentin 300 MG capsule  Commonly known as: NEURONTIN   TAKE 1 CAPSULE AT BEDTIME      HYDROcodone-acetaminophen  MG per tablet  Commonly known as: NORCO   1 tablet, Oral, Every 8 Hours PRN      multivitamin tablet tablet  Generic drug: multivitamin   1 tablet, Oral, Daily      progesterone 200 MG capsule  Commonly known as: PROMETRIUM   200 mg, Oral, Daily      Progesterone 200 MG capsule  Commonly known as: PROMETRIUM   200 mg, Oral, Daily      promethazine 25 MG tablet  Commonly known as: PHENERGAN   25 mg, Oral, Every 6 Hours PRN      Unable to find   APPLY TWO clicks topically TWICE DAILY AS DIRECTED         Stop These Medications    carvedilol 12.5 MG tablet  Commonly known as: COREG  Stopped by: Adriana Maravilla MD     POTASSIUM PO  Stopped by: MD Adriana Pickett MD  06/15/21  13:16 EDT    **Dragon Disclaimer:   Much of this encounter note is an electronic transcription/translation of spoken language to printed text. The electronic translation of spoken language may permit erroneous, or at times, nonsensical words or phrases to be inadvertently transcribed. Although I have reviewed the note for such errors, some may still exist.

## 2021-07-06 DIAGNOSIS — F41.9 ANXIETY: ICD-10-CM

## 2021-07-06 DIAGNOSIS — G62.9 NEUROPATHY: ICD-10-CM

## 2021-07-06 DIAGNOSIS — M62.838 MUSCLE SPASM: ICD-10-CM

## 2021-07-06 DIAGNOSIS — K58.0 IRRITABLE BOWEL SYNDROME WITH DIARRHEA: ICD-10-CM

## 2021-07-06 RX ORDER — DIAZEPAM 5 MG/1
TABLET ORAL
Qty: 60 TABLET | Refills: 0 | Status: SHIPPED | OUTPATIENT
Start: 2021-07-06 | End: 2021-08-30

## 2021-07-06 RX ORDER — DICYCLOMINE HCL 20 MG
TABLET ORAL
Qty: 90 TABLET | Refills: 0 | Status: SHIPPED | OUTPATIENT
Start: 2021-07-06 | End: 2022-07-11

## 2021-07-07 RX ORDER — PROMETHAZINE HYDROCHLORIDE 25 MG/1
TABLET ORAL
Qty: 20 TABLET | Refills: 0 | Status: SHIPPED | OUTPATIENT
Start: 2021-07-07 | End: 2021-08-30

## 2021-07-19 ENCOUNTER — OFFICE VISIT (OUTPATIENT)
Dept: INTERNAL MEDICINE | Facility: CLINIC | Age: 66
End: 2021-07-19

## 2021-07-19 VITALS
SYSTOLIC BLOOD PRESSURE: 120 MMHG | BODY MASS INDEX: 28.28 KG/M2 | OXYGEN SATURATION: 98 % | DIASTOLIC BLOOD PRESSURE: 68 MMHG | HEART RATE: 84 BPM | TEMPERATURE: 97.5 F | HEIGHT: 71 IN | WEIGHT: 202 LBS

## 2021-07-19 DIAGNOSIS — R93.2 ABNORMAL LIVER CT: ICD-10-CM

## 2021-07-19 DIAGNOSIS — I10 ESSENTIAL HYPERTENSION: Primary | ICD-10-CM

## 2021-07-19 DIAGNOSIS — G89.29 OTHER CHRONIC PAIN: ICD-10-CM

## 2021-07-19 DIAGNOSIS — M79.7 FIBROMYALGIA, PRIMARY: ICD-10-CM

## 2021-07-19 DIAGNOSIS — R53.82 CHRONIC FATIGUE: ICD-10-CM

## 2021-07-19 DIAGNOSIS — G47.00 INSOMNIA, UNSPECIFIED TYPE: ICD-10-CM

## 2021-07-19 PROCEDURE — 99214 OFFICE O/P EST MOD 30 MIN: CPT | Performed by: INTERNAL MEDICINE

## 2021-07-19 RX ORDER — PREGABALIN 75 MG/1
CAPSULE ORAL
COMMUNITY
Start: 2021-06-18 | End: 2022-07-11

## 2021-07-19 NOTE — ASSESSMENT & PLAN NOTE
She declines for now b/c she is getting multiple testing through pain management, but will contact me as soon as she is agreeable.  She understands that the CAT scan was abnormal and does need to be rechecked.

## 2021-07-19 NOTE — ASSESSMENT & PLAN NOTE
I still recommend counseling for extreme anxiety.  She declines for now but will call if she change her mind.

## 2021-07-19 NOTE — PROGRESS NOTES
"Chief Complaint  Hypertension (3 mo follow up ), Fibromyalgia, Anxiety, and Insomnia    Subjective          Maggie Malhotra presents to [facility] for   Hypertension  This is a chronic problem. The current episode started more than 1 year ago. The problem is unchanged. The problem is controlled. Associated symptoms include anxiety.   Fibromyalgia  This is a chronic problem. The current episode started more than 1 year ago. The problem occurs constantly. The problem has been unchanged.   Anxiety  Presents for follow-up visit. Symptoms include excessive worry, insomnia and nervous/anxious behavior. Symptoms occur most days.       Insomnia  This is a recurrent problem. The current episode started more than 1 year ago. The problem occurs intermittently. The problem has been unchanged.       Review of Systems   Psychiatric/Behavioral: The patient is nervous/anxious and has insomnia.         Objective   Vital Signs:   /68   Pulse 84   Temp 97.5 °F (36.4 °C)   Ht 180.3 cm (70.98\")   Wt 91.6 kg (202 lb)   SpO2 98%   BMI 28.19 kg/m²     Physical Exam  Vitals and nursing note reviewed.   Constitutional:       General: She is not in acute distress.     Appearance: She is well-developed.   Cardiovascular:      Rate and Rhythm: Normal rate and regular rhythm.      Heart sounds: Normal heart sounds.   Pulmonary:      Effort: No respiratory distress.      Breath sounds: No wheezing or rales.   Chest:      Chest wall: No tenderness.   Psychiatric:         Behavior: Behavior normal.        Result Review :                 Assessment and Plan    Problem List Items Addressed This Visit        Unprioritized    Chronic pain    Overview     Continue f/u with rheumatolgy, neurology & pain management.         Current Assessment & Plan     I still recommend counseling for extreme anxiety.  She declines for now but will call if she change her mind.         Insomnia    Current Assessment & Plan     " Ok to take occ valium - not at same time as hydrocodone (discussed potential for over dose)         Fibromyalgia, primary    Overview     For years         Essential hypertension - Primary    Current Assessment & Plan     Hypertension is improving with treatment.  Continue current treatment regimen.  Dietary sodium restriction.  Weight loss.  Regular aerobic exercise.  Blood pressure will be reassessed at the next regular appointment.         Chronic fatigue    Abnormal liver CT    Overview     2021 - likely fatty liver but they rec MRI liver or CT mass protocol for further eval         Current Assessment & Plan     She declines for now b/c she is getting multiple testing through pain management, but will contact me as soon as she is agreeable.  She understands that the CAT scan was abnormal and does need to be rechecked.               Follow Up   Return in about 4 months (around 11/19/2021) for Medicare Wellness.  Patient was given instructions and counseling regarding her condition or for health maintenance advice. Please see specific information pulled into the AVS if appropriate.

## 2021-07-26 ENCOUNTER — TELEPHONE (OUTPATIENT)
Dept: INTERNAL MEDICINE | Facility: CLINIC | Age: 66
End: 2021-07-26

## 2021-07-26 RX ORDER — TRAZODONE HYDROCHLORIDE 50 MG/1
50 TABLET ORAL NIGHTLY
Qty: 30 TABLET | Refills: 6 | Status: SHIPPED | OUTPATIENT
Start: 2021-07-26 | End: 2022-07-11

## 2021-07-26 NOTE — TELEPHONE ENCOUNTER
Caller: Maggie Malhotra    Relationship: Self    Best call back number:[phone]     What medications are you currently taking: diazePAM (VALIUM) 5 MG tablet  When did you start taking these medications: UNSURE OF WHEN SHE STARTED     Which medication are you concerned about:     Who prescribed you this medication:     What are your concerns: PATIENT STATES THE VALIUM IS NOT WORKING SHE WOULD LIKE TO TRY TRAZODONE INSTEAD. PATIENT WOULD LIKE FOR  TO CALL HER     How long have you been taking these medications:    How long have you had these concerns: PATIENT STATED IN THE BEGINNING THIS MEDICATION WORKED BUT LATELY THIS HAS NOT HELPED

## 2021-08-30 DIAGNOSIS — F41.9 ANXIETY: ICD-10-CM

## 2021-08-30 DIAGNOSIS — G62.9 NEUROPATHY: ICD-10-CM

## 2021-08-30 DIAGNOSIS — M62.838 MUSCLE SPASM: ICD-10-CM

## 2021-08-30 RX ORDER — PROMETHAZINE HYDROCHLORIDE 25 MG/1
TABLET ORAL
Qty: 20 TABLET | Refills: 0 | Status: SHIPPED | OUTPATIENT
Start: 2021-08-30 | End: 2021-10-12

## 2021-08-30 RX ORDER — DIAZEPAM 5 MG/1
TABLET ORAL
Qty: 60 TABLET | Refills: 0 | Status: SHIPPED | OUTPATIENT
Start: 2021-08-30 | End: 2021-10-13

## 2021-09-10 ENCOUNTER — TRANSCRIBE ORDERS (OUTPATIENT)
Dept: ADMINISTRATIVE | Facility: HOSPITAL | Age: 66
End: 2021-09-10

## 2021-09-10 DIAGNOSIS — E07.9 DISORDER OF THYROID: ICD-10-CM

## 2021-09-10 DIAGNOSIS — E78.41 ELEVATED LIPOPROTEIN A LEVEL: ICD-10-CM

## 2021-09-10 DIAGNOSIS — R13.10 DYSPHAGIA, UNSPECIFIED TYPE: Primary | ICD-10-CM

## 2021-09-21 NOTE — TELEPHONE ENCOUNTER
Kimberly Lozano MD Simpson, Shereha, MA  Caller: Unspecified (2 days ago, 11:52 AM)  Pls let her know that I sent in trazodone.  Who is her pain medicine specialist? That person needs to be documented in her chart (who gives hydrocodone?).       7/28/2021-Ms. Malhotra see Dr. Beverley Phillips ( listed in her care team). Dr. Phillips also prescribed the hydrocodone.    Ms. Malhotra was informed on how to take the Trazodone and call if she feels she need a higher dose. She verbalized she understood and thank you.   S/F.

## 2021-10-12 DIAGNOSIS — M62.838 MUSCLE SPASM: ICD-10-CM

## 2021-10-12 DIAGNOSIS — G62.9 NEUROPATHY: ICD-10-CM

## 2021-10-12 DIAGNOSIS — F41.9 ANXIETY: ICD-10-CM

## 2021-10-12 RX ORDER — PROMETHAZINE HYDROCHLORIDE 25 MG/1
TABLET ORAL
Qty: 20 TABLET | Refills: 0 | Status: SHIPPED | OUTPATIENT
Start: 2021-10-12 | End: 2021-12-08

## 2021-10-13 RX ORDER — DIAZEPAM 5 MG/1
TABLET ORAL
Qty: 60 TABLET | Refills: 0 | Status: SHIPPED | OUTPATIENT
Start: 2021-10-13 | End: 2021-12-09

## 2021-10-14 ENCOUNTER — HOSPITAL ENCOUNTER (OUTPATIENT)
Dept: CT IMAGING | Facility: HOSPITAL | Age: 66
Discharge: HOME | End: 2021-10-14

## 2021-10-14 ENCOUNTER — HOSPITAL ENCOUNTER (OUTPATIENT)
Dept: GENERAL RADIOLOGY | Facility: HOSPITAL | Age: 66
Discharge: HOME | End: 2021-10-14

## 2021-10-14 ENCOUNTER — HOSPITAL ENCOUNTER (OUTPATIENT)
Dept: ULTRASOUND IMAGING | Facility: HOSPITAL | Age: 66
Discharge: HOME | End: 2021-10-14

## 2021-10-14 DIAGNOSIS — E07.9 DISORDER OF THYROID: ICD-10-CM

## 2021-10-14 DIAGNOSIS — R13.10 DYSPHAGIA, UNSPECIFIED TYPE: ICD-10-CM

## 2021-10-14 DIAGNOSIS — E78.41 ELEVATED LIPOPROTEIN A LEVEL: ICD-10-CM

## 2021-10-14 LAB — CREAT BLDA-MCNC: 0.9 MG/DL (ref 0.6–1.3)

## 2021-10-14 PROCEDURE — 74220 X-RAY XM ESOPHAGUS 1CNTRST: CPT

## 2021-10-14 PROCEDURE — 76536 US EXAM OF HEAD AND NECK: CPT

## 2021-10-14 PROCEDURE — 74170 CT ABD WO CNTRST FLWD CNTRST: CPT

## 2021-10-14 PROCEDURE — 82565 ASSAY OF CREATININE: CPT

## 2021-10-14 PROCEDURE — 25010000002 IOPAMIDOL 61 % SOLUTION: Performed by: FAMILY MEDICINE

## 2021-10-14 RX ADMIN — BARIUM SULFATE 700 MG: 700 TABLET ORAL at 11:06

## 2021-10-14 RX ADMIN — IOPAMIDOL 85 ML: 612 INJECTION, SOLUTION INTRAVENOUS at 10:27

## 2021-10-14 RX ADMIN — BARIUM SULFATE 183 ML: 960 POWDER, FOR SUSPENSION ORAL at 11:06

## 2021-12-08 DIAGNOSIS — M62.838 MUSCLE SPASM: ICD-10-CM

## 2021-12-08 DIAGNOSIS — G62.9 NEUROPATHY: ICD-10-CM

## 2021-12-08 DIAGNOSIS — F41.9 ANXIETY: ICD-10-CM

## 2021-12-08 RX ORDER — PROMETHAZINE HYDROCHLORIDE 25 MG/1
TABLET ORAL
Qty: 20 TABLET | Refills: 0 | Status: SHIPPED | OUTPATIENT
Start: 2021-12-08 | End: 2022-07-11

## 2021-12-09 RX ORDER — DIAZEPAM 5 MG/1
TABLET ORAL
Qty: 60 TABLET | Refills: 0 | Status: SHIPPED | OUTPATIENT
Start: 2021-12-09 | End: 2022-08-23 | Stop reason: SDUPTHER

## 2022-03-11 ENCOUNTER — PATIENT ROUNDING (BHMG ONLY) (OUTPATIENT)
Dept: OBSTETRICS AND GYNECOLOGY | Facility: CLINIC | Age: 67
End: 2022-03-11

## 2022-03-11 ENCOUNTER — OFFICE VISIT (OUTPATIENT)
Dept: OBSTETRICS AND GYNECOLOGY | Facility: CLINIC | Age: 67
End: 2022-03-11

## 2022-03-11 VITALS
WEIGHT: 210.4 LBS | SYSTOLIC BLOOD PRESSURE: 122 MMHG | HEIGHT: 71 IN | DIASTOLIC BLOOD PRESSURE: 84 MMHG | BODY MASS INDEX: 29.46 KG/M2

## 2022-03-11 DIAGNOSIS — Z01.419 PAP SMEAR, LOW-RISK: ICD-10-CM

## 2022-03-11 DIAGNOSIS — N76.0 ACUTE VAGINITIS: ICD-10-CM

## 2022-03-11 DIAGNOSIS — Z12.31 ENCOUNTER FOR SCREENING MAMMOGRAM FOR MALIGNANT NEOPLASM OF BREAST: ICD-10-CM

## 2022-03-11 DIAGNOSIS — Z01.419 WELL WOMAN EXAM WITH ROUTINE GYNECOLOGICAL EXAM: Primary | ICD-10-CM

## 2022-03-11 PROCEDURE — 99213 OFFICE O/P EST LOW 20 MIN: CPT | Performed by: NURSE PRACTITIONER

## 2022-03-11 PROCEDURE — G0101 CA SCREEN;PELVIC/BREAST EXAM: HCPCS | Performed by: NURSE PRACTITIONER

## 2022-03-11 RX ORDER — IBUPROFEN 200 MG
TABLET ORAL
COMMUNITY

## 2022-03-11 RX ORDER — ONDANSETRON 4 MG/1
TABLET, FILM COATED ORAL
COMMUNITY
End: 2022-07-11

## 2022-03-11 RX ORDER — ERGOCALCIFEROL 1.25 MG/1
CAPSULE ORAL
COMMUNITY
End: 2022-07-11

## 2022-03-11 RX ORDER — POTASSIUM CHLORIDE 20 MEQ/1
TABLET, EXTENDED RELEASE ORAL
COMMUNITY
End: 2022-07-11

## 2022-03-11 RX ORDER — SILDENAFIL CITRATE 20 MG/1
TABLET ORAL
COMMUNITY
End: 2022-07-11

## 2022-03-11 RX ORDER — NEBIVOLOL 5 MG/1
TABLET ORAL DAILY
COMMUNITY
End: 2022-11-22

## 2022-03-11 RX ORDER — PREGABALIN 75 MG/1
CAPSULE ORAL
COMMUNITY
End: 2022-07-11

## 2022-03-11 RX ORDER — PROGESTERONE 100 MG/1
CAPSULE ORAL
COMMUNITY
End: 2022-07-11

## 2022-03-11 NOTE — PROGRESS NOTES
"GYN Annual Exam     Chief Complaint   Patient presents with   • Gynecologic Exam     Vaginal discharge has a different smell.       Maggie Malhotra is a 66 y.o. female who presents for annual well woman exam. Periods absent since 2006 d/t history of abnormal pap smears. She denies any vaginal bleeding.     She c/o vaginal discharge with odor. Reports this started approx 1-2 months ago. She reports the smell \"just doesn't smell good.\" Her discharge is described as white. When discussing she just continues to state that the smell is bad. Denies itching or irritation.     She is not doing SBE monthly.     She was a former patient of Dr. Rendon. She is behind on her exams. She is going to [facility]. She started going there last August. She is taking progesterone tablets and using estrogen and testosterone cream.         OB History        1    Para   1    Term   1            AB        Living   1       SAB        IAB        Ectopic        Molar        Multiple        Live Births   1                Mammogram: approx 2 years ago   Dexa scan:  normal   Colonoscopy:   Last Pap : - Normal. Pt reports pap in 2018 with [facility] but can not find one.   History of abnormal Pap smear: yes -  with hysterectomy   Family history of uterine, colon or ovarian cancer: no  Family history of breast cancer: no  History of abnormal mammogram: no        Past Medical History:   Diagnosis Date   • Allergic rhinitis    • Anxiety    • Backache    • Blood pressure elevated without history of HTN    • Broken foot 2002    LT   • Chronic pain    • Depression    • Dizziness    • Fatigue    • Fibromyalgia, primary    • Guillain Barré syndrome (CMS/HCC)    • H/O mammogram 2012    Dr. Rendon   • H/O: pneumonia    • History of broken leg 1998    LT   • Hypotension    • IBS (irritable bowel syndrome)    • Insomnia    • Memory disorder    • Migraine    • Mood disorder (CMS/HCC)    • Multiple joint pain    • " Night sweat    • Syncope    • Vitamin D deficiency        Past Surgical History:   Procedure Laterality Date   • BREAST AUGMENTATION BILATERAL MASTOPEXY Bilateral 01/01/1998   • COLONOSCOPY N/A 05/18/2010    Dr. Gurmeet Justice   • COLONOSCOPY  10/09/2019    Non-thrombosed external hemorrhoids found on perianal exam, diverticulosis in the sigmoid colon and in the descending colon, One 4 mm polyp in the mid sigmoid colon, tortuous colon, IH. Path: Tubular adenoma.      • COLONOSCOPY W/ BIOPSIES  06/27/2014    Non-thrombosed external hemorrhoids, Diverticulosis in the sigmoid colon, two 4 to 5 mm polyps in the sigmoid colon and the descending colon (pathology: Hyperplastic polyp), Tortuous colon, Internal hemorrhoids   • ENDOSCOPY N/A 06/27/2014    Z-line irregular, 38 cm from the incisors, Gastritis, Gastric polyps, Duodenal erosions without bleeding    • ENDOSCOPY N/A 05/18/2010    Dr. Gurmeet Justice   • ENDOSCOPY  10/09/2019    Z-line irregular, gastritis, bilious gastric fluid. Path: Reflux pattern esophagitis, chronic inflammation.    • HYSTERECTOMY N/A 02/01/2006   • LIPOSUCTION N/A 04/01/2001   • PAP SMEAR N/A 07/10/2013   • TOE SURGERY Right 01/01/1995   • TONSILLECTOMY N/A 01/01/1964   • TRACHEAL SURGERY N/A 01/01/1986    [facility]   • TRACHEOSTOMY           Current Outpatient Medications:   •  carvedilol CR (Coreg CR) 40 MG 24 hr capsule, Take 1 capsule by mouth Daily., Disp: 90 capsule, Rfl: 3  •  ciclopirox (PENLAC) 8 % solution, ciclopirox 8 % topical solution, Disp: , Rfl:   •  diazePAM (VALIUM) 5 MG tablet, Take 1 tablet by mouth twice daily as needed for anxiety, Disp: 60 tablet, Rfl: 0  •  dicyclomine (BENTYL) 20 MG tablet, TAKE 1 TABLET BY MOUTH 4 TIMES DAILY BEFORE MEAL(S) AND AT BEDTIME AS NEEDED FOR CRAMPS, Disp: 90 tablet, Rfl: 0  •  econazole nitrate (SPECTAZOLE) 1 % cream, econazole 1 % topical cream, Disp: , Rfl:   •  ergocalciferol (ERGOCALCIFEROL) 1.25 MG (03380 UT) capsule,  ergocalciferol (vitamin D2) 1,250 mcg (50,000 unit) capsule, Disp: , Rfl:   •  esomeprazole (nexIUM) 40 MG capsule, Take 1 capsule by mouth 2 (Two) Times a Day., Disp: 180 capsule, Rfl: 3  •  HYDROcodone-acetaminophen (NORCO)  MG per tablet, Take 1 tablet by mouth Every 8 (Eight) Hours As Needed for Severe Pain ., Disp: 100 tablet, Rfl: 0  •  ibuprofen (IBU) 200 MG tablet, IBU-200  prn, Disp: , Rfl:   •  multivitamin (DAILY ES) tablet tablet, Take 1 tablet by mouth daily., Disp: , Rfl:   •  nebivolol (BYSTOLIC) 5 MG tablet, nebivolol 5 mg tablet, Disp: , Rfl:   •  ondansetron (ZOFRAN) 4 MG tablet, ondansetron HCl 4 mg tablet, Disp: , Rfl:   •  potassium chloride (KLOR-CON) 20 MEQ CR tablet, Klor-Con M20 mEq tablet,extended release, Disp: , Rfl:   •  pregabalin (LYRICA) 75 MG capsule, TAKE 1 CAPSULE BY MOUTH TWICE DAILY AS DIRECTED, Disp: , Rfl:   •  pregabalin (Lyrica) 75 MG capsule, Lyrica 75 mg capsule  Take 1 capsule every day by oral route as directed for 30 days., Disp: , Rfl:   •  Progesterone (PROMETRIUM) 100 MG capsule, progesterone sr 300mg capsules  take 1 Capsule by mouth every night 1-2 hours before bedtime, Disp: , Rfl:   •  progesterone (PROMETRIUM) 200 MG capsule, Take 200 mg by mouth Daily., Disp: , Rfl: 3  •  promethazine (PHENERGAN) 25 MG tablet, TAKE 1 TABLET BY MOUTH EVERY 6 HOURS AS NEEDED FOR NAUSEA AND VOMITING, Disp: 20 tablet, Rfl: 0  •  sildenafil (REVATIO) 20 MG tablet, sildenafil (pulmonary hypertension) 20 mg tablet, Disp: , Rfl:   •  traZODone (DESYREL) 50 MG tablet, Take 1 tablet by mouth Every Night. 1/2/qhs for 2d, then 1 qhs.  Call if needing higher dose., Disp: 30 tablet, Rfl: 6  •  Unable to find, APPLY TWO clicks topically TWICE DAILY AS DIRECTED, Disp: , Rfl:     Allergies   Allergen Reactions   • Celexa [Citalopram Hydrobromide] Other (See Comments)     Headache   • Cymbalta [Duloxetine Hcl] Nausea Only and Other (See Comments)     Fatigue/Pain   • Influenza Vaccines  "Unknown - Low Severity   • Midazolam Itching     Other reaction(s): Other (See Comments)  \"makes me very hyper\"   • Pantoprazole Other (See Comments)     Chills and lethargy       Social History     Tobacco Use   • Smoking status: Never Smoker   • Smokeless tobacco: Never Used   • Tobacco comment: occasional caffiene   Substance Use Topics   • Alcohol use: Yes     Alcohol/week: 3.0 standard drinks     Types: 3 Cans of beer per week     Comment: Moderate   • Drug use: Never       Family History   Problem Relation Age of Onset   • Heart failure Father    • Other Father         Lung failure       Review of Systems   Constitutional: Negative.    Respiratory: Negative.    Cardiovascular: Negative.    Gastrointestinal: Negative.    Endocrine: Negative.    Genitourinary: Negative.    Musculoskeletal: Negative.    Skin: Negative.    Neurological: Negative.    Psychiatric/Behavioral: Negative.        /84   Ht 180.3 cm (71\")   Wt 95.4 kg (210 lb 6.4 oz)   LMP 01/01/2006 (LMP Unknown)   BMI 29.34 kg/m²     Physical Exam  Vitals reviewed.   Constitutional:       Appearance: She is well-developed.   Neck:      Thyroid: No thyromegaly.   Cardiovascular:      Rate and Rhythm: Normal rate and regular rhythm.      Heart sounds: Normal heart sounds.   Pulmonary:      Effort: Pulmonary effort is normal.      Breath sounds: Normal breath sounds.   Chest:   Breasts:      Right: No inverted nipple, mass, nipple discharge, skin change or tenderness.      Left: No inverted nipple, mass, nipple discharge, skin change or tenderness.       Abdominal:      General: Bowel sounds are normal.      Palpations: Abdomen is soft.   Genitourinary:     Exam position: Supine.      Labia:         Right: No rash, tenderness, lesion or injury.         Left: No rash, tenderness, lesion or injury.       Vagina: No signs of injury and foreign body. Vaginal discharge present. No erythema, tenderness or bleeding.      Cervix: No friability.      " "Uterus: Absent.       Adnexa:         Right: No mass, tenderness or fullness.          Left: No mass, tenderness or fullness.        Rectum: Normal.      Comments: Scant white discharge noted  Musculoskeletal:         General: Normal range of motion.      Cervical back: Normal range of motion and neck supple.   Skin:     General: Skin is warm and dry.   Neurological:      General: No focal deficit present.      Mental Status: She is alert and oriented to person, place, and time.   Psychiatric:         Mood and Affect: Mood normal.         Behavior: Behavior normal.            Assessment     1) GYN annual well woman exam.   2) Postmenopausal   3) Vaginal discharge     Plan     1) Breast Health - Clinical breast exam & mammogram yearly, Self breast awareness. Schedule screening mammogram.   2) Pap - Pap/HPV of vag cuff checked today   3) HRT- Taking oral Prometrium 400mg daily and topical estrogen/testosterone cream. Reports the provided at [facility] is adjusting her medications to \"make my labs normal.\" States, \"my levels were almost zero.\" Had a lengthy disc with patient regarding her HRT. She is asking for this office to prescribe her HRT starting in August so she does \"not have to pay $1800 again to go there.\" Reports takes these medications for her general wellbeing. States her quality of life is more important than quantity. Disc with patient that she needs to wean off of these medications. Patient counseled that hormone treatment should be used to help with specific symptoms related to menopause such as hot flashes, night sweats and vaginal atrophy. Hormones should not be given for “general wellbeing” or to avoid aging. The lowest dose hormone that treats symptoms should be used for the shortest about of time possible.  Although estrogen is the most effective treatment for hot flashes, other non hormonal options exist (such as Brisdelle or venlafaxine) and should also be considered.  Anyone with an intact uterus " should also receive progestogen to prevent uterine overgrowth that can lead to uterine cancer.  All hormone therapy, whether it is synthetic or bio identical, can lead to increased risk of stroke, heart attack and thromboembolic diseases.  These adverse events are more likely to develop in the first year of use and in patients who are older than the typical menopausal age.  Risks of estrogen dependent cancers such as breast cancer increase with prolonged use.  Attempts to wean hormone therapy should be discussed annually and particularly after three to five years of use.  Any side effects such as vaginal bleeding or pain should be reported immediately.  4) Smoking status- non smoker   5) Colon health - screening colonoscopy recommended if not up to date  6) Patient's Body mass index is 29.34 kg/m². indicating that she is overweight (BMI 25-29.9).   7) Bone health - Weight bearing exercise, dietary calcium recommendations and vitamin D  reviewed.   8) Vaginal discharge- Check NuSwab for mycoplasma. BV and yeast will result on pap if present. Rec vaginal probiotics.     Follow up prn and one year    Ember Ruelas, RAJEEV  3/11/2022  10:21 EST

## 2022-03-11 NOTE — PROGRESS NOTES
A MY-CHART MESSAGE HAS BEEN SENT TO THE PATIENT FOR PATIENT ROUNDING WITH [facility] [place]

## 2022-03-16 LAB
CYTOLOGIST CVX/VAG CYTO: NORMAL
CYTOLOGY CVX/VAG DOC CYTO: NORMAL
CYTOLOGY CVX/VAG DOC THIN PREP: NORMAL
DX ICD CODE: NORMAL
HIV 1 & 2 AB SER-IMP: NORMAL
M GENITALIUM DNA SPEC QL NAA+PROBE: NEGATIVE
M HOMINIS DNA SPEC QL NAA+PROBE: NEGATIVE
OTHER STN SPEC: NORMAL
STAT OF ADQ CVX/VAG CYTO-IMP: NORMAL
UREAPLASMA DNA SPEC QL NAA+PROBE: NEGATIVE

## 2022-05-17 ENCOUNTER — HOSPITAL ENCOUNTER (OUTPATIENT)
Dept: MAMMOGRAPHY | Facility: HOSPITAL | Age: 67
Discharge: HOME | End: 2022-05-17
Admitting: NURSE PRACTITIONER

## 2022-05-17 DIAGNOSIS — Z01.419 WELL WOMAN EXAM WITH ROUTINE GYNECOLOGICAL EXAM: ICD-10-CM

## 2022-05-17 DIAGNOSIS — Z12.31 ENCOUNTER FOR SCREENING MAMMOGRAM FOR MALIGNANT NEOPLASM OF BREAST: ICD-10-CM

## 2022-05-17 PROCEDURE — 77063 BREAST TOMOSYNTHESIS BI: CPT

## 2022-05-17 PROCEDURE — 77067 SCR MAMMO BI INCL CAD: CPT

## 2022-07-11 ENCOUNTER — OFFICE VISIT (OUTPATIENT)
Dept: INTERNAL MEDICINE | Facility: CLINIC | Age: 67
End: 2022-07-11

## 2022-07-11 VITALS
SYSTOLIC BLOOD PRESSURE: 124 MMHG | HEIGHT: 71 IN | BODY MASS INDEX: 28.84 KG/M2 | WEIGHT: 206 LBS | DIASTOLIC BLOOD PRESSURE: 80 MMHG | OXYGEN SATURATION: 97 % | HEART RATE: 81 BPM

## 2022-07-11 DIAGNOSIS — R60.0 LOCALIZED EDEMA: Primary | ICD-10-CM

## 2022-07-11 PROCEDURE — 99214 OFFICE O/P EST MOD 30 MIN: CPT | Performed by: NURSE PRACTITIONER

## 2022-07-11 RX ORDER — MULTIVIT WITH MINERALS/LUTEIN
1000 TABLET ORAL DAILY
COMMUNITY
End: 2022-08-23

## 2022-07-11 RX ORDER — SILDENAFIL CITRATE 20 MG/1
1 TABLET ORAL EVERY 8 HOURS
COMMUNITY
End: 2022-07-25

## 2022-07-11 RX ORDER — ZOLPIDEM TARTRATE 10 MG/1
1 TABLET ORAL NIGHTLY PRN
COMMUNITY
Start: 2022-06-23 | End: 2022-07-25

## 2022-07-11 RX ORDER — CHLORTHALIDONE 25 MG/1
25 TABLET ORAL DAILY
Qty: 30 TABLET | Refills: 1 | Status: SHIPPED | OUTPATIENT
Start: 2022-07-11 | End: 2022-08-23 | Stop reason: SDUPTHER

## 2022-07-11 RX ORDER — HYOSCYAMINE SULFATE 0.125 MG
0.12 TABLET ORAL AS NEEDED
COMMUNITY
End: 2023-02-23

## 2022-07-11 RX ORDER — BUMETANIDE 2 MG/1
1 TABLET ORAL DAILY
COMMUNITY
End: 2022-08-23

## 2022-07-11 RX ORDER — HYDROCODONE BITARTRATE AND ACETAMINOPHEN 10; 325 MG/1; MG/1
1 TABLET ORAL AS NEEDED
COMMUNITY

## 2022-07-11 RX ORDER — CLOBETASOL PROPIONATE 0.46 MG/ML
1 SOLUTION TOPICAL DAILY PRN
COMMUNITY
End: 2022-07-25

## 2022-07-11 NOTE — PROGRESS NOTES
Subjective   Maggie Malhotra is a 67 y.o. female. Patient is here today for   Chief Complaint   Patient presents with   • Edema     Pt complains of having bilateral edema x2-3 months. Pt currently taking a diuretic but did not take today.  Pt denies any pain.    .    History of Present Illness   New patient here to establish care.  Health history and questionnaire have been reviewed in its entirety. The patient's previous primary care provider was Dr Kimberly Lozano. Patient last saw her in July 2021. She also sees Dr. Reyes at [facility]. Patient is accompanied by her     C/o increased swelling in lower extremities for a couple of months. She is on Bumex which helps very little. Elevating her legs doesn't help. No shortness of breath with increased swelling. Denies any changes in medications recently.    c/o nausea since walking into the office today. Denies any abdominal pain or other symptoms.     The following portions of the patient's history were reviewed and updated as appropriate: allergies, current medications, past family history, past medical history, past social history, past surgical history and problem list.    Review of Systems    Objective   Vitals:    07/11/22 1316   BP: 124/80   Pulse: 81   SpO2: 97%     Body mass index is 28.73 kg/m².  Physical Exam  Vitals and nursing note reviewed.   Constitutional:       Appearance: She is well-developed.   Cardiovascular:      Rate and Rhythm: Normal rate and regular rhythm.      Heart sounds: Normal heart sounds.   Pulmonary:      Effort: Pulmonary effort is normal.      Breath sounds: Normal breath sounds.   Skin:     General: Skin is warm and dry.   Neurological:      Mental Status: Mental status is at baseline. She is lethargic.      Motor: Weakness present.      Comments: Patient's  states that this is her baseline   Psychiatric:         Speech: Speech normal.         Behavior: Behavior normal.         Thought Content: Thought content  normal.         Assessment & Plan   Diagnoses and all orders for this visit:    1. Localized edema (Primary)  -     Comprehensive Metabolic Panel  -     chlorthalidone (HYGROTON) 25 MG tablet; Take 1 tablet by mouth Daily.  Dispense: 30 tablet; Refill: 1  -     Duplex Venous Lower Extremity - Bilateral CAR; Future      Edema - will change bumex to chlorthalidone. Check venous doppler and CMP.     Nausea/vomiting - zofran 8 mg given in office. Patient tolerated    F/u in 6 weeks to reassess edema

## 2022-07-12 LAB
ALBUMIN SERPL-MCNC: 4.4 G/DL (ref 3.8–4.8)
ALBUMIN/GLOB SERPL: 1.9 {RATIO} (ref 1.2–2.2)
ALP SERPL-CCNC: 112 IU/L (ref 44–121)
ALT SERPL-CCNC: 70 IU/L (ref 0–32)
AST SERPL-CCNC: 124 IU/L (ref 0–40)
BILIRUB SERPL-MCNC: 0.9 MG/DL (ref 0–1.2)
BUN SERPL-MCNC: 12 MG/DL (ref 8–27)
BUN/CREAT SERPL: 15 (ref 12–28)
CALCIUM SERPL-MCNC: 9 MG/DL (ref 8.7–10.3)
CHLORIDE SERPL-SCNC: 91 MMOL/L (ref 96–106)
CO2 SERPL-SCNC: 22 MMOL/L (ref 20–29)
CREAT SERPL-MCNC: 0.8 MG/DL (ref 0.57–1)
EGFRCR SERPLBLD CKD-EPI 2021: 81 ML/MIN/1.73
GLOBULIN SER CALC-MCNC: 2.3 G/DL (ref 1.5–4.5)
GLUCOSE SERPL-MCNC: 83 MG/DL (ref 65–99)
POTASSIUM SERPL-SCNC: 4.1 MMOL/L (ref 3.5–5.2)
PROT SERPL-MCNC: 6.7 G/DL (ref 6–8.5)
SODIUM SERPL-SCNC: 132 MMOL/L (ref 134–144)

## 2022-07-25 ENCOUNTER — OFFICE VISIT (OUTPATIENT)
Dept: GASTROENTEROLOGY | Facility: CLINIC | Age: 67
End: 2022-07-25

## 2022-07-25 ENCOUNTER — PREP FOR SURGERY (OUTPATIENT)
Dept: SURGERY | Facility: SURGERY CENTER | Age: 67
End: 2022-07-25

## 2022-07-25 VITALS
HEIGHT: 71 IN | SYSTOLIC BLOOD PRESSURE: 120 MMHG | BODY MASS INDEX: 28.45 KG/M2 | HEART RATE: 79 BPM | TEMPERATURE: 97.6 F | WEIGHT: 203.2 LBS | OXYGEN SATURATION: 98 % | DIASTOLIC BLOOD PRESSURE: 76 MMHG

## 2022-07-25 DIAGNOSIS — K58.0 IRRITABLE BOWEL SYNDROME WITH DIARRHEA: Primary | ICD-10-CM

## 2022-07-25 DIAGNOSIS — R13.19 ESOPHAGEAL DYSPHAGIA: Primary | ICD-10-CM

## 2022-07-25 DIAGNOSIS — K21.9 GASTROESOPHAGEAL REFLUX DISEASE WITHOUT ESOPHAGITIS: ICD-10-CM

## 2022-07-25 DIAGNOSIS — R13.19 ESOPHAGEAL DYSPHAGIA: ICD-10-CM

## 2022-07-25 DIAGNOSIS — R93.3 ABNORMAL ESOPHAGRAM: ICD-10-CM

## 2022-07-25 PROCEDURE — 99214 OFFICE O/P EST MOD 30 MIN: CPT | Performed by: NURSE PRACTITIONER

## 2022-07-25 RX ORDER — SODIUM CHLORIDE, SODIUM LACTATE, POTASSIUM CHLORIDE, CALCIUM CHLORIDE 600; 310; 30; 20 MG/100ML; MG/100ML; MG/100ML; MG/100ML
30 INJECTION, SOLUTION INTRAVENOUS CONTINUOUS PRN
Status: CANCELLED | OUTPATIENT
Start: 2022-07-25

## 2022-07-25 RX ORDER — SODIUM CHLORIDE 0.9 % (FLUSH) 0.9 %
3 SYRINGE (ML) INJECTION EVERY 12 HOURS SCHEDULED
Status: CANCELLED | OUTPATIENT
Start: 2022-07-25

## 2022-07-25 RX ORDER — SODIUM CHLORIDE 0.9 % (FLUSH) 0.9 %
10 SYRINGE (ML) INJECTION AS NEEDED
Status: CANCELLED | OUTPATIENT
Start: 2022-07-25

## 2022-07-25 RX ORDER — DICYCLOMINE HCL 20 MG
20 TABLET ORAL 4 TIMES DAILY PRN
Qty: 120 TABLET | Refills: 2 | Status: SHIPPED | OUTPATIENT
Start: 2022-07-25 | End: 2023-02-23

## 2022-07-25 NOTE — PROGRESS NOTES
"Chief Complaint   Patient presents with   • Abdominal Pain   • Difficulty Swallowing           History of Present Illness  Patient is a 67-year-old female who presents today for evaluation. She has a history of GERD and colon polyps.  She is a new patient to me with a previous patient of Dr. Justice/RAJEEV Lara.  Most recent EGD and colonoscopy in 2019.    Patient presents today for follow-up.  She reports she has been experiencing intermittent lower abdominal cramping.  The symptoms have been present for years and are stable.  She reports intermittent issues with diarrhea associated with this.  She has taken dicyclomine in the past and needs a refill of this but does find this helpful for her symptoms.  She feels the symptoms are stable.  She denies any blood in the stool or dark stools.    Patient also presents today with concerns about dysphagia.  This has been present for the last few months.  It primarily occurs with pills.  She reports no regurgitation.  She reports intermittent issues with nausea and vomiting.  She had an esophagram performed in September that showed difficulty in initiating swallowing.  She reports she was evaluated by ENT who was concerned for potential for esophageal issue and recommended she follow-up with GI.       Result Review :       SCANNED PATHOLOGY (10/09/2019)   ENDOSCOPY, INT (10/09/2019)   SCANNED - COLONOSCOPY (10/09/2019)   Office Visit with Chelo Lux APRN (03/08/2021)   CT Abdomen With & Without Contrast (10/14/2021 10:36)  NM Hepatobiliary Without CCK (05/03/2021 09:49)  FL Esophagram Complete Single Contrast (10/14/2021 11:    Vital Signs:   /76   Pulse 79   Temp 97.6 °F (36.4 °C)   Ht 180.3 cm (71\")   Wt 92.2 kg (203 lb 3.2 oz)   SpO2 98%   BMI 28.34 kg/m²     Body mass index is 28.34 kg/m².     Physical Exam  Vitals reviewed.   Constitutional:       General: She is not in acute distress.     Appearance: She is well-developed.   HENT:      " Head: Normocephalic and atraumatic.   Pulmonary:      Effort: Pulmonary effort is normal. No respiratory distress.   Abdominal:      General: Abdomen is flat. Bowel sounds are normal. There is no distension.      Palpations: Abdomen is soft.      Tenderness: There is no abdominal tenderness.   Skin:     General: Skin is dry.      Coloration: Skin is not pale.   Neurological:      Mental Status: She is alert and oriented to person, place, and time.   Psychiatric:         Thought Content: Thought content normal.           Assessment and Plan {CC Problem List  Visit Diagnosis  ROS  Review (Popup)  [facility]  Quality  BestPractice  Medications  SmartSets  SnapShot Encounters  Media :23}   Diagnoses and all orders for this visit:    1. Irritable bowel syndrome with diarrhea (Primary)    2. Gastroesophageal reflux disease without esophagitis    3. Esophageal dysphagia  -     FL Video Swallow With Speech Single Contrast; Future    4. Abnormal esophagram  -     FL Video Swallow With Speech Single Contrast; Future    Other orders  -     riFAXIMin (Xifaxan) 550 MG tablet; Take 1 tablet by mouth 3 (Three) Times a Day for 14 days.  Dispense: 42 tablet; Refill: 2  -     dicyclomine (BENTYL) 20 MG tablet; Take 1 tablet by mouth 4 (Four) Times a Day As Needed (abdominal pain, diarrhea).  Dispense: 120 tablet; Refill: 2         Discussion  Patient presents today with concerns about new with dysphagia.  Recommended EGD for further evaluation and to assess for any esophageal abnormality that could be contributing to this.  We will also schedule video swallow study due to difficulty initiating swallow seen on esophagram.    Regarding chronic abdominal cramping and intermittent diarrhea, symptoms felt to be consistent with IBS-D.  We will continue dicyclomine as needed and provide treatment with Xifaxan.          Follow Up   Return for Follow up to review results after testing complete.    Patient Instructions    Schedule EGD for further evaluation of symptoms.     Schedule video swallow study with speech therapy for further evaluation and treatment of dysphagia.    For abdominal cramping/diarrhea, begin trial of dicyclomine as prescribed. Prescription sent to pharmacy.     For IBS-D, complete course of Xifaxan as prescribed.

## 2022-07-25 NOTE — PATIENT INSTRUCTIONS
Schedule EGD for further evaluation of symptoms.     Schedule video swallow study with speech therapy for further evaluation and treatment of dysphagia.    For abdominal cramping/diarrhea, begin trial of dicyclomine as prescribed. Prescription sent to pharmacy.     For IBS-D, complete course of Xifaxan as prescribed.

## 2022-07-26 ENCOUNTER — HOSPITAL ENCOUNTER (OUTPATIENT)
Dept: CARDIOLOGY | Facility: HOSPITAL | Age: 67
Discharge: HOME | End: 2022-07-26
Admitting: NURSE PRACTITIONER

## 2022-07-26 DIAGNOSIS — R60.0 LOCALIZED EDEMA: ICD-10-CM

## 2022-07-26 LAB

## 2022-07-26 PROCEDURE — 93970 EXTREMITY STUDY: CPT

## 2022-07-27 ENCOUNTER — PATIENT ROUNDING (BHMG ONLY) (OUTPATIENT)
Dept: GASTROENTEROLOGY | Facility: CLINIC | Age: 67
End: 2022-07-27

## 2022-07-27 NOTE — PROGRESS NOTES
"July 27, 2022    Hello, may I speak with Maggie Malhotra?    My name is Jo      I am  with [facility]  [address] [address] [phone].    Before we get started may I verify your date of birth? 1955--verified    I am calling to officially welcome you to our practice and ask about your recent visit. Is this a good time to talk? yes      Tell me about your visit with us. What things went well?  \"Everything went well\"       We're always looking for ways to make our patients' experiences even better. Do you have recommendations on ways we may improve?  \"Not that I can think of\"    Overall were you satisfied with your first visit to our practice? yes       I appreciate you taking the time to speak with me today. Is there anything else I can do for you? no      Thank you, and have a great day.      "

## 2022-07-28 ENCOUNTER — TELEPHONE (OUTPATIENT)
Dept: GASTROENTEROLOGY | Facility: CLINIC | Age: 67
End: 2022-07-28

## 2022-07-29 ENCOUNTER — TELEPHONE (OUTPATIENT)
Dept: GASTROENTEROLOGY | Facility: CLINIC | Age: 67
End: 2022-07-29

## 2022-07-29 NOTE — TELEPHONE ENCOUNTER
Patient's insurance has denied coverage for Xifaxan by PA denchelsea and appeal denial.  She states she would like something else sent to the pharmacy.

## 2022-07-30 ENCOUNTER — LAB (OUTPATIENT)
Dept: LAB | Facility: SURGERY CENTER | Age: 67
End: 2022-07-30

## 2022-07-30 DIAGNOSIS — R13.19 ESOPHAGEAL DYSPHAGIA: ICD-10-CM

## 2022-07-30 LAB — SARS-COV-2 ORF1AB RESP QL NAA+PROBE: NOT DETECTED

## 2022-07-30 PROCEDURE — U0005 INFEC AGEN DETEC AMPLI PROBE: HCPCS | Performed by: NURSE PRACTITIONER

## 2022-07-30 PROCEDURE — U0004 COV-19 TEST NON-CDC HGH THRU: HCPCS | Performed by: NURSE PRACTITIONER

## 2022-08-02 ENCOUNTER — ANESTHESIA (OUTPATIENT)
Dept: SURGERY | Facility: SURGERY CENTER | Age: 67
End: 2022-08-02

## 2022-08-02 ENCOUNTER — HOSPITAL ENCOUNTER (OUTPATIENT)
Facility: SURGERY CENTER | Age: 67
Setting detail: HOSPITAL OUTPATIENT SURGERY
Discharge: HOME | End: 2022-08-02
Attending: INTERNAL MEDICINE | Admitting: INTERNAL MEDICINE

## 2022-08-02 ENCOUNTER — TELEPHONE (OUTPATIENT)
Dept: GASTROENTEROLOGY | Facility: CLINIC | Age: 67
End: 2022-08-02

## 2022-08-02 ENCOUNTER — ANESTHESIA EVENT (OUTPATIENT)
Dept: SURGERY | Facility: SURGERY CENTER | Age: 67
End: 2022-08-02

## 2022-08-02 VITALS
HEIGHT: 71 IN | RESPIRATION RATE: 20 BRPM | DIASTOLIC BLOOD PRESSURE: 83 MMHG | WEIGHT: 203 LBS | OXYGEN SATURATION: 98 % | TEMPERATURE: 98.2 F | BODY MASS INDEX: 28.42 KG/M2 | HEART RATE: 83 BPM | SYSTOLIC BLOOD PRESSURE: 116 MMHG

## 2022-08-02 DIAGNOSIS — R13.19 ESOPHAGEAL DYSPHAGIA: ICD-10-CM

## 2022-08-02 PROCEDURE — C1726 CATH, BAL DIL, NON-VASCULAR: HCPCS | Performed by: INTERNAL MEDICINE

## 2022-08-02 PROCEDURE — 43251 EGD REMOVE LESION SNARE: CPT | Performed by: INTERNAL MEDICINE

## 2022-08-02 PROCEDURE — 43249 ESOPH EGD DILATION <30 MM: CPT | Performed by: INTERNAL MEDICINE

## 2022-08-02 PROCEDURE — 25010000002 PROPOFOL 10 MG/ML EMULSION: Performed by: ANESTHESIOLOGY

## 2022-08-02 PROCEDURE — 43239 EGD BIOPSY SINGLE/MULTIPLE: CPT | Performed by: INTERNAL MEDICINE

## 2022-08-02 PROCEDURE — 88305 TISSUE EXAM BY PATHOLOGIST: CPT | Performed by: INTERNAL MEDICINE

## 2022-08-02 RX ORDER — LIDOCAINE HYDROCHLORIDE 20 MG/ML
INJECTION, SOLUTION INFILTRATION; PERINEURAL AS NEEDED
Status: DISCONTINUED | OUTPATIENT
Start: 2022-08-02 | End: 2022-08-02 | Stop reason: SURG

## 2022-08-02 RX ORDER — SODIUM CHLORIDE, SODIUM LACTATE, POTASSIUM CHLORIDE, CALCIUM CHLORIDE 600; 310; 30; 20 MG/100ML; MG/100ML; MG/100ML; MG/100ML
30 INJECTION, SOLUTION INTRAVENOUS CONTINUOUS PRN
Status: DISCONTINUED | OUTPATIENT
Start: 2022-08-02 | End: 2022-08-02 | Stop reason: HOSPADM

## 2022-08-02 RX ORDER — SODIUM CHLORIDE 0.9 % (FLUSH) 0.9 %
10 SYRINGE (ML) INJECTION AS NEEDED
Status: DISCONTINUED | OUTPATIENT
Start: 2022-08-02 | End: 2022-08-02 | Stop reason: HOSPADM

## 2022-08-02 RX ORDER — PANTOPRAZOLE SODIUM 40 MG/1
40 TABLET, DELAYED RELEASE ORAL DAILY
Qty: 30 TABLET | Refills: 5 | Status: SHIPPED | OUTPATIENT
Start: 2022-08-02 | End: 2022-11-22 | Stop reason: ALTCHOICE

## 2022-08-02 RX ORDER — SODIUM CHLORIDE 0.9 % (FLUSH) 0.9 %
3 SYRINGE (ML) INJECTION EVERY 12 HOURS SCHEDULED
Status: DISCONTINUED | OUTPATIENT
Start: 2022-08-02 | End: 2022-08-02 | Stop reason: HOSPADM

## 2022-08-02 RX ORDER — PROPOFOL 10 MG/ML
VIAL (ML) INTRAVENOUS AS NEEDED
Status: DISCONTINUED | OUTPATIENT
Start: 2022-08-02 | End: 2022-08-02 | Stop reason: SURG

## 2022-08-02 RX ADMIN — PROPOFOL 150 MG: 10 INJECTION, EMULSION INTRAVENOUS at 09:26

## 2022-08-02 RX ADMIN — PROPOFOL 50 MG: 10 INJECTION, EMULSION INTRAVENOUS at 09:29

## 2022-08-02 RX ADMIN — SODIUM CHLORIDE, POTASSIUM CHLORIDE, SODIUM LACTATE AND CALCIUM CHLORIDE 30 ML/HR: 600; 310; 30; 20 INJECTION, SOLUTION INTRAVENOUS at 09:14

## 2022-08-02 RX ADMIN — GLYCOPYRROLATE 0.2 MG: 0.2 INJECTION, SOLUTION INTRAMUSCULAR; INTRAVITREAL at 09:23

## 2022-08-02 RX ADMIN — LIDOCAINE HYDROCHLORIDE 60 MG: 20 INJECTION, SOLUTION INFILTRATION; PERINEURAL at 09:26

## 2022-08-02 RX ADMIN — PROPOFOL 200 MCG/KG/MIN: 10 INJECTION, EMULSION INTRAVENOUS at 09:26

## 2022-08-02 NOTE — ANESTHESIA PREPROCEDURE EVALUATION
Anesthesia Evaluation     Patient summary reviewed   no history of anesthetic complications:  NPO Solid Status: > 8 hours  NPO Liquid Status: > 2 hours           Airway   Mallampati: II  TM distance: >3 FB  Neck ROM: full  Possible difficult intubation  Comment: H/o tracheostomy  Dental - normal exam     Pulmonary     breath sounds clear to auscultation  (-) shortness of breath, recent URI, not a smoker  Cardiovascular   Exercise tolerance: good (4-7 METS)    Patient on routine beta blocker and Beta blocker given within 24 hours of surgery  Rhythm: regular  Rate: normal    (+) hypertension,   (-) past MI, dysrhythmias, angina    ROS comment: December 2018 she had an echocardiogram which showed normal LV systolic function with ejection fraction of 67% and grade 1 diastolic dysfunction.    Neuro/Psych  (+) headaches, numbness (GBSynd), psychiatric history Anxiety and Depression,    (-) seizures, CVA  GI/Hepatic/Renal/Endo    (+)  GERD,  liver disease fatty liver disease,   (-)  obesity, no renal disease, diabetes    Musculoskeletal     (+) chronic pain, myalgias,   (-) neck stiffness  Abdominal    Substance History      OB/GYN          Other   arthritis,                    Anesthesia Plan    ASA 3     MAC     (MAC anesthesia discussed with patient and/or patient representative. Risks (including but not limited to intra-op awareness), benefits, and alternatives were discussed. Understanding was voiced with an agreement to proceed with a MAC technique and General as a backup option. )    Anesthetic plan, risks, benefits, and alternatives have been provided, discussed and informed consent has been obtained with: patient.        CODE STATUS:

## 2022-08-02 NOTE — H&P
No chief complaint on file.      HPI  H/o Esophageal dysphagia-- not as much now, now with nausea and epigastic pain limiting ability to eat --esophagram with indentation of aortic arch         Problem List:    Patient Active Problem List   Diagnosis   • Chronic pain   • Irritable bowel syndrome with diarrhea   • Vitamin D deficiency   • Insomnia   • Allergic rhinitis   • Fibromyalgia, primary   • Essential hypertension   • Anxiety   • Chronic fatigue   • Increased MCV   • Guillain-Kerrick syndrome (HCC)   • Monopolar depression (HCC)   • Elevated liver function tests   • Dyspepsia   • Diarrhea   • Gastroesophageal reflux disease   • Arthritis of right hip   • Right hip pain   • Right thigh pain   • Bilious vomiting with nausea   • Generalized abdominal pain   • Gallstones   • Sinus tachycardia   • Abnormal liver CT   • Esophageal dysphagia       Medical History:    Past Medical History:   Diagnosis Date   • Allergic rhinitis    • Anxiety    • Backache    • Blood pressure elevated without history of HTN    • Broken foot 06/01/2002    LT   • Chronic pain    • Depression    • Dizziness    • Fatigue    • Fibromyalgia, primary    • Guillain Barré syndrome (HCC)    • H/O mammogram 01/01/2012    Dr. Rendon   • H/O: pneumonia    • History of broken leg 01/01/1998    LT   • Hypotension    • IBS (irritable bowel syndrome)    • Insomnia    • Memory disorder    • Migraine    • Mood disorder (HCC)    • Multiple joint pain    • Night sweat    • Syncope    • Vitamin D deficiency         Social History:    Social History     Socioeconomic History   • Marital status:    Tobacco Use   • Smoking status: Never Smoker   • Smokeless tobacco: Never Used   • Tobacco comment: occasional caffiene   Vaping Use   • Vaping Use: Never used   Substance and Sexual Activity   • Alcohol use: Yes     Alcohol/week: 3.0 standard drinks     Types: 3 Cans of beer per week     Comment: Moderate   • Drug use: Never   • Sexual activity: Not Currently      Partners: Male       Family History:   Family History   Problem Relation Age of Onset   • Arthritis Mother    • Heart failure Father    • Other Father         Lung failure   • Breast cancer Neg Hx    • Ovarian cancer Neg Hx    • Uterine cancer Neg Hx    • Colon cancer Neg Hx    • Colon polyps Neg Hx    • Crohn's disease Neg Hx    • Irritable bowel syndrome Neg Hx    • Ulcerative colitis Neg Hx        Surgical History:   Past Surgical History:   Procedure Laterality Date   • BREAST AUGMENTATION BILATERAL MASTOPEXY Bilateral 01/01/1998   • COLONOSCOPY N/A 05/18/2010    Dr. Gurmeet Justice   • COLONOSCOPY  10/09/2019    Non-thrombosed external hemorrhoids found on perianal exam, diverticulosis in the sigmoid colon and in the descending colon, One 4 mm polyp in the mid sigmoid colon, tortuous colon, IH. Path: Tubular adenoma.      • COLONOSCOPY W/ BIOPSIES  06/27/2014    Non-thrombosed external hemorrhoids, Diverticulosis in the sigmoid colon, two 4 to 5 mm polyps in the sigmoid colon and the descending colon (pathology: Hyperplastic polyp), Tortuous colon, Internal hemorrhoids   • ENDOSCOPY N/A 06/27/2014    Z-line irregular, 38 cm from the incisors, Gastritis, Gastric polyps, Duodenal erosions without bleeding    • ENDOSCOPY N/A 05/18/2010    Dr. Gurmeet Justice   • ENDOSCOPY  10/09/2019    Z-line irregular, gastritis, bilious gastric fluid. Path: Reflux pattern esophagitis, chronic inflammation.    • HYSTERECTOMY N/A 02/01/2006   • LIPOSUCTION N/A 04/01/2001   • OOPHORECTOMY     • PAP SMEAR N/A 07/10/2013   • TOE SURGERY Right 01/01/1995   • TONSILLECTOMY N/A 01/01/1964   • TRACHEAL SURGERY N/A 01/01/1986    Scar Repair   • TRACHEOSTOMY         No current facility-administered medications for this encounter.    Current Outpatient Medications:   •  bumetanide (BUMEX) 2 MG tablet, Take 1 tablet by mouth Daily., Disp: , Rfl:   •  chlorthalidone (HYGROTON) 25 MG tablet, Take 1 tablet by mouth Daily., Disp: 30  "tablet, Rfl: 1  •  diazePAM (VALIUM) 5 MG tablet, Take 1 tablet by mouth twice daily as needed for anxiety, Disp: 60 tablet, Rfl: 0  •  dicyclomine (BENTYL) 20 MG tablet, Take 1 tablet by mouth 4 (Four) Times a Day As Needed (abdominal pain, diarrhea)., Disp: 120 tablet, Rfl: 2  •  esomeprazole (nexIUM) 40 MG capsule, Take 1 capsule by mouth 2 (Two) Times a Day., Disp: 180 capsule, Rfl: 3  •  HYDROcodone-acetaminophen (NORCO)  MG per tablet, Take 1 tablet by mouth As Needed for Moderate Pain ., Disp: , Rfl:   •  hyoscyamine (ANASPAZ,LEVSIN) 0.125 MG tablet, Take 0.125 mg by mouth As Needed for Cramping., Disp: , Rfl:   •  ibuprofen (ADVIL,MOTRIN) 200 MG tablet, IBU-200  prn, Disp: , Rfl:   •  Magnesium-Potassium 250-100 MG tablet, Take 1 tablet by mouth Daily., Disp: , Rfl:   •  multivitamin (DAILY ES) tablet tablet, Take 1 tablet by mouth daily., Disp: , Rfl:   •  nebivolol (BYSTOLIC) 5 MG tablet, Daily., Disp: , Rfl:   •  progesterone (PROMETRIUM) 200 MG capsule, Take 200 mg by mouth Daily., Disp: , Rfl: 3  •  riFAXIMin (Xifaxan) 550 MG tablet, Take 1 tablet by mouth 3 (Three) Times a Day for 14 days., Disp: 42 tablet, Rfl: 2  •  vitamin E 1000 UNIT capsule, Take 1,000 Units by mouth Daily., Disp: , Rfl:     Allergies:   Allergies   Allergen Reactions   • Influenza Vaccines Unknown - Low Severity   • Midazolam Itching     Other reaction(s): Other (See Comments)  \"makes me very hyper\"        The following portions of the patient's history were reviewed by me and updated as appropriate: review of systems, allergies, current medications, past family history, past medical history, past social history, past surgical history and problem list.    There were no vitals filed for this visit.    PHYSICAL EXAM:    CONSTITUTIONAL:  today's vital signs reviewed by me  GASTROINTESTINAL: abdomen is soft nontender nondistended with normal active bowel sounds, no masses are appreciated    Assessment/ Plan  H/o Esophageal " dysphagia-- not as much now, now with nausea and epigastic pain limiting ability to eat --esophagram with indentation of aortic arch    egd    Risks and benefits as well as alternatives to endoscopic evaluation were explained to the patient and they voiced understanding and wish to proceed.  These risks include but are not limited to the risk of bleeding, perforation, adverse reaction to sedation, and missed lesions.  The patient was given the opportunity to ask questions prior to the endoscopic procedure.

## 2022-08-02 NOTE — ANESTHESIA POSTPROCEDURE EVALUATION
"Patient: Maggie Malhotra    Procedure Summary     Date: 08/02/22 Room / Location: [facility]    Anesthesia Start: 0920 Anesthesia Stop: 0947    Procedure: egd with esophageal dilatation 12-15 balloon with cold biopsies (N/A ) Diagnosis:       Esophageal dysphagia      (Esophageal dysphagia [R13.19])    Surgeons: Michael Matos MD Provider: Chad Brush DO    Anesthesia Type: MAC ASA Status: 3          Anesthesia Type: MAC    Vitals  Vitals Value Taken Time   /83 08/02/22 0958   Temp 36.8 °C (98.2 °F) 08/02/22 0945   Pulse 85 08/02/22 0958   Resp 20 08/02/22 0958   SpO2 95 % 08/02/22 0958           Post Anesthesia Care and Evaluation    Patient location during evaluation: bedside  Patient participation: waiting for patient participation  Level of consciousness: sleepy but conscious and responsive to verbal stimuli  Pain management: adequate    Airway patency: patent  Anesthetic complications: No anesthetic complications  PONV Status: NA  Cardiovascular status: acceptable and hemodynamically stable  Respiratory status: acceptable, spontaneous ventilation and nonlabored ventilation  Hydration status: acceptable    Comments: /83 (BP Location: Left arm, Patient Position: Sitting)   Pulse 85   Temp 36.8 °C (98.2 °F) (Temporal)   Resp 20   Ht 180.3 cm (71\")   Wt 92.1 kg (203 lb)   LMP 01/01/2006 (LMP Unknown)   SpO2 95%   BMI 28.31 kg/m²       "

## 2022-08-03 LAB
LAB AP CASE REPORT: NORMAL
LAB AP CLINICAL INFORMATION: NORMAL
PATH REPORT.FINAL DX SPEC: NORMAL
PATH REPORT.GROSS SPEC: NORMAL

## 2022-08-11 ENCOUNTER — HOSPITAL ENCOUNTER (OUTPATIENT)
Dept: GENERAL RADIOLOGY | Facility: HOSPITAL | Age: 67
Discharge: HOME | End: 2022-08-11
Admitting: NURSE PRACTITIONER

## 2022-08-11 DIAGNOSIS — R13.19 ESOPHAGEAL DYSPHAGIA: ICD-10-CM

## 2022-08-11 DIAGNOSIS — R93.3 ABNORMAL ESOPHAGRAM: ICD-10-CM

## 2022-08-11 PROCEDURE — 74230 X-RAY XM SWLNG FUNCJ C+: CPT

## 2022-08-11 PROCEDURE — 92611 MOTION FLUOROSCOPY/SWALLOW: CPT

## 2022-08-11 RX ADMIN — BARIUM SULFATE 55 ML: 0.81 POWDER, FOR SUSPENSION ORAL at 13:41

## 2022-08-11 RX ADMIN — BARIUM SULFATE 4 ML: 980 POWDER, FOR SUSPENSION ORAL at 13:41

## 2022-08-11 RX ADMIN — BARIUM SULFATE 1 TEASPOON(S): 0.6 CREAM ORAL at 13:41

## 2022-08-11 NOTE — MBS/VFSS/FEES
Outpatient Speech Language Pathology   Adult Swallow Initial Evaluation  [facility]     Patient Name: Maggie Malhotra  : 1955  MRN: [id number]  Today's Date: 2022         Visit Date: 2022   Patient Active Problem List   Diagnosis   • Chronic pain   • Irritable bowel syndrome with diarrhea   • Vitamin D deficiency   • Insomnia   • Allergic rhinitis   • Fibromyalgia, primary   • Essential hypertension   • Anxiety   • Chronic fatigue   • Increased MCV   • Guillain-Ashuelot syndrome (HCC)   • Monopolar depression (HCC)   • Elevated liver function tests   • Dyspepsia   • Diarrhea   • Gastroesophageal reflux disease   • Arthritis of right hip   • Right hip pain   • Right thigh pain   • Bilious vomiting with nausea   • Generalized abdominal pain   • Gallstones   • Sinus tachycardia   • Abnormal liver CT   • Esophageal dysphagia        Past Medical History:   Diagnosis Date   • Allergic rhinitis    • Anxiety    • Backache    • Blood pressure elevated without history of HTN    • Broken foot 2002    LT   • Chronic pain    • Depression    • Dizziness    • Fatigue    • Fibromyalgia, primary    • Guillain Barré syndrome (HCC)    • H/O mammogram 2012    Dr. Rendon   • H/O: pneumonia    • History of broken leg 1998    LT   • Hypotension    • IBS (irritable bowel syndrome)    • Insomnia    • Memory disorder    • Migraine    • Mood disorder (HCC)    • Multiple joint pain    • Night sweat    • Syncope    • Vitamin D deficiency         Past Surgical History:   Procedure Laterality Date   • BREAST AUGMENTATION BILATERAL MASTOPEXY Bilateral 1998   • COLONOSCOPY N/A 2010    Dr. Gurmeet Justice   • COLONOSCOPY  10/09/2019    Non-thrombosed external hemorrhoids found on perianal exam, diverticulosis in the sigmoid colon and in the descending colon, One 4 mm polyp in the mid sigmoid colon, tortuous colon, IH. Path: Tubular adenoma.      • COLONOSCOPY W/ BIOPSIES  2014     Non-thrombosed external hemorrhoids, Diverticulosis in the sigmoid colon, two 4 to 5 mm polyps in the sigmoid colon and the descending colon (pathology: Hyperplastic polyp), Tortuous colon, Internal hemorrhoids   • ENDOSCOPY N/A 06/27/2014    Z-line irregular, 38 cm from the incisors, Gastritis, Gastric polyps, Duodenal erosions without bleeding    • ENDOSCOPY N/A 05/18/2010    Dr. Gurmeet Justice   • ENDOSCOPY  10/09/2019    Z-line irregular, gastritis, bilious gastric fluid. Path: Reflux pattern esophagitis, chronic inflammation.    • ENDOSCOPY N/A 8/2/2022    Procedure: egd with esophageal dilatation 12-15 balloon with cold biopsies;  Surgeon: Michael Matos MD;  Location: [facility] MAIN OR;  Service: Gastroenterology;  Laterality: N/A;  hiatal hernia, gastric polyp, gastrtitis   • HYSTERECTOMY N/A 02/01/2006   • LIPOSUCTION N/A 04/01/2001   • OOPHORECTOMY     • PAP SMEAR N/A 07/10/2013   • TOE SURGERY Right 01/01/1995   • TONSILLECTOMY N/A 01/01/1964   • TRACHEAL SURGERY N/A 01/01/1986    Scar Repair   • TRACHEOSTOMY           Visit Dx:     ICD-10-CM ICD-9-CM   1. Esophageal dysphagia  R13.19 787.29   2. Abnormal esophagram  R93.3 793.4            OP SLP Assessment/Plan - 08/11/22 1404        SLP Assessment    Functional Problems Swallowing  -SH    Clinical Impression Comments Functional swallow  -       SLP Plan    Plan Comments Home exercise program, then maintenance plan if effective  -          User Key  (r) = Recorded By, (t) = Taken By, (c) = Cosigned By    Initials Name Provider Type     Mary Sims MS CCC-SLP Speech and Language Pathologist                 SLP Adult Swallow Evaluation     Row Name 08/11/22 1300       Rehab Evaluation    Document Type evaluation  -SH    Patient Effort excellent  -       General Information    Patient Profile Reviewed yes  -SH    Pertinent History Of Current Problem Dysphagia with pills. Other dysphagia symptoms improved after recent dilation per patient. Flat  affect. The patient not recall which provider ordered the assessment. Pt did not report hx of tracheostomy or tracheal surgery, but this was noted in patient's history during VFSS documentation.  -    Current Method of Nutrition regular textures;thin liquids  -    Precautions/Limitations, Vision WFL  -    Precautions/Limitations, Hearing WFL;for purposes of eval  -    Prior Level of Function-Communication WFL  -    Prior Level of Function-Swallowing other (see comments);esophageal concerns  dysphagia with pills  -    Plans/Goals Discussed with patient  -    Barriers to Rehab previous functional deficit  -    Patient's Goals for Discharge patient did not state  -       Pain    Additional Documentation Pain Scale: Word Pre/Post-Treatment (Group)  -       Pain Scale: Word Pre/Post-Treatment    Pain: Word Scale, Pretreatment 0 - no pain  -       MBS/VFSS Interpretation    VFSS Summary Pt presents with a functional oropharyngeal swallow. Trace, transient penetration inconsistently with thins via cup and straw. Mild pharyngeal residue post swallow, which patient cleared with a second spontaneous swallow. Normal swallow initiation with puree. Pt cleared mild oral and valleculae residue with a spontaneous second swallow. Rotary mastication with mech soft and regular. Spill to the valleculae before the swallow with thin portion of mech soft. Adequate swallow initiation with regular, no signficant oropharyngeal residue post swallow. Brief retention of barium tablet in the valleculae, which patient cleared with an additional drink of water. SLP did educate patient on pharyngeal strengthening program (handout provided), despite functional performance. If there is minimal impairment not visualized, the strengthening program should assist patient with pharyngeal clearance with meds.  -       SLP Communication to Radiology    Summary Statement Radiologist present: Dr. Jordan. No aspiration observed on this  study. Brief retention of barium tablet at the valleculae, which patient sensated and cleared with an additional drink of water.  -       SLP Evaluation Clinical Impression    SLP Swallowing Diagnosis swallow WFL  -    Swallow Criteria for Skilled Therapeutic Interventions Met no problems identified which require skilled intervention  -       Recommendations    Therapy Frequency (Swallow) evaluation only  -    SLP Diet Recommendation regular textures;thin liquids  -    Recommended Precautions and Strategies upright posture during/after eating  -    Oral Care Recommendations Oral Care BID/PRN  -    SLP Rec. for Method of Medication Administration as tolerated  -          User Key  (r) = Recorded By, (t) = Taken By, (c) = Cosigned By    Initials Name Provider Type     Mary Sims MS CCC-SLP Speech and Language Pathologist                               OP SLP Education     Row Name 08/11/22 8193       Education    Barriers to Learning No barriers identified  -    Assessed Learning needs  -    Learning Motivation Moderate  -    Learning Method Explanation;Demonstration;Written materials;Other (comment)  Video  -    Teaching Response Verbalized understanding  -          User Key  (r) = Recorded By, (t) = Taken By, (c) = Cosigned By    Initials Name Effective Dates     Mary Sims MS CCC-SLP 06/16/21 -                          Time Calculation:   SLP Start Time: 1300  Untimed Charges  [id number] Motion Fluoro Eval Swallow Minutes: 60  Total Minutes  Untimed Charges Total Minutes: 60   Total Minutes: 60    Therapy Charges for Today     Code Description Service Date Service Provider Modifiers Qty    [id number]  ST MOTION FLUORO EVAL SWALLOW 4 8/11/2022 Mary Sims MS CCC-SLP GN 1                   Mary Sims MS CCC-SLP  8/11/2022

## 2022-08-23 ENCOUNTER — OFFICE VISIT (OUTPATIENT)
Dept: INTERNAL MEDICINE | Facility: CLINIC | Age: 67
End: 2022-08-23

## 2022-08-23 VITALS
WEIGHT: 200 LBS | SYSTOLIC BLOOD PRESSURE: 128 MMHG | HEART RATE: 85 BPM | OXYGEN SATURATION: 99 % | HEIGHT: 71 IN | BODY MASS INDEX: 28 KG/M2 | DIASTOLIC BLOOD PRESSURE: 86 MMHG

## 2022-08-23 DIAGNOSIS — R60.0 LOCALIZED EDEMA: Primary | ICD-10-CM

## 2022-08-23 PROCEDURE — 99213 OFFICE O/P EST LOW 20 MIN: CPT | Performed by: NURSE PRACTITIONER

## 2022-08-23 RX ORDER — ZOLPIDEM TARTRATE 12.5 MG/1
1 TABLET, FILM COATED, EXTENDED RELEASE ORAL NIGHTLY
COMMUNITY
Start: 2022-08-18 | End: 2022-11-21 | Stop reason: SDUPTHER

## 2022-08-23 RX ORDER — DIAZEPAM 10 MG/1
1 TABLET ORAL DAILY
COMMUNITY
Start: 2022-08-18 | End: 2023-02-23 | Stop reason: SDUPTHER

## 2022-08-23 RX ORDER — CHLORTHALIDONE 50 MG/1
50 TABLET ORAL DAILY
Qty: 30 TABLET | Refills: 1 | Status: SHIPPED | OUTPATIENT
Start: 2022-08-23 | End: 2022-11-21

## 2022-08-23 NOTE — PROGRESS NOTES
Subjective   Maggie Malhotra is a 67 y.o. female. Patient is here today for   Chief Complaint   Patient presents with   • Edema     Pt continues with edema and stated that the swelling has not gotten any better at this point with the medication change to chlorthalidone.    .    History of Present Illness   Patient is here to follow-up on bilateral ankle edema.  At her last office visit about 6 weeks ago, her Bumex was changed to chlorthalidone.  Patient states that she has not noticed a difference in her edema.  She hasn't used compression stockings. No shortness of breath.  She had a venous Doppler study which was negative for DVT.  States that she does occasionally put her feet up and it does help, but she is not able to sit for long periods of time with her feet elevated.    The following portions of the patient's history were reviewed and updated as appropriate: allergies, current medications, past family history, past medical history, past social history, past surgical history and problem list.    Review of Systems    Objective   Vitals:    08/23/22 1144   BP: 128/86   Pulse: 85   SpO2: 99%     Body mass index is 27.89 kg/m².  Physical Exam  Vitals and nursing note reviewed.   Constitutional:       Appearance: Normal appearance. She is well-developed.   Cardiovascular:      Rate and Rhythm: Normal rate and regular rhythm.      Heart sounds: Normal heart sounds.   Pulmonary:      Effort: Pulmonary effort is normal.      Breath sounds: Normal breath sounds.   Musculoskeletal:      Right ankle: Swelling present.      Left ankle: Swelling present.   Skin:     General: Skin is warm and dry.   Neurological:      Mental Status: She is alert and oriented to person, place, and time.   Psychiatric:         Speech: Speech normal.         Behavior: Behavior normal.         Thought Content: Thought content normal.         Assessment & Plan   Diagnoses and all orders for this visit:    1. Localized edema (Primary)  -      Ambulatory Referral to Vascular Surgery  -     chlorthalidone (HYGROTEN) 50 MG tablet; Take 1 tablet by mouth Daily.  Dispense: 30 tablet; Refill: 1      Increase chlorthalidone to 50 mg daily. Instructed patient to get compression stockings. Will refer to vascular.

## 2022-11-20 DIAGNOSIS — R60.0 LOCALIZED EDEMA: ICD-10-CM

## 2022-11-21 DIAGNOSIS — G47.00 INSOMNIA, UNSPECIFIED TYPE: Primary | ICD-10-CM

## 2022-11-21 RX ORDER — CHLORTHALIDONE 50 MG/1
TABLET ORAL
Qty: 30 TABLET | Refills: 1 | Status: SHIPPED | OUTPATIENT
Start: 2022-11-21 | End: 2022-11-22

## 2022-11-21 NOTE — TELEPHONE ENCOUNTER
Caller: Maggie Malhotra    Relationship: Self    Best call back number: [phone]    Requested Prescriptions:   Requested Prescriptions     Pending Prescriptions Disp Refills   • zolpidem CR (AMBIEN CR) 12.5 MG CR tablet       Sig: Take 1 tablet by mouth Every Night.        Pharmacy where request should be sent: [facility] [id number] [facility] [id number] [facility] AT [facility] [address] [facility] [phone] [facility] [phone] FX     Additional details provided by patient:     Does the patient have less than a 3 day supply:  [x] Yes  [] No    Prudence Suarez Rep   11/21/22 16:07 EST

## 2022-11-22 ENCOUNTER — OFFICE VISIT (OUTPATIENT)
Dept: CARDIOLOGY | Facility: CLINIC | Age: 67
End: 2022-11-22

## 2022-11-22 VITALS
HEIGHT: 71 IN | HEART RATE: 74 BPM | DIASTOLIC BLOOD PRESSURE: 84 MMHG | SYSTOLIC BLOOD PRESSURE: 126 MMHG | RESPIRATION RATE: 20 BRPM | WEIGHT: 195 LBS | OXYGEN SATURATION: 99 % | BODY MASS INDEX: 27.3 KG/M2

## 2022-11-22 DIAGNOSIS — R00.0 SINUS TACHYCARDIA: ICD-10-CM

## 2022-11-22 DIAGNOSIS — I10 ESSENTIAL HYPERTENSION: Primary | ICD-10-CM

## 2022-11-22 DIAGNOSIS — G61.0 GUILLAIN-BARRE SYNDROME: ICD-10-CM

## 2022-11-22 DIAGNOSIS — R60.0 EDEMA LEG: ICD-10-CM

## 2022-11-22 PROCEDURE — 93000 ELECTROCARDIOGRAM COMPLETE: CPT | Performed by: INTERNAL MEDICINE

## 2022-11-22 PROCEDURE — 99214 OFFICE O/P EST MOD 30 MIN: CPT | Performed by: INTERNAL MEDICINE

## 2022-11-22 RX ORDER — ZOLPIDEM TARTRATE 12.5 MG/1
12.5 TABLET, FILM COATED, EXTENDED RELEASE ORAL NIGHTLY
Qty: 30 TABLET | Refills: 0 | Status: SHIPPED | OUTPATIENT
Start: 2022-11-22 | End: 2022-12-21

## 2022-11-22 RX ORDER — ONDANSETRON 4 MG/1
TABLET, FILM COATED ORAL
COMMUNITY
Start: 2022-10-05 | End: 2022-11-22 | Stop reason: SDUPTHER

## 2022-11-22 RX ORDER — ONDANSETRON 4 MG/1
4 TABLET, FILM COATED ORAL EVERY 8 HOURS PRN
Qty: 60 TABLET | Refills: 1 | Status: SHIPPED | OUTPATIENT
Start: 2022-11-22 | End: 2023-02-23

## 2022-11-22 RX ORDER — CARVEDILOL PHOSPHATE 40 MG/1
40 CAPSULE, EXTENDED RELEASE ORAL DAILY
Qty: 90 CAPSULE | Refills: 3 | Status: SHIPPED | OUTPATIENT
Start: 2022-11-22

## 2022-11-22 RX ORDER — TESTOSTERONE 12.5 MG/1.25G
GEL TOPICAL DAILY
COMMUNITY
End: 2023-02-23

## 2022-11-22 RX ORDER — FUROSEMIDE 20 MG/1
20 TABLET ORAL DAILY
Qty: 90 TABLET | Refills: 3 | Status: SHIPPED | OUTPATIENT
Start: 2022-11-22 | End: 2023-02-23

## 2022-11-22 RX ORDER — POTASSIUM CHLORIDE 750 MG/1
10 TABLET, FILM COATED, EXTENDED RELEASE ORAL DAILY
Qty: 90 TABLET | Refills: 3 | Status: SHIPPED | OUTPATIENT
Start: 2022-11-22 | End: 2023-02-23

## 2022-11-22 NOTE — TELEPHONE ENCOUNTER
LAST OV 07/25/2022.  Pt is asking for a new RX for Zofran. She has been vomiting on and off. She has 1 pill left. She stated that she is worried about not having medication to get through Thanksgiving.  She uses [facility] pharmacy.   Thank you

## 2022-11-22 NOTE — PROGRESS NOTES
CARDIOLOGY    Adriana Maravilla MD    ENCOUNTER DATE:  11/22/2022    Maggie Malhotra / 67 y.o. / female        CHIEF COMPLAINT / REASON FOR OFFICE VISIT     Heart Problem (Yearly Follow up/Medication review /Edema in Feet )      HISTORY OF PRESENT ILLNESS       HPI    Maggie Malhotra is a 67 y.o. 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 This has left her wit some neuropathy and fibromyalgia. She has no known heart disease. She had a stress echo in 2006 which showed normal LV systolic function without significant valvular heart disease, and she had no evidence of ischemia. She had an exercise stress test in 2012 which was normal. In 09/2018, she was at work. She had just eaten breakfast. She had sudden onset of feeling unwell. She was in a cold sweat. Co-workers said she was really pale. She was dizzy. She felt a little short of breath but did not have any palpitations. She went to the emergency room where her troponin was negative. Basic metabolic panel showed a blood sugar of 140 but was otherwise pretty unremarkable, and CBC was unremarkable. D-dimer was mildly elevated. She was discharged with a Zio patch which was normal. She had carotid artery ultrasound which was also normal.  In December 2018 she had an echocardiogram which showed normal LV systolic function with ejection fraction of 67% and grade 1 diastolic dysfunction.    She complains of bilateral lower extremity edema.  No chest pain.  Shortness of breath is at baseline.      REVIEW OF SYSTEMS     Review of Systems   Constitutional: Negative for chills, fever, weight gain and weight loss.   Cardiovascular: Positive for leg swelling.   Respiratory: Negative for cough, snoring and wheezing.    Hematologic/Lymphatic: Negative for bleeding problem. Does not bruise/bleed easily.   Skin: Negative for color change.   Musculoskeletal: Positive for joint pain and myalgias. Negative for falls.  "  Gastrointestinal: Negative for melena.   Genitourinary: Negative for hematuria.   Neurological: Negative for excessive daytime sleepiness.   Psychiatric/Behavioral: Negative for depression. The patient is nervous/anxious.          VITAL SIGNS     Visit Vitals  /84 (BP Location: Right arm, Patient Position: Sitting, Cuff Size: Adult)   Pulse 74   Resp 20   Ht 180.3 cm (71\")   Wt 88.5 kg (195 lb)   LMP 01/01/2006 (LMP Unknown)   SpO2 99%   BMI 27.20 kg/m²         Wt Readings from Last 3 Encounters:   11/22/22 88.5 kg (195 lb)   08/23/22 90.7 kg (200 lb)   08/02/22 92.1 kg (203 lb)     Body mass index is 27.2 kg/m².      PHYSICAL EXAMINATION     Constitutional:       General: Not in acute distress.  Neck:      Vascular: No carotid bruit or JVD.   Pulmonary:      Effort: Pulmonary effort is normal.      Breath sounds: Normal breath sounds.   Cardiovascular:      Normal rate. Regular rhythm.      Murmurs: There is no murmur.   Psychiatric:         Mood and Affect: Mood and affect normal.           REVIEWED DATA       ECG 12 Lead    Date/Time: 11/22/2022 2:03 PM  Performed by: Adriana Maravilla MD  Authorized by: Adriana Maravilla MD   Comparison: compared with previous ECG from 6/15/2021  Similar to previous ECG  Rhythm: sinus rhythm  BPM: 74  Conduction: conduction normal  ST Segments: ST segments normal  T Waves: T waves normal    Clinical impression: normal ECG                  Lab Results   Component Value Date    GLUCOSE 83 07/11/2022    BUN 12 07/11/2022    CREATININE 0.80 07/11/2022    EGFRIFNONA 71 04/13/2021    EGFRIFAFRI 86 04/13/2021    BCR 15 07/11/2022    K 4.1 07/15/2022    CO2 22 07/11/2022    CALCIUM 9.0 07/11/2022    PROTENTOTREF 6.7 07/11/2022    ALBUMIN 3.8 07/16/2022    LABIL2 1.5 07/16/2022     (H) 07/16/2022    ALT 81 (H) 07/16/2022       ASSESSMENT & PLAN      Diagnosis Plan   1. Essential hypertension  Adult Transthoracic Echo Complete W/ Cont if Necessary Per Protocol    Basic " Metabolic Panel      2. Sinus tachycardia  Adult Transthoracic Echo Complete W/ Cont if Necessary Per Protocol    Basic Metabolic Panel      3. Guillain-[place] syndrome (HCC)  Adult Transthoracic Echo Complete W/ Cont if Necessary Per Protocol    Basic Metabolic Panel      4. Edema leg  Adult Transthoracic Echo Complete W/ Cont if Necessary Per Protocol    Basic Metabolic Panel          1.  Hypertension.  She thinks the edema correlated with changing from carvedilol to Bystolic.  I will put her back on carvedi   She was started on chlorthalidone to see if that would help the edema.  It has not helped some going to try some Lasix and stop the chlorthalidone.  I encouraged her to monitor her blood pressure at home and let me know if she was having any problems with it.  Check a BMP in 1 week.  2.  History of sinus tachycardia.  Normal TSH in April 2021.  3.  Balance problems.   4.  Anxiety/depression.   5.  Edema.  Normal lower extremity Doppler March 2022.  Check an echocardiogram.    One of my nurses or I will call her to go over the results of her echo.  She should call me if she is having any problems with her blood pressure.  Plan will be for her to come back in 1 year unless something changes.    Orders Placed This Encounter   Procedures   • Basic Metabolic Panel     Standing Status:   Future     Standing Expiration Date:   11/22/2023     Order Specific Question:   Release to patient     Answer:   Routine Release   • ECG 12 Lead     This order was created via procedure documentation     Order Specific Question:   Release to patient     Answer:   Routine Release   • Adult Transthoracic Echo Complete W/ Cont if Necessary Per Protocol     Standing Status:   Future     Standing Expiration Date:   11/22/2023     Order Specific Question:   Reason for exam?     Answer:   Dyspnea           MEDICATIONS         Discharge Medications          Accurate as of November 22, 2022  2:03 PM. If you have any questions, ask your  nurse or doctor.            New Medications      Instructions Start Date   carvedilol CR 40 MG 24 hr capsule  Commonly known as: Coreg CR  Started by: Adriana Maravilla MD   40 mg, Oral, Daily      furosemide 20 MG tablet  Commonly known as: LASIX  Started by: Adriana Maravilla MD   20 mg, Oral, Daily      potassium chloride 10 MEQ CR tablet  Started by: Adriana Maravilla MD   10 mEq, Oral, Daily         Changes to Medications      Instructions Start Date   ondansetron 4 MG tablet  Commonly known as: ZOFRAN  What changed:   · how much to take  · how to take this  · when to take this  · reasons to take this  Changed by: Michael Matos MD   4 mg, Oral, Every 8 Hours PRN         Continue These Medications      Instructions Start Date   diazePAM 10 MG tablet  Commonly known as: VALIUM   1 tablet, Oral, Daily      dicyclomine 20 MG tablet  Commonly known as: BENTYL   20 mg, Oral, 4 Times Daily PRN      esomeprazole 40 MG capsule  Commonly known as: nexIUM   40 mg, Oral, 2 Times Daily      HYDROcodone-acetaminophen  MG per tablet  Commonly known as: NORCO   1 tablet, Oral, As Needed      hyoscyamine 0.125 MG tablet  Commonly known as: ANASPAZ,LEVSIN   0.125 mg, Oral, As Needed      ibuprofen 200 MG tablet  Commonly known as: ADVIL,MOTRIN   IBU-200   prn      Magnesium-Potassium 250-100 MG tablet   1 tablet, Oral, Daily      multivitamin tablet tablet  Generic drug: multivitamin   1 tablet, Oral, Daily      progesterone 200 MG capsule  Commonly known as: PROMETRIUM   200 mg, Oral, Daily      testosterone 25 MG/2.5GM (1%) gel gel  Commonly known as: ANDROGEL   Transdermal, Daily      zolpidem CR 12.5 MG CR tablet  Commonly known as: AMBIEN CR   12.5 mg, Oral, Nightly         Stop These Medications    chlorthalidone 50 MG tablet  Commonly known as: HYGROTEN  Stopped by: Adriana Maravilla MD     nebivolol 5 MG tablet  Commonly known as: BYSTOLIC  Stopped by: MD Adriana Pickett,  MD  11/22/22  14:03 EST    Part of this note may be an electronic transcription/translation of spoken language to printed text using the Dragon dictation system.

## 2022-11-23 ENCOUNTER — HOSPITAL ENCOUNTER (OUTPATIENT)
Dept: CARDIOLOGY | Facility: HOSPITAL | Age: 67
Discharge: HOME | End: 2022-11-23
Admitting: INTERNAL MEDICINE

## 2022-11-23 ENCOUNTER — TELEPHONE (OUTPATIENT)
Dept: CARDIOLOGY | Facility: CLINIC | Age: 67
End: 2022-11-23

## 2022-11-23 VITALS
HEIGHT: 71 IN | SYSTOLIC BLOOD PRESSURE: 123 MMHG | OXYGEN SATURATION: 98 % | HEART RATE: 78 BPM | WEIGHT: 195 LBS | DIASTOLIC BLOOD PRESSURE: 75 MMHG | BODY MASS INDEX: 27.3 KG/M2

## 2022-11-23 DIAGNOSIS — G61.0 GUILLAIN-BARRE SYNDROME: ICD-10-CM

## 2022-11-23 DIAGNOSIS — R60.0 EDEMA LEG: ICD-10-CM

## 2022-11-23 DIAGNOSIS — R00.0 SINUS TACHYCARDIA: ICD-10-CM

## 2022-11-23 DIAGNOSIS — I10 ESSENTIAL HYPERTENSION: ICD-10-CM

## 2022-11-23 LAB
AORTIC ARCH: 2.9 CM
ASCENDING AORTA: 3.1 CM
BH CV ECHO MEAS - ACS: 1.81 CM
BH CV ECHO MEAS - AO MAX PG: 5.3 MMHG
BH CV ECHO MEAS - AO MEAN PG: 2.9 MMHG
BH CV ECHO MEAS - AO ROOT DIAM: 3.6 CM
BH CV ECHO MEAS - AO V2 MAX: 115.4 CM/SEC
BH CV ECHO MEAS - AO V2 VTI: 25.3 CM
BH CV ECHO MEAS - AVA(I,D): 2.06 CM2
BH CV ECHO MEAS - EDV(CUBED): 47.9 ML
BH CV ECHO MEAS - EDV(MOD-SP2): 102 ML
BH CV ECHO MEAS - EDV(MOD-SP4): 70 ML
BH CV ECHO MEAS - EF(MOD-BP): 66.4 %
BH CV ECHO MEAS - EF(MOD-SP2): 66.7 %
BH CV ECHO MEAS - EF(MOD-SP4): 65.7 %
BH CV ECHO MEAS - ESV(CUBED): 11.9 ML
BH CV ECHO MEAS - ESV(MOD-SP2): 34 ML
BH CV ECHO MEAS - ESV(MOD-SP4): 24 ML
BH CV ECHO MEAS - FS: 37.2 %
BH CV ECHO MEAS - IVS/LVPW: 1 CM
BH CV ECHO MEAS - IVSD: 1 CM
BH CV ECHO MEAS - LAT PEAK E' VEL: 8.7 CM/SEC
BH CV ECHO MEAS - LV DIASTOLIC VOL/BSA (35-75): 33.6 CM2
BH CV ECHO MEAS - LV MASS(C)D: 109 GRAMS
BH CV ECHO MEAS - LV MAX PG: 3.5 MMHG
BH CV ECHO MEAS - LV MEAN PG: 1.46 MMHG
BH CV ECHO MEAS - LV SYSTOLIC VOL/BSA (12-30): 11.5 CM2
BH CV ECHO MEAS - LV V1 MAX: 93.8 CM/SEC
BH CV ECHO MEAS - LV V1 VTI: 19.2 CM
BH CV ECHO MEAS - LVIDD: 3.6 CM
BH CV ECHO MEAS - LVIDS: 2.28 CM
BH CV ECHO MEAS - LVOT AREA: 2.7 CM2
BH CV ECHO MEAS - LVOT DIAM: 1.86 CM
BH CV ECHO MEAS - LVPWD: 1 CM
BH CV ECHO MEAS - MED PEAK E' VEL: 6.6 CM/SEC
BH CV ECHO MEAS - MV A DUR: 0.11 SEC
BH CV ECHO MEAS - MV A MAX VEL: 102.8 CM/SEC
BH CV ECHO MEAS - MV DEC SLOPE: 327.5 CM/SEC2
BH CV ECHO MEAS - MV DEC TIME: 0.26 MSEC
BH CV ECHO MEAS - MV E MAX VEL: 61.3 CM/SEC
BH CV ECHO MEAS - MV E/A: 0.6
BH CV ECHO MEAS - MV MAX PG: 2.8 MMHG
BH CV ECHO MEAS - MV MEAN PG: 1.37 MMHG
BH CV ECHO MEAS - MV P1/2T: 71.6 MSEC
BH CV ECHO MEAS - MV V2 VTI: 20.1 CM
BH CV ECHO MEAS - MVA(P1/2T): 3.1 CM2
BH CV ECHO MEAS - MVA(VTI): 2.6 CM2
BH CV ECHO MEAS - PA ACC TIME: 0.11 SEC
BH CV ECHO MEAS - PA PR(ACCEL): 30.3 MMHG
BH CV ECHO MEAS - PA V2 MAX: 104.5 CM/SEC
BH CV ECHO MEAS - PULM A REVS DUR: 0.14 SEC
BH CV ECHO MEAS - PULM A REVS VEL: 34 CM/SEC
BH CV ECHO MEAS - PULM DIAS VEL: 40.9 CM/SEC
BH CV ECHO MEAS - PULM S/D: 1.25
BH CV ECHO MEAS - PULM SYS VEL: 51 CM/SEC
BH CV ECHO MEAS - QP/QS: 0.9
BH CV ECHO MEAS - RAP SYSTOLE: 3 MMHG
BH CV ECHO MEAS - RV MAX PG: 1.42 MMHG
BH CV ECHO MEAS - RV V1 MAX: 59.6 CM/SEC
BH CV ECHO MEAS - RV V1 VTI: 13.9 CM
BH CV ECHO MEAS - RVOT DIAM: 2.07 CM
BH CV ECHO MEAS - RVSP: 27.7 MMHG
BH CV ECHO MEAS - SI(MOD-SP2): 32.6 ML/M2
BH CV ECHO MEAS - SI(MOD-SP4): 22 ML/M2
BH CV ECHO MEAS - SUP REN AO DIAM: 2.4 CM
BH CV ECHO MEAS - SV(LVOT): 52 ML
BH CV ECHO MEAS - SV(MOD-SP2): 68 ML
BH CV ECHO MEAS - SV(MOD-SP4): 46 ML
BH CV ECHO MEAS - SV(RVOT): 46.6 ML
BH CV ECHO MEAS - TAPSE (>1.6): 2.5 CM
BH CV ECHO MEAS - TR MAX PG: 24.7 MMHG
BH CV ECHO MEAS - TR MAX VEL: 248.4 CM/SEC
BH CV ECHO MEASUREMENTS AVERAGE E/E' RATIO: 8.01
BH CV XLRA - RV BASE: 3.2 CM
BH CV XLRA - RV LENGTH: 7 CM
BH CV XLRA - RV MID: 3 CM
BH CV XLRA - TDI S': 13.1 CM/SEC
LEFT ATRIUM VOLUME INDEX: 25 ML/M2
MAXIMAL PREDICTED HEART RATE: 154 BPM
SINUS: 3.5 CM
STJ: 3.2 CM
STRESS TARGET HR: 131 BPM

## 2022-11-23 PROCEDURE — 93306 TTE W/DOPPLER COMPLETE: CPT

## 2022-11-23 PROCEDURE — 25010000002 PERFLUTREN (DEFINITY) 8.476 MG IN SODIUM CHLORIDE (PF) 0.9 % 10 ML INJECTION: Performed by: INTERNAL MEDICINE

## 2022-11-23 PROCEDURE — 93306 TTE W/DOPPLER COMPLETE: CPT | Performed by: INTERNAL MEDICINE

## 2022-11-23 RX ADMIN — PERFLUTREN 3 ML: 6.52 INJECTION, SUSPENSION INTRAVENOUS at 13:16

## 2022-11-28 NOTE — TELEPHONE ENCOUNTER
Notified patient of recommendations. Patient verbalized understanding.    Opal Black RN  Triage [facility] [place]

## 2022-11-28 NOTE — TELEPHONE ENCOUNTER
Notified patient of results. Patient verbalized understanding.    Opal Black RN  Triage [facility] [place]

## 2022-11-28 NOTE — TELEPHONE ENCOUNTER
Patient left message stating she had just spoke with triage, and has additional questions. States her sodium is low and she is wanting suggestions on how to increase it?     BRYANNA Estrada   11/28/2022

## 2022-12-20 DIAGNOSIS — G47.00 INSOMNIA, UNSPECIFIED TYPE: ICD-10-CM

## 2022-12-21 RX ORDER — ZOLPIDEM TARTRATE 12.5 MG/1
TABLET, FILM COATED, EXTENDED RELEASE ORAL
Qty: 30 TABLET | Refills: 0 | Status: SHIPPED | OUTPATIENT
Start: 2022-12-21 | End: 2023-01-24 | Stop reason: SDUPTHER

## 2023-01-24 DIAGNOSIS — G47.00 INSOMNIA, UNSPECIFIED TYPE: ICD-10-CM

## 2023-01-24 RX ORDER — DIAZEPAM 10 MG/1
10 TABLET ORAL DAILY
OUTPATIENT
Start: 2023-01-24

## 2023-01-24 RX ORDER — ZOLPIDEM TARTRATE 12.5 MG/1
12.5 TABLET, FILM COATED, EXTENDED RELEASE ORAL NIGHTLY
Qty: 30 TABLET | Refills: 0 | Status: SHIPPED | OUTPATIENT
Start: 2023-01-24 | End: 2023-02-23 | Stop reason: SDUPTHER

## 2023-01-24 NOTE — TELEPHONE ENCOUNTER
Caller: MalhotraИванnetodylan FRIAS    Relationship: Self    Best call back number: [phone]    Requested Prescriptions:   Requested Prescriptions     Pending Prescriptions Disp Refills   • zolpidem CR (AMBIEN CR) 12.5 MG CR tablet 30 tablet 0     Sig: Take 1 tablet by mouth Every Night.   • diazePAM (VALIUM) 10 MG tablet       Sig: Take 1 tablet by mouth Daily.        Pharmacy where request should be sent: [id number] [address] [facility] [phone] [facility] [phone] FX     Additional details provided by patient: PATIENT STATES THAT SHE HAS A 3 DAY SUPPLY OF MEDICATION.    Does the patient have less than a 3 day supply:  [x] Yes  [] No    Would you like a call back once the refill request has been completed: [x] Yes [] No    If the office needs to give you a call back, can they leave a voicemail: [x] Yes [] No    PLEASE ADVISE.    Prudence Hutchins Rep   01/24/23 12:05 EST

## 2023-02-16 DIAGNOSIS — Z00.00 ANNUAL PHYSICAL EXAM: Primary | ICD-10-CM

## 2023-02-16 DIAGNOSIS — Z00.00 ENCOUNTER FOR ANNUAL WELLNESS VISIT (AWV) IN MEDICARE PATIENT: ICD-10-CM

## 2023-02-17 LAB
ALBUMIN SERPL-MCNC: 4.4 G/DL (ref 3.5–5.2)
ALBUMIN/GLOB SERPL: 2 G/DL
ALP SERPL-CCNC: 144 U/L (ref 39–117)
ALT SERPL-CCNC: 65 U/L (ref 1–33)
AST SERPL-CCNC: 120 U/L (ref 1–32)
BASOPHILS # BLD AUTO: 0.04 10*3/MM3 (ref 0–0.2)
BASOPHILS NFR BLD AUTO: 1.2 % (ref 0–1.5)
BILIRUB SERPL-MCNC: 1 MG/DL (ref 0–1.2)
BUN SERPL-MCNC: 4 MG/DL (ref 8–23)
BUN/CREAT SERPL: 6.2 (ref 7–25)
CALCIUM SERPL-MCNC: 9.7 MG/DL (ref 8.6–10.5)
CHLORIDE SERPL-SCNC: 89 MMOL/L (ref 98–107)
CHOLEST SERPL-MCNC: 204 MG/DL (ref 0–200)
CHOLEST/HDLC SERPL: 1.84 {RATIO}
CO2 SERPL-SCNC: 28.1 MMOL/L (ref 22–29)
CREAT SERPL-MCNC: 0.65 MG/DL (ref 0.57–1)
EGFRCR SERPLBLD CKD-EPI 2021: 97.2 ML/MIN/1.73
EOSINOPHIL # BLD AUTO: 0.22 10*3/MM3 (ref 0–0.4)
EOSINOPHIL NFR BLD AUTO: 6.6 % (ref 0.3–6.2)
ERYTHROCYTE [DISTWIDTH] IN BLOOD BY AUTOMATED COUNT: 15.4 % (ref 12.3–15.4)
GLOBULIN SER CALC-MCNC: 2.2 GM/DL
GLUCOSE SERPL-MCNC: 92 MG/DL (ref 65–99)
HCT VFR BLD AUTO: 37.6 % (ref 34–46.6)
HDLC SERPL-MCNC: 111 MG/DL (ref 40–60)
HGB BLD-MCNC: 13.6 G/DL (ref 12–15.9)
IMM GRANULOCYTES # BLD AUTO: 0.01 10*3/MM3 (ref 0–0.05)
IMM GRANULOCYTES NFR BLD AUTO: 0.3 % (ref 0–0.5)
LDLC SERPL CALC-MCNC: 79 MG/DL (ref 0–100)
LYMPHOCYTES # BLD AUTO: 1.09 10*3/MM3 (ref 0.7–3.1)
LYMPHOCYTES NFR BLD AUTO: 32.6 % (ref 19.6–45.3)
MCH RBC QN AUTO: 35.8 PG (ref 26.6–33)
MCHC RBC AUTO-ENTMCNC: 36.2 G/DL (ref 31.5–35.7)
MCV RBC AUTO: 98.9 FL (ref 79–97)
MONOCYTES # BLD AUTO: 0.58 10*3/MM3 (ref 0.1–0.9)
MONOCYTES NFR BLD AUTO: 17.4 % (ref 5–12)
NEUTROPHILS # BLD AUTO: 1.4 10*3/MM3 (ref 1.7–7)
NEUTROPHILS NFR BLD AUTO: 41.9 % (ref 42.7–76)
NRBC BLD AUTO-RTO: 0 /100 WBC (ref 0–0.2)
PLATELET # BLD AUTO: 308 10*3/MM3 (ref 140–450)
POTASSIUM SERPL-SCNC: 4.5 MMOL/L (ref 3.5–5.2)
PROT SERPL-MCNC: 6.6 G/DL (ref 6–8.5)
RBC # BLD AUTO: 3.8 10*6/MM3 (ref 3.77–5.28)
SODIUM SERPL-SCNC: 130 MMOL/L (ref 136–145)
TRIGL SERPL-MCNC: 77 MG/DL (ref 0–150)
TSH SERPL DL<=0.005 MIU/L-ACNC: 2.4 UIU/ML (ref 0.27–4.2)
VLDLC SERPL CALC-MCNC: 14 MG/DL (ref 5–40)
WBC # BLD AUTO: 3.34 10*3/MM3 (ref 3.4–10.8)

## 2023-02-23 ENCOUNTER — OFFICE VISIT (OUTPATIENT)
Dept: INTERNAL MEDICINE | Facility: CLINIC | Age: 68
End: 2023-02-23
Payer: MEDICARE

## 2023-02-23 VITALS
SYSTOLIC BLOOD PRESSURE: 122 MMHG | OXYGEN SATURATION: 99 % | BODY MASS INDEX: 27.3 KG/M2 | DIASTOLIC BLOOD PRESSURE: 80 MMHG | WEIGHT: 195 LBS | HEART RATE: 62 BPM | TEMPERATURE: 97.3 F | HEIGHT: 71 IN

## 2023-02-23 DIAGNOSIS — Z00.00 ENCOUNTER FOR SUBSEQUENT ANNUAL WELLNESS VISIT (AWV) IN MEDICARE PATIENT: Primary | ICD-10-CM

## 2023-02-23 DIAGNOSIS — G47.00 INSOMNIA, UNSPECIFIED TYPE: ICD-10-CM

## 2023-02-23 DIAGNOSIS — E87.1 HYPONATREMIA: ICD-10-CM

## 2023-02-23 DIAGNOSIS — R79.89 ELEVATED LIVER FUNCTION TESTS: ICD-10-CM

## 2023-02-23 DIAGNOSIS — F41.9 ANXIETY: ICD-10-CM

## 2023-02-23 DIAGNOSIS — M79.7 FIBROMYALGIA: ICD-10-CM

## 2023-02-23 PROBLEM — M47.812 CERVICAL SPONDYLOSIS WITHOUT MYELOPATHY: Status: ACTIVE | Noted: 2022-11-01

## 2023-02-23 PROBLEM — R74.01 TRANSAMINITIS: Status: ACTIVE | Noted: 2019-06-07

## 2023-02-23 PROBLEM — Z86.69 HISTORY OF GUILLAIN-BARRE SYNDROME: Status: ACTIVE | Noted: 2022-12-29

## 2023-02-23 PROBLEM — R53.1 WEAKNESS: Status: ACTIVE | Noted: 2018-02-07

## 2023-02-23 PROBLEM — R26.89 IMPAIRMENT OF BALANCE: Status: ACTIVE | Noted: 2022-11-01

## 2023-02-23 PROCEDURE — 1126F AMNT PAIN NOTED NONE PRSNT: CPT | Performed by: NURSE PRACTITIONER

## 2023-02-23 PROCEDURE — G0009 ADMIN PNEUMOCOCCAL VACCINE: HCPCS | Performed by: NURSE PRACTITIONER

## 2023-02-23 PROCEDURE — G0439 PPPS, SUBSEQ VISIT: HCPCS | Performed by: NURSE PRACTITIONER

## 2023-02-23 PROCEDURE — 99214 OFFICE O/P EST MOD 30 MIN: CPT | Performed by: NURSE PRACTITIONER

## 2023-02-23 PROCEDURE — 1170F FXNL STATUS ASSESSED: CPT | Performed by: NURSE PRACTITIONER

## 2023-02-23 PROCEDURE — 90677 PCV20 VACCINE IM: CPT | Performed by: NURSE PRACTITIONER

## 2023-02-23 PROCEDURE — 1159F MED LIST DOCD IN RCRD: CPT | Performed by: NURSE PRACTITIONER

## 2023-02-23 RX ORDER — OXYCODONE AND ACETAMINOPHEN 7.5; 325 MG/1; MG/1
TABLET ORAL
COMMUNITY
Start: 2023-01-04

## 2023-02-23 RX ORDER — DIAZEPAM 10 MG/1
10 TABLET ORAL DAILY PRN
Qty: 30 TABLET | Refills: 0 | Status: SHIPPED | OUTPATIENT
Start: 2023-02-23

## 2023-02-23 RX ORDER — OXYCODONE AND ACETAMINOPHEN 7.5; 325 MG/1; MG/1
TABLET ORAL
COMMUNITY

## 2023-02-23 RX ORDER — PREGABALIN 75 MG/1
CAPSULE ORAL
COMMUNITY
Start: 2023-02-20

## 2023-02-23 RX ORDER — ZOLPIDEM TARTRATE 12.5 MG/1
12.5 TABLET, FILM COATED, EXTENDED RELEASE ORAL NIGHTLY
Qty: 30 TABLET | Refills: 0 | Status: SHIPPED | OUTPATIENT
Start: 2023-02-23 | End: 2023-03-21

## 2023-02-23 RX ORDER — PANTOPRAZOLE SODIUM 40 MG/1
TABLET, DELAYED RELEASE ORAL
COMMUNITY
Start: 2023-02-06

## 2023-02-23 RX ORDER — CHLORTHALIDONE 25 MG/1
TABLET ORAL
COMMUNITY
Start: 2023-01-02

## 2023-02-23 NOTE — PROGRESS NOTES
The ABCs of the Annual Wellness Visit  Subsequent Medicare Wellness Visit    Subjective    Maggie Malhotra is a 67 y.o. female who presents for a Subsequent Medicare Wellness Visit.    The following portions of the patient's history were reviewed and   updated as appropriate: allergies, current medications, past family history, past medical history, past social history, past surgical history and problem list.    Compared to one year ago, the patient feels her physical   health is better. Pain has improved.     Compared to one year ago, the patient feels her mental   health is better.    Recent Hospitalizations:  She was admitted within the past 365 days at [facility].       Current Medical Providers:  Patient Care Team:  Lydia Major APRN as PCP - General (Family Medicine)  Catia Reece MD as Consulting Physician (Internal Medicine)  Gurmeet Justice MD as Consulting Physician (Gastroenterology)  Lanre Solis MD as Consulting Physician (Allergy)  Olegario Serrano MD as Consulting Physician (Urology)  Kenney Reyes MD as Consulting Physician (Neurology)  Tong Hayes MD as Consulting Physician (Neurology)  Adriana Maravilla MD as Consulting Physician (Cardiology)  Jessica Rendon MD as Consulting Physician (Obstetrics and Gynecology)  Xi Spann MD as Consulting Physician (Dermatology)  Dianna Garnica MD as Consulting Physician (Orthopedic Surgery)  Mone Mcpherson MD (Neurology)  Allie Devlin APRN as Nurse Practitioner (Nurse Practitioner)  Beverley Cash MD as Consulting Physician (Pain Medicine)  Nancy Bai APRN as Nurse Practitioner (Nurse Practitioner)    Outpatient Medications Prior to Visit   Medication Sig Dispense Refill   • carvedilol CR (Coreg CR) 40 MG 24 hr capsule Take 1 capsule by mouth Daily. 90 capsule 3   • chlorthalidone (HYGROTON) 25 MG tablet      • HYDROcodone-acetaminophen (NORCO)   MG per tablet Take 1 tablet by mouth As Needed for Moderate Pain .     • ibuprofen (ADVIL,MOTRIN) 200 MG tablet IBU-200   prn     • Magnesium-Potassium 250-100 MG tablet Take 1 tablet by mouth Daily.     • multivitamin (DAILY ES) tablet tablet Take 1 tablet by mouth daily.     • pantoprazole (PROTONIX) 40 MG EC tablet      • pregabalin (LYRICA) 75 MG capsule      • diazePAM (VALIUM) 10 MG tablet Take 1 tablet by mouth Daily.     • zolpidem CR (AMBIEN CR) 12.5 MG CR tablet Take 1 tablet by mouth Every Night. 30 tablet 0   • oxyCODONE-acetaminophen (PERCOCET) 7.5-325 MG per tablet      • oxyCODONE-acetaminophen (PERCOCET) 7.5-325 MG per tablet oxycodone-acetaminophen 7.5 mg-325 mg tablet     • dicyclomine (BENTYL) 20 MG tablet Take 1 tablet by mouth 4 (Four) Times a Day As Needed (abdominal pain, diarrhea). 120 tablet 2   • esomeprazole (nexIUM) 40 MG capsule Take 1 capsule by mouth 2 (Two) Times a Day. 180 capsule 3   • furosemide (LASIX) 20 MG tablet Take 1 tablet by mouth Daily. 90 tablet 3   • hyoscyamine (ANASPAZ,LEVSIN) 0.125 MG tablet Take 0.125 mg by mouth As Needed for Cramping.     • ondansetron (ZOFRAN) 4 MG tablet Take 1 tablet by mouth Every 8 (Eight) Hours As Needed for Nausea or Vomiting. 60 tablet 1   • potassium chloride 10 MEQ CR tablet Take 1 tablet by mouth Daily. 90 tablet 3   • progesterone (PROMETRIUM) 200 MG capsule Take 200 mg by mouth Daily.  3   • testosterone (ANDROGEL) 25 MG/2.5GM (1%) gel gel Place  on the skin as directed by provider Daily.       No facility-administered medications prior to visit.       Opioid medication/s are on active medication list.  and I have evaluated her active treatment plan and pain score trends (see table).  Vitals:    02/23/23 1110   PainSc: 0-No pain     I have reviewed the chart for potential of high risk medication and harmful drug interactions in the elderly.            Aspirin is not on active medication list.  Aspirin use is not indicated based on  "review of current medical condition/s. Risk of harm outweighs potential benefits.  .    Patient Active Problem List   Diagnosis   • Fibromyalgia   • Irritable bowel syndrome with diarrhea   • Vitamin D deficiency   • Insomnia   • Allergic rhinitis   • Fibromyalgia, primary   • Primary hypertension   • Anxiety   • Weakness   • Increased MCV   • Guillain Barré syndrome (HCC)   • Monopolar depression (HCC)   • Transaminitis   • Dyspepsia   • Diarrhea   • Gastroesophageal reflux disease   • Arthritis of right hip   • Right hip pain   • Right thigh pain   • Bilious vomiting with nausea   • Generalized abdominal pain   • Gallstones   • Sinus tachycardia   • Abnormal liver CT   • Esophageal dysphagia   • History of Guillain-[place] syndrome   • Impairment of balance   • Cervical spondylosis without myelopathy     Advance Care Planning  Advance Directive is not on file.  ACP discussion was held with the patient during this visit. Patient has an advance directive (not in EMR), copy requested.     Objective    Vitals:    02/23/23 1110   BP: 122/80   Pulse: 62   Temp: 97.3 °F (36.3 °C)   SpO2: 99%   Weight: 88.5 kg (195 lb)   Height: 180.3 cm (70.98\")   PainSc: 0-No pain     Estimated body mass index is 27.21 kg/m² as calculated from the following:    Height as of this encounter: 180.3 cm (70.98\").    Weight as of this encounter: 88.5 kg (195 lb).    BMI is >= 25 and <30. (Overweight) The following options were offered after discussion;: exercise counseling/recommendations and nutrition counseling/recommendations      Does the patient have evidence of cognitive impairment? No    Lab Results   Component Value Date    CHLPL 204 (H) 02/16/2023    TRIG 77 02/16/2023     (H) 02/16/2023    LDL 79 02/16/2023    VLDL 14 02/16/2023        HEALTH RISK ASSESSMENT    Smoking Status:  Social History     Tobacco Use   Smoking Status Never   Smokeless Tobacco Never   Tobacco Comments    occasional caffiene     Alcohol " Consumption:  Social History     Substance and Sexual Activity   Alcohol Use Yes   • Alcohol/week: 3.0 standard drinks   • Types: 3 Cans of beer per week    Comment: Moderate     Fall Risk Screen:    REUBEN Fall Risk Assessment was completed, and patient is at MODERATE risk for falls. Assessment completed on:2/23/2023    Depression Screening:  PHQ-2/PHQ-9 Depression Screening 2/23/2023   Little Interest or Pleasure in Doing Things 0-->not at all   Feeling Down, Depressed or Hopeless 0-->not at all   PHQ-9: Brief Depression Severity Measure Score 0       Health Habits and Functional and Cognitive Screening:  Functional & Cognitive Status 2/23/2023   Do you have difficulty preparing food and eating? No   Do you have difficulty bathing yourself, getting dressed or grooming yourself? No   Do you have difficulty using the toilet? No   Do you have difficulty moving around from place to place? Yes   Do you have trouble with steps or getting out of a bed or a chair? Yes   Current Diet Well Balanced Diet   Dental Exam Up to date   Eye Exam Up to date   Exercise (times per week) 3 times per week   Current Exercises Include Walking;Weightlifting   Do you need help using the phone?  No   Are you deaf or do you have serious difficulty hearing?  No   Do you need help with transportation? No   Do you need help shopping? No   Do you need help preparing meals?  No   Do you need help with housework?  No   Do you need help with laundry? No   Do you need help taking your medications? No   Do you need help managing money? No   Do you ever drive or ride in a car without wearing a seat belt? No   Have you felt unusual stress, anger or loneliness in the last month? No   Who do you live with? Spouse   If you need help, do you have trouble finding someone available to you? No   Have you been bothered in the last four weeks by sexual problems? No   Do you have difficulty concentrating, remembering or making decisions? No       Age-appropriate  Screening Schedule:  Refer to the list below for future screening recommendations based on patient's age, sex and/or medical conditions. Orders for these recommended tests are listed in the plan section. The patient has been provided with a written plan.    Health Maintenance   Topic Date Due   • DXA SCAN  07/10/2022   • COVID-19 Vaccine (4 - Booster for Pfizer series) 02/25/2023 (Originally 1/29/2022)   • MAMMOGRAM  05/17/2023   • ANNUAL WELLNESS VISIT  02/23/2024   • COLORECTAL CANCER SCREENING  10/09/2024   • PAP SMEAR  03/11/2025   • HEPATITIS C SCREENING  Completed   • Pneumococcal Vaccine 65+  Completed   • TDAP/TD VACCINES  Discontinued   • ZOSTER VACCINE  Discontinued                CMS Preventative Services Quick Reference  Risk Factors Identified During Encounter  Immunizations Discussed/Encouraged: Prevnar 20 (Pneumococcal 20-valent conjugate)  The above risks/problems have been discussed with the patient.  Pertinent information has been shared with the patient in the After Visit Summary.  An After Visit Summary and PPPS were made available to the patient.    Follow Up:   Next Medicare Wellness visit to be scheduled in 1 year.       Additional E&M Note during same encounter follows:  Patient has multiple medical problems which are significant and separately identifiable that require additional work above and beyond the Medicare Wellness Visit.      Chief Complaint  Medicare Wellness-subsequent    Subjective        HPI  Maggie Malhotra is also being seen today for low BP. She stopped coreg 2 months ago because her BP was dropping to 70s/50s    Patient is here to follow up on insomnia. She is requesting a refill on ambien.     Patient takes valium a couple times a week. Helps with fibromyalgia. She is requesting a refill. She does see pain management and they prescribe hydrocodone and lyrica.     Patient takes chlorthalidone as needed for edema. She states that she takes it one to two times per week.       "    Objective   Vital Signs:  /80   Pulse 62   Temp 97.3 °F (36.3 °C)   Ht 180.3 cm (70.98\")   Wt 88.5 kg (195 lb)   SpO2 99%   BMI 27.21 kg/m²     Physical Exam  Vitals and nursing note reviewed.   Constitutional:       Appearance: Normal appearance. She is well-developed.   Cardiovascular:      Rate and Rhythm: Normal rate and regular rhythm.      Heart sounds: Normal heart sounds.   Pulmonary:      Effort: Pulmonary effort is normal.      Breath sounds: Normal breath sounds.   Musculoskeletal:      Right lower leg: No edema.      Left lower leg: No edema.   Skin:     General: Skin is warm and dry.   Neurological:      Mental Status: She is alert and oriented to person, place, and time.   Psychiatric:         Speech: Speech normal.         Behavior: Behavior normal.         Thought Content: Thought content normal.          The following data was reviewed by: RAJEEV Calix on 02/23/2023:  Common labs    Common Labs 7/15/22 7/15/22 7/16/22 7/16/22 2/16/23 2/16/23 2/16/23    0400 0630 0343 0343 1032 1032 1032   Glucose     92     BUN     4 (A)     Creatinine     0.65     Sodium     130 (A)     Potassium  4.1   4.5     Chloride     89 (A)     Calcium     9.7     Total Protein     6.6     Albumin 3.8  3.8  4.4     Total Bilirubin 1.3 (A)  1.5 (A)  1.0     Alkaline Phosphatase 95  178 (A)  144 (A)     AST (SGOT) 214 (A)  157 (A)  120 (A)     ALT (SGPT) 87 (A)  81 (A)  65 (A)     WBC      3.34 (A)    Hemoglobin      13.6    Hematocrit      37.6    Platelets      308    Total Cholesterol       204 (A)   Triglycerides       77   HDL Cholesterol       111 (A)   LDL Cholesterol        79   Hemoglobin A1C    5.0      (A) Abnormal value       Comments are available for some flowsheets but are not being displayed.                      Assessment and Plan   Diagnoses and all orders for this visit:    1. Encounter for subsequent annual wellness visit (AWV) in Medicare patient (Primary)    2. Insomnia, " unspecified type  -     zolpidem CR (AMBIEN CR) 12.5 MG CR tablet; Take 1 tablet by mouth Every Night.  Dispense: 30 tablet; Refill: 0    3. Anxiety  -     diazePAM (VALIUM) 10 MG tablet; Take 1 tablet by mouth Daily As Needed for Anxiety.  Dispense: 30 tablet; Refill: 0    4. Fibromyalgia  -     diazePAM (VALIUM) 10 MG tablet; Take 1 tablet by mouth Daily As Needed for Anxiety.  Dispense: 30 tablet; Refill: 0    5. Hyponatremia  -     Comprehensive Metabolic Panel; Future    6. Elevated liver function tests  -     Comprehensive Metabolic Panel; Future    Other orders  -     Pneumococcal Conjugate Vaccine 20-Valent (PCV20)    hyponatremia - stop chlorthalidone. Recheck CMP in one month. She may need a referral to nephrology    Elevated liver enzymes - improving. CT abd done in October 2021 showed moderate hepatic steatosis and mild hepatomegaly showing no  change since 05/19/2021.        I spent 30 minutes caring for Maggie on this date of service. This time includes time spent by me in the following activities:preparing for the visit, reviewing tests, performing a medically appropriate examination and/or evaluation , counseling and educating the patient/family/caregiver, ordering medications, tests, or procedures and documenting information in the medical record  Follow Up No follow-ups on file.  Patient was given instructions and counseling regarding her condition or for health maintenance advice. Please see specific information pulled into the AVS if appropriate.

## 2023-02-24 ENCOUNTER — TELEPHONE (OUTPATIENT)
Dept: INTERNAL MEDICINE | Facility: CLINIC | Age: 68
End: 2023-02-24
Payer: MEDICARE

## 2023-02-24 NOTE — TELEPHONE ENCOUNTER
Pharmacy Name:  [facility]    Pharmacy representative name: GILBERTO    Pharmacy representative phone number: [phone]    What medication are you calling in regards to: diazePAM (VALIUM) 10 MG tablet    What question does the pharmacy have: PATIENT GETS NORCO FROM DIFFERENT PROVIDER.  THEY HAVE A FEW MORE QUESTION THAT THEY NEED A CALL BACK ON.     Who is the provider that prescribed the medication: WALKER

## 2023-03-20 ENCOUNTER — TELEPHONE (OUTPATIENT)
Dept: INTERNAL MEDICINE | Facility: CLINIC | Age: 68
End: 2023-03-20
Payer: MEDICARE

## 2023-03-20 DIAGNOSIS — G47.00 INSOMNIA, UNSPECIFIED TYPE: Primary | ICD-10-CM

## 2023-03-20 NOTE — TELEPHONE ENCOUNTER
Caller: Maggie Malhotra    Relationship: Self    Best call back number: [phone]    What is the best time to reach you: ANY TIME    Who are you requesting to speak with (clinical staff, provider,  specific staff member): CLINICAL STAFF      What was the call regarding: PATIENT STATES THAT HER AMBIEN DOSAGE THAT SHE IS CURRENTLY TAKING IS NOT WORKING.  SHE WOULD LIKE A CALLBACK TO DISCUSS ALTERNATIVE MEDICATION OR DOSAGE ADJUSTMENT.      Do you require a callback: YES    PLEASE ADVISE.

## 2023-03-21 RX ORDER — TRAZODONE HYDROCHLORIDE 50 MG/1
50 TABLET ORAL NIGHTLY
Qty: 90 TABLET | Refills: 0 | Status: SHIPPED | OUTPATIENT
Start: 2023-03-21

## 2023-04-07 RX ORDER — ZALEPLON 5 MG/1
5 CAPSULE ORAL NIGHTLY
Qty: 30 CAPSULE | Refills: 0 | Status: SHIPPED | OUTPATIENT
Start: 2023-04-07

## 2023-04-17 ENCOUNTER — TELEPHONE (OUTPATIENT)
Dept: INTERNAL MEDICINE | Facility: CLINIC | Age: 68
End: 2023-04-17

## 2023-04-17 DIAGNOSIS — F41.9 ANXIETY: ICD-10-CM

## 2023-04-17 DIAGNOSIS — M79.7 FIBROMYALGIA: ICD-10-CM

## 2023-04-17 RX ORDER — DIAZEPAM 10 MG/1
10 TABLET ORAL DAILY PRN
Qty: 30 TABLET | Refills: 0 | Status: SHIPPED | OUTPATIENT
Start: 2023-04-17

## 2023-04-17 NOTE — TELEPHONE ENCOUNTER
Caller: Maggie Malhotra    Relationship: Self    Best call back number: [phone]    Requested Prescriptions:   Requested Prescriptions     Pending Prescriptions Disp Refills   • diazePAM (VALIUM) 10 MG tablet 30 tablet 0     Sig: Take 1 tablet by mouth Daily As Needed for Anxiety.        Pharmacy where request should be sent: [id number] [address] [phone]  - [phone]     Last office visit with prescribing clinician: 2/23/2023   Last telemedicine visit with prescribing clinician: 8/24/2023   Next office visit with prescribing clinician: 8/24/2023     Additional details provided by patient:     Does the patient have less than a 3 day supply:  [] Yes  [] No    Would you like a call back once the refill request has been completed: [x] Yes [] No    If the office needs to give you a call back, can they leave a voicemail: [x] Yes [] No    Prudence Cortes Rep   04/17/23 08:13 EDT

## 2023-04-17 NOTE — TELEPHONE ENCOUNTER
Caller: Maggie Malhotra    Relationship: Self    Best call back number: [phone]    What medication are you requesting: AMBIEN    Have you had these symptoms before:    [x] Yes  [] No    Have you been treated for these symptoms before:   [x] Yes  [] No    If a prescription is needed, what is your preferred pharmacy and phone number: [id number] - [place], [place] - [address] [facility] [address].  [phone] [facility] [phone] FX     Additional notes: PATIENT STATED THE PRESCRIPTION THAT FREDDIE DANAY HAS HER ON TO HELP HER SLEEP IS NOT WORKING.    SHE WOULD LIKE TO KNOW IF SHE CAN GO BACK TO THE AMBIEN FOR NOW.    PLEASE CALL.

## 2023-04-18 DIAGNOSIS — G47.00 INSOMNIA, UNSPECIFIED TYPE: ICD-10-CM

## 2023-04-19 RX ORDER — ZOLPIDEM TARTRATE 12.5 MG/1
TABLET, FILM COATED, EXTENDED RELEASE ORAL
Qty: 30 TABLET | Refills: 0 | OUTPATIENT
Start: 2023-04-19

## 2023-04-20 ENCOUNTER — TELEMEDICINE (OUTPATIENT)
Dept: INTERNAL MEDICINE | Facility: CLINIC | Age: 68
End: 2023-04-20
Payer: MEDICARE

## 2023-04-20 DIAGNOSIS — G47.00 INSOMNIA, UNSPECIFIED TYPE: Primary | ICD-10-CM

## 2023-04-20 PROCEDURE — 99213 OFFICE O/P EST LOW 20 MIN: CPT | Performed by: NURSE PRACTITIONER

## 2023-04-20 PROCEDURE — 1159F MED LIST DOCD IN RCRD: CPT | Performed by: NURSE PRACTITIONER

## 2023-04-20 PROCEDURE — 1160F RVW MEDS BY RX/DR IN RCRD: CPT | Performed by: NURSE PRACTITIONER

## 2023-04-20 RX ORDER — PREGABALIN 75 MG/1
CAPSULE ORAL
COMMUNITY
Start: 2023-02-20

## 2023-04-20 RX ORDER — ZOLPIDEM TARTRATE 12.5 MG/1
12.5 TABLET, FILM COATED, EXTENDED RELEASE ORAL NIGHTLY PRN
Qty: 30 TABLET | Refills: 0 | Status: SHIPPED | OUTPATIENT
Start: 2023-04-20

## 2023-04-20 NOTE — PROGRESS NOTES
Subjective   Maggie Malhotra is a 68 y.o. female. Patient is here today for   Chief Complaint   Patient presents with   • Medication Problem   .    History of Present Illness   You have chosen to receive care through a telehealth visit.  Do you consent to use a video/audio connection for your medical care today? Yes    Patient is located at home  Provider is located at office at [facility].     Patient is here to discuss insomnia. She was on Ambien CR 12.5 mg and stated that it was only helping with sleep about 1/2 the time.  He requested to be switched to trazodone since she had tried that in the past with success. Trazodone did not help, so she was changed to sonata. States that didn't help and she would like to go back to ambien.     Patient has had some nausea, diarrhea for a couple of days. She has a script for zofran and is able to keep fluids down.    The following portions of the patient's history were reviewed and updated as appropriate: allergies, current medications, past family history, past medical history, past social history, past surgical history and problem list.    Review of Systems    Objective   There were no vitals filed for this visit.  There is no height or weight on file to calculate BMI.  Physical Exam  Nursing note reviewed.   Constitutional:       Appearance: She is ill-appearing.   Pulmonary:      Effort: Pulmonary effort is normal.   Neurological:      Mental Status: She is oriented to person, place, and time.   Psychiatric:         Mood and Affect: Mood normal.         Behavior: Behavior normal.         Assessment & Plan   Diagnoses and all orders for this visit:    1. Insomnia, unspecified type (Primary)  -     zolpidem CR (Ambien CR) 12.5 MG CR tablet; Take 1 tablet by mouth At Night As Needed for Sleep.  Dispense: 30 tablet; Refill: 0    nausea - likely viral. Be sure to drink fluids. F/u if symptoms do not feel better.     Insomnia - will go back to ambien for now. Patient  has a f/u appt in 4 months, but f/u sooner if she continues with sleep issues.

## 2023-05-16 DIAGNOSIS — M79.7 FIBROMYALGIA: Primary | ICD-10-CM

## 2023-05-17 ENCOUNTER — TELEPHONE (OUTPATIENT)
Dept: INTERNAL MEDICINE | Facility: CLINIC | Age: 68
End: 2023-05-17

## 2023-05-17 NOTE — TELEPHONE ENCOUNTER
Caller: Ganesh Malhotra    Relationship: Emergency Contact    Best call back number: [phone]    What form or medical record are you requesting: MOST RECENT LAB RESULTS    Who is requesting this form or medical record from you: [facility]    How would you like to receive the form or medical records (pick-up, mail, fax): FAX [phone]    Timeframe paperwork needed: ASAP - THE PATIENT IS CURRENTLY AT THE LOCATION FOR AN APPOINTMENT.     Additional notes: PLEASE ADVISE WHEN THIS IS COMPLETED.

## 2023-05-20 DIAGNOSIS — G47.00 INSOMNIA, UNSPECIFIED TYPE: ICD-10-CM

## 2023-05-22 RX ORDER — ZOLPIDEM TARTRATE 12.5 MG/1
TABLET, FILM COATED, EXTENDED RELEASE ORAL
Qty: 30 TABLET | Refills: 0 | Status: SHIPPED | OUTPATIENT
Start: 2023-05-22

## 2023-05-31 NOTE — PROGRESS NOTES
The patient has a pain history of the following:  Fibromyalgia     Previous interventions that the patient has received include:   None     Pain medications include:  Hydrocodone-acetaminophen 10-325mg   Ibuprofen  Pregabalin    Previously: Oxycodone-acetaminophen 7.5-325mg, Gabapentin (memory loss), Cymbalta     Other conservative modalities which the patient reports using include:  Physical Therapy: yes  Chiropractor: no  Massage Therapy: no  TENS: yes  Neck or back surgery: no  Past pain management: yes    Past Significant Surgical History:  Right hip replacement     HPI:       CHIEF COMPLAINT: Fibromyalgia    Maggie Malhotra is a 68 y.o. female referred here by RAJEEV Calix. Maggie Malhotra presents to the office for evaluation and treatment of Fibromyalgia      History of Present Illness  Mrs. Malhotra presents today with complaints of fibromyalgia and chronic pain status post Guillain Lund years ago.  She states that she has been on hydrocodone daily for years for her fibromyalgia.  She also takes ibuprofen as needed for pain.  In the past she has tried gabapentin as well as Cymbalta.  She was unable to tolerate gabapentin, however she was recently started on pregabalin and is tolerating that very well.  She states that the pregabalin is seeming to help with her chronic pain.    She is a patient of [facility].  She states that this clinic is closer to her home, so she is considering changing her care to here.       PEG Assessment   What number best describes your pain on average in the past week?3  What number best describes how, during the past week, pain has interfered with your enjoyment of life?4  What number best describes how, during the past week, pain has interfered with your general activity?  4        Current Outpatient Medications:   •  carvedilol CR (Coreg CR) 40 MG 24 hr capsule, Take 1 capsule by mouth Daily., Disp: 90 capsule, Rfl: 3  •  chlorthalidone (HYGROTON) 25 MG tablet,  , Disp: , Rfl:   •  diazePAM (VALIUM) 10 MG tablet, Take 1 tablet by mouth Daily As Needed for Anxiety., Disp: 30 tablet, Rfl: 0  •  HYDROcodone-acetaminophen (NORCO)  MG per tablet, Take 1 tablet by mouth As Needed for Moderate Pain., Disp: , Rfl:   •  ibuprofen (ADVIL,MOTRIN) 200 MG tablet, IBU-200  prn, Disp: , Rfl:   •  Magnesium-Potassium 250-100 MG tablet, Take 1 tablet by mouth Daily., Disp: , Rfl:   •  multivitamin (DAILY ES) tablet tablet, Take 1 tablet by mouth Daily., Disp: , Rfl:   •  Ondansetron 4 MG film, , Disp: , Rfl:   •  pantoprazole (PROTONIX) 40 MG EC tablet, , Disp: , Rfl:   •  pregabalin (LYRICA) 75 MG capsule, , Disp: , Rfl:   •  traZODone (DESYREL) 50 MG tablet, Take 1 tablet by mouth Every Night. May take 2 tablets if needed, Disp: 90 tablet, Rfl: 0  •  zolpidem CR (AMBIEN CR) 12.5 MG CR tablet, TAKE ONE TABLET BY MOUTH AT BEDTIME AS NEEDED for sleep, Disp: 30 tablet, Rfl: 0  •  esomeprazole (nexIUM) 40 MG capsule, Daily., Disp: , Rfl:   •  naloxone (Narcan) 4 MG/0.1ML nasal spray, 1 spray into the nostril(s) as directed by provider As Needed (Respiratory depression)., Disp: 1 each, Rfl: 1    The following portions of the patient's history were reviewed and updated as appropriate: allergies, current medications, past family history, past medical history, past social history, past surgical history and problem list.      REVIEW OF PERTINENT MEDICAL DATA    22 MRI Lumbar Spine Wo & W Con  Order: [id number]  Narrative    RE[facility]ING YOUR TEST RESULTS IN [facility] IS NOT A SUBSTITUTE FOR DISCUSSING THOSE RESULTS WITH YOUR HEALTH CARE PROVIDER.   PLEASE CONTACT YOUR PROVIDER VIA [employer] TO DISCUSS ANY QUESTIONS OR CONCERNS YOU MAY HAVE REGARDING THESE TEST RESULTS.     RADIOLOGY REPORT     FACILITY:  [facility]   UNIT/AGE/GENDER: LOR  IN      AGE:67 Y          SEX:F   PATIENT NAME/:  IVANA OZUNA S    1955   UNIT NUMBER:  [id number]   ACCOUNT  NUMBER:  [id number]   ACCESSION NUMBER:  [id number]     MRI LUMBAR SPINE     CLINICAL INDICATION: lower extremity weakness         COMPARISON: MRI cervical spine June 22, 2021     TECHNIQUE: This MR study consists of multiple sagittal and axial images of the lumbar spine utilizing T1, T2 and gradient echo acquisitions. Post contrast axial and sagittal images.     FINDINGS:   Trace grade 1 anterolisthesis of L3 on L4.     Degenerative endplate changes and associated varying degrees of loss of disc space height of the lumbar spine, greatest at L4-L5. Vertebral body heights are maintained. Heterogeneous marrow signal throughout the lumbar spine, similar to that seen in the   cervical spine, however some of these foci demonstrate increased STIR signal and a focus within the posterior aspect of L2 vertebral body with heterogeneous low T1 signal demonstrates increased STIR signal and enhancement.     Bilateral facet joint effusions at L3-L4. Small fluid collections at the posterior aspects of the bilateral L2-L3 facet joints and the posterior aspect of the left L3-L4 facet joint. These posterior fluid collections demonstrate associated heterogeneous   enhancement.     The conus medullaris is normal in morphology and terminates at L1.     No abnormal postcontrast enhancement within the spinal canal.     At L1-L2:  Symmetric disc bulge. Facet joint hypertrophy. Minimal spinal canal stenosis. Minimal bilateral neuroforaminal stenosis.     At L2-L3:   Disc bulge asymmetric to the left. Facet joint hypertrophy. Mild left asymmetric spinal canal stenosis. Mild left and minimal right neuroforaminal stenosis.     At L3-L4:  Symmetric disc bulge. Ligamentum flavum thickening. Facet joint hypertrophy. Mild to moderate spinal canal stenosis. Mild-to-moderate bilateral neuroforaminal stenosis.     At L4-L5:  Disc bulge asymmetric to the left. Facet joint hypertrophy. Moderate bilateral, left greater than right, neural foraminal  stenosis.     At L5-S1:   Facet joint hypertrophy. Mild bilateral neuroforaminal stenosis.     IMPRESSION:   No abnormal enhancement of the nerve roots of the cauda equina.     Heterogeneously enhancing small fluid collections at the posterior aspects of the L2-L3 and L3-L4 facet joints as described above with small bilateral L3-L4 facet joint effusions. Findings may represent inflamed synovial cysts and/or septic facet joints   at L3-L4.     Heterogeneous low T1 signal focus within the posterior aspect of L2 vertebral body with associated enhancement could represent an atypical hemangioma although other etiology is not excluded. Diffusely heterogeneous marrow signal as seen in the cervical   spine which is nonspecific. Recommend short interval follow-up exam to evaluate for stability.     Dictated by: Kimberly Serrano M.D.     Images and Report reviewed and interpreted by: Kimberly Serrano M.D.     <PS><Electronically signed by: Kimberly Serrano M.D.>   07/16/2022 0838     D: 07/16/2022 0827   T: 07/16/2022 0827  Exam End: 07/16/22 05:37    Specimen Collected: 07/16/22 08:27 Last Resulted: 07/16/22 08:39         5/12/23 Creatinine 0.69,  Platelets 308 (10*3)    Review of Systems   Constitutional: Positive for fatigue. Negative for chills and fever.   Respiratory: Negative for cough and shortness of breath.    Gastrointestinal: Negative for abdominal pain, constipation and diarrhea.   Genitourinary: Negative for difficulty urinating and dysuria.   Musculoskeletal: Positive for myalgias.   Neurological: Positive for weakness (generalized), numbness (bilateral feet) and headaches. Negative for dizziness and light-headedness.   Psychiatric/Behavioral: Positive for sleep disturbance. Negative for suicidal ideas.     I have reviewed and confirmed the accuracy of the ROS as documented by the MA/LPN/RN Rocio Macias MD      Vitals:    06/01/23 1351   BP: 138/85   BP Location: Left arm   Patient Position: Sitting   Pulse: 90  "  Temp: 98.4 °F (36.9 °C)   TempSrc: Temporal   SpO2: 97%   Weight: 85.3 kg (188 lb)   Height: 180.3 cm (70.98\")   PainSc:   4   PainLoc: Generalized         Objective   Physical Exam  Constitutional:       General: She is not in acute distress.  Pulmonary:      Effort: Pulmonary effort is normal. No respiratory distress.   Skin:     General: Skin is warm and dry.   Neurological:      Mental Status: She is alert.   Psychiatric:         Mood and Affect: Mood normal.         Thought Content: Thought content normal.         Assessment & Plan   Diagnoses and all orders for this visit:    1. Chronic pain syndrome (Primary)  -     naloxone (Narcan) 4 MG/0.1ML nasal spray; 1 spray into the nostril(s) as directed by provider As Needed (Respiratory depression).  Dispense: 1 each; Refill: 1    2. Opioid use  -     naloxone (Narcan) 4 MG/0.1ML nasal spray; 1 spray into the nostril(s) as directed by provider As Needed (Respiratory depression).  Dispense: 1 each; Refill: 1    3. History of Guillain-Vancouver syndrome    4. Fibromyalgia        - Pertinent labs reviewed.   - Pertinent imaging reviewed.   -I explained to her that if she were to become a patient here, my recommendations would be to decrease and discontinue her hydrocodone.  My plan would be to decrease and discontinue it in 3 to 6 months time.  I explained that her medication combination is very high risk.  -While decreasing her hydrocodone, I would recommend increasing her Lyrica dose and/or frequency.  We could consider restarting Cymbalta.  Once she has discontinued hydrocodone, we may consider low-dose naltrexone for treatment.  -I recommended that she consider this option and if she would like to be seen by us, she could call us back before her next prescription is due and we would complete a controlled substance agreement at that time.  She decided that she would like to complete the paperwork today, so I explained that we would do that as well as a urine drug " screen.  She then told me she was unable to produce urine since she had recently gone to the restroom.  I offered to perform an oral drug screen today, however she requested to come back in a few weeks to do this in the paperwork.  I explained that that was no longer an option since that would not be considered a random drug screen.  She decided to leave the clinic without completing the drug screen and paperwork.  Since she did not complete the drug screen today, I do not feel comfortable prescribing any controlled substances for her.  -During her visit we did discuss the black box warning of benzodiazepines and opioids.  I also reviewed her records documenting alcohol use.  I explained that I was going to prescribe her Narcan and recommended that she pick that up from her pharmacy.  - Maggie Malhotra reports a pain score of 4.  Given her pain assessment as noted, treatment options were discussed and the following options were decided upon as a follow-up plan to address the patient's pain: continuation of current treatment plan for pain.    --- Follow-up for non-controlled substance management only          Rocio Macias MD  Pain Management    EMR Dragon/Transcription disclaimer:   Much of this encounter note is an electronic transcription/translation of spoken language to printed text. The electronic translation of spoken language may permit erroneous, or at times, nonsensical words or phrases to be inadvertently transcribed; Although I have reviewed the note for such errors, some may still exist.

## 2023-06-01 ENCOUNTER — OFFICE VISIT (OUTPATIENT)
Dept: PAIN MEDICINE | Facility: CLINIC | Age: 68
End: 2023-06-01

## 2023-06-01 VITALS
OXYGEN SATURATION: 97 % | HEART RATE: 90 BPM | SYSTOLIC BLOOD PRESSURE: 138 MMHG | BODY MASS INDEX: 26.32 KG/M2 | WEIGHT: 188 LBS | DIASTOLIC BLOOD PRESSURE: 85 MMHG | HEIGHT: 71 IN | TEMPERATURE: 98.4 F

## 2023-06-01 DIAGNOSIS — F11.90 OPIOID USE: ICD-10-CM

## 2023-06-01 DIAGNOSIS — G89.4 CHRONIC PAIN SYNDROME: Primary | ICD-10-CM

## 2023-06-01 DIAGNOSIS — M79.7 FIBROMYALGIA: ICD-10-CM

## 2023-06-01 DIAGNOSIS — Z86.69 HISTORY OF GUILLAIN-BARRE SYNDROME: ICD-10-CM

## 2023-06-01 RX ORDER — NALOXONE HYDROCHLORIDE 4 MG/.1ML
1 SPRAY NASAL AS NEEDED
Qty: 1 EACH | Refills: 1 | Status: SHIPPED | OUTPATIENT
Start: 2023-06-01

## 2023-06-01 RX ORDER — ESOMEPRAZOLE MAGNESIUM 40 MG/1
CAPSULE, DELAYED RELEASE ORAL EVERY 24 HOURS
COMMUNITY

## 2023-07-24 ENCOUNTER — TELEPHONE (OUTPATIENT)
Dept: INTERNAL MEDICINE | Facility: CLINIC | Age: 68
End: 2023-07-24

## 2023-07-24 NOTE — TELEPHONE ENCOUNTER
Caller: Maggie Malhotra    Relationship: Self    Best call back number: [phone]     What is the medical concern/diagnosis: JOINT PAIN/ ACHES, ARTHRITIS CONCERNS     What specialty or service is being requested: RHEUMATOLOGY     What is the provider, practice or medical service name: [facility] IF THERE IS A RHEUMATOLOGIST IN THAT AREA, IF NOT PLEASE SEND TO [facility]     What is the office location: [address] [place] [address] [address] [place] [address]    What is the office phone number: [phone]    Any additional details: PATIENT STATES TO PLEASE REACH OUT WITH ANY QUESTIONS OR CONCERNS.

## 2023-08-04 ENCOUNTER — OFFICE VISIT (OUTPATIENT)
Dept: INTERNAL MEDICINE | Facility: CLINIC | Age: 68
End: 2023-08-04
Payer: MEDICARE

## 2023-08-04 VITALS
TEMPERATURE: 96.1 F | HEIGHT: 71 IN | DIASTOLIC BLOOD PRESSURE: 80 MMHG | SYSTOLIC BLOOD PRESSURE: 116 MMHG | WEIGHT: 187.7 LBS | BODY MASS INDEX: 26.28 KG/M2 | HEART RATE: 89 BPM | OXYGEN SATURATION: 96 %

## 2023-08-04 DIAGNOSIS — G47.00 INSOMNIA, UNSPECIFIED TYPE: Primary | ICD-10-CM

## 2023-08-04 DIAGNOSIS — M79.7 FIBROMYALGIA, PRIMARY: ICD-10-CM

## 2023-08-04 PROBLEM — G62.9 POLYNEUROPATHY, UNSPECIFIED: Status: ACTIVE | Noted: 2022-07-20

## 2023-08-04 PROBLEM — M79.2 NEUROPATHIC PAIN: Status: ACTIVE | Noted: 2023-03-16

## 2023-08-04 PROCEDURE — 3079F DIAST BP 80-89 MM HG: CPT | Performed by: NURSE PRACTITIONER

## 2023-08-04 PROCEDURE — 1159F MED LIST DOCD IN RCRD: CPT | Performed by: NURSE PRACTITIONER

## 2023-08-04 PROCEDURE — 1160F RVW MEDS BY RX/DR IN RCRD: CPT | Performed by: NURSE PRACTITIONER

## 2023-08-04 PROCEDURE — 3074F SYST BP LT 130 MM HG: CPT | Performed by: NURSE PRACTITIONER

## 2023-08-04 PROCEDURE — 99214 OFFICE O/P EST MOD 30 MIN: CPT | Performed by: NURSE PRACTITIONER

## 2023-08-04 RX ORDER — FUROSEMIDE 20 MG/1
TABLET ORAL
COMMUNITY
Start: 2023-06-20

## 2023-08-04 RX ORDER — ESZOPICLONE 2 MG/1
2 TABLET, FILM COATED ORAL NIGHTLY
Qty: 30 TABLET | Refills: 0 | Status: SHIPPED | OUTPATIENT
Start: 2023-08-04

## 2023-08-04 NOTE — PROGRESS NOTES
Subjective   Maggie Malhotra is a 68 y.o. female. Patient is here today for   Chief Complaint   Patient presents with    Joint Swelling     Patient complains of ankle and hand swelling and joint pain.     Insomnia       Patient is still having trouble sleeping.    .    History of Present Illness   Patient is here to request a referral to rheumatology. She is having swelling in Bilateral hands. She was previously Diagnosed with fibromyalgia. She has a History of GBS. She is wanting to know what other treatment options are available.     Patient is here to follow up on insomnia.  Trazodone was not helping.  Lunesta helped some.    She is going to follow up with [facility]. She had bioidentical hormones in the past which helped with her feeling better overall    The following portions of the patient's history were reviewed and updated as appropriate: allergies, current medications, past family history, past medical history, past social history, past surgical history and problem list.    Review of Systems    Objective   Vitals:    08/04/23 1124   BP: 116/80   Pulse: 89   Temp: 96.1 øF (35.6 øC)   SpO2: 96%     Body mass index is 26.19 kg/mý.  Physical Exam  Vitals and nursing note reviewed.   Constitutional:       Appearance: Normal appearance. She is well-developed.   Cardiovascular:      Rate and Rhythm: Normal rate and regular rhythm.      Heart sounds: Normal heart sounds.   Pulmonary:      Effort: Pulmonary effort is normal.      Breath sounds: Normal breath sounds.   Musculoskeletal:      Right hand: Swelling present.      Left hand: Swelling present.   Skin:     General: Skin is warm and dry.   Neurological:      Mental Status: She is alert and oriented to person, place, and time.   Psychiatric:         Speech: Speech normal.         Behavior: Behavior normal.         Thought Content: Thought content normal.       Assessment & Plan   Diagnoses and all orders for this visit:    1. Insomnia, unspecified type  (Primary)  -     eszopiclone (Lunesta) 2 MG tablet; Take 1 tablet by mouth Every Night. Take immediately before bedtime  Dispense: 30 tablet; Refill: 0    2. Fibromyalgia, primary  -     Ambulatory Referral to Rheumatology      Insomnia - will increase Lunesta to 2 mg nightly. F/u in 2 months    Fibromyalgia - refer to rheumatology.     Answers submitted by the patient for this visit:  Other (Submitted on 8/3/2023)  Please describe your symptoms.: Needing referral for a rheumatologist  Have you had these symptoms before?: No  How long have you been having these symptoms?: 1-4 days  Please list any medications you are currently taking for this condition.: None  Please describe any probable cause for these symptoms. : None  Primary Reason for Visit (Submitted on 8/3/2023)  What is the primary reason for your visit?: Other

## 2023-08-21 ENCOUNTER — TELEPHONE (OUTPATIENT)
Dept: INTERNAL MEDICINE | Facility: CLINIC | Age: 68
End: 2023-08-21
Payer: MEDICARE

## 2023-08-21 NOTE — TELEPHONE ENCOUNTER
Caller: Maggie Malhotra    Relationship: Self    Best call back number: [phone]     Requested Prescriptions: AMBIEN 12.5         Pharmacy where request should be sent:    [id number] - [facility] [address]. - [phone]  - [phone]   Last office visit with prescribing clinician: 8/4/2023   Last telemedicine visit with prescribing clinician: 4/20/2023   Next office visit with prescribing clinician: 10/5/2023     Does the patient have less than a 3 day supply:  [x] Yes  [] No    Would you like a call back once the refill request has been completed: [] Yes [] No    If the office needs to give you a call back, can they leave a voicemail: [] Yes [] No    Prudence Harvey Rep   08/21/23 09:44 EDT     PLEASE ADVISE.

## 2023-08-22 ENCOUNTER — TELEPHONE (OUTPATIENT)
Dept: INTERNAL MEDICINE | Facility: CLINIC | Age: 68
End: 2023-08-22
Payer: MEDICARE

## 2023-08-22 DIAGNOSIS — G62.9 NEUROPATHY: Primary | ICD-10-CM

## 2023-08-22 DIAGNOSIS — G61.0 GUILLAIN BARRÉ SYNDROME: ICD-10-CM

## 2023-08-22 DIAGNOSIS — G47.00 INSOMNIA, UNSPECIFIED TYPE: ICD-10-CM

## 2023-08-22 RX ORDER — ESZOPICLONE 3 MG/1
3 TABLET, FILM COATED ORAL NIGHTLY
Qty: 30 TABLET | Refills: 0 | Status: SHIPPED | OUTPATIENT
Start: 2023-08-22

## 2023-08-22 NOTE — TELEPHONE ENCOUNTER
She says it is not working for her, she has not slept since being sick. Requesting to up dosage as well.

## 2023-09-13 DIAGNOSIS — F41.9 ANXIETY: ICD-10-CM

## 2023-09-13 DIAGNOSIS — M79.7 FIBROMYALGIA: ICD-10-CM

## 2023-09-13 NOTE — TELEPHONE ENCOUNTER
[facility] PHYLLIS CHAO ON DESK  UDS NEEDS TO BE COMPLETED  LAST OV 8/4/2023  F/U SCHEDULED FOR 10/5/2023  LAST FILL DATE 7/13/2023

## 2023-09-14 RX ORDER — DIAZEPAM 10 MG/1
TABLET ORAL
Qty: 30 TABLET | Refills: 0 | Status: SHIPPED | OUTPATIENT
Start: 2023-09-14

## 2023-09-29 DIAGNOSIS — G47.00 INSOMNIA, UNSPECIFIED TYPE: ICD-10-CM

## 2023-09-29 RX ORDER — ESZOPICLONE 3 MG/1
TABLET, FILM COATED ORAL
Qty: 30 TABLET | Refills: 0 | Status: SHIPPED | OUTPATIENT
Start: 2023-09-29

## 2023-10-07 NOTE — PROGRESS NOTES
Chief Complaint   Patient presents with   • abdominal spasms     ways to help.       Maggie Malhotra is a  61 y.o. female here for a follow up visit for IBS-D, abd cramping    HPI    61-year-old female patient of Dr. Justice's last evaluated in the office in November 2015.  Followed for a history of irritable bowel syndrome predominant diarrhea and history of colon polyps.  She presents today for routine follow-up.  She reports her irritable bowel syndrome has been under good control.  She has no issues with regard to loose watery stools.  She reports daily bowel movements that are formed.  She has not seen any blood in the stool.  Her only complaint is of persistent lower abdominal bloating and cramping even in the absence of need to have a bowel movement.  She is currently taking Bentyl and Levsin sublingually for acute episodes with some improvement in her symptoms.  She does not have chronic abdominal pain.  Her weight is stable.  Prior colonoscopy was performed in June 2014 with findings of external hemorrhoids, diverticular disease and hyperplastic polyp. She watches her diet closely but does not notice an increase in symptoms with regard to dietary intake related to abdominal bloating or cramping.      Past Medical History   Diagnosis Date   • Allergic rhinitis    • Anxiety    • Backache    • Broken foot 06/01/2002     LT   • Chronic pain    • Depression    • Fatigue    • Fibromyalgia, primary    • Guillain Barré syndrome    • H/O mammogram 01/01/2012     Dr. Rendon   • H/O: pneumonia    • History of broken leg 01/01/1998     LT   • Hypotension    • IBS (irritable bowel syndrome)    • Insomnia    • Migraine    • Multiple joint pain    • Night sweat    • Vitamin D deficiency        Current Outpatient Prescriptions   Medication Sig Dispense Refill   • busPIRone (BUSPAR) 30 MG tablet Take 1 tablet by mouth 2 (Two) Times a Day. 60 tablet 6   • escitalopram (LEXAPRO) 20 MG tablet 1.5 pill daily 135 tablet 2   •  gabapentin (NEURONTIN) 600 MG tablet TAKE 1 TABLET DAILY 90 tablet 0   • Hormone Cream Base (HRT BASE) cream Testosterone 40 mg/Estradiol 1.2 mg/g  1   • HYDROcodone-acetaminophen (NORCO)  MG per tablet Take 1 tablet by mouth Every 6 (Six) Hours As Needed for moderate pain (4-6). 100 tablet 0   • hyoscyamine (LEVSIN) 0.125 MG SL tablet Dissolve one to 2 tabs every 6 hrs sublingual for abdominal spasms 60 tablet 0   • LORazepam (ATIVAN) 1 MG tablet Take 1 tablet by mouth Every 8 (Eight) Hours As Needed for anxiety. 90 tablet 0   • multivitamin (DAILY ES) tablet tablet Take 1 tablet by mouth daily.     • Nutritional Supplements (DHEA PO) Take 1 tablet by mouth daily.     • Nutritional Supplements (HRT SUPPORT PO) Estradiol/Progesterone 1/100 capsuleTake one capsule in the evening. May increase to one capsule two times a day     • Nutritional Supplements (HRT SUPPORT PO) Take 60 mcg by mouth.     • Nutritional Supplements (HRT SUPPORT PO) Take 30 mcg by mouth Daily.     • pregabalin (LYRICA) 100 MG capsule Take 1 capsule by mouth 3 (three) times a day. (Patient taking differently: Take 100 mg by mouth Daily.) 270 capsule 1   • promethazine (PHENERGAN) 25 MG tablet TAKE 1 TABLET EVERY 6 HOURS AS NEEDED FOR NAUSEA OR VOMITING 20 tablet 1   • THYROIDTHROID 48.75 MG tablet tablet Take 1 tablet by mouth Daily.     • Unable to find Take 1 each by mouth daily. Compound Drug-Progesterone 150 mg       • zolpidem (AMBIEN) 10 MG tablet Take 1 tablet by mouth At Night As Needed for sleep. 30 tablet 0     No current facility-administered medications for this visit.        PRN Meds:.    Allergies   Allergen Reactions   • Celexa [Citalopram Hydrobromide] Other (See Comments)     Headache   • Codeine    • Cymbalta [Duloxetine Hcl] Nausea Only and Other (See Comments)     Fatigue/Pain   • Erythromycin    • Influenza Vaccines    • Midazolam        Social History     Social History   • Marital status:      Spouse name: N/A  "  • Number of children: N/A   • Years of education: N/A     Occupational History   • Not on file.     Social History Main Topics   • Smoking status: Never Smoker   • Smokeless tobacco: Never Used   • Alcohol use 1.8 oz/week     3 Cans of beer per week      Comment: Moderate   • Drug use: Yes     Special: Hydrocodone, Benzodiazepines   • Sexual activity: Not Currently     Partners: Male     Other Topics Concern   • Not on file     Social History Narrative       Family History   Problem Relation Age of Onset   • Heart failure Father    • Other Father      Lung failure       Review of Systems   Constitutional: Negative for unexpected weight change.   HENT: Negative for trouble swallowing.    Gastrointestinal: Positive for abdominal distention and abdominal pain. Negative for constipation, diarrhea, nausea and vomiting.   Allergic/Immunologic: Negative for immunocompromised state.       Vitals:    01/13/17 1308   BP: 144/84     Visit Vitals   • /84   • Ht 71\" (180.3 cm)   • Wt 199 lb (90.3 kg)   • LMP 01/01/2006   • BMI 27.75 kg/m2     Physical Exam   Constitutional: She is oriented to person, place, and time. She appears well-developed and well-nourished.   Cardiovascular: Normal rate and regular rhythm.    Pulmonary/Chest: Effort normal.   Abdominal: Soft. Bowel sounds are normal. She exhibits no distension. There is no tenderness.   Neurological: She is alert and oriented to person, place, and time.   Skin: Skin is warm and dry.   Psychiatric: She has a normal mood and affect.   Vitals reviewed.      ASSESSMENT AND PLAN    Maggie was seen today for abdominal spasms.    Diagnoses and all orders for this visit:    Irritable bowel syndrome with diarrhea    Her abdominal exam is benign.  She will continue the Levsin for acute episodes of abdominal spasm/cramping and can follow that with use of Bentyl.  We have also discussed a trial of daily Bentyl for preventative measures.  I also suggested IB Sagrario " Sepsis over-the-counter to help with complaints of abdominal bloating.  In addition, she should continue to monitor her dietary intake.  She does eat a lot of fresh fruits and vegetables in an attempt to eat more healthfully, however this may be precipitating some increase in the bloating and cramping that she has been experiencing.  She will return for routine follow-up in one year, sooner for any changes from today's assessment

## 2023-10-16 ENCOUNTER — OFFICE VISIT (OUTPATIENT)
Dept: INTERNAL MEDICINE | Facility: CLINIC | Age: 68
End: 2023-10-16
Payer: MEDICARE

## 2023-10-16 VITALS
TEMPERATURE: 97.8 F | SYSTOLIC BLOOD PRESSURE: 128 MMHG | OXYGEN SATURATION: 98 % | BODY MASS INDEX: 26 KG/M2 | DIASTOLIC BLOOD PRESSURE: 80 MMHG | WEIGHT: 185.7 LBS | HEIGHT: 71 IN | HEART RATE: 73 BPM

## 2023-10-16 DIAGNOSIS — G47.00 INSOMNIA, UNSPECIFIED TYPE: ICD-10-CM

## 2023-10-16 DIAGNOSIS — M79.641 PAIN IN BOTH HANDS: Primary | ICD-10-CM

## 2023-10-16 DIAGNOSIS — M79.675 CHRONIC PAIN OF TOES OF BOTH FEET: ICD-10-CM

## 2023-10-16 DIAGNOSIS — M79.7 FIBROMYALGIA: ICD-10-CM

## 2023-10-16 DIAGNOSIS — M79.674 CHRONIC PAIN OF TOES OF BOTH FEET: ICD-10-CM

## 2023-10-16 DIAGNOSIS — F41.9 ANXIETY: ICD-10-CM

## 2023-10-16 DIAGNOSIS — M79.642 PAIN IN BOTH HANDS: Primary | ICD-10-CM

## 2023-10-16 DIAGNOSIS — G89.29 CHRONIC PAIN OF TOES OF BOTH FEET: ICD-10-CM

## 2023-10-16 PROBLEM — F10.10 ALCOHOL ABUSE: Status: ACTIVE | Noted: 2023-05-17

## 2023-10-16 PROCEDURE — 1160F RVW MEDS BY RX/DR IN RCRD: CPT | Performed by: NURSE PRACTITIONER

## 2023-10-16 PROCEDURE — 3079F DIAST BP 80-89 MM HG: CPT | Performed by: NURSE PRACTITIONER

## 2023-10-16 PROCEDURE — 1159F MED LIST DOCD IN RCRD: CPT | Performed by: NURSE PRACTITIONER

## 2023-10-16 PROCEDURE — 99214 OFFICE O/P EST MOD 30 MIN: CPT | Performed by: NURSE PRACTITIONER

## 2023-10-16 PROCEDURE — 3074F SYST BP LT 130 MM HG: CPT | Performed by: NURSE PRACTITIONER

## 2023-10-16 RX ORDER — DIAZEPAM 10 MG/1
10 TABLET ORAL DAILY PRN
Qty: 30 TABLET | Refills: 0 | Status: SHIPPED | OUTPATIENT
Start: 2023-10-16

## 2023-10-16 NOTE — PROGRESS NOTES
Subjective   Maggie Malhotra is a 68 y.o. female. Patient is here today for   Chief Complaint   Patient presents with    Insomnia     Patient is here for a 2 month follow up on insomnia, she started Lunesta.    .    History of Present Illness   C/o pain in joints in hands and feet. Her joints are starting to contract.  Left thumb swollen and painful.   She is requesting a second opinion on her joints.     Patient is here to follow up on insomnia. Lunesta helps some. She has been getting better sleep.     Patient is here to follow up on anxiety. She takes diazepam as needed. She does occasionally take this to help her sleep.     Answers submitted by the patient for this visit:  Other (Submitted on 10/9/2023)  Please describe your symptoms.: Sleep disorder  Have you had these symptoms before?: Yes  How long have you been having these symptoms?: Greater than 2 weeks  Primary Reason for Visit (Submitted on 10/9/2023)  What is the primary reason for your visit?: Other    Had labs done last week.    The following portions of the patient's history were reviewed and updated as appropriate: allergies, current medications, past family history, past medical history, past social history, past surgical history and problem list.    Review of Systems    Objective   Vitals:    10/16/23 1415   BP: 128/80   Pulse: 73   Temp: 97.8 °F (36.6 °C)   SpO2: 98%     Body mass index is 25.91 kg/m².  Physical Exam  Vitals and nursing note reviewed.   Constitutional:       Appearance: Normal appearance. She is well-developed.   Cardiovascular:      Rate and Rhythm: Normal rate and regular rhythm.      Heart sounds: Normal heart sounds.   Pulmonary:      Effort: Pulmonary effort is normal.      Breath sounds: Normal breath sounds.   Musculoskeletal:      Right hand: Deformity and bony tenderness present.      Left hand: Deformity and bony tenderness present.      Comments: Toes are hyperflexed   Skin:     General: Skin is warm and dry.    Neurological:      Mental Status: She is alert and oriented to person, place, and time.   Psychiatric:         Speech: Speech normal.         Behavior: Behavior normal.         Thought Content: Thought content normal.         Assessment & Plan   Diagnoses and all orders for this visit:    1. Pain in both hands (Primary)  -     Ambulatory Referral to Hand Surgery    2. Anxiety  -     diazePAM (VALIUM) 10 MG tablet; Take 1 tablet by mouth Daily As Needed for Anxiety.  Dispense: 30 tablet; Refill: 0    3. Fibromyalgia  -     diazePAM (VALIUM) 10 MG tablet; Take 1 tablet by mouth Daily As Needed for Anxiety.  Dispense: 30 tablet; Refill: 0    4. Chronic pain of toes of both feet    5. Insomnia, unspecified type      Pain in both hands - will refer to hand surgery. She has seen rheumatology     Anxiety - will refill diazepam    Pain in feet - patient will follow up with podiatry    Insomnia - continue with Lunesta 3 mg daily. As part of this patient's treatment plan I am prescribing controlled substances.  The patient has been made aware of appropriate use of such medications, including potential risk of somnolence. Limited ability to drive and/or work safely, and potential for dependence or overdose.  It has also been made clear that these medications are for use by this patient only, without concomitant use of alcohol or other substances unless prescribed.  ZHANNA report has been reviewed and scanned into the patient's chart.

## 2023-10-27 DIAGNOSIS — G47.00 INSOMNIA, UNSPECIFIED TYPE: ICD-10-CM

## 2023-10-28 RX ORDER — ESZOPICLONE 3 MG/1
TABLET, FILM COATED ORAL
Qty: 30 TABLET | Refills: 0 | Status: SHIPPED | OUTPATIENT
Start: 2023-10-28

## 2023-11-17 DIAGNOSIS — M79.7 FIBROMYALGIA: ICD-10-CM

## 2023-11-17 DIAGNOSIS — F41.9 ANXIETY: ICD-10-CM

## 2023-11-17 RX ORDER — DIAZEPAM 10 MG/1
10 TABLET ORAL DAILY PRN
Qty: 30 TABLET | Refills: 0 | Status: SHIPPED | OUTPATIENT
Start: 2023-11-17

## 2023-11-28 DIAGNOSIS — G47.00 INSOMNIA, UNSPECIFIED TYPE: ICD-10-CM

## 2023-11-28 RX ORDER — ESZOPICLONE 3 MG/1
TABLET, FILM COATED ORAL
Qty: 90 TABLET | Refills: 0 | Status: SHIPPED | OUTPATIENT
Start: 2023-11-28

## 2023-12-12 ENCOUNTER — TELEMEDICINE (OUTPATIENT)
Dept: INTERNAL MEDICINE | Facility: CLINIC | Age: 68
End: 2023-12-12
Payer: MEDICARE

## 2023-12-12 ENCOUNTER — TELEPHONE (OUTPATIENT)
Dept: INTERNAL MEDICINE | Facility: CLINIC | Age: 68
End: 2023-12-12

## 2023-12-12 VITALS — BODY MASS INDEX: 25.9 KG/M2 | TEMPERATURE: 98 F | HEIGHT: 71 IN | WEIGHT: 185 LBS

## 2023-12-12 DIAGNOSIS — M79.7 FIBROMYALGIA: ICD-10-CM

## 2023-12-12 DIAGNOSIS — F41.9 ANXIETY: Primary | ICD-10-CM

## 2023-12-12 PROCEDURE — 1159F MED LIST DOCD IN RCRD: CPT | Performed by: NURSE PRACTITIONER

## 2023-12-12 PROCEDURE — 1160F RVW MEDS BY RX/DR IN RCRD: CPT | Performed by: NURSE PRACTITIONER

## 2023-12-12 PROCEDURE — 99213 OFFICE O/P EST LOW 20 MIN: CPT | Performed by: NURSE PRACTITIONER

## 2023-12-12 RX ORDER — DIAZEPAM 10 MG/1
10 TABLET ORAL DAILY PRN
Qty: 30 TABLET | Refills: 0 | Status: SHIPPED | OUTPATIENT
Start: 2023-12-12

## 2023-12-12 RX ORDER — PROGESTERONE 200 MG/1
300 CAPSULE ORAL DAILY
COMMUNITY

## 2023-12-12 RX ORDER — ESCITALOPRAM OXALATE 10 MG/1
10 TABLET ORAL DAILY
Qty: 30 TABLET | Refills: 1 | Status: SHIPPED | OUTPATIENT
Start: 2023-12-12

## 2023-12-12 NOTE — PROGRESS NOTES
Subjective   Maggie Malhotra is a 68 y.o. female. Patient is here today for   Chief Complaint   Patient presents with    Anxiety     Patient complains anxiety has gotten worse over the year, patient says she has tried to work through it and multiple supplements and she cannot find a remedy     .    History of Present Illness   You have chosen to receive care through a telehealth visit.  Do you consent to use a video/audio connection for your medical care today? Yes    Patient is located at home in [place]  Provider is located at [facility] in [place], [place]      Patient is here to discuss anxiety. She has tried OTC supplements with no relief. It has become worse. She has tried cymbalta in the past which made her symptoms worse.   She is shaking while she is trying to do her crafts due to her anxiety.     The following portions of the patient's history were reviewed and updated as appropriate: allergies, current medications, past family history, past medical history, past social history, past surgical history and problem list.    Review of Systems    Objective   Vitals:    12/12/23 1616   Temp: 98 °F (36.7 °C)     Body mass index is 25.81 kg/m².  Physical Exam  Nursing note reviewed.   Pulmonary:      Effort: Pulmonary effort is normal.   Neurological:      General: No focal deficit present.      Mental Status: She is alert and oriented to person, place, and time.   Psychiatric:         Mood and Affect: Mood normal.         Behavior: Behavior normal.         Assessment & Plan   Diagnoses and all orders for this visit:    1. Anxiety (Primary)  -     escitalopram (Lexapro) 10 MG tablet; Take 1 tablet by mouth Daily. Take 1/2 tab for one week, then increase to 1 full tab.  Dispense: 30 tablet; Refill: 1  -     diazePAM (VALIUM) 10 MG tablet; Take 1 tablet by mouth Daily As Needed for Anxiety.  Dispense: 30 tablet; Refill: 0    2. Fibromyalgia  -     diazePAM (VALIUM) 10 MG tablet; Take 1 tablet by mouth  Daily As Needed for Anxiety.  Dispense: 30 tablet; Refill: 0      Anxiety - patient will be started on lexapro. She will continue with diazepam daily as needed for anxiety.     F/u in two months

## 2023-12-12 NOTE — TELEPHONE ENCOUNTER
Caller: Maggie Malhotra S     Relationship: SELF     Best call back number:     [phone] (Mobile)       What is your medical concern? ANXIETY     How long has this issue been going on? ABOUT A MONTH SINCE IT'S GOTTEN WORSE. BUT BEEN LONGER. SAYS IT'S A QUALITY OF LIFE THING FOR HER. RIGHT NOW.     Is your provider already aware of this issue? NOT SURE     Have you been treated for this issue? YES

## 2023-12-14 ENCOUNTER — TELEPHONE (OUTPATIENT)
Dept: INTERNAL MEDICINE | Facility: CLINIC | Age: 68
End: 2023-12-14
Payer: MEDICARE

## 2023-12-14 RX ORDER — BUSPIRONE HYDROCHLORIDE 5 MG/1
5 TABLET ORAL AS NEEDED
Qty: 30 TABLET | Refills: 0 | Status: SHIPPED | OUTPATIENT
Start: 2023-12-14 | End: 2023-12-14 | Stop reason: SDUPTHER

## 2023-12-14 RX ORDER — BUSPIRONE HYDROCHLORIDE 5 MG/1
5 TABLET ORAL 3 TIMES DAILY
Qty: 90 TABLET | Refills: 1 | Status: SHIPPED | OUTPATIENT
Start: 2023-12-14

## 2023-12-14 NOTE — TELEPHONE ENCOUNTER
Caller: Maggie Malhotra    Relationship: Self    Best call back number: [phone]     What is the best time to reach you: ANY TIME    Who are you requesting to speak with (clinical staff, provider,  specific staff member): CLINICAL STAFF    What was the call regarding: PATIENT REQUESTS A CALLBACK TO DISCUSS DIFFICULTY WITH GETTING HER ESCITALOPRAM MEDICATION AT HER PHARMACY.  SHE ALSO WOULD LIKE TO DISCUSS IF THERE IS AN ALTERNATIVE MEDICATION THAT FREDDIE JON CAN SUGGEST.    SHE REQUESTS A CALLBACK TO DISCUSS.    Is it okay if the provider responds through MyChart:     PLEASE ADVISE.

## 2023-12-19 ENCOUNTER — TELEPHONE (OUTPATIENT)
Dept: INTERNAL MEDICINE | Facility: CLINIC | Age: 68
End: 2023-12-19
Payer: MEDICARE

## 2023-12-19 ENCOUNTER — TELEPHONE (OUTPATIENT)
Dept: GASTROENTEROLOGY | Facility: CLINIC | Age: 68
End: 2023-12-19
Payer: MEDICARE

## 2023-12-19 NOTE — TELEPHONE ENCOUNTER
Caller: Maggie Malhotra    Relationship: Self    Best call back number: [phone]       What was the call regarding: PATIENT IS CALLING BACK TO CHECK STATUS. PLEASE CALL ASAP

## 2023-12-19 NOTE — TELEPHONE ENCOUNTER
Caller: Maggie Malhotra    Relationship: Self    Best call back number: [phone]    Which medication are you concerned about: CECILY    Who prescribed you this medication: [facility] ADMITTED DEC 16 DISCHARGED 12/18    PATIENT GIVEN THIS IN THE HOSPITAL AND WAS CURIOUS IF THIS WAS THE CAUSE AND QUESTIONED IF SHE WAS HAVING WITHDRAW SYMPTOMS FROM THE MEDICATION.    When did you start taking this medication: DEC 16    What are your concerns: PATIENT IS EXPERIENCING EXCESSIVE CRAMPING, SHAKING, HURTS ALL OVER    How long have you had these concerns: SINCE 4 AM THIS MORNING    sucralfate 1 g Oral ACHS     PANTOPRAZOLE 40 MG TAKEN TWICE DAILY    PATIENT HAD AN UPPER [facility] FOUND AN ULCER    PATIENT REQUESTING A BLAS BACK TODAY ASAP

## 2023-12-19 NOTE — TELEPHONE ENCOUNTER
TO SUM THIS MESSAGE UP:  SHE SAYS SHE WAS GIVEN DILAUDID AND IS NOW  EXPERIENCING EXCESSIVE CRAMPING, SHAKING, HURTS ALL OVER, SHE'S ASKING IF THIS A WITHDRAW SYMPTOM, IS THIS THE DILAUDID OR SHOULD SHE CALL SURGEONS OFFICE, PLEASE ADVISE.

## 2023-12-19 NOTE — TELEPHONE ENCOUNTER
It looks like the EGD was performed at [place] by Dr. Lanza.  I would recommend she contact her office and let her know what is going on.    It looks like she was prescribed pantoprazole 40 mg twice daily and Carafate which I would agree with taking due to the ulcers seen.    If the pain is severe, she as a fever, or she has any difficulty tolerating oral intake, I would recommend proceeding to the ER

## 2023-12-19 NOTE — TELEPHONE ENCOUNTER
Patient left a Voice Message stating that she is having cramps, and requested a call back.    Pt had an EGD yesterday :  1- White plaques in esophagus.  2- Z-line irregular, 39 cm from incisors  3-  Multiple Gastric ulcers, 3mm largest.  4- Multiple Duodenal ulcers, 8 mm largest.    Bx were taken.    Complains today of severe abdominal cramps/pain located in epigastric and periumbilical areas, constant with sudden increases of intensity. This pain woke her up at 04:00 am today. Has taken Tylenol (2)  with no relief. Denies any other symptom.    Requesting advice.    LOV 7/25/22.

## 2023-12-20 NOTE — TELEPHONE ENCOUNTER
"I called pt and offered F/U appt, pt replied that she appreciates our care and would like to continue with us, but for now she wants to wait for the results and solutions from this interaction w/Markie, and then take it from there. Originally she went to their UC/ER thinking she had a \"stomach bug\" and now that initial encounter has derivate on more procedures (CLS is to be discussed) diagnosis and treatments.   She will call us back.  "

## 2023-12-20 NOTE — TELEPHONE ENCOUNTER
If she plans to follow with our office, please schedule a follow up visit.  Or if she plans to transfer to [place], I would recommend she follow-up with them.

## 2023-12-27 DIAGNOSIS — G47.00 INSOMNIA, UNSPECIFIED TYPE: ICD-10-CM

## 2023-12-27 RX ORDER — ESZOPICLONE 3 MG/1
TABLET, FILM COATED ORAL
Qty: 90 TABLET | Refills: 0 | OUTPATIENT
Start: 2023-12-27

## 2023-12-28 ENCOUNTER — TELEPHONE (OUTPATIENT)
Dept: INTERNAL MEDICINE | Facility: CLINIC | Age: 68
End: 2023-12-28
Payer: MEDICARE

## 2024-01-05 DIAGNOSIS — G47.00 INSOMNIA, UNSPECIFIED TYPE: ICD-10-CM

## 2024-01-05 RX ORDER — ESZOPICLONE 3 MG/1
3 TABLET, FILM COATED ORAL NIGHTLY
Qty: 30 TABLET | Refills: 0 | Status: SHIPPED | OUTPATIENT
Start: 2024-01-05

## 2024-01-09 ENCOUNTER — TELEPHONE (OUTPATIENT)
Dept: GASTROENTEROLOGY | Facility: CLINIC | Age: 69
End: 2024-01-09

## 2024-01-09 NOTE — TELEPHONE ENCOUNTER
RN called patient for more information and to recommend that she follow-up with the surgeon who removed the gallbladder. Pt reports it was Dr. Harrell who preformed her cholecystectomy and that their office recommended she contact her GI specialist. Pt has not been seen since 7/2022. Please advise. EL

## 2024-01-09 NOTE — TELEPHONE ENCOUNTER
Hub staff attempted to follow warm transfer process and was unsuccessful     Caller: Maggie Malhotra    Relationship to patient: Self    Best call back number: [phone]    Patient is needing: PT IS CALLING TO REPORT THAT SINCE HAVING HER GALLBLADDER REMOVED SHE IS EXPERIENCING LOW ABD PAIN, SHAKINESS AND SHE CAN'T EAT. SHE STATES IT STARTED  01.05.24 AND HAS GOTTEN PROGRESSIVELY WORSE EVER SINCE THEN. PLEASE CONTACT PT AS SOON AS POSSIBLE.

## 2024-01-09 NOTE — TELEPHONE ENCOUNTER
Please let patient know that at time of last in office visit with Dr. Posey he had offered to proceed with lab testing as well as imaging.  If she is experiencing severe pain and or inability to keep down fluids would recommend seeking further evaluation in the ER as she is at increased risk for acute pancreatitis after undergoing gallbladder surgery.    Thank you,    RAJEEV Solis

## 2024-01-09 NOTE — TELEPHONE ENCOUNTER
RN called and discussed provider's recommendations with patient. Pt is going to call Dr. Posey's office back and discuss. RN encouraged patient to seek further evaluation in ER should she start experiencing severe pain and or the inability to keep down fluids. Pt verbalized understanding. EL

## 2024-01-11 DIAGNOSIS — F41.9 ANXIETY: ICD-10-CM

## 2024-01-11 DIAGNOSIS — M79.7 FIBROMYALGIA: ICD-10-CM

## 2024-01-12 RX ORDER — DIAZEPAM 10 MG/1
10 TABLET ORAL DAILY PRN
Qty: 30 TABLET | Refills: 0 | Status: SHIPPED | OUTPATIENT
Start: 2024-01-12

## 2024-01-16 ENCOUNTER — TELEPHONE (OUTPATIENT)
Dept: CARDIOLOGY | Facility: CLINIC | Age: 69
End: 2024-01-16

## 2024-01-16 RX ORDER — CARVEDILOL PHOSPHATE 40 MG/1
40 CAPSULE, EXTENDED RELEASE ORAL DAILY
Qty: 90 CAPSULE | Refills: 1 | Status: SHIPPED | OUTPATIENT
Start: 2024-01-16

## 2024-01-16 NOTE — TELEPHONE ENCOUNTER
Caller: Maggie Malhotra    Relationship: Self    Best call back number: [phone]    Requested Prescriptions:   Requested Prescriptions     Pending Prescriptions Disp Refills    carvedilol CR (Coreg CR) 40 MG 24 hr capsule 90 capsule 3     Sig: Take 1 capsule by mouth Daily.        Pharmacy where request should be sent: [id number] [address] [facility] [phone]  - [phone]     Last office visit with prescribing clinician: 11/22/2022   Last telemedicine visit with prescribing clinician: Visit date not found   Next office visit with prescribing clinician: 1/18/2024     Additional details provided by patient: NONE    Does the patient have less than a 3 day supply:  [x] Yes  [] No    Would you like a call back once the refill request has been completed: [] Yes [] No    If the office needs to give you a call back, can they leave a voicemail: [] Yes [] No    Prudence Heath Rep   01/16/24 12:58 EST

## 2024-01-29 ENCOUNTER — OFFICE VISIT (OUTPATIENT)
Dept: GASTROENTEROLOGY | Facility: CLINIC | Age: 69
End: 2024-01-29
Payer: MEDICARE

## 2024-01-29 VITALS
OXYGEN SATURATION: 98 % | BODY MASS INDEX: 24.01 KG/M2 | SYSTOLIC BLOOD PRESSURE: 150 MMHG | DIASTOLIC BLOOD PRESSURE: 110 MMHG | HEART RATE: 94 BPM | WEIGHT: 171.5 LBS | HEIGHT: 71 IN | TEMPERATURE: 97.8 F

## 2024-01-29 DIAGNOSIS — R74.8 ELEVATED LIVER ENZYMES: ICD-10-CM

## 2024-01-29 DIAGNOSIS — E87.1 HYPONATREMIA: Primary | ICD-10-CM

## 2024-01-29 DIAGNOSIS — R94.5 ABNORMAL RESULTS OF LIVER FUNCTION STUDIES: ICD-10-CM

## 2024-01-29 PROCEDURE — 1160F RVW MEDS BY RX/DR IN RCRD: CPT

## 2024-01-29 PROCEDURE — 1159F MED LIST DOCD IN RCRD: CPT

## 2024-01-29 PROCEDURE — 3077F SYST BP >= 140 MM HG: CPT

## 2024-01-29 PROCEDURE — 3080F DIAST BP >= 90 MM HG: CPT

## 2024-01-29 PROCEDURE — 99214 OFFICE O/P EST MOD 30 MIN: CPT

## 2024-01-29 RX ORDER — ONDANSETRON 4 MG/1
8 TABLET, FILM COATED ORAL EVERY 8 HOURS PRN
Qty: 20 TABLET | Refills: 0 | Status: SHIPPED | OUTPATIENT
Start: 2024-01-29

## 2024-01-29 RX ORDER — DICYCLOMINE HYDROCHLORIDE 10 MG/1
10 CAPSULE ORAL 4 TIMES DAILY
COMMUNITY
Start: 2024-01-15 | End: 2024-02-14

## 2024-01-29 NOTE — PROGRESS NOTES
Chief Complaint   Patient presents with    Abdominal Pain    Nausea           History of Present Illness    Patient is a 68 y.o. who presents to the office for follow up evaluation.  Last in office visit was on 7/25/2022 with Nancy BOO for abdominal pain and esophageal dysphagia. Patient has a significant past medical history of alcohol abuse, fibromyalgia, Marialuisa Barré syndrome, IBS, and cholecystectomy with Dr. Harrell at [facility] in December 2023.  Denies previous liver serologies for elevated liver enzymes    Last colonoscopy performed on 10/9/2019 noted diverticula in sigmoid and descending colon, 4 mm polyp in sigmoid colon, moderately torturous colon, and internal hemorrhoids.    Next colonoscopy due 10/9/2024     Since December 2023 cholecystectomy she has required 2 separate hospitalizations the first of which on 1/15/2024 with a subsequent admission to [facility] on 1/24/2024.    During the first admission she was diagnosed with acute toxic and metabolic encephalopathy, EtOH intoxication, right upper quadrant seroma, and acute on chronic hyponatremia.    Most recent EGD evaluation was completed on 12/18/2023 with Dr. Feliz at [facility]:    Many non-bleeding cratered duodenal ulcers with no stigmata of bleeding  were found in the first portion of the duodenum. The largest lesion was  8 mm in largest dimension.   Many non-bleeding superficial gastric ulcers with no stigmata of  bleeding were found in the gastric antrum. The largest lesion was 3 mm  in largest dimension.   ENDOSCOPY, INT (12/18/2023)   Biopsies of gastric antrum consistent with chronic inactive gastritis, without evidence of intestinal metaplasia or H. pylori.  There was noted to be an irregular Z-line without evidence of Lozano's esophagus.    She was instructed to continue pantoprazole 40 mg twice daily x 8 weeks then reduce to once daily.  Esophageal candidiasis was treated with fluconazole x 14  days.    Random biopsy of esophagus consistent with Candida esophagitis    For GERD, she is currently taking pantoprazole 40 mg twice daily and sucralfate 3 times daily as instructed by Dr. Feliz at time of 12/18/2023 EGD evaluation.  States that she completed 14-day fluconazole regimen as prescribed and denies upper GI concerns including heartburn, nausea, vomiting, or dysphagia.    Verbalizes concern over return and nausea as diet is slowly advanced and request refill of prescribed Zofran.    Denies use of NSAIDs or alcohol since 1/26/2024 inpatient discharge from [facility].    Bowel habits are described as occurring every day to every other day that are small in volume.  She has recently started taking stool softeners over the weekend and denies melena or hematochezia.  No abdominal pain.    Most concerning symptom at this time is generalized back pain which she has plans to complete follow-up with primary care provider for further assessment.    Patient denies known family history of colon polyps, colon cancer, or IBD.       Result Review :       CT Abdomen & Pelvis W IV Contrast Without Oral Contrast (01/11/2024 21:44)   S/p cholecystectomy with development of biliary duct and pancreatic duct enlargement without visible etiology.  No evidence of choledocholithiasis or obstructing lesion  Fluid attenuation within the gallbladder fossa thought to be secondary to postoperative leak versus postoperative changes without evidence of ascites  Diminished perfusion to left hepatic lobe  NM Hida, Wo CCK (01/12/2024 14:24)   No definite bile leak visualized.     CT Guided Drainage of Abscess Gallbladder fossa (01/26/2024 09:36)  Successful CT-guided aspiration of tiny fluid collection in the   gallbladder fossa. The collection was too small for drainage catheter  placement.   CT Abdomen & Pelvis W IV Contrast Without Oral Contrast (01/24/2024 16:15)  persistent organized fluid collection within the gallbladder  "fossa; may represent a small postsurgical biloma or hematoma   measuring 2.2 x 3.2 cm similar to the prior exam.   Stable mild biliary ductal dilation with smooth distal tapering to the ampulla. Numerous calcified hepatic and splenic granulomata. most compatible with postcholecystectomy reservoir effect  given normal serum bilirubin.   Vital Signs:   BP (!) 150/110 Comment: taken by Catia Wilkerson  Pulse 94   Temp 97.8 °F (36.6 °C)   Ht 180.3 cm (71\")   Wt 77.8 kg (171 lb 8 oz)   SpO2 98%   BMI 23.92 kg/m²     Body mass index is 23.92 kg/m².     Physical Exam  Vitals reviewed.   Constitutional:       General: She is not in acute distress.     Appearance: Normal appearance. She is well-developed. She is not diaphoretic.   HENT:      Head: Normocephalic and atraumatic.   Eyes:      General: No scleral icterus.  Cardiovascular:      Rate and Rhythm: Normal rate and regular rhythm.   Pulmonary:      Effort: Pulmonary effort is normal. No respiratory distress.      Breath sounds: Normal breath sounds.   Abdominal:      General: Bowel sounds are normal. There is no distension.      Palpations: Abdomen is soft. There is no mass.      Tenderness: There is no abdominal tenderness. There is no guarding or rebound.      Hernia: No hernia is present.   Skin:     General: Skin is warm and dry.      Coloration: Skin is not jaundiced.   Neurological:      Mental Status: She is alert and oriented to person, place, and time.   Psychiatric:         Mood and Affect: Mood normal.         Behavior: Behavior normal.         Thought Content: Thought content normal.         Judgment: Judgment normal.           Assessment and Plan    Diagnoses and all orders for this visit:    1. Hyponatremia (Primary)  -     US Liver; Future  -     Alpha - 1 - Antitrypsin  -     GOOD  -     Anti-microsomal Antibody  -     Anti-Smooth Muscle Antibody Titer  -     CBC & Differential  -     Comprehensive Metabolic Panel  -     Ferritin  -     Celiac " Disease Panel  -     Ceruloplasmin  -     Gamma GT  -     Hepatitis Panel, Acute  -     IgG, IgA, IgM  -     Iron Profile  -     Mitochondrial Antibodies, M2    2. Elevated liver enzymes  -     Alpha - 1 - Antitrypsin  -     GOOD  -     Anti-microsomal Antibody  -     Anti-Smooth Muscle Antibody Titer  -     CBC & Differential  -     Comprehensive Metabolic Panel  -     Ferritin  -     Celiac Disease Panel  -     Ceruloplasmin  -     Gamma GT  -     Hepatitis Panel, Acute  -     IgG, IgA, IgM  -     Iron Profile  -     Mitochondrial Antibodies, M2    3. Abnormal results of liver function studies  -     Hepatitis Panel, Acute    Other orders  -     ondansetron (ZOFRAN) 4 MG tablet; Take 2 tablets by mouth Every 8 (Eight) Hours As Needed for Nausea.  Dispense: 20 tablet; Refill: 0           Discussion:    Patient presents following inpatient admission to [facility] for upper abdominal pain and nausea.  Reviewed inpatient records at length with patient and concur with plan to continue pantoprazole twice daily for a total of 8 weeks followed by reducing to once daily to ensure healing of noted ulcerations.  I also have recommended that she continue with sucralfate.    For elevated liver serologies, reiterated the importance of strict alcohol avoidance as I do have a suspicion of chronic liver disease secondary to persistently low serum sodium values in the setting of chronic alcohol use.  Recommend obtaining liver serologies to assess for an underlying etiology to elevation and enzymes.      She declines to have these performed today and that blood work be performed at planned blood work at follow-up with primary care provider.  Lab requisition provided at conclusion of today's visit.  I would also like to complete a liver ultrasound to further assess for an underlying cause to elevation as well as for evidence of cirrhosis/chronic liver disease.    Next colonoscopy for colon cancer screening due October 2024 with  plans to complete EGD at this time if upper GI symptoms do not improve as expected, in the setting of worsening anemia, or new onset of esophageal dysphagia.    If bowel habits do not become regular on current stool softener regimen recommend starting MiraLAX 1 capful daily.  She is agreeable to contacting our office for any new or worsening GI concerns.    Notified patient of elevated blood pressure with instructions to reassess when you arrive home and if remains elevated to contact primary care provider for further recommendations or if you develop new onset headache or CVA alarm symptoms to follow-up in ER for further evaluation.      Patient verbalizes understanding of alarm symptoms that would warrant urgent ER evaluation and ability to reassess blood pressure upon her return home.    Patient is agreeable to the outlined above treatment plan.  Verbalizes understanding and will contact office for any new or worsening concerns.  All questions answered and support provided.        Patient Instructions   Colonoscopy is due October 2024    For GERD:    Follow antireflux precautions:    Continue pantoprazole 40 mg twice daily prior to breakfast and dinner for a total of 8 weeks then can reduce to once daily  For nausea, prescription sent for Zofran 4 mg to be taken every 8 hours as needed  Avoiding eating within 3 to 4 hours of bedtime.    Avoid foods that can trigger symptoms which may include:  citrus fruits  spicy foods,  Tomatoes  Red sauces   Chocolate  coffee/tea  caffeinated or carbonated beverages  alcohol    Blood work to further assess elevated liver enzymes- orders provided to have performed at time of planned bloodwork with [facility]    Once we receive these results, our office will contact you to discuss updating your treatment plan as indicated     Avoid alcohol consumption    Scheduling of your Ordered Diagnostic Tests- Centralized scheduling :    A team member from [facility]  scheduling department will contact you to schedule your tests.    You can also contact them directly at [phone].    Recheck when you arrive home and if remains elevated contact primary care provider for further recommendations or if you develop new onset headache, weakness, speech changes, or dizziness recommend seeking further evaluation in the ER ER for further evaluation.            EMR Dragon/Transcription Disclaimer:  This document has been Dictated utilizing Dragon dictation.

## 2024-02-04 ENCOUNTER — HOSPITAL ENCOUNTER (EMERGENCY)
Facility: HOSPITAL | Age: 69
Discharge: HOME | End: 2024-02-04
Attending: EMERGENCY MEDICINE | Admitting: EMERGENCY MEDICINE
Payer: MEDICARE

## 2024-02-04 VITALS
OXYGEN SATURATION: 99 % | WEIGHT: 172 LBS | SYSTOLIC BLOOD PRESSURE: 153 MMHG | RESPIRATION RATE: 16 BRPM | DIASTOLIC BLOOD PRESSURE: 104 MMHG | BODY MASS INDEX: 24.08 KG/M2 | HEART RATE: 88 BPM | HEIGHT: 71 IN | TEMPERATURE: 98.8 F

## 2024-02-04 DIAGNOSIS — M79.7 FIBROMYALGIA SYNDROME: Primary | ICD-10-CM

## 2024-02-04 LAB
ALBUMIN SERPL-MCNC: 4.7 G/DL (ref 3.5–5.2)
ALBUMIN/GLOB SERPL: 2 G/DL
ALP SERPL-CCNC: 86 U/L (ref 39–117)
ALT SERPL W P-5'-P-CCNC: 25 U/L (ref 1–33)
ANION GAP SERPL CALCULATED.3IONS-SCNC: 17.6 MMOL/L (ref 5–15)
AST SERPL-CCNC: 47 U/L (ref 1–32)
BACTERIA UR QL AUTO: ABNORMAL /HPF
BASOPHILS # BLD AUTO: 0.01 10*3/MM3 (ref 0–0.2)
BASOPHILS NFR BLD AUTO: 0.3 % (ref 0–1.5)
BILIRUB SERPL-MCNC: 0.7 MG/DL (ref 0–1.2)
BILIRUB UR QL STRIP: NEGATIVE
BUN BLDA-MCNC: <3 MG/DL
BUN SERPL-MCNC: 3 MG/DL (ref 8–23)
BUN/CREAT SERPL: 6.1 (ref 7–25)
CA-I BLDA-SCNC: 0.94 MMOL/L (ref 4.5–5.3)
CALCIUM SPEC-SCNC: 9 MG/DL (ref 8.6–10.5)
CHLORIDE BLDA-SCNC: 84 MMOL/L (ref 98–109)
CHLORIDE SERPL-SCNC: 82 MMOL/L (ref 98–107)
CLARITY UR: CLEAR
CO2 BLDA-SCNC: 22.4 MMOL/L (ref 23–27)
CO2 SERPL-SCNC: 18.4 MMOL/L (ref 22–29)
COLOR UR: ABNORMAL
CREAT BLDA-MCNC: <0.3 MG/DL (ref 0.6–1.3)
CREAT SERPL-MCNC: 0.49 MG/DL (ref 0.57–1)
DEPRECATED RDW RBC AUTO: 49.1 FL (ref 37–54)
EGFRCR SERPLBLD CKD-EPI 2021: 103.4 ML/MIN/1.73
EOSINOPHIL # BLD AUTO: 0.02 10*3/MM3 (ref 0–0.4)
EOSINOPHIL NFR BLD AUTO: 0.6 % (ref 0.3–6.2)
ERYTHROCYTE [DISTWIDTH] IN BLOOD BY AUTOMATED COUNT: 13.2 % (ref 12.3–15.4)
GLOBULIN UR ELPH-MCNC: 2.3 GM/DL
GLUCOSE BLDC GLUCOMTR-MCNC: 100 MG/DL (ref 70–130)
GLUCOSE SERPL-MCNC: 93 MG/DL (ref 65–99)
GLUCOSE UR STRIP-MCNC: NEGATIVE MG/DL
HCT VFR BLD AUTO: 38.9 % (ref 34–46.6)
HGB BLD-MCNC: 13.5 G/DL (ref 12–15.9)
HGB UR QL STRIP.AUTO: ABNORMAL
HOLD SPECIMEN: NORMAL
HYALINE CASTS UR QL AUTO: ABNORMAL /LPF
IMM GRANULOCYTES # BLD AUTO: 0.01 10*3/MM3 (ref 0–0.05)
IMM GRANULOCYTES NFR BLD AUTO: 0.3 % (ref 0–0.5)
KETONES UR QL STRIP: ABNORMAL
LEUKOCYTE ESTERASE UR QL STRIP.AUTO: ABNORMAL
LIPASE SERPL-CCNC: 55 U/L (ref 13–60)
LYMPHOCYTES # BLD AUTO: 1.1 10*3/MM3 (ref 0.7–3.1)
LYMPHOCYTES NFR BLD AUTO: 31.6 % (ref 19.6–45.3)
MCH RBC QN AUTO: 34.4 PG (ref 26.6–33)
MCHC RBC AUTO-ENTMCNC: 34.7 G/DL (ref 31.5–35.7)
MCV RBC AUTO: 99 FL (ref 79–97)
MONOCYTES # BLD AUTO: 0.6 10*3/MM3 (ref 0.1–0.9)
MONOCYTES NFR BLD AUTO: 17.2 % (ref 5–12)
NEUTROPHILS NFR BLD AUTO: 1.74 10*3/MM3 (ref 1.7–7)
NEUTROPHILS NFR BLD AUTO: 50 % (ref 42.7–76)
NITRITE UR QL STRIP: NEGATIVE
PH UR STRIP.AUTO: 7 [PH] (ref 5–8)
PLATELET # BLD AUTO: 260 10*3/MM3 (ref 140–450)
PMV BLD AUTO: 8.4 FL (ref 6–12)
POTASSIUM BLDA-SCNC: 4.4 MMOL/L (ref 3.5–4.9)
POTASSIUM SERPL-SCNC: 4.5 MMOL/L (ref 3.5–5.2)
PROT SERPL-MCNC: 7 G/DL (ref 6–8.5)
PROT UR QL STRIP: NEGATIVE
RBC # BLD AUTO: 3.93 10*6/MM3 (ref 3.77–5.28)
RBC # UR STRIP: ABNORMAL /HPF
REF LAB TEST METHOD: ABNORMAL
SODIUM BLD-SCNC: 121 MMOL/L (ref 138–146)
SODIUM SERPL-SCNC: 118 MMOL/L (ref 136–145)
SP GR UR STRIP: 1.01 (ref 1–1.03)
SQUAMOUS #/AREA URNS HPF: ABNORMAL /HPF
TROPONIN T SERPL HS-MCNC: 15 NG/L
UROBILINOGEN UR QL STRIP: ABNORMAL
WBC # UR STRIP: ABNORMAL /HPF
WBC NRBC COR # BLD AUTO: 3.48 10*3/MM3 (ref 3.4–10.8)

## 2024-02-04 PROCEDURE — 25810000003 SODIUM CHLORIDE 0.9 % SOLUTION: Performed by: EMERGENCY MEDICINE

## 2024-02-04 PROCEDURE — 83690 ASSAY OF LIPASE: CPT | Performed by: EMERGENCY MEDICINE

## 2024-02-04 PROCEDURE — 25010000002 DROPERIDOL PER 5 MG: Performed by: EMERGENCY MEDICINE

## 2024-02-04 PROCEDURE — 80053 COMPREHEN METABOLIC PANEL: CPT | Performed by: EMERGENCY MEDICINE

## 2024-02-04 PROCEDURE — 96361 HYDRATE IV INFUSION ADD-ON: CPT

## 2024-02-04 PROCEDURE — 25010000002 METOCLOPRAMIDE PER 10 MG: Performed by: EMERGENCY MEDICINE

## 2024-02-04 PROCEDURE — 99283 EMERGENCY DEPT VISIT LOW MDM: CPT

## 2024-02-04 PROCEDURE — 25010000002 MORPHINE PER 10 MG: Performed by: EMERGENCY MEDICINE

## 2024-02-04 PROCEDURE — 96374 THER/PROPH/DIAG INJ IV PUSH: CPT

## 2024-02-04 PROCEDURE — 96376 TX/PRO/DX INJ SAME DRUG ADON: CPT

## 2024-02-04 PROCEDURE — 80047 BASIC METABLC PNL IONIZED CA: CPT

## 2024-02-04 PROCEDURE — 81001 URINALYSIS AUTO W/SCOPE: CPT | Performed by: EMERGENCY MEDICINE

## 2024-02-04 PROCEDURE — 85025 COMPLETE CBC W/AUTO DIFF WBC: CPT | Performed by: EMERGENCY MEDICINE

## 2024-02-04 PROCEDURE — 96375 TX/PRO/DX INJ NEW DRUG ADDON: CPT

## 2024-02-04 PROCEDURE — 84484 ASSAY OF TROPONIN QUANT: CPT | Performed by: EMERGENCY MEDICINE

## 2024-02-04 RX ORDER — MORPHINE SULFATE 4 MG/ML
4 INJECTION, SOLUTION INTRAMUSCULAR; INTRAVENOUS ONCE
Status: COMPLETED | OUTPATIENT
Start: 2024-02-04 | End: 2024-02-04

## 2024-02-04 RX ORDER — ASPIRIN 81 MG/1
324 TABLET, CHEWABLE ORAL ONCE
Status: COMPLETED | OUTPATIENT
Start: 2024-02-04 | End: 2024-02-04

## 2024-02-04 RX ORDER — SODIUM CHLORIDE 0.9 % (FLUSH) 0.9 %
10 SYRINGE (ML) INJECTION AS NEEDED
Status: DISCONTINUED | OUTPATIENT
Start: 2024-02-04 | End: 2024-02-04 | Stop reason: HOSPADM

## 2024-02-04 RX ORDER — METOCLOPRAMIDE HYDROCHLORIDE 5 MG/ML
10 INJECTION INTRAMUSCULAR; INTRAVENOUS ONCE
Status: COMPLETED | OUTPATIENT
Start: 2024-02-04 | End: 2024-02-04

## 2024-02-04 RX ORDER — DROPERIDOL 2.5 MG/ML
1.25 INJECTION, SOLUTION INTRAMUSCULAR; INTRAVENOUS ONCE
Status: COMPLETED | OUTPATIENT
Start: 2024-02-04 | End: 2024-02-04

## 2024-02-04 RX ORDER — HALOPERIDOL 0.5 MG/1
0.5 TABLET ORAL 2 TIMES DAILY
Qty: 2 TABLET | Refills: 0 | Status: SHIPPED | OUTPATIENT
Start: 2024-02-04 | End: 2024-02-04 | Stop reason: SDUPTHER

## 2024-02-04 RX ORDER — HALOPERIDOL 0.5 MG/1
0.5 TABLET ORAL 2 TIMES DAILY
Qty: 4 TABLET | Refills: 0 | Status: SHIPPED | OUTPATIENT
Start: 2024-02-04 | End: 2024-02-06

## 2024-02-04 RX ADMIN — SODIUM CHLORIDE 1000 ML: 9 INJECTION, SOLUTION INTRAVENOUS at 10:22

## 2024-02-04 RX ADMIN — MORPHINE SULFATE 4 MG: 4 INJECTION, SOLUTION INTRAMUSCULAR; INTRAVENOUS at 10:22

## 2024-02-04 RX ADMIN — MORPHINE SULFATE 4 MG: 4 INJECTION, SOLUTION INTRAMUSCULAR; INTRAVENOUS at 10:53

## 2024-02-04 RX ADMIN — ASPIRIN 324 MG: 81 TABLET, CHEWABLE ORAL at 10:21

## 2024-02-04 RX ADMIN — DROPERIDOL 1.25 MG: 2.5 INJECTION, SOLUTION INTRAMUSCULAR; INTRAVENOUS at 11:26

## 2024-02-04 RX ADMIN — METOCLOPRAMIDE 10 MG: 5 INJECTION, SOLUTION INTRAMUSCULAR; INTRAVENOUS at 10:22

## 2024-02-04 NOTE — FSED PROVIDER NOTE
"Subjective   History of Present Illness  68-year-old with postop gallbladder surgery December 2023 and has had fluid removed from her abdomen postop and has since done well.  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  Overall pain throughout her body which is now called fibromyalgia.  She does not have any narcotics at home to take.  Been having watery diarrhea related to the pain.  No specific abdominal pain just overall pain.  She has been trying to hydrate and has been difficult to do that.  And she is unable to control her pain at home.  Her  is with her and says periodically this happens was bad enough that they have to be seen in ER for treatment.  She has a history of low potassium in the past and is wondering if it is low now.  She has not been able to sleep at night due to the pain.  She is exhausted.  There is no specific body part that is worse than another in terms of pain.      Review of Systems    Past Medical History:   Diagnosis Date    Alcohol abuse 5/17/2023    Allergic rhinitis     Anxiety     Backache     Blood pressure elevated without history of HTN     Broken foot 06/01/2002    LT    Chronic pain     Chronic pain disorder     Depression     Dizziness     Extremity pain     Fatigue     Fibromyalgia, primary     Guillain Barré syndrome     H/O mammogram 01/01/2012    Dr. Rendon    H/O: pneumonia     History of broken leg 01/01/1998    LT    Hypertension 2019    Hypotension     IBS (irritable bowel syndrome)     Insomnia     Memory disorder     Migraine     Mood disorder     Multiple joint pain     Night sweat     Syncope     Vitamin D deficiency        Allergies   Allergen Reactions    Influenza Vaccines Unknown - Low Severity    Midazolam Itching     Other reaction(s): Other (See Comments)  \"makes me very hyper\"       Past Surgical History: "   Procedure Laterality Date    BREAST AUGMENTATION BILATERAL MASTOPEXY Bilateral 01/01/1998    CHOLECYSTECTOMY      COLONOSCOPY N/A 05/18/2010    Dr. Gurmeet Justice    COLONOSCOPY  10/09/2019    Non-thrombosed external hemorrhoids found on perianal exam, diverticulosis in the sigmoid colon and in the descending colon, One 4 mm polyp in the mid sigmoid colon, tortuous colon, IH. Path: Tubular adenoma.       COLONOSCOPY W/ BIOPSIES  06/27/2014    Non-thrombosed external hemorrhoids, Diverticulosis in the sigmoid colon, two 4 to 5 mm polyps in the sigmoid colon and the descending colon (pathology: Hyperplastic polyp), Tortuous colon, Internal hemorrhoids    ENDOSCOPY N/A 06/27/2014    Z-line irregular, 38 cm from the incisors, Gastritis, Gastric polyps, Duodenal erosions without bleeding     ENDOSCOPY N/A 05/18/2010    Dr. Gurmeet Justice    ENDOSCOPY  10/09/2019    Z-line irregular, gastritis, bilious gastric fluid. Path: Reflux pattern esophagitis, chronic inflammation.     ENDOSCOPY N/A 08/02/2022    Procedure: egd with esophageal dilatation 12-15 balloon with cold biopsies;  Surgeon: Michael Matos MD;  Location: [facility] OR;  Service: Gastroenterology;  Laterality: N/A;  hiatal hernia, gastric polyp, gastrtitis    FRACTURE SURGERY  1997    Right Leg    HYSTERECTOMY N/A 02/01/2006    JOINT REPLACEMENT  Right Hip    LIPOSUCTION N/A 04/01/2001    OOPHORECTOMY      PAP SMEAR N/A 07/10/2013    TOE SURGERY Right 01/01/1995    TONSILLECTOMY N/A 01/01/1964    TRACHEAL SURGERY N/A 01/01/1986    Scar Repair    TRACHEOSTOMY      UPPER GASTROINTESTINAL ENDOSCOPY         Family History   Problem Relation Age of Onset    Arthritis Mother     Heart failure Father     Other Father         Lung failure    Breast cancer Neg Hx     Ovarian cancer Neg Hx     Uterine cancer Neg Hx     Colon cancer Neg Hx     Colon polyps Neg Hx     Crohn's disease Neg Hx     Irritable bowel syndrome Neg Hx     Ulcerative colitis Neg Hx         Social History     Socioeconomic History    Marital status:    Tobacco Use    Smoking status: Never    Smokeless tobacco: Never    Tobacco comments:     occasional caffiene   Vaping Use    Vaping Use: Never used   Substance and Sexual Activity    Alcohol use: Yes     Alcohol/week: 3.0 standard drinks of alcohol     Types: 3 Cans of beer per week     Comment: Moderate    Drug use: Never    Sexual activity: Yes     Partners: Male     Birth control/protection: Vasectomy, Hysterectomy           Objective   Physical Exam  Vitals and nursing note reviewed.   Constitutional:       General: She is in acute distress.      Appearance: She is well-developed.   HENT:      Head: Atraumatic.   Cardiovascular:      Rate and Rhythm: Normal rate and regular rhythm.      Heart sounds: Normal heart sounds. No murmur heard.  Pulmonary:      Effort: Pulmonary effort is normal. No respiratory distress.      Breath sounds: Normal breath sounds. No stridor. No wheezing, rhonchi or rales.   Abdominal:      General: Abdomen is flat. Bowel sounds are normal.      Palpations: Abdomen is soft. There is no mass.      Tenderness: There is no abdominal tenderness. There is no guarding or rebound. Negative signs include Henning's sign and McBurney's sign.   Skin:     General: Skin is warm.      Capillary Refill: Capillary refill takes less than 2 seconds.      Coloration: Skin is not pale.      Findings: No erythema or rash.   Neurological:      General: No focal deficit present.      Mental Status: She is alert. She is disoriented.   Psychiatric:         Mood and Affect: Mood normal.         Behavior: Behavior normal.         Procedures           ED Course  ED Course as of 02/04/24 1743   Sun Feb 04, 2024   1007 CBC does not show anything concerning except percentage of monocytes are slightly elevated. [AR]   1058 Troponin is 15, less than 14 is normal I am not inclined to hermelinda this at this time. [AR]   1058 HS Troponin T(!): 15 [AR]    1058 Patient has received morphine twice in a short amount of time which the pharmacist called about and we discussed.  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  That she has been in pain for 3 to 4 days will make her more resistant to treatment.  Will switch over to fentanyl or 2 droperidol on the next round if she requests. [AR]   1133 Patient able to ambulate to the bathroom and back [AR]   1216 Patient feels much better after the droperidol and 1.25 mg which is half the dose that usually give and can be given at home.  Will send to E prescribe for her.  IV fluids are having trouble going and as her veins are so small however she is gotten some.  She feels good enough to go home and the  is agreed. [AR]   1217 Comprehensive panel is in process and has been sent to [facility] Main lab however they have the delay is 2 to 3 hours and this patient is ready to go if there is any anomaly in the CMP that is concerning to me we will give them a call. [AR]   1630 Pharmacy called and said there was an error and that how the prescription was written for 2 tablets except it is written for 2 days which is 4 tablets so I corrected that rescinded. [AR]   1640 Lab delays have finally returned.  Patient's sodium is 118 which is not apparently her common level which is usually about 132 125.  BUN is 3 and creatinine is 0.499 gap 17.6 she has 6-10 RBCs 6-10 white blood cells 3-7 squamous cells and no bacteria and a microscopic urine test with ketones and trace leuks.  POC sodium was 121 [AR]      ED Course User Index  [AR] Violeta Gastelum MD                                           Medical Decision Making  Did Gilbert diagnosis includes but not limited to infectious diarrhea, diarrhea due to pain and stress, fibromyalgia attack,  anxiety, infection, bowel infection, UTI, viral syndrome.    2 doses of morphine did not make a difference with droperidol 1.25 mg IV was given.  She feels much better.  CMP is still pending as it had to be sent out to [facility] Main lab due to dysfunctional equipment here in the stand-alone ER.  I do not want to keep this patient in any longer and they do not want to stay. Will call her if the CMP has any changes on it that are concerning.  Will write for a few low dose tablets for haldol prescription.      You were given a couple doses of morphine which did not help you.  You are given a small dose of droperidol at 1.25 mg which seem to help.  Haldol is a medication similar to this but I cannot give you too many doses of it due to side effects.  This should get you through tomorrow.  Tomorrow please call your nurse practitioner or PCP regarding your episode and how you feel now.  The pending lab work is a comprehensive metabolic panel which has to do with electrolytes, liver function tests and kidney function with hydration status.  If there are any abnormalities on that result that are concerning to me we will call you back regarding those results and they will also show up on your my health chart.    She and her  understood and agreed    Problems Addressed:  Fibromyalgia syndrome: complicated acute illness or injury    Amount and/or Complexity of Data Reviewed  Labs: ordered. Decision-making details documented in ED Course.  Radiology: ordered and independent interpretation performed.  ECG/medicine tests: ordered and independent interpretation performed.    Risk  OTC drugs.  Prescription drug management.        Final diagnoses:   Fibromyalgia syndrome       ED Disposition  ED Disposition       ED Disposition   Discharge    Condition   Stable    Comment   --               Lydia Major, APRN  [address]  [place] [id number] [phone]    In 3 days           Medication List        New  Prescriptions      haloperidol 0.5 MG tablet  Commonly known as: HALDOL  Take 1 tablet by mouth 2 (Two) Times a Day for 2 days.               Where to Get Your Medications        These medications were sent to [id number] [place], [place] - [address] [place] [address] AT [facility] [address] - [phone]  - [phone] FX  [address] [place] [address], [facility] [address]      Phone: [phone]   haloperidol 0.5 MG tablet

## 2024-02-04 NOTE — DISCHARGE INSTRUCTIONS
You were given a couple doses of morphine which did not help you.  You are given a small dose of droperidol at 1.25 mg which seem to help.  Haldol is a medication similar to this but I cannot give you too many doses of it due to side effects.  This should get you through tomorrow.  Tomorrow please call your nurse practitioner or PCP regarding your episode and how you feel now.  The pending lab work is a comprehensive metabolic panel which has to do with electrolytes, liver function tests and kidney function with hydration status.  If there are any abnormalities on that result that are concerning to me we will call you back regarding those results and they will also show up on your my health chart.

## 2024-02-05 ENCOUNTER — TELEPHONE (OUTPATIENT)
Dept: INTERNAL MEDICINE | Facility: CLINIC | Age: 69
End: 2024-02-05

## 2024-02-05 NOTE — TELEPHONE ENCOUNTER
Caller: Maggie Malhotra    Relationship to patient: Self    Best call back number: [phone]     New or established patient?  [] New  [x] Established    Date of discharge: 02-04-24    Facility discharged from: [facility] EMERGENCY ROOM    Diagnosis/Symptoms: fibromyalgia    Length of stay (If applicable): 1 DAY    Specialty Only: Did you see a [facility] provider?    [x] Yes  [] No  If so, who?     PATIENT IS IN A LOT OF PAIN, AND WANTED TO SEE IF YOU CAN PLEASE HELP HER OUT, AND GIVE HER A CALL

## 2024-02-05 NOTE — TELEPHONE ENCOUNTER
Lydia would like to see patient in office tomorrow morning for hospital follow up, please schedule.

## 2024-02-06 ENCOUNTER — LAB (OUTPATIENT)
Dept: LAB | Facility: HOSPITAL | Age: 69
End: 2024-02-06
Payer: MEDICARE

## 2024-02-06 ENCOUNTER — OFFICE VISIT (OUTPATIENT)
Dept: INTERNAL MEDICINE | Facility: CLINIC | Age: 69
End: 2024-02-06
Payer: MEDICARE

## 2024-02-06 VITALS
OXYGEN SATURATION: 98 % | HEART RATE: 86 BPM | SYSTOLIC BLOOD PRESSURE: 150 MMHG | TEMPERATURE: 98 F | BODY MASS INDEX: 23.8 KG/M2 | DIASTOLIC BLOOD PRESSURE: 108 MMHG | WEIGHT: 170 LBS | HEIGHT: 71 IN

## 2024-02-06 DIAGNOSIS — F41.9 ANXIETY: ICD-10-CM

## 2024-02-06 DIAGNOSIS — E87.1 HYPONATREMIA: ICD-10-CM

## 2024-02-06 DIAGNOSIS — M79.7 FIBROMYALGIA: Primary | ICD-10-CM

## 2024-02-06 DIAGNOSIS — I10 HYPERTENSION, UNSPECIFIED TYPE: ICD-10-CM

## 2024-02-06 LAB
ALBUMIN SERPL-MCNC: 4.7 G/DL (ref 3.5–5.2)
ALBUMIN/GLOB SERPL: 2 G/DL
ALP SERPL-CCNC: 88 U/L (ref 39–117)
ALT SERPL W P-5'-P-CCNC: 17 U/L (ref 1–33)
ANION GAP SERPL CALCULATED.3IONS-SCNC: 12.9 MMOL/L (ref 5–15)
AST SERPL-CCNC: 25 U/L (ref 1–32)
BILIRUB SERPL-MCNC: 0.8 MG/DL (ref 0–1.2)
BUN SERPL-MCNC: 3 MG/DL (ref 8–23)
BUN/CREAT SERPL: 5.2 (ref 7–25)
CALCIUM SPEC-SCNC: 9.7 MG/DL (ref 8.6–10.5)
CHLORIDE SERPL-SCNC: 81 MMOL/L (ref 98–107)
CO2 SERPL-SCNC: 26.1 MMOL/L (ref 22–29)
CREAT SERPL-MCNC: 0.58 MG/DL (ref 0.57–1)
EGFRCR SERPLBLD CKD-EPI 2021: 99.3 ML/MIN/1.73
GLOBULIN UR ELPH-MCNC: 2.3 GM/DL
GLUCOSE SERPL-MCNC: 114 MG/DL (ref 65–99)
POTASSIUM SERPL-SCNC: 3.5 MMOL/L (ref 3.5–5.2)
PROT SERPL-MCNC: 7 G/DL (ref 6–8.5)
SODIUM SERPL-SCNC: 120 MMOL/L (ref 136–145)

## 2024-02-06 PROCEDURE — 80053 COMPREHEN METABOLIC PANEL: CPT | Performed by: NURSE PRACTITIONER

## 2024-02-06 PROCEDURE — 36415 COLL VENOUS BLD VENIPUNCTURE: CPT | Performed by: NURSE PRACTITIONER

## 2024-02-06 PROCEDURE — 1159F MED LIST DOCD IN RCRD: CPT | Performed by: NURSE PRACTITIONER

## 2024-02-06 PROCEDURE — 99214 OFFICE O/P EST MOD 30 MIN: CPT | Performed by: NURSE PRACTITIONER

## 2024-02-06 PROCEDURE — 1160F RVW MEDS BY RX/DR IN RCRD: CPT | Performed by: NURSE PRACTITIONER

## 2024-02-06 PROCEDURE — 3080F DIAST BP >= 90 MM HG: CPT | Performed by: NURSE PRACTITIONER

## 2024-02-06 PROCEDURE — 3077F SYST BP >= 140 MM HG: CPT | Performed by: NURSE PRACTITIONER

## 2024-02-06 RX ORDER — PREGABALIN 75 MG/1
CAPSULE ORAL
Qty: 98 CAPSULE | Refills: 0 | Status: SHIPPED | OUTPATIENT
Start: 2024-02-06 | End: 2024-03-05

## 2024-02-06 RX ORDER — DIAZEPAM 10 MG/1
10 TABLET ORAL DAILY PRN
Qty: 30 TABLET | Refills: 0 | Status: SHIPPED | OUTPATIENT
Start: 2024-02-06

## 2024-02-06 RX ORDER — HYDROCODONE BITARTRATE AND ACETAMINOPHEN 10; 325 MG/1; MG/1
1 TABLET ORAL EVERY 12 HOURS PRN
Qty: 30 TABLET | Refills: 0 | Status: SHIPPED | OUTPATIENT
Start: 2024-02-06 | End: 2024-02-08 | Stop reason: RX

## 2024-02-06 NOTE — PROGRESS NOTES
Subjective   Maggie Malhotra is a 68 y.o. female. Patient is here today for   Chief Complaint   Patient presents with    Hospital Follow Up Visit   .    History of Present Illness   Patient was seen in the ER 2 days ago due to pain. Patient has fibromyalgia and has occasional flare ups. She has a history of Guillain [place] syndrome.  In the ER she was treated with morphine which did not help but then was given a small dose of droperidol which seem to help.  She was sent home with 4 doses of Haldol.  Patient states that she has lost weight because she cannot eat due to having pain.  She has also not been taking most of her medications on a regular basis due to the pain.      Patient does have anxiety and states that she has been taking her Lexapro which has helped with her anxiety.  She also has a prescription for Valium to take once daily.  Patient has been taking an extra half a tablet occasionally due to pain.  I did explain to patient that that was to be used for anxiety and not to be used for pain.    Patient's blood pressure is elevated today.  Patient states that she is taking her carvedilol.  States that her blood pressure is up due to her pain.  Her sodium level has been low in the past, so she is no longer on chlorthalidone or furosemide.    The following portions of the patient's history were reviewed and updated as appropriate: allergies, current medications, past family history, past medical history, past social history, past surgical history and problem list.    Review of Systems    Objective   Vitals:    02/06/24 1249   BP: (!) 150/108   Pulse: 86   Temp: 98 °F (36.7 °C)   SpO2: 98%     Body mass index is 23.72 kg/m².  Physical Exam  Vitals and nursing note reviewed.   Constitutional:       Appearance: Normal appearance. She is well-developed. Ill appearance: appears uncomfortable.   Cardiovascular:      Rate and Rhythm: Normal rate and regular rhythm.      Heart sounds: Normal heart sounds.    Pulmonary:      Effort: Pulmonary effort is normal.      Breath sounds: Normal breath sounds.   Skin:     General: Skin is warm and dry.   Neurological:      Mental Status: She is alert and oriented to person, place, and time.   Psychiatric:         Speech: Speech normal.         Behavior: Behavior normal.         Thought Content: Thought content normal.         Assessment & Plan   Diagnoses and all orders for this visit:    1. Fibromyalgia (Primary)  -     HYDROcodone-acetaminophen (NORCO)  MG per tablet; Take 1 tablet by mouth Every 12 (Twelve) Hours As Needed for Moderate Pain.  Dispense: 30 tablet; Refill: 0  -     pregabalin (LYRICA) 75 MG capsule; Take 1 capsule by mouth 2 (Two) Times a Day for 7 days, THEN 2 capsules 2 (Two) Times a Day for 21 days.  Dispense: 98 capsule; Refill: 0  -     diazePAM (VALIUM) 10 MG tablet; Take 1 tablet by mouth Daily As Needed for Anxiety.  Dispense: 30 tablet; Refill: 0    2. Anxiety  -     diazePAM (VALIUM) 10 MG tablet; Take 1 tablet by mouth Daily As Needed for Anxiety.  Dispense: 30 tablet; Refill: 0    3. Hyponatremia  -     Comprehensive Metabolic Panel    4. Hypertension, unspecified type      Fibromyalgia - will prescribe hydrocodone and lyrica. She is only to use the hydrocodone as needed. She will need to follow up with pain management. Patient's  will call to make appt. Patient was seen less than a year ago. Will put in a new referral order if needed    Anxiety - continue lexapro. Valium is to be used as needed.     Hyponatremia - patient will go downstairs to have her labs checked today.     HTN - no med changes today. This should improve when her pain improves.     F/u scheduled next month.

## 2024-02-07 ENCOUNTER — TELEPHONE (OUTPATIENT)
Dept: INTERNAL MEDICINE | Facility: CLINIC | Age: 69
End: 2024-02-07
Payer: MEDICARE

## 2024-02-07 DIAGNOSIS — M79.7 FIBROMYALGIA: Primary | ICD-10-CM

## 2024-02-07 NOTE — TELEPHONE ENCOUNTER
Caller: Shabbir Malhotra    Relationship: Emergency Contact    Best call back number: [phone]*    What was the call regarding: PATIENT'S SPOUSE CALLING STATING THAT THE PHARMACY DOES NOT HAVE THE HYDROcodone-acetaminophen (NORCO)  MG per tablet IN STOCK, BUT THEY DO HAVE THE 7.5 IN STOCK. SHABBIR IS REQUESTING A NEW PRESCRIPTION TO BE SENT TO THE PHARMACY FOR THE 7.5 NORCO WITH THE APPROPRIATE QUANTITY FOR DOSING.     PHARMACY CONFIRMED:  [id number] [place], [place] - [address] [place] [address] AT [facility] - [phone]  - [phone]  [phone]

## 2024-02-08 ENCOUNTER — TELEPHONE (OUTPATIENT)
Dept: INTERNAL MEDICINE | Facility: CLINIC | Age: 69
End: 2024-02-08
Payer: MEDICARE

## 2024-02-08 DIAGNOSIS — G47.00 INSOMNIA, UNSPECIFIED TYPE: Primary | ICD-10-CM

## 2024-02-08 RX ORDER — ZOLPIDEM TARTRATE 10 MG/1
10 TABLET ORAL NIGHTLY PRN
Qty: 30 TABLET | Refills: 0 | Status: SHIPPED | OUTPATIENT
Start: 2024-02-08

## 2024-02-08 RX ORDER — HYDROCODONE BITARTRATE AND ACETAMINOPHEN 7.5; 325 MG/1; MG/1
1 TABLET ORAL EVERY 12 HOURS PRN
Qty: 30 TABLET | Refills: 0 | Status: SHIPPED | OUTPATIENT
Start: 2024-02-08

## 2024-02-08 NOTE — TELEPHONE ENCOUNTER
----- Message from Gloria Gonzalez MA sent at 2/8/2024  2:26 PM EST -----  Regarding: FW: Prescription for Sleep  Contact: [phone]    ----- Message -----  From: Maggie Malhotra  Sent: 2/8/2024   2:14 PM EST  To: [facility]  Subject: Prescription for Sleep                           I believe we discussed Ambien.  I have head that prescribed before and it seems to work the best.    Thank You  Maggie

## 2024-02-12 DIAGNOSIS — F41.9 ANXIETY: Primary | ICD-10-CM

## 2024-02-12 DIAGNOSIS — E87.1 HYPONATREMIA: Primary | ICD-10-CM

## 2024-02-15 ENCOUNTER — OFFICE VISIT (OUTPATIENT)
Dept: PAIN MEDICINE | Facility: CLINIC | Age: 69
End: 2024-02-15
Payer: MEDICARE

## 2024-02-15 VITALS
TEMPERATURE: 97.1 F | HEART RATE: 89 BPM | DIASTOLIC BLOOD PRESSURE: 104 MMHG | HEIGHT: 71 IN | BODY MASS INDEX: 24.61 KG/M2 | SYSTOLIC BLOOD PRESSURE: 148 MMHG | OXYGEN SATURATION: 98 % | WEIGHT: 175.8 LBS

## 2024-02-15 DIAGNOSIS — Z86.69 HISTORY OF GUILLAIN-BARRE SYNDROME: Primary | ICD-10-CM

## 2024-02-15 DIAGNOSIS — M79.2 NEUROPATHIC PAIN: ICD-10-CM

## 2024-02-15 DIAGNOSIS — G62.9 POLYNEUROPATHY, UNSPECIFIED: ICD-10-CM

## 2024-02-15 PROCEDURE — 1160F RVW MEDS BY RX/DR IN RCRD: CPT | Performed by: PHYSICIAN ASSISTANT

## 2024-02-15 PROCEDURE — 3080F DIAST BP >= 90 MM HG: CPT | Performed by: PHYSICIAN ASSISTANT

## 2024-02-15 PROCEDURE — 1125F AMNT PAIN NOTED PAIN PRSNT: CPT | Performed by: PHYSICIAN ASSISTANT

## 2024-02-15 PROCEDURE — 3077F SYST BP >= 140 MM HG: CPT | Performed by: PHYSICIAN ASSISTANT

## 2024-02-15 PROCEDURE — 1159F MED LIST DOCD IN RCRD: CPT | Performed by: PHYSICIAN ASSISTANT

## 2024-02-15 PROCEDURE — 99214 OFFICE O/P EST MOD 30 MIN: CPT | Performed by: PHYSICIAN ASSISTANT

## 2024-02-20 ENCOUNTER — TELEPHONE (OUTPATIENT)
Dept: INTERNAL MEDICINE | Facility: CLINIC | Age: 69
End: 2024-02-20
Payer: MEDICARE

## 2024-02-20 NOTE — TELEPHONE ENCOUNTER
Caller: Maggie Malhotra    Relationship: Self    Best call back number  [phone]    What is the medical concern/diagnosis: PAIN    What specialty or service is being requested: PAIN MANAGEMENT    What is the provider, practice or medical service name: [facility]    What is the office location: [address] [place] [address]    What is the office phone number: [phone]    Any additional details: PATIENT IS CALLING IN TO ASK IF DR JON WILL REFER HER TO PAIN MANAGEMENT.

## 2024-02-22 DIAGNOSIS — G62.9 NEUROPATHY: Primary | ICD-10-CM

## 2024-02-22 DIAGNOSIS — G61.0 GUILLAIN BARRÉ SYNDROME: ICD-10-CM

## 2024-02-22 DIAGNOSIS — M79.7 FIBROMYALGIA, PRIMARY: ICD-10-CM

## 2024-02-28 NOTE — TELEPHONE ENCOUNTER
PATIENT CALLING TO FOLLOW UP ON THE PAIN MANAGEMENT REFERRAL SHE STATED THAT SHE IS OUT OF MEDICATION.

## 2024-02-29 NOTE — TELEPHONE ENCOUNTER
Called patient to let her know referral was sent to requested office, she will need to make an appointment with them and give us a call and let us know what date she has before we request Lulu to fill pain medication until appointment.

## 2024-03-01 DIAGNOSIS — E87.1 HYPONATREMIA: Primary | ICD-10-CM

## 2024-03-01 DIAGNOSIS — M79.7 FIBROMYALGIA: ICD-10-CM

## 2024-03-01 LAB
ALBUMIN SERPL-MCNC: 4.6 G/DL (ref 3.5–5.2)
ALBUMIN/GLOB SERPL: 2.4 G/DL
ALP SERPL-CCNC: 68 U/L (ref 39–117)
ALT SERPL-CCNC: 18 U/L (ref 1–33)
AST SERPL-CCNC: 16 U/L (ref 1–32)
BILIRUB SERPL-MCNC: 0.5 MG/DL (ref 0–1.2)
BUN SERPL-MCNC: 10 MG/DL (ref 8–23)
BUN/CREAT SERPL: 17.9 (ref 7–25)
CALCIUM SERPL-MCNC: 9.4 MG/DL (ref 8.6–10.5)
CHLORIDE SERPL-SCNC: 97 MMOL/L (ref 98–107)
CO2 SERPL-SCNC: 26.7 MMOL/L (ref 22–29)
CREAT SERPL-MCNC: 0.56 MG/DL (ref 0.57–1)
EGFRCR SERPLBLD CKD-EPI 2021: 100.2 ML/MIN/1.73
GLOBULIN SER CALC-MCNC: 1.9 GM/DL
GLUCOSE SERPL-MCNC: 90 MG/DL (ref 65–99)
POTASSIUM SERPL-SCNC: 4.4 MMOL/L (ref 3.5–5.2)
PROT SERPL-MCNC: 6.5 G/DL (ref 6–8.5)
SODIUM SERPL-SCNC: 136 MMOL/L (ref 136–145)

## 2024-03-01 RX ORDER — HYDROCODONE BITARTRATE AND ACETAMINOPHEN 7.5; 325 MG/1; MG/1
1 TABLET ORAL EVERY 12 HOURS PRN
Qty: 30 TABLET | Refills: 0 | Status: SHIPPED | OUTPATIENT
Start: 2024-03-01

## 2024-03-01 NOTE — TELEPHONE ENCOUNTER
Patient called requesting a refill, she says tried to call pain management to set appointment up they did not receive the referral yet, assuming they have not received the referral Blanca did send referral out to the office requested.

## 2024-03-07 ENCOUNTER — OFFICE VISIT (OUTPATIENT)
Dept: INTERNAL MEDICINE | Facility: CLINIC | Age: 69
End: 2024-03-07
Payer: MEDICARE

## 2024-03-07 VITALS
OXYGEN SATURATION: 95 % | HEART RATE: 82 BPM | WEIGHT: 178 LBS | SYSTOLIC BLOOD PRESSURE: 128 MMHG | HEIGHT: 71 IN | DIASTOLIC BLOOD PRESSURE: 88 MMHG | TEMPERATURE: 97.8 F | BODY MASS INDEX: 24.92 KG/M2

## 2024-03-07 DIAGNOSIS — F41.9 ANXIETY: ICD-10-CM

## 2024-03-07 DIAGNOSIS — Z00.00 ENCOUNTER FOR SUBSEQUENT ANNUAL WELLNESS VISIT (AWV) IN MEDICARE PATIENT: Primary | ICD-10-CM

## 2024-03-07 DIAGNOSIS — M79.7 FIBROMYALGIA: ICD-10-CM

## 2024-03-07 DIAGNOSIS — G47.00 INSOMNIA, UNSPECIFIED TYPE: ICD-10-CM

## 2024-03-07 RX ORDER — BUSPIRONE HYDROCHLORIDE 7.5 MG/1
7.5 TABLET ORAL 3 TIMES DAILY
Qty: 90 TABLET | Refills: 3 | Status: SHIPPED | OUTPATIENT
Start: 2024-03-07

## 2024-03-07 RX ORDER — PREGABALIN 150 MG/1
150 CAPSULE ORAL 2 TIMES DAILY
Qty: 180 CAPSULE | Refills: 0 | Status: SHIPPED | OUTPATIENT
Start: 2024-03-07

## 2024-03-07 RX ORDER — DIAZEPAM 10 MG/1
10 TABLET ORAL DAILY PRN
Qty: 30 TABLET | Refills: 0 | Status: SHIPPED | OUTPATIENT
Start: 2024-03-07

## 2024-03-07 RX ORDER — ZOLPIDEM TARTRATE 12.5 MG/1
12.5 TABLET, FILM COATED, EXTENDED RELEASE ORAL NIGHTLY PRN
Qty: 30 TABLET | Refills: 0 | Status: SHIPPED | OUTPATIENT
Start: 2024-03-07

## 2024-03-07 NOTE — PROGRESS NOTES
The ABCs of the Annual Wellness Visit  Subsequent Medicare Wellness Visit    Subjective    Maggie Malhotra is a 68 y.o. female who presents for a Subsequent Medicare Wellness Visit.    The following portions of the patient's history were reviewed and   updated as appropriate: allergies, current medications, past family history, past medical history, past social history, past surgical history, and problem list.    Compared to one year ago, the patient feels her physical   health is the same.    Compared to one year ago, the patient feels her mental   health is better.    Recent Hospitalizations:  She was admitted within the past 365 days at [facility].       Current Medical Providers:  Patient Care Team:  Lydia Major APRN as PCP - General (Family Medicine)  Catia Reece MD as Consulting Physician (Internal Medicine)  Gurmeet Justice MD as Consulting Physician (Gastroenterology)  Lanre Solis MD as Consulting Physician (Allergy)  Olegario Serrano MD as Consulting Physician (Urology)  Kenney Reyes MD as Consulting Physician (Neurology)  Tong Hayes MD as Consulting Physician (Neurology)  Adriana Maravilla MD as Consulting Physician (Cardiology)  Jessica Rendon MD as Consulting Physician (Obstetrics and Gynecology)  Xi Spann MD as Consulting Physician (Dermatology)  Dianna Garnica MD as Consulting Physician (Orthopedic Surgery)  Mone Mcpherson MD (Neurology)  Allie Devlin APRN as Nurse Practitioner (Nurse Practitioner)  Beverley Adam MD as Consulting Physician (Pain Medicine)  Nancy Bai APRN as Nurse Practitioner (Nurse Practitioner)  Allison Lopez APRN as Nurse Practitioner (Gastroenterology)    Outpatient Medications Prior to Visit   Medication Sig Dispense Refill    albuterol sulfate  (90 Base) MCG/ACT inhaler Inhale 2 puffs Every 4 (Four) Hours As Needed for Wheezing for up to 30 days. 8 g 0     carvedilol CR (Coreg CR) 40 MG 24 hr capsule Take 1 capsule by mouth Daily. 90 capsule 1    Estrad-Estriol-Testost-Progest (Bi-Est Progest-Testosterone) cream Place  on the skin as directed by provider.      HYDROcodone-acetaminophen (NORCO) 7.5-325 MG per tablet Take 1 tablet by mouth Every 12 (Twelve) Hours As Needed for Moderate Pain. 30 tablet 0    Magnesium-Potassium 250-100 MG tablet Take 1 tablet by mouth Daily.      multivitamin (DAILY ES) tablet tablet Take 1 tablet by mouth Daily.      naloxone (Narcan) 4 MG/0.1ML nasal spray 1 spray into the nostril(s) as directed by provider As Needed (Respiratory depression). 1 each 1    ondansetron (ZOFRAN) 4 MG tablet Take 2 tablets by mouth Every 8 (Eight) Hours As Needed for Nausea. 20 tablet 0    pantoprazole (PROTONIX) 40 MG EC tablet       Progesterone (PROMETRIUM) 200 MG capsule Take 300 mg by mouth Daily. Patient takes 300 mg      busPIRone (BUSPAR) 5 MG tablet Take 1 tablet by mouth 3 (Three) Times a Day. 90 tablet 1    diazePAM (VALIUM) 10 MG tablet Take 1 tablet by mouth Daily As Needed for Anxiety. 30 tablet 0    zolpidem (AMBIEN) 10 MG tablet Take 1 tablet by mouth At Night As Needed for Sleep. 30 tablet 0    haloperidol (HALDOL) 0.5 MG tablet Take 1 tablet by mouth 2 (Two) Times a Day for 2 days. 4 tablet 0    amoxicillin-clavulanate (AUGMENTIN) 875-125 MG per tablet Take 1 tablet by mouth 2 (Two) Times a Day for 10 days. (Patient not taking: Reported on 3/7/2024) 20 tablet 0    escitalopram (Lexapro) 10 MG tablet Take 1 tablet by mouth Daily. Take 1/2 tab for one week, then increase to 1 full tab. (Patient not taking: Reported on 3/7/2024) 30 tablet 1    pregabalin (LYRICA) 75 MG capsule Take 1 capsule by mouth 2 (Two) Times a Day for 7 days, THEN 2 capsules 2 (Two) Times a Day for 21 days. 98 capsule 0     No facility-administered medications prior to visit.       Opioid medication/s are on active medication list.  and I have evaluated her active  "treatment plan and pain score trends (see table).  Vitals:    03/07/24 1357   PainSc:   2     I have reviewed the chart for potential of high risk medication and harmful drug interactions in the elderly.          Aspirin is not on active medication list.  Aspirin use is not indicated based on review of current medical condition/s. Risk of harm outweighs potential benefits.  .    Patient Active Problem List   Diagnosis    Fibromyalgia    Irritable bowel syndrome with diarrhea    Vitamin D deficiency    Insomnia    Allergic rhinitis    Fibromyalgia, primary    Essential hypertension    Anxiety    Weakness    Increased MCV    Guillain [place] syndrome    Monopolar depression    Transaminitis    Dyspepsia    Diarrhea    Gastroesophageal reflux disease    Arthritis of right hip    Right hip pain    Right thigh pain    Bilious vomiting with nausea    Generalized abdominal pain    Gallstones    Sinus tachycardia    Abnormal liver CT    Esophageal dysphagia    History of Guillain-[place] syndrome    Impairment of balance    Cervical spondylosis without myelopathy    Neuropathic pain    Polyneuropathy, unspecified    Alcohol abuse     Advance Care Planning   Advance Care Planning     Advance Directive is not on file.  ACP discussion was held with the patient during this visit. Patient does not have an advance directive, information provided.     Objective    Vitals:    03/07/24 1357   BP: 128/88   Pulse: 82   Temp: 97.8 °F (36.6 °C)   SpO2: 95%   Weight: 80.7 kg (178 lb)   Height: 180.3 cm (70.98\")   PainSc:   2     Estimated body mass index is 24.84 kg/m² as calculated from the following:    Height as of this encounter: 180.3 cm (70.98\").    Weight as of this encounter: 80.7 kg (178 lb).    BMI is within normal parameters. No other follow-up for BMI required.      Does the patient have evidence of cognitive impairment? No          HEALTH RISK ASSESSMENT    Smoking Status:  Social History     Tobacco Use   Smoking Status Never "   Smokeless Tobacco Never   Tobacco Comments    occasional caffiene     Alcohol Consumption:  Social History     Substance and Sexual Activity   Alcohol Use Yes    Alcohol/week: 3.0 standard drinks of alcohol    Types: 3 Cans of beer per week    Comment: Moderate     Fall Risk Screen:    REUBEN Fall Risk Assessment was completed, and patient is at LOW risk for falls.Assessment completed on:3/7/2024    Depression Screening:      3/7/2024     2:01 PM   PHQ-2/PHQ-9 Depression Screening   Little Interest or Pleasure in Doing Things 0-->not at all   Feeling Down, Depressed or Hopeless 0-->not at all   PHQ-9: Brief Depression Severity Measure Score 0       Health Habits and Functional and Cognitive Screenin/29/2024     1:32 PM   Functional & Cognitive Status   Do you have difficulty preparing food and eating? No   Do you have difficulty bathing yourself, getting dressed or grooming yourself? No   Do you have difficulty using the toilet? No   Do you have difficulty moving around from place to place? No   Do you have trouble with steps or getting out of a bed or a chair? No   Current Diet Well Balanced Diet   Dental Exam Up to date   Eye Exam Up to date   Exercise (times per week) 1 times per week   Current Exercises Include Cardiovascular Workout   Do you need help using the phone?  No   Are you deaf or do you have serious difficulty hearing?  No   Do you need help to go to places out of walking distance? No   Do you need help shopping? No   Do you need help preparing meals?  No   Do you need help with housework?  No   Do you need help with laundry? No   Do you need help taking your medications? No   Do you need help managing money? No   Do you ever drive or ride in a car without wearing a seat belt? No   Have you felt unusual stress, anger or loneliness in the last month? No   Who do you live with? Spouse   If you need help, do you have trouble finding someone available to you? No   Have you been bothered in the  last four weeks by sexual problems? No   Do you have difficulty concentrating, remembering or making decisions? No       Age-appropriate Screening Schedule:  Refer to the list below for future screening recommendations based on patient's age, sex and/or medical conditions. Orders for these recommended tests are listed in the plan section. The patient has been provided with a written plan.    Health Maintenance   Topic Date Due    RSV Vaccine - Adults (1 - 1-dose 60+ series) Never done    DXA SCAN  07/10/2022    MAMMOGRAM  05/17/2023    GASTROSCOPY (EGD)  08/02/2023    COVID-19 Vaccine (4 - 2023-24 season) 09/01/2023    COLORECTAL CANCER SCREENING  10/09/2024    ANNUAL WELLNESS VISIT  03/07/2025    PAP SMEAR  03/11/2025    HEPATITIS C SCREENING  Completed    Pneumococcal Vaccine 65+  Completed    TDAP/TD VACCINES  Discontinued    ZOSTER VACCINE  Discontinued                  CMS Preventative Services Quick Reference  Risk Factors Identified During Encounter  Chronic Pain:  follow up with pain management  The above risks/problems have been discussed with the patient.  Pertinent information has been shared with the patient in the After Visit Summary.  An After Visit Summary and PPPS were made available to the patient.    Follow Up:   Next Medicare Wellness visit to be scheduled in 1 year.       Additional E&M Note during same encounter follows:  Patient has multiple medical problems which are significant and separately identifiable that require additional work above and beyond the Medicare Wellness Visit.      Chief Complaint  Medicare Wellness-subsequent    Subjective        HPI  Maggie Malhotra is also being seen today for anxiety and depression. She stopped lexapro and this improved sodium.   For anxiety - buspirone not helping much. She takes valium daily and sometimes twice a day.   I have recommended a psychiatry referral, but she is not wanting to see another doctor.     Will cancel nephrology referral since  "sodium has improved.    She continues with chronic pain. She has seen pain management. She is on lyrica.           Objective   Vital Signs:  /88   Pulse 82   Temp 97.8 °F (36.6 °C)   Ht 180.3 cm (70.98\")   Wt 80.7 kg (178 lb)   SpO2 95%   BMI 24.84 kg/m²     Physical Exam  Vitals and nursing note reviewed.   Constitutional:       Appearance: Normal appearance. She is well-developed.   Cardiovascular:      Rate and Rhythm: Normal rate and regular rhythm.      Heart sounds: Normal heart sounds.   Pulmonary:      Effort: Pulmonary effort is normal.      Breath sounds: Normal breath sounds.   Skin:     General: Skin is warm and dry.   Neurological:      Mental Status: She is alert and oriented to person, place, and time.   Psychiatric:         Speech: Speech normal.         Behavior: Behavior normal.         Thought Content: Thought content normal.          The following data was reviewed by: RAJEEV Calix on 03/07/2024:  Common labs          2/4/2024    09:38 2/4/2024    09:51 2/6/2024    14:33 3/1/2024    11:06   Common Labs   Glucose 93   114  90    BUN 3   3  10    Creatinine 0.49  <0.30  0.58  0.56    Sodium 118   120  136    Potassium 4.5   3.5  4.4    Chloride 82   81  97    Calcium 9.0   9.7  9.4    Total Protein    6.5    Albumin 4.7   4.7  4.6    Total Bilirubin 0.7   0.8  0.5    Alkaline Phosphatase 86   88  68    AST (SGOT) 47   25  16    ALT (SGPT) 25   17  18    WBC 3.48       Hemoglobin 13.5       Hematocrit 38.9       Platelets 260                    Assessment and Plan   Diagnoses and all orders for this visit:    1. Encounter for subsequent annual wellness visit (AWV) in Medicare patient (Primary)    2. Insomnia, unspecified type  -     zolpidem CR (Ambien CR) 12.5 MG CR tablet; Take 1 tablet by mouth At Night As Needed for Sleep.  Dispense: 30 tablet; Refill: 0    3. Fibromyalgia  -     pregabalin (LYRICA) 150 MG capsule; Take 1 capsule by mouth 2 (Two) Times a Day.  Dispense: 180 " capsule; Refill: 0  -     diazePAM (VALIUM) 10 MG tablet; Take 1 tablet by mouth Daily As Needed for Anxiety.  Dispense: 30 tablet; Refill: 0    4. Anxiety  -     diazePAM (VALIUM) 10 MG tablet; Take 1 tablet by mouth Daily As Needed for Anxiety.  Dispense: 30 tablet; Refill: 0  -     busPIRone (BUSPAR) 7.5 MG tablet; Take 1 tablet by mouth 3 (Three) Times a Day.  Dispense: 90 tablet; Refill: 3    Insomnia - will continue ambien    Fibromyalgia - will increase lyrica to 150 mg twice daily. Stressed that valium is to be used only once a day.     Anxiety - will increase buspirone to 7.5 mg three times daily    Discussed with patient that she is taking several controlled medications for pain, sleep, and anxiety and that she needed to be careful being on that many different medications. She needs to follow up with pain management. I would also like for her to see psychology for her anxiety and depression    As part of this patient's treatment plan I am prescribing controlled substances.  The patient has been made aware of appropriate use of such medications, including potential risk of somnolence. Limited ability to drive and/or work safely, and potential for dependence or overdose.  It has also been made clear that these medications are for use by this patient only, without concomitant use of alcohol or other substances unless prescribed.  ZHANNA report has been reviewed and scanned into the patient's chart.           Follow Up   No follow-ups on file.  Patient was given instructions and counseling regarding her condition or for health maintenance advice. Please see specific information pulled into the AVS if appropriate.

## 2024-03-21 ENCOUNTER — OFFICE VISIT (OUTPATIENT)
Dept: CARDIOLOGY | Facility: CLINIC | Age: 69
End: 2024-03-21
Payer: MEDICARE

## 2024-03-21 VITALS
RESPIRATION RATE: 16 BRPM | SYSTOLIC BLOOD PRESSURE: 144 MMHG | OXYGEN SATURATION: 97 % | BODY MASS INDEX: 26.05 KG/M2 | HEIGHT: 70 IN | WEIGHT: 182 LBS | HEART RATE: 83 BPM | DIASTOLIC BLOOD PRESSURE: 78 MMHG

## 2024-03-21 DIAGNOSIS — R00.0 SINUS TACHYCARDIA: ICD-10-CM

## 2024-03-21 DIAGNOSIS — I10 ESSENTIAL HYPERTENSION: Primary | ICD-10-CM

## 2024-03-21 PROCEDURE — 3078F DIAST BP <80 MM HG: CPT | Performed by: INTERNAL MEDICINE

## 2024-03-21 PROCEDURE — 99214 OFFICE O/P EST MOD 30 MIN: CPT | Performed by: INTERNAL MEDICINE

## 2024-03-21 PROCEDURE — 1160F RVW MEDS BY RX/DR IN RCRD: CPT | Performed by: INTERNAL MEDICINE

## 2024-03-21 PROCEDURE — 3077F SYST BP >= 140 MM HG: CPT | Performed by: INTERNAL MEDICINE

## 2024-03-21 PROCEDURE — 1159F MED LIST DOCD IN RCRD: CPT | Performed by: INTERNAL MEDICINE

## 2024-03-21 PROCEDURE — 93000 ELECTROCARDIOGRAM COMPLETE: CPT | Performed by: INTERNAL MEDICINE

## 2024-03-21 RX ORDER — CARVEDILOL PHOSPHATE 40 MG/1
40 CAPSULE, EXTENDED RELEASE ORAL DAILY
Qty: 90 CAPSULE | Refills: 3 | Status: SHIPPED | OUTPATIENT
Start: 2024-03-21

## 2024-03-21 NOTE — PROGRESS NOTES
CARDIOLOGY    Adriana Maravilla MD    ENCOUNTER DATE:  03/21/2024    Maggie Malhotra / 68 y.o. / female        CHIEF COMPLAINT / REASON FOR OFFICE VISIT     Follow-up (Yearly follow up/)      HISTORY OF PRESENT ILLNESS       HPI    Maggie Malhotra is a 68 y.o. 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 This has left her with some neuropathy and fibromyalgia. She has no known heart disease. She had a stress echo in 2006 which showed normal LV systolic function without significant valvular heart disease, and she had no evidence of ischemia. She had an exercise stress test in 2012 which was normal. In 09/2018, she was at work. She had just eaten breakfast. She had sudden onset of feeling unwell. She was in a cold sweat. Co-workers said she was really pale. She was dizzy. She felt a little short of breath but did not have any palpitations. She went to the emergency room where her troponin was negative. Basic metabolic panel showed a blood sugar of 140 but was otherwise pretty unremarkable, and CBC was unremarkable. D-dimer was mildly elevated. She was discharged with a Zio patch which was normal. She had carotid artery ultrasound which was also normal.  In December 2018 she had an echocardiogram which showed normal LV systolic function with ejection fraction of 67% and grade 1 diastolic dysfunction.    I saw her in 2021 and 2022 for hypertension.  In July 2022 she had a normal lower extremity venous Doppler.  In November 2022 she had an echocardiogram showing normal LV function, normal diastolic function and no valve disease.    Unfortunately she was sick over [place] and in January and had to have her gallbladder taken out.  She is doing better now.  She has not been watching her blood pressure on a regular basis.  She has not gotten back into exercising but plans to remain active.    REVIEW OF SYSTEMS     Review of Systems   Constitutional: Negative for chills,  "fever, weight gain and weight loss.   Cardiovascular:  Negative for leg swelling.   Respiratory:  Negative for cough, snoring and wheezing.    Hematologic/Lymphatic: Negative for bleeding problem. Does not bruise/bleed easily.   Skin:  Negative for color change.   Musculoskeletal:  Positive for joint pain and myalgias. Negative for falls.   Gastrointestinal:  Negative for melena.   Genitourinary:  Negative for hematuria.   Neurological:  Negative for excessive daytime sleepiness.   Psychiatric/Behavioral:  Negative for depression. The patient is not nervous/anxious.          VITAL SIGNS     Visit Vitals  /78 (BP Location: Right arm, Patient Position: Sitting, Cuff Size: Adult)   Pulse 83   Resp 16   Ht 177.8 cm (70\")   Wt 82.6 kg (182 lb)   LMP 01/01/2006 (LMP Unknown)   SpO2 97%   BMI 26.11 kg/m²         Wt Readings from Last 3 Encounters:   03/21/24 82.6 kg (182 lb)   03/07/24 80.7 kg (178 lb)   02/28/24 79.4 kg (175 lb)     Body mass index is 26.11 kg/m².      PHYSICAL EXAMINATION     Constitutional:       General: Not in acute distress.  Neck:      Vascular: No carotid bruit or JVD.   Pulmonary:      Effort: Pulmonary effort is normal.      Breath sounds: Normal breath sounds.   Cardiovascular:      Normal rate. Regular rhythm.      Murmurs: There is no murmur.   Psychiatric:         Mood and Affect: Mood and affect normal.           REVIEWED DATA       ECG 12 Lead    Date/Time: 3/21/2024 2:52 PM  Performed by: Adriana Maravilla MD    Authorized by: Adriana Maravilla MD  Comparison: compared with previous ECG from 11/22/2022  Similar to previous ECG  Rhythm: sinus rhythm  BPM: 83  Conduction: conduction normal  ST Segments: ST segments normal  T Waves: T waves normal    Clinical impression: normal ECG                Lab Results   Component Value Date    GLUCOSE 90 03/01/2024    BUN 10 03/01/2024    CREATININE 0.56 (L) 03/01/2024    EGFRRESULT 100.2 03/01/2024    EGFR 99.3 02/06/2024    BCR 17.9 03/01/2024 "    K 4.4 03/01/2024    CO2 26.7 03/01/2024    CALCIUM 9.4 03/01/2024    PROTENTOTREF 6.5 03/01/2024    ALBUMIN 4.6 03/01/2024    BILITOT 0.5 03/01/2024    AST 16 03/01/2024    ALT 18 03/01/2024       ASSESSMENT & PLAN      Diagnosis Plan   1. Essential hypertension        2. Sinus tachycardia            1.  Hypertension.  She thinks edema correlated with changing from carvedilol to Bystolic.  I put her on once a day carvedilol and she has done well with it.  She is not complaining of any edema at this point in time.  I gave her sheet and asked her to start monitoring her blood pressure on a routine basis and let me know if it is high.  2.  History of sinus tachycardia.  Normal TSH in April 2021.  3.  Balance problems.   4.  Anxiety/depression.   5.  Edema.  Normal lower extremity Doppler March 2022.  Normal echo in November 2022.    Tentative plan will be for 1 year follow-up unless she has a change in her symptoms sooner.      Orders Placed This Encounter   Procedures    ECG 12 Lead     This order was created via procedure documentation     Order Specific Question:   Release to patient     Answer:   Routine Release [[id number]]           MEDICATIONS         Discharge Medications            Accurate as of March 21, 2024  2:53 PM. If you have any questions, ask your nurse or doctor.                Continue These Medications        Instructions Start Date   albuterol sulfate  (90 Base) MCG/ACT inhaler  Commonly known as: PROVENTIL HFA;VENTOLIN HFA;PROAIR HFA   2 puffs, Inhalation, Every 4 Hours PRN      Bi-Est Progest-Testosterone cream   Transdermal      busPIRone 7.5 MG tablet  Commonly known as: BUSPAR   7.5 mg, Oral, 3 Times Daily      carvedilol CR 40 MG 24 hr capsule  Commonly known as: Coreg CR   40 mg, Oral, Daily      diazePAM 10 MG tablet  Commonly known as: VALIUM   10 mg, Oral, Daily PRN      HYDROcodone-acetaminophen 7.5-325 MG per tablet  Commonly known as: NORCO   1 tablet, Oral, Every 12 Hours  PRN      multivitamin tablet tablet  Generic drug: multivitamin   1 tablet, Oral, Daily      pregabalin 150 MG capsule  Commonly known as: LYRICA   150 mg, Oral, 2 Times Daily      Progesterone 200 MG capsule  Commonly known as: PROMETRIUM   300 mg, Oral, Daily, Patient takes 300 mg       zolpidem CR 12.5 MG CR tablet  Commonly known as: Ambien CR   12.5 mg, Oral, Nightly PRN             Stop These Medications      haloperidol 0.5 MG tablet  Commonly known as: HALDOL  Stopped by: Adriana Maravilla MD     naloxone 4 MG/0.1ML nasal spray  Commonly known as: Narcan  Stopped by: MD Adriana Pickett MD  03/21/24  14:53 EDT    Part of this note may be an electronic transcription/translation of spoken language to printed text using the Dragon dictation system.

## 2024-03-25 ENCOUNTER — TELEPHONE (OUTPATIENT)
Dept: INTERNAL MEDICINE | Facility: CLINIC | Age: 69
End: 2024-03-25
Payer: MEDICARE

## 2024-03-25 DIAGNOSIS — M79.7 FIBROMYALGIA: ICD-10-CM

## 2024-03-25 RX ORDER — HYDROCODONE BITARTRATE AND ACETAMINOPHEN 7.5; 325 MG/1; MG/1
1 TABLET ORAL DAILY PRN
Qty: 30 TABLET | Refills: 0 | Status: SHIPPED | OUTPATIENT
Start: 2024-03-25

## 2024-03-25 NOTE — TELEPHONE ENCOUNTER
----- Message from RAJEEV Calix sent at 3/25/2024  8:55 AM EDT -----  I sent in a new script for one a day. She will then get one more refill if needed prior to seeing pain management. Who is she seeing?  ----- Message -----  From: Lili Josue MA  Sent: 3/22/2024   3:57 PM EDT  To: RAJEEV Calix      ----- Message -----  From: Violeta Sweet RegSched Rep  Sent: 3/22/2024   3:17 PM EDT  To: Lili Josue MA    Patient has an appointment with pain management on may 14th. She is almost out of HYDROcodone-acetaminophen (NORCO) 7.5-325 MG per tablet [[id number]] and would like to know if RAJEEV Calix could send in the refills for her until pain management can take over the prescription.

## 2024-03-25 NOTE — TELEPHONE ENCOUNTER
----- Message from Lili Josue MA sent at 3/22/2024  3:57 PM EDT -----    ----- Message -----  From: Violeta Sweet RegSched Rep  Sent: 3/22/2024   3:17 PM EDT  To: Lili Josue MA    Patient has an appointment with pain management on may 14th. She is almost out of HYDROcodone-acetaminophen (NORCO) 7.5-325 MG per tablet [[id number]] and would like to know if RAJEEV Calix could send in the refills for her until pain management can take over the prescription.

## 2024-04-11 DIAGNOSIS — G47.00 INSOMNIA, UNSPECIFIED TYPE: ICD-10-CM

## 2024-04-11 RX ORDER — ZOLPIDEM TARTRATE 12.5 MG/1
12.5 TABLET, FILM COATED, EXTENDED RELEASE ORAL NIGHTLY PRN
Qty: 30 TABLET | Refills: 0 | Status: SHIPPED | OUTPATIENT
Start: 2024-04-11

## 2024-04-22 ENCOUNTER — HOSPITAL ENCOUNTER (EMERGENCY)
Facility: HOSPITAL | Age: 69
Discharge: LEFT BEFORE DISCHARGE | End: 2024-04-22
Attending: EMERGENCY MEDICINE | Admitting: EMERGENCY MEDICINE
Payer: MEDICARE

## 2024-04-22 ENCOUNTER — APPOINTMENT (OUTPATIENT)
Dept: CT IMAGING | Facility: HOSPITAL | Age: 69
End: 2024-04-22
Payer: MEDICARE

## 2024-04-22 ENCOUNTER — APPOINTMENT (OUTPATIENT)
Dept: GENERAL RADIOLOGY | Facility: HOSPITAL | Age: 69
End: 2024-04-22
Payer: MEDICARE

## 2024-04-22 VITALS
BODY MASS INDEX: 15.54 KG/M2 | RESPIRATION RATE: 18 BRPM | SYSTOLIC BLOOD PRESSURE: 130 MMHG | HEIGHT: 71 IN | DIASTOLIC BLOOD PRESSURE: 75 MMHG | WEIGHT: 111 LBS | HEART RATE: 86 BPM | TEMPERATURE: 97.8 F | OXYGEN SATURATION: 98 %

## 2024-04-22 DIAGNOSIS — R11.2 NAUSEA AND VOMITING, UNSPECIFIED VOMITING TYPE: ICD-10-CM

## 2024-04-22 DIAGNOSIS — N39.0 ACUTE UTI: ICD-10-CM

## 2024-04-22 DIAGNOSIS — E87.1 HYPONATREMIA: Primary | ICD-10-CM

## 2024-04-22 DIAGNOSIS — R10.9 ABDOMINAL PAIN, UNSPECIFIED ABDOMINAL LOCATION: ICD-10-CM

## 2024-04-22 LAB
ALBUMIN SERPL-MCNC: 4.5 G/DL (ref 3.5–5.2)
ALBUMIN/GLOB SERPL: 2.3 G/DL
ALP SERPL-CCNC: 88 U/L (ref 39–117)
ALT SERPL W P-5'-P-CCNC: 23 U/L (ref 1–33)
ANION GAP SERPL CALCULATED.3IONS-SCNC: 12.9 MMOL/L (ref 5–15)
ANION GAP SERPL CALCULATED.3IONS-SCNC: 13.1 MMOL/L (ref 5–15)
AST SERPL-CCNC: 36 U/L (ref 1–32)
BACTERIA UR QL AUTO: ABNORMAL /HPF
BASOPHILS # BLD AUTO: 0.03 10*3/MM3 (ref 0–0.2)
BASOPHILS NFR BLD AUTO: 0.4 % (ref 0–1.5)
BILIRUB SERPL-MCNC: 0.8 MG/DL (ref 0–1.2)
BILIRUB UR QL STRIP: NEGATIVE
BUN SERPL-MCNC: 7 MG/DL (ref 8–23)
BUN SERPL-MCNC: 7 MG/DL (ref 8–23)
BUN/CREAT SERPL: 9.5 (ref 7–25)
BUN/CREAT SERPL: 9.7 (ref 7–25)
CALCIUM SPEC-SCNC: 8.3 MG/DL (ref 8.6–10.5)
CALCIUM SPEC-SCNC: 8.7 MG/DL (ref 8.6–10.5)
CHLORIDE SERPL-SCNC: 91 MMOL/L (ref 98–107)
CHLORIDE SERPL-SCNC: 93 MMOL/L (ref 98–107)
CLARITY UR: ABNORMAL
CO2 SERPL-SCNC: 21.9 MMOL/L (ref 22–29)
CO2 SERPL-SCNC: 22.1 MMOL/L (ref 22–29)
COLOR UR: ABNORMAL
CREAT SERPL-MCNC: 0.72 MG/DL (ref 0.57–1)
CREAT SERPL-MCNC: 0.74 MG/DL (ref 0.57–1)
D-LACTATE SERPL-SCNC: 1.3 MMOL/L (ref 0.5–2)
D-LACTATE SERPL-SCNC: 2.1 MMOL/L (ref 0.5–2)
DEPRECATED RDW RBC AUTO: 48.2 FL (ref 37–54)
EGFRCR SERPLBLD CKD-EPI 2021: 88.3 ML/MIN/1.73
EGFRCR SERPLBLD CKD-EPI 2021: 91.2 ML/MIN/1.73
EOSINOPHIL # BLD AUTO: 0.07 10*3/MM3 (ref 0–0.4)
EOSINOPHIL NFR BLD AUTO: 0.9 % (ref 0.3–6.2)
ERYTHROCYTE [DISTWIDTH] IN BLOOD BY AUTOMATED COUNT: 12.9 % (ref 12.3–15.4)
GLOBULIN UR ELPH-MCNC: 2 GM/DL
GLUCOSE SERPL-MCNC: 86 MG/DL (ref 65–99)
GLUCOSE SERPL-MCNC: 87 MG/DL (ref 65–99)
GLUCOSE UR STRIP-MCNC: NEGATIVE MG/DL
HCT VFR BLD AUTO: 38.4 % (ref 34–46.6)
HGB BLD-MCNC: 13.4 G/DL (ref 12–15.9)
HGB UR QL STRIP.AUTO: ABNORMAL
HOLD SPECIMEN: NORMAL
HYALINE CASTS UR QL AUTO: ABNORMAL /LPF
IMM GRANULOCYTES # BLD AUTO: 0.01 10*3/MM3 (ref 0–0.05)
IMM GRANULOCYTES NFR BLD AUTO: 0.1 % (ref 0–0.5)
KETONES UR QL STRIP: ABNORMAL
LEUKOCYTE ESTERASE UR QL STRIP.AUTO: ABNORMAL
LIPASE SERPL-CCNC: 28 U/L (ref 13–60)
LYMPHOCYTES # BLD AUTO: 1.71 10*3/MM3 (ref 0.7–3.1)
LYMPHOCYTES NFR BLD AUTO: 22.4 % (ref 19.6–45.3)
MCH RBC QN AUTO: 34.9 PG (ref 26.6–33)
MCHC RBC AUTO-ENTMCNC: 34.9 G/DL (ref 31.5–35.7)
MCV RBC AUTO: 100 FL (ref 79–97)
MONOCYTES # BLD AUTO: 0.62 10*3/MM3 (ref 0.1–0.9)
MONOCYTES NFR BLD AUTO: 8.1 % (ref 5–12)
NEUTROPHILS NFR BLD AUTO: 5.18 10*3/MM3 (ref 1.7–7)
NEUTROPHILS NFR BLD AUTO: 68.1 % (ref 42.7–76)
NITRITE UR QL STRIP: POSITIVE
PH UR STRIP.AUTO: 6 [PH] (ref 5–8)
PLATELET # BLD AUTO: 254 10*3/MM3 (ref 140–450)
PMV BLD AUTO: 8.2 FL (ref 6–12)
POTASSIUM SERPL-SCNC: 3.9 MMOL/L (ref 3.5–5.2)
POTASSIUM SERPL-SCNC: 3.9 MMOL/L (ref 3.5–5.2)
PROT SERPL-MCNC: 6.5 G/DL (ref 6–8.5)
PROT UR QL STRIP: NEGATIVE
RBC # BLD AUTO: 3.84 10*6/MM3 (ref 3.77–5.28)
RBC # UR STRIP: ABNORMAL /HPF
REF LAB TEST METHOD: ABNORMAL
SODIUM SERPL-SCNC: 126 MMOL/L (ref 136–145)
SODIUM SERPL-SCNC: 128 MMOL/L (ref 136–145)
SP GR UR STRIP: <=1.005 (ref 1–1.03)
SQUAMOUS #/AREA URNS HPF: ABNORMAL /HPF
UROBILINOGEN UR QL STRIP: ABNORMAL
WBC # UR STRIP: ABNORMAL /HPF
WBC NRBC COR # BLD AUTO: 7.62 10*3/MM3 (ref 3.4–10.8)

## 2024-04-22 PROCEDURE — 96374 THER/PROPH/DIAG INJ IV PUSH: CPT

## 2024-04-22 PROCEDURE — 96375 TX/PRO/DX INJ NEW DRUG ADDON: CPT

## 2024-04-22 PROCEDURE — 25010000002 HYDROMORPHONE PER 4 MG: Performed by: EMERGENCY MEDICINE

## 2024-04-22 PROCEDURE — 81001 URINALYSIS AUTO W/SCOPE: CPT | Performed by: EMERGENCY MEDICINE

## 2024-04-22 PROCEDURE — EDLOS

## 2024-04-22 PROCEDURE — 25810000003 SODIUM CHLORIDE 0.9 % SOLUTION

## 2024-04-22 PROCEDURE — 96376 TX/PRO/DX INJ SAME DRUG ADON: CPT

## 2024-04-22 PROCEDURE — 25510000001 IOPAMIDOL PER 1 ML: Performed by: EMERGENCY MEDICINE

## 2024-04-22 PROCEDURE — 71045 X-RAY EXAM CHEST 1 VIEW: CPT

## 2024-04-22 PROCEDURE — 74177 CT ABD & PELVIS W/CONTRAST: CPT

## 2024-04-22 PROCEDURE — 83605 ASSAY OF LACTIC ACID: CPT | Performed by: EMERGENCY MEDICINE

## 2024-04-22 PROCEDURE — 83690 ASSAY OF LIPASE: CPT | Performed by: EMERGENCY MEDICINE

## 2024-04-22 PROCEDURE — 99285 EMERGENCY DEPT VISIT HI MDM: CPT

## 2024-04-22 PROCEDURE — 80053 COMPREHEN METABOLIC PANEL: CPT | Performed by: EMERGENCY MEDICINE

## 2024-04-22 PROCEDURE — 96361 HYDRATE IV INFUSION ADD-ON: CPT

## 2024-04-22 PROCEDURE — 85025 COMPLETE CBC W/AUTO DIFF WBC: CPT | Performed by: EMERGENCY MEDICINE

## 2024-04-22 PROCEDURE — 63710000001 PROMETHAZINE PER 25 MG

## 2024-04-22 PROCEDURE — 25010000002 KETOROLAC TROMETHAMINE PER 15 MG

## 2024-04-22 PROCEDURE — 25010000002 ONDANSETRON PER 1 MG

## 2024-04-22 PROCEDURE — 36415 COLL VENOUS BLD VENIPUNCTURE: CPT

## 2024-04-22 RX ORDER — ONDANSETRON 4 MG/1
8 TABLET, ORALLY DISINTEGRATING ORAL EVERY 8 HOURS PRN
Qty: 12 TABLET | Refills: 0 | Status: SHIPPED | OUTPATIENT
Start: 2024-04-22

## 2024-04-22 RX ORDER — ONDANSETRON 2 MG/ML
4 INJECTION INTRAMUSCULAR; INTRAVENOUS ONCE
Status: COMPLETED | OUTPATIENT
Start: 2024-04-22 | End: 2024-04-22

## 2024-04-22 RX ORDER — CEPHALEXIN 500 MG/1
500 CAPSULE ORAL 2 TIMES DAILY
Qty: 14 CAPSULE | Refills: 0 | Status: SHIPPED | OUTPATIENT
Start: 2024-04-22 | End: 2024-04-29

## 2024-04-22 RX ORDER — PROMETHAZINE HYDROCHLORIDE 25 MG/1
25 TABLET ORAL EVERY 6 HOURS PRN
Qty: 12 TABLET | Refills: 0 | Status: SHIPPED | OUTPATIENT
Start: 2024-04-22

## 2024-04-22 RX ORDER — CEPHALEXIN 500 MG/1
500 CAPSULE ORAL ONCE
Status: COMPLETED | OUTPATIENT
Start: 2024-04-22 | End: 2024-04-22

## 2024-04-22 RX ORDER — SODIUM CHLORIDE 0.9 % (FLUSH) 0.9 %
10 SYRINGE (ML) INJECTION AS NEEDED
Status: DISCONTINUED | OUTPATIENT
Start: 2024-04-22 | End: 2024-04-22 | Stop reason: HOSPADM

## 2024-04-22 RX ORDER — KETOROLAC TROMETHAMINE 30 MG/ML
30 INJECTION, SOLUTION INTRAMUSCULAR; INTRAVENOUS ONCE
Status: COMPLETED | OUTPATIENT
Start: 2024-04-22 | End: 2024-04-22

## 2024-04-22 RX ORDER — HYDROMORPHONE HYDROCHLORIDE 1 MG/ML
0.5 INJECTION, SOLUTION INTRAMUSCULAR; INTRAVENOUS; SUBCUTANEOUS ONCE
Status: COMPLETED | OUTPATIENT
Start: 2024-04-22 | End: 2024-04-22

## 2024-04-22 RX ORDER — PROMETHAZINE HYDROCHLORIDE 25 MG/1
25 TABLET ORAL ONCE
Status: COMPLETED | OUTPATIENT
Start: 2024-04-22 | End: 2024-04-22

## 2024-04-22 RX ADMIN — HYDROMORPHONE HYDROCHLORIDE 0.5 MG: 1 INJECTION, SOLUTION INTRAMUSCULAR; INTRAVENOUS; SUBCUTANEOUS at 17:59

## 2024-04-22 RX ADMIN — ONDANSETRON 4 MG: 2 INJECTION INTRAMUSCULAR; INTRAVENOUS at 17:05

## 2024-04-22 RX ADMIN — CEPHALEXIN 500 MG: 500 CAPSULE ORAL at 19:26

## 2024-04-22 RX ADMIN — IOPAMIDOL 85 ML: 755 INJECTION, SOLUTION INTRAVENOUS at 17:44

## 2024-04-22 RX ADMIN — SODIUM CHLORIDE 500 ML: 9 INJECTION, SOLUTION INTRAVENOUS at 19:27

## 2024-04-22 RX ADMIN — KETOROLAC TROMETHAMINE 30 MG: 30 INJECTION, SOLUTION INTRAMUSCULAR at 20:03

## 2024-04-22 RX ADMIN — HYDROMORPHONE HYDROCHLORIDE 0.5 MG: 1 INJECTION, SOLUTION INTRAMUSCULAR; INTRAVENOUS; SUBCUTANEOUS at 17:05

## 2024-04-22 RX ADMIN — PROMETHAZINE HYDROCHLORIDE 25 MG: 25 TABLET ORAL at 19:26

## 2024-04-22 RX ADMIN — SODIUM CHLORIDE 1000 ML: 9 INJECTION, SOLUTION INTRAVENOUS at 17:05

## 2024-04-22 NOTE — FSED PROVIDER NOTE
"Subjective   History of Present Illness  Patient is a 69-year-old female who presents to the emergency department for abdominal pain, vomiting, diarrhea that onset last night.  Patient reports over a dozen episodes of vomiting in the last 24 hours.  Has had very poor oral intake.  Denies fever, chills, body aches.  Has been feeling well prior.  Some diarrhea is chronic, as she had gallbladder surgery x 4 months ago.        Review of Systems   Constitutional:  Positive for fatigue. Negative for appetite change, chills, diaphoresis and fever.   Respiratory:  Negative for cough and shortness of breath.    Cardiovascular:  Negative for chest pain.   Gastrointestinal:  Positive for abdominal pain, diarrhea, nausea and vomiting. Negative for blood in stool and constipation.   Genitourinary:  Negative for decreased urine volume, difficulty urinating, dysuria, flank pain, frequency and urgency.   Skin:  Negative for color change, pallor, rash and wound.   Neurological:  Negative for seizures and syncope.       Past Medical History:   Diagnosis Date    Alcohol abuse 5/17/2023    Allergic rhinitis     Anxiety     Backache     Blood pressure elevated without history of HTN     Broken foot 06/01/2002    LT    Chronic pain     Chronic pain disorder     Depression     Dizziness     Extremity pain     Fatigue     Fibromyalgia, primary     Guillain Barré syndrome     H/O mammogram 01/01/2012    Dr. Rendon    H/O: pneumonia     History of broken leg 01/01/1998    LT    Hypertension 2019    Hypotension     IBS (irritable bowel syndrome)     Insomnia     Memory disorder     Migraine     Mood disorder     Multiple joint pain     Night sweat     Syncope     Vitamin D deficiency        Allergies   Allergen Reactions    Influenza Vaccines Unknown - Low Severity    Midazolam Itching     Other reaction(s): Other (See Comments)  \"makes me very hyper\"       Past Surgical History:   Procedure Laterality Date    BREAST AUGMENTATION BILATERAL " MASTOPEXY Bilateral 01/01/1998    CHOLECYSTECTOMY      COLONOSCOPY N/A 05/18/2010    Dr. Gurmeet Justice    COLONOSCOPY  10/09/2019    Non-thrombosed external hemorrhoids found on perianal exam, diverticulosis in the sigmoid colon and in the descending colon, One 4 mm polyp in the mid sigmoid colon, tortuous colon, IH. Path: Tubular adenoma.       COLONOSCOPY W/ BIOPSIES  06/27/2014    Non-thrombosed external hemorrhoids, Diverticulosis in the sigmoid colon, two 4 to 5 mm polyps in the sigmoid colon and the descending colon (pathology: Hyperplastic polyp), Tortuous colon, Internal hemorrhoids    ENDOSCOPY N/A 06/27/2014    Z-line irregular, 38 cm from the incisors, Gastritis, Gastric polyps, Duodenal erosions without bleeding     ENDOSCOPY N/A 05/18/2010    Dr. Gurmeet Justice    ENDOSCOPY  10/09/2019    Z-line irregular, gastritis, bilious gastric fluid. Path: Reflux pattern esophagitis, chronic inflammation.     ENDOSCOPY N/A 08/02/2022    Procedure: egd with esophageal dilatation 12-15 balloon with cold biopsies;  Surgeon: Michael Matos MD;  Location: [facility];  Service: Gastroenterology;  Laterality: N/A;  hiatal hernia, gastric polyp, gastrtitis    FRACTURE SURGERY  1997    Right Leg    HYSTERECTOMY N/A 02/01/2006    JOINT REPLACEMENT  Right Hip    LIPOSUCTION N/A 04/01/2001    OOPHORECTOMY      PAP SMEAR N/A 07/10/2013    TOE SURGERY Right 01/01/1995    TONSILLECTOMY N/A 01/01/1964    TRACHEAL SURGERY N/A 01/01/1986    [facility]    TRACHEOSTOMY      UPPER GASTROINTESTINAL ENDOSCOPY         Family History   Problem Relation Age of Onset    Arthritis Mother     Heart failure Father     Other Father         Lung failure    Breast cancer Neg Hx     Ovarian cancer Neg Hx     Uterine cancer Neg Hx     Colon cancer Neg Hx     Colon polyps Neg Hx     Crohn's disease Neg Hx     Irritable bowel syndrome Neg Hx     Ulcerative colitis Neg Hx        Social History     Socioeconomic History    Marital  status:    Tobacco Use    Smoking status: Never     Passive exposure: Never    Smokeless tobacco: Never    Tobacco comments:     occasional caffiene   Vaping Use    Vaping status: Never Used   Substance and Sexual Activity    Alcohol use: Yes     Alcohol/week: 3.0 standard drinks of alcohol     Types: 3 Cans of beer per week     Comment: Moderate    Drug use: Never    Sexual activity: Yes     Partners: Male     Birth control/protection: Vasectomy, Hysterectomy           Objective   Physical Exam  Constitutional:       Appearance: She is ill-appearing. She is not toxic-appearing or diaphoretic.   Abdominal:      Comments: Generalized nonspecific abdominal tenderness, worse in the LLQ.  Abdomen soft, nonsurgical.  No guarding.   Neurological:      Mental Status: She is alert.         Procedures           ED Course  ED Course as of 04/22/24 2230   Mon Apr 22, 2024 1900 CBC unremarkable.    CMP with a hyponatremia and hypochloremia.  This appears to be an on and off issue with the patient with no known cause.  Last hospitalized a couple of months ago for similar presentation.  Patient thought some providers thought may be medication related but unknown.  Remaining CMP is largely unremarkable. [AS]   1901 Normal lipase. [AS]   1901 Elevated lactic. [AS]   1901 Nitrate positive UTI. [AS]   1905 CT abdomen pelvis with contrast:    NDINGS: The visualized portion of the lung bases are clear. The  visualized portion of the heart has a normal appearance. The liver  demonstrates scattered calcified granulomata throughout with a diffuse  hypodense appearance. Evidence of prior cholecystectomy is noted. The  pancreas appears normal. The kidneys have a normal appearance. The  adrenal glands appear normal. The spleen demonstrates multiple calcified  granulomata. And appendicolith is noted. No stranding surrounding the  appendix is appreciated. Appendix is normal in caliber. Scattered  diverticulosis is seen throughout the  sigmoid colon. Evidence of prior  right hip arthroplasty is noted. L4-5 degenerative disc disease is  appreciated.     IMPRESSION:  1. There is no evidence for an acute intra-abdominal or pelvic process.     2. Clonic diverticulosis.     3. Hepatic steatosis. [AS]   1958 Lactate: 1.3  Improved to normal. [AS]   1959 After 1 L fluid bolus NS, sodium improved from 126 to 128.  Chloride improved to 91 to 93. [AS]   2225 Negative chest x-ray. [AS]      ED Course User Index  [AS] Regla Serrano, TANA                                           Medical Decision Making  Patient is a 69-year-old female who presents for abdominal pain, vomiting.  She overall appears ill, but in no acute distress and nontoxic.  Vital signs are WNL.  Patient does not vomit while in the ER.  Given Zofran and Phenergan.  Pain control throughout visit with Dilaudid and Toradol.  Workup reveals UTI, the patient does not have any urinary symptoms.  Will treat with Keflex.  First dose given in ER.  She does have an elevated lactic of 2.1 upon arrival that resolved to normal with fluids.  Most concerning, workup reveals a significant hyponatremia.  This appears to be a somewhat ongoing issue for the patient.  He has been recently hospitalized for the same.  There has been no identifiable cause.  X-ray checked for possible pneumonia as source of hyponatremia.  Appears clear to me, but radiologist has not read prior to discharge.  There is some improvement in sodium levels while in the ER after 1 L saline.  Given additional 500 mL NS.  Do not want to correct too quickly.  I recommend observation for continued fluids.  Patient declines and is agreeable to signing out AMA despite me strongly recommending this course of action.  She understands the risks of seizure, worsening condition, death.    My differential diagnosis for abdominal pain includes but is not limited to:  Gastritis, gastroenteritis, peptic ulcer disease, GERD, esophageal perforation, acute  appendicitis, mesenteric adenitis, Meckel's diverticulum, epiploic appendagitis, diverticulitis, colon cancer, ulcerative colitis, Crohn's disease, intussusception, small bowel obstruction, adhesions, ischemic bowel, perforated viscus, ileus, obstipation, biliary colic, cholecystitis, cholelithiasis, Chetan-Ferdrick Ahsan, hepatitis, pancreatitis, common bile duct obstruction, cholangitis, bile leak, splenic trauma, splenic rupture, splenic infarction, splenic abscess, abdominal abscess, ascites, spontaneous bacterial peritonitis, hernia, UTI, cystitis,ureterolithiasis, urinary obstruction, ovarian cyst, torsion, pregnancy, ectopic pregnancy, PID, pelvic abscess, mittelschmerz, endometriosis, AAA, myocardial infarction, pneumonia, cancer, porphyria, DKA, medications, sickle cell, viral syndrome, zoster     Problems Addressed:  Abdominal pain, unspecified abdominal location: complicated acute illness or injury  Acute UTI: complicated acute illness or injury  Hyponatremia: complicated acute illness or injury  Nausea and vomiting, unspecified vomiting type: complicated acute illness or injury    Amount and/or Complexity of Data Reviewed  Labs: ordered. Decision-making details documented in ED Course.  Radiology: ordered.    Risk  Prescription drug management.        Final diagnoses:   Hyponatremia   Acute UTI   Nausea and vomiting, unspecified vomiting type   Abdominal pain, unspecified abdominal location       ED Disposition  ED Disposition       ED Disposition   AMA    Condition   --    Comment   --               Lydia Major, RAJEEV  [address] [id number] [phone]               Medication List        New Prescriptions      cephalexin 500 MG capsule  Commonly known as: KEFLEX  Take 1 capsule by mouth 2 (Two) Times a Day for 7 days.     ondansetron ODT 4 MG disintegrating tablet  Commonly known as: ZOFRAN-ODT  Place 2 tablets on the tongue Every 8 (Eight) Hours As Needed for Nausea or  Vomiting.     promethazine 25 MG tablet  Commonly known as: PHENERGAN  Take 1 tablet by mouth Every 6 (Six) Hours As Needed for Nausea or Vomiting. May cause drowsiness.               Where to Get Your Medications        These medications were sent to [id number] [place], [place] - [address] [place] [address] AT [address] - [phone]  - [phone]  [address] [facility], [facility] [address]      Phone: [phone]   cephalexin 500 MG capsule  ondansetron ODT 4 MG disintegrating tablet  promethazine 25 MG tablet

## 2024-04-23 NOTE — DISCHARGE INSTRUCTIONS
Hyponatremia can be a serious life-threatening condition.  Call your PCP first thing tomorrow morning for lab check and visit tomorrow.  You may need to be on a fluid restriction as you were before when this occurred.  Antibiotics prescribed for your UTI.  First dose given in ER.  Next dose due tomorrow.  Use Zofran and/or Phenergan as prescribed as needed for nausea vomiting.  Recommend Imodium as directed for diarrhea if needed.  Return to emergency department for worsening symptoms or other medical emergencies.  Refer to the attached instructions for further information.

## 2024-04-25 DIAGNOSIS — F41.9 ANXIETY: ICD-10-CM

## 2024-04-25 DIAGNOSIS — M79.7 FIBROMYALGIA: ICD-10-CM

## 2024-04-25 RX ORDER — DIAZEPAM 10 MG/1
10 TABLET ORAL DAILY PRN
Qty: 30 TABLET | Refills: 0 | Status: SHIPPED | OUTPATIENT
Start: 2024-04-25

## 2024-04-25 RX ORDER — HYDROCODONE BITARTRATE AND ACETAMINOPHEN 7.5; 325 MG/1; MG/1
1 TABLET ORAL DAILY PRN
Qty: 30 TABLET | Refills: 0 | Status: SHIPPED | OUTPATIENT
Start: 2024-04-25

## 2024-05-08 ENCOUNTER — OFFICE VISIT (OUTPATIENT)
Dept: GASTROENTEROLOGY | Facility: CLINIC | Age: 69
End: 2024-05-08
Payer: MEDICARE

## 2024-05-08 ENCOUNTER — LAB (OUTPATIENT)
Dept: LAB | Facility: HOSPITAL | Age: 69
End: 2024-05-08
Payer: MEDICARE

## 2024-05-08 VITALS
HEART RATE: 88 BPM | DIASTOLIC BLOOD PRESSURE: 110 MMHG | TEMPERATURE: 96.2 F | WEIGHT: 182.4 LBS | BODY MASS INDEX: 25.54 KG/M2 | HEIGHT: 71 IN | SYSTOLIC BLOOD PRESSURE: 160 MMHG

## 2024-05-08 DIAGNOSIS — R74.8 ELEVATED LIVER ENZYMES: ICD-10-CM

## 2024-05-08 DIAGNOSIS — R19.7 DIARRHEA, UNSPECIFIED TYPE: ICD-10-CM

## 2024-05-08 DIAGNOSIS — R15.2 FECAL URGENCY: ICD-10-CM

## 2024-05-08 DIAGNOSIS — K58.0 IRRITABLE BOWEL SYNDROME WITH DIARRHEA: Primary | ICD-10-CM

## 2024-05-08 LAB
ALBUMIN SERPL-MCNC: 4.4 G/DL (ref 3.5–5.2)
ALBUMIN/GLOB SERPL: 1.4 G/DL
ALP SERPL-CCNC: 96 U/L (ref 39–117)
ALPHA1 GLOB MFR UR ELPH: 133 MG/DL (ref 90–200)
ALT SERPL W P-5'-P-CCNC: 18 U/L (ref 1–33)
ANION GAP SERPL CALCULATED.3IONS-SCNC: 10.5 MMOL/L (ref 5–15)
AST SERPL-CCNC: 28 U/L (ref 1–32)
BASOPHILS # BLD AUTO: 0.04 10*3/MM3 (ref 0–0.2)
BASOPHILS NFR BLD AUTO: 0.7 % (ref 0–1.5)
BILIRUB SERPL-MCNC: 0.4 MG/DL (ref 0–1.2)
BUN SERPL-MCNC: 8 MG/DL (ref 8–23)
BUN/CREAT SERPL: 12.1 (ref 7–25)
CALCIUM SPEC-SCNC: 9.8 MG/DL (ref 8.6–10.5)
CERULOPLASMIN SERPL-MCNC: 25 MG/DL (ref 19–39)
CHLORIDE SERPL-SCNC: 99 MMOL/L (ref 98–107)
CO2 SERPL-SCNC: 26.5 MMOL/L (ref 22–29)
CREAT SERPL-MCNC: 0.66 MG/DL (ref 0.57–1)
DEPRECATED RDW RBC AUTO: 50.8 FL (ref 37–54)
EGFRCR SERPLBLD CKD-EPI 2021: 95.7 ML/MIN/1.73
EOSINOPHIL # BLD AUTO: 0.13 10*3/MM3 (ref 0–0.4)
EOSINOPHIL NFR BLD AUTO: 2.2 % (ref 0.3–6.2)
ERYTHROCYTE [DISTWIDTH] IN BLOOD BY AUTOMATED COUNT: 13.5 % (ref 12.3–15.4)
FERRITIN SERPL-MCNC: 182.7 NG/ML (ref 13–150)
GGT SERPL-CCNC: 180 U/L (ref 5–36)
GLOBULIN UR ELPH-MCNC: 3.1 GM/DL
GLUCOSE SERPL-MCNC: 93 MG/DL (ref 65–99)
HAV IGM SERPL QL IA: NORMAL
HBV CORE IGM SERPL QL IA: NORMAL
HBV SURFACE AG SERPL QL IA: NORMAL
HCT VFR BLD AUTO: 42.1 % (ref 34–46.6)
HCV AB SER QL: NORMAL
HGB BLD-MCNC: 14.5 G/DL (ref 12–15.9)
IGA1 MFR SER: 125 MG/DL (ref 70–400)
IGG1 SER-MCNC: 1003 MG/DL (ref 700–1600)
IGM SERPL-MCNC: 128 MG/DL (ref 40–230)
IMM GRANULOCYTES # BLD AUTO: 0.01 10*3/MM3 (ref 0–0.05)
IMM GRANULOCYTES NFR BLD AUTO: 0.2 % (ref 0–0.5)
IRON 24H UR-MRATE: 103 MCG/DL (ref 37–145)
IRON SATN MFR SERPL: 25 % (ref 20–50)
LYMPHOCYTES # BLD AUTO: 1.65 10*3/MM3 (ref 0.7–3.1)
LYMPHOCYTES NFR BLD AUTO: 27.8 % (ref 19.6–45.3)
MCH RBC QN AUTO: 35 PG (ref 26.6–33)
MCHC RBC AUTO-ENTMCNC: 34.4 G/DL (ref 31.5–35.7)
MCV RBC AUTO: 101.7 FL (ref 79–97)
MONOCYTES # BLD AUTO: 0.56 10*3/MM3 (ref 0.1–0.9)
MONOCYTES NFR BLD AUTO: 9.4 % (ref 5–12)
NEUTROPHILS NFR BLD AUTO: 3.55 10*3/MM3 (ref 1.7–7)
NEUTROPHILS NFR BLD AUTO: 59.7 % (ref 42.7–76)
NRBC BLD AUTO-RTO: 0 /100 WBC (ref 0–0.2)
PLATELET # BLD AUTO: 295 10*3/MM3 (ref 140–450)
PMV BLD AUTO: 8.5 FL (ref 6–12)
POTASSIUM SERPL-SCNC: 4.3 MMOL/L (ref 3.5–5.2)
PROT SERPL-MCNC: 7.5 G/DL (ref 6–8.5)
RBC # BLD AUTO: 4.14 10*6/MM3 (ref 3.77–5.28)
SODIUM SERPL-SCNC: 136 MMOL/L (ref 136–145)
TIBC SERPL-MCNC: 420 MCG/DL (ref 298–536)
TRANSFERRIN SERPL-MCNC: 282 MG/DL (ref 200–360)
WBC NRBC COR # BLD AUTO: 5.94 10*3/MM3 (ref 3.4–10.8)

## 2024-05-08 PROCEDURE — 86364 TISS TRNSGLTMNASE EA IG CLAS: CPT

## 2024-05-08 PROCEDURE — 86381 MITOCHONDRIAL ANTIBODY EACH: CPT

## 2024-05-08 PROCEDURE — 86038 ANTINUCLEAR ANTIBODIES: CPT

## 2024-05-08 PROCEDURE — 84466 ASSAY OF TRANSFERRIN: CPT

## 2024-05-08 PROCEDURE — 82977 ASSAY OF GGT: CPT

## 2024-05-08 PROCEDURE — 82728 ASSAY OF FERRITIN: CPT

## 2024-05-08 PROCEDURE — 86015 ACTIN ANTIBODY EACH: CPT

## 2024-05-08 PROCEDURE — 86376 MICROSOMAL ANTIBODY EACH: CPT

## 2024-05-08 PROCEDURE — 80053 COMPREHEN METABOLIC PANEL: CPT

## 2024-05-08 PROCEDURE — 80074 ACUTE HEPATITIS PANEL: CPT

## 2024-05-08 PROCEDURE — 82784 ASSAY IGA/IGD/IGG/IGM EACH: CPT

## 2024-05-08 PROCEDURE — 85025 COMPLETE CBC W/AUTO DIFF WBC: CPT

## 2024-05-08 PROCEDURE — 86231 EMA EACH IG CLASS: CPT

## 2024-05-08 PROCEDURE — 82390 ASSAY OF CERULOPLASMIN: CPT

## 2024-05-08 PROCEDURE — 83540 ASSAY OF IRON: CPT

## 2024-05-08 PROCEDURE — 82103 ALPHA-1-ANTITRYPSIN TOTAL: CPT

## 2024-05-08 RX ORDER — DICYCLOMINE HYDROCHLORIDE 10 MG/1
10 CAPSULE ORAL 3 TIMES DAILY PRN
Qty: 90 CAPSULE | Refills: 1 | Status: SHIPPED | OUTPATIENT
Start: 2024-05-08

## 2024-05-09 ENCOUNTER — LAB (OUTPATIENT)
Dept: LAB | Facility: HOSPITAL | Age: 69
End: 2024-05-09
Payer: MEDICARE

## 2024-05-09 DIAGNOSIS — R19.7 DIARRHEA, UNSPECIFIED TYPE: ICD-10-CM

## 2024-05-09 LAB
ANA SER QL: NEGATIVE
ENDOMYSIUM IGA SER QL: NEGATIVE
IGA SERPL-MCNC: 118 MG/DL (ref 87–352)
LKM-1 AB SER-ACNC: <1 UNITS (ref 0–20)
MITOCHONDRIA M2 IGG SER-ACNC: <20 UNITS (ref 0–20)
SMA IGG SER-ACNC: 9 UNITS (ref 0–19)
TTG IGA SER-ACNC: <2 U/ML (ref 0–3)

## 2024-05-09 PROCEDURE — 82705 FATS/LIPIDS FECES QUAL: CPT

## 2024-05-09 PROCEDURE — 87209 SMEAR COMPLEX STAIN: CPT

## 2024-05-09 PROCEDURE — 87177 OVA AND PARASITES SMEARS: CPT

## 2024-05-09 PROCEDURE — 87046 STOOL CULTR AEROBIC BACT EA: CPT

## 2024-05-09 PROCEDURE — 87329 GIARDIA AG IA: CPT

## 2024-05-09 PROCEDURE — 87493 C DIFF AMPLIFIED PROBE: CPT

## 2024-05-09 PROCEDURE — 87045 FECES CULTURE AEROBIC BACT: CPT

## 2024-05-09 PROCEDURE — 87427 SHIGA-LIKE TOXIN AG IA: CPT

## 2024-05-09 PROCEDURE — 82653 EL-1 FECAL QUANTITATIVE: CPT

## 2024-05-10 LAB
C DIFF TOX GENS STL QL NAA+PROBE: NEGATIVE
G LAMBLIA AG STL QL IA: NEGATIVE

## 2024-05-11 LAB
FA STL QL: NORMAL
NEUTRAL FAT STL QL: NORMAL

## 2024-05-13 DIAGNOSIS — G47.00 INSOMNIA, UNSPECIFIED TYPE: ICD-10-CM

## 2024-05-13 LAB
BACTERIA SPEC CULT: NORMAL
BACTERIA SPEC CULT: NORMAL
CAMPYLOBACTER STL CULT: NORMAL
E COLI SXT STL QL IA: NEGATIVE
SALM + SHIG STL CULT: NORMAL

## 2024-05-13 RX ORDER — ZOLPIDEM TARTRATE 12.5 MG/1
12.5 TABLET, FILM COATED, EXTENDED RELEASE ORAL NIGHTLY PRN
Qty: 30 TABLET | Refills: 0 | Status: SHIPPED | OUTPATIENT
Start: 2024-05-13

## 2024-05-14 LAB
O+P SPEC MICRO: NORMAL
O+P STL CONC: NORMAL

## 2024-05-17 LAB — ELASTASE PANC STL-MCNT: 149 UG ELAST./G

## 2024-05-20 DIAGNOSIS — R19.7 DIARRHEA, UNSPECIFIED TYPE: Primary | ICD-10-CM

## 2024-05-20 DIAGNOSIS — K86.81 EXOCRINE PANCREATIC INSUFFICIENCY: ICD-10-CM

## 2024-05-29 ENCOUNTER — TELEPHONE (OUTPATIENT)
Dept: GASTROENTEROLOGY | Facility: CLINIC | Age: 69
End: 2024-05-29
Payer: MEDICARE

## 2024-05-29 DIAGNOSIS — F41.9 ANXIETY: ICD-10-CM

## 2024-05-29 DIAGNOSIS — M79.7 FIBROMYALGIA: ICD-10-CM

## 2024-05-29 RX ORDER — HYDROCODONE BITARTRATE AND ACETAMINOPHEN 7.5; 325 MG/1; MG/1
1 TABLET ORAL DAILY PRN
Qty: 30 TABLET | Refills: 0 | Status: CANCELLED | OUTPATIENT
Start: 2024-05-29

## 2024-05-29 RX ORDER — ONDANSETRON 4 MG/1
TABLET, ORALLY DISINTEGRATING ORAL
Qty: 20 TABLET | Refills: 1 | Status: SHIPPED | OUTPATIENT
Start: 2024-05-29

## 2024-05-29 NOTE — TELEPHONE ENCOUNTER
Patient left a Voice Message stating that she was having vomits and abdominal pain. Requested to be seen.   [phone].    I called back, pt states that since last week she has been experiencing: body aches, described as everything, abdomen, joints, muscles, etc. Intensity 9/10 with no triggering or relieving factors. Tylenol not effective. Pt was unable to provide more details on this aspect.  Also fatigue, anorexia and nausea all the time, vomiting mostly at nights, about 2 times, after eating, usually a dark colored material. Pt was unable to provide more details on these aspects.    Requested advice.

## 2024-05-29 NOTE — TELEPHONE ENCOUNTER
Recommend follow-up with primary care provider regarding the body pain.    I sent in a prescription to the pharmacy for ondansetron for nausea and vomiting if needed.  Keep appointment next week as scheduled with RAJEEV Dunn.  If symptoms become severe or she is unable to tolerate oral intake, proceed to ER.

## 2024-05-29 NOTE — TELEPHONE ENCOUNTER
Maggie called needing refill RX's sent to [facility] for:      1. diazePAM (VALIUM) 10 MG tablet     2. HYDROcodone-acetaminophen (NORCO) 7.5-325 MG per tablet

## 2024-05-30 ENCOUNTER — HOSPITAL ENCOUNTER (EMERGENCY)
Facility: HOSPITAL | Age: 69
Discharge: HOME | End: 2024-05-30
Attending: EMERGENCY MEDICINE
Payer: MEDICARE

## 2024-05-30 ENCOUNTER — APPOINTMENT (OUTPATIENT)
Dept: CT IMAGING | Facility: HOSPITAL | Age: 69
End: 2024-05-30
Payer: MEDICARE

## 2024-05-30 VITALS
BODY MASS INDEX: 25.2 KG/M2 | RESPIRATION RATE: 16 BRPM | OXYGEN SATURATION: 97 % | HEART RATE: 80 BPM | HEIGHT: 71 IN | TEMPERATURE: 98.1 F | SYSTOLIC BLOOD PRESSURE: 124 MMHG | DIASTOLIC BLOOD PRESSURE: 59 MMHG | WEIGHT: 180 LBS

## 2024-05-30 DIAGNOSIS — R11.2 NAUSEA AND VOMITING, UNSPECIFIED VOMITING TYPE: ICD-10-CM

## 2024-05-30 DIAGNOSIS — E87.1 HYPONATREMIA: ICD-10-CM

## 2024-05-30 DIAGNOSIS — R10.84 GENERALIZED ABDOMINAL PAIN: Primary | ICD-10-CM

## 2024-05-30 DIAGNOSIS — E87.20 LACTIC ACIDOSIS: ICD-10-CM

## 2024-05-30 LAB
ALBUMIN SERPL-MCNC: 4.7 G/DL (ref 3.5–5.2)
ALBUMIN/GLOB SERPL: 2 G/DL
ALP SERPL-CCNC: 104 U/L (ref 39–117)
ALT SERPL W P-5'-P-CCNC: 25 U/L (ref 1–33)
ANION GAP SERPL CALCULATED.3IONS-SCNC: 16.6 MMOL/L (ref 5–15)
AST SERPL-CCNC: 39 U/L (ref 1–32)
BACTERIA UR QL AUTO: NORMAL /HPF
BASOPHILS # BLD AUTO: 0.02 10*3/MM3 (ref 0–0.2)
BASOPHILS NFR BLD AUTO: 0.3 % (ref 0–1.5)
BILIRUB SERPL-MCNC: 0.4 MG/DL (ref 0–1.2)
BILIRUB UR QL STRIP: NEGATIVE
BUN SERPL-MCNC: 10 MG/DL (ref 8–23)
BUN/CREAT SERPL: 11.9 (ref 7–25)
CALCIUM SPEC-SCNC: 9.2 MG/DL (ref 8.6–10.5)
CHLORIDE SERPL-SCNC: 88 MMOL/L (ref 98–107)
CLARITY UR: CLEAR
CO2 SERPL-SCNC: 20.4 MMOL/L (ref 22–29)
COLOR UR: YELLOW
CREAT SERPL-MCNC: 0.84 MG/DL (ref 0.57–1)
D-LACTATE SERPL-SCNC: 3.1 MMOL/L (ref 0.5–2)
D-LACTATE SERPL-SCNC: 3.9 MMOL/L (ref 0.5–2)
DEPRECATED RDW RBC AUTO: 45.6 FL (ref 37–54)
EGFRCR SERPLBLD CKD-EPI 2021: 75.8 ML/MIN/1.73
EOSINOPHIL # BLD AUTO: 0.09 10*3/MM3 (ref 0–0.4)
EOSINOPHIL NFR BLD AUTO: 1.4 % (ref 0.3–6.2)
ERYTHROCYTE [DISTWIDTH] IN BLOOD BY AUTOMATED COUNT: 12.4 % (ref 12.3–15.4)
GLOBULIN UR ELPH-MCNC: 2.3 GM/DL
GLUCOSE SERPL-MCNC: 77 MG/DL (ref 65–99)
GLUCOSE UR STRIP-MCNC: NEGATIVE MG/DL
HCT VFR BLD AUTO: 41.2 % (ref 34–46.6)
HGB BLD-MCNC: 14.5 G/DL (ref 12–15.9)
HGB UR QL STRIP.AUTO: ABNORMAL
HOLD SPECIMEN: NORMAL
HYALINE CASTS UR QL AUTO: NORMAL /LPF
IMM GRANULOCYTES # BLD AUTO: 0 10*3/MM3 (ref 0–0.05)
IMM GRANULOCYTES NFR BLD AUTO: 0 % (ref 0–0.5)
KETONES UR QL STRIP: ABNORMAL
LEUKOCYTE ESTERASE UR QL STRIP.AUTO: NEGATIVE
LIPASE SERPL-CCNC: 47 U/L (ref 13–60)
LYMPHOCYTES # BLD AUTO: 2.28 10*3/MM3 (ref 0.7–3.1)
LYMPHOCYTES NFR BLD AUTO: 35.3 % (ref 19.6–45.3)
MCH RBC QN AUTO: 34.7 PG (ref 26.6–33)
MCHC RBC AUTO-ENTMCNC: 35.2 G/DL (ref 31.5–35.7)
MCV RBC AUTO: 98.6 FL (ref 79–97)
MONOCYTES # BLD AUTO: 0.45 10*3/MM3 (ref 0.1–0.9)
MONOCYTES NFR BLD AUTO: 7 % (ref 5–12)
NEUTROPHILS NFR BLD AUTO: 3.62 10*3/MM3 (ref 1.7–7)
NEUTROPHILS NFR BLD AUTO: 56 % (ref 42.7–76)
NITRITE UR QL STRIP: NEGATIVE
PH UR STRIP.AUTO: 6 [PH] (ref 5–8)
PLATELET # BLD AUTO: 237 10*3/MM3 (ref 140–450)
PMV BLD AUTO: 8.4 FL (ref 6–12)
POTASSIUM SERPL-SCNC: 3.7 MMOL/L (ref 3.5–5.2)
PROT SERPL-MCNC: 7 G/DL (ref 6–8.5)
PROT UR QL STRIP: NEGATIVE
RBC # BLD AUTO: 4.18 10*6/MM3 (ref 3.77–5.28)
RBC # UR STRIP: NORMAL /HPF
REF LAB TEST METHOD: NORMAL
SODIUM SERPL-SCNC: 125 MMOL/L (ref 136–145)
SP GR UR STRIP: <=1.005 (ref 1–1.03)
SQUAMOUS #/AREA URNS HPF: NORMAL /HPF
UROBILINOGEN UR QL STRIP: ABNORMAL
WBC # UR STRIP: NORMAL /HPF
WBC NRBC COR # BLD AUTO: 6.46 10*3/MM3 (ref 3.4–10.8)

## 2024-05-30 PROCEDURE — 25010000002 MORPHINE PER 10 MG: Performed by: EMERGENCY MEDICINE

## 2024-05-30 PROCEDURE — 74177 CT ABD & PELVIS W/CONTRAST: CPT

## 2024-05-30 PROCEDURE — 25810000003 SODIUM CHLORIDE 0.9 % SOLUTION: Performed by: EMERGENCY MEDICINE

## 2024-05-30 PROCEDURE — 36415 COLL VENOUS BLD VENIPUNCTURE: CPT

## 2024-05-30 PROCEDURE — 99285 EMERGENCY DEPT VISIT HI MDM: CPT

## 2024-05-30 PROCEDURE — 81001 URINALYSIS AUTO W/SCOPE: CPT | Performed by: EMERGENCY MEDICINE

## 2024-05-30 PROCEDURE — 25010000002 ONDANSETRON PER 1 MG: Performed by: EMERGENCY MEDICINE

## 2024-05-30 PROCEDURE — 96375 TX/PRO/DX INJ NEW DRUG ADDON: CPT

## 2024-05-30 PROCEDURE — 96374 THER/PROPH/DIAG INJ IV PUSH: CPT

## 2024-05-30 PROCEDURE — 83605 ASSAY OF LACTIC ACID: CPT | Performed by: EMERGENCY MEDICINE

## 2024-05-30 PROCEDURE — 83690 ASSAY OF LIPASE: CPT | Performed by: EMERGENCY MEDICINE

## 2024-05-30 PROCEDURE — 85025 COMPLETE CBC W/AUTO DIFF WBC: CPT

## 2024-05-30 PROCEDURE — 99284 EMERGENCY DEPT VISIT MOD MDM: CPT | Performed by: EMERGENCY MEDICINE

## 2024-05-30 PROCEDURE — 25010000002 DROPERIDOL PER 5 MG: Performed by: EMERGENCY MEDICINE

## 2024-05-30 PROCEDURE — 25010000002 DIPHENHYDRAMINE PER 50 MG: Performed by: EMERGENCY MEDICINE

## 2024-05-30 PROCEDURE — 25510000001 IOPAMIDOL PER 1 ML: Performed by: EMERGENCY MEDICINE

## 2024-05-30 PROCEDURE — 25010000002 HYDROMORPHONE PER 4 MG: Performed by: EMERGENCY MEDICINE

## 2024-05-30 PROCEDURE — 80053 COMPREHEN METABOLIC PANEL: CPT | Performed by: EMERGENCY MEDICINE

## 2024-05-30 RX ORDER — DROPERIDOL 2.5 MG/ML
2.5 INJECTION, SOLUTION INTRAMUSCULAR; INTRAVENOUS ONCE
Status: COMPLETED | OUTPATIENT
Start: 2024-05-30 | End: 2024-05-30

## 2024-05-30 RX ORDER — ONDANSETRON 2 MG/ML
4 INJECTION INTRAMUSCULAR; INTRAVENOUS ONCE
Status: COMPLETED | OUTPATIENT
Start: 2024-05-30 | End: 2024-05-30

## 2024-05-30 RX ORDER — MORPHINE SULFATE 4 MG/ML
4 INJECTION, SOLUTION INTRAMUSCULAR; INTRAVENOUS ONCE
Status: COMPLETED | OUTPATIENT
Start: 2024-05-30 | End: 2024-05-30

## 2024-05-30 RX ORDER — DIAZEPAM 10 MG/1
10 TABLET ORAL DAILY PRN
Qty: 30 TABLET | Refills: 0 | Status: SHIPPED | OUTPATIENT
Start: 2024-05-30

## 2024-05-30 RX ORDER — SODIUM CHLORIDE 0.9 % (FLUSH) 0.9 %
10 SYRINGE (ML) INJECTION AS NEEDED
Status: DISCONTINUED | OUTPATIENT
Start: 2024-05-30 | End: 2024-05-30 | Stop reason: HOSPADM

## 2024-05-30 RX ORDER — PROMETHAZINE HYDROCHLORIDE 25 MG/1
25 TABLET ORAL EVERY 6 HOURS PRN
Qty: 20 TABLET | Refills: 0 | Status: SHIPPED | OUTPATIENT
Start: 2024-05-30

## 2024-05-30 RX ORDER — HYDROMORPHONE HYDROCHLORIDE 1 MG/ML
0.5 INJECTION, SOLUTION INTRAMUSCULAR; INTRAVENOUS; SUBCUTANEOUS ONCE
Status: COMPLETED | OUTPATIENT
Start: 2024-05-30 | End: 2024-05-30

## 2024-05-30 RX ORDER — DIPHENHYDRAMINE HYDROCHLORIDE 50 MG/ML
25 INJECTION INTRAMUSCULAR; INTRAVENOUS ONCE
Status: COMPLETED | OUTPATIENT
Start: 2024-05-30 | End: 2024-05-30

## 2024-05-30 RX ADMIN — MORPHINE SULFATE 4 MG: 4 INJECTION, SOLUTION INTRAMUSCULAR; INTRAVENOUS at 04:46

## 2024-05-30 RX ADMIN — HYDROMORPHONE HYDROCHLORIDE 0.5 MG: 1 INJECTION, SOLUTION INTRAMUSCULAR; INTRAVENOUS; SUBCUTANEOUS at 05:33

## 2024-05-30 RX ADMIN — IOPAMIDOL 100 ML: 755 INJECTION, SOLUTION INTRAVENOUS at 05:36

## 2024-05-30 RX ADMIN — DROPERIDOL 2.5 MG: 2.5 INJECTION, SOLUTION INTRAMUSCULAR; INTRAVENOUS at 08:13

## 2024-05-30 RX ADMIN — SODIUM CHLORIDE 1000 ML: 9 INJECTION, SOLUTION INTRAVENOUS at 05:18

## 2024-05-30 RX ADMIN — SODIUM CHLORIDE 1000 ML: 9 INJECTION, SOLUTION INTRAVENOUS at 04:45

## 2024-05-30 RX ADMIN — ONDANSETRON 4 MG: 2 INJECTION INTRAMUSCULAR; INTRAVENOUS at 04:46

## 2024-05-30 RX ADMIN — DIPHENHYDRAMINE HYDROCHLORIDE 25 MG: 50 INJECTION, SOLUTION INTRAMUSCULAR; INTRAVENOUS at 08:12

## 2024-05-30 NOTE — TELEPHONE ENCOUNTER
The request for norco was not supposed be sent, it was supposed to be discontinued once she started seeing pain management. Which she has started seeing 5/14/24, could you please feel valium?

## 2024-05-30 NOTE — FSED PROVIDER NOTE
"Subjective   History of Present Illness  Patient has been complaining of abdominal pain for the last 7 days.  She states she has had nausea and vomiting primarily although nursing did get a history of diarrhea she did not mention that to me.  The patient states the pain is just gotten progressively worse she is indicating in her epigastrium and left lower quadrant.  The patient denies any fevers or chills urinary symptoms.        Review of Systems   Gastrointestinal:  Positive for abdominal pain, nausea and vomiting.   All other systems reviewed and are negative.      Past Medical History:   Diagnosis Date    Alcohol abuse 5/17/2023    Allergic rhinitis     Anxiety     Backache     Blood pressure elevated without history of HTN     Broken foot 06/01/2002    LT    Chronic pain     Chronic pain disorder     Depression     Dizziness     Extremity pain     Fatigue     Fibromyalgia, primary     Guillain Barré syndrome     H/O mammogram 01/01/2012    Dr. Rendon    H/O: pneumonia     History of broken leg 01/01/1998    LT    Hypertension 2019    Hypotension     IBS (irritable bowel syndrome)     Insomnia     Memory disorder     Migraine     Mood disorder     Multiple joint pain     Night sweat     Syncope     Vitamin D deficiency        Allergies   Allergen Reactions    Influenza Vaccines Unknown - Low Severity    Midazolam Itching     Other reaction(s): Other (See Comments)  \"makes me very hyper\"       Past Surgical History:   Procedure Laterality Date    BREAST AUGMENTATION BILATERAL MASTOPEXY Bilateral 01/01/1998    CHOLECYSTECTOMY      COLONOSCOPY N/A 05/18/2010    Dr. Gurmeet Justice    COLONOSCOPY  10/09/2019    Non-thrombosed external hemorrhoids found on perianal exam, diverticulosis in the sigmoid colon and in the descending colon, One 4 mm polyp in the mid sigmoid colon, tortuous colon, IH. Path: Tubular adenoma.       COLONOSCOPY W/ BIOPSIES  06/27/2014    Non-thrombosed external hemorrhoids, Diverticulosis in " the sigmoid colon, two 4 to 5 mm polyps in the sigmoid colon and the descending colon (pathology: Hyperplastic polyp), Tortuous colon, Internal hemorrhoids    ENDOSCOPY N/A 06/27/2014    Z-line irregular, 38 cm from the incisors, Gastritis, Gastric polyps, Duodenal erosions without bleeding     ENDOSCOPY N/A 05/18/2010    Dr. Gurmeet Justice    ENDOSCOPY  10/09/2019    Z-line irregular, gastritis, bilious gastric fluid. Path: Reflux pattern esophagitis, chronic inflammation.     ENDOSCOPY N/A 08/02/2022    Procedure: egd with esophageal dilatation 12-15 balloon with cold biopsies;  Surgeon: Michael Matos MD;  Location: [facility] OR;  Service: Gastroenterology;  Laterality: N/A;  hiatal hernia, gastric polyp, gastrtitis    FRACTURE SURGERY  1997    Right Leg    HYSTERECTOMY N/A 02/01/2006    JOINT REPLACEMENT  Right Hip    LIPOSUCTION N/A 04/01/2001    OOPHORECTOMY      PAP SMEAR N/A 07/10/2013    TOE SURGERY Right 01/01/1995    TONSILLECTOMY N/A 01/01/1964    TRACHEAL SURGERY N/A 01/01/1986    [facility]    TRACHEOSTOMY      UPPER GASTROINTESTINAL ENDOSCOPY         Family History   Problem Relation Age of Onset    Arthritis Mother     Heart failure Father     Other Father         Lung failure    Breast cancer Neg Hx     Ovarian cancer Neg Hx     Uterine cancer Neg Hx     Colon cancer Neg Hx     Colon polyps Neg Hx     Crohn's disease Neg Hx     Irritable bowel syndrome Neg Hx     Ulcerative colitis Neg Hx        Social History     Socioeconomic History    Marital status:    Tobacco Use    Smoking status: Never     Passive exposure: Never    Smokeless tobacco: Never    Tobacco comments:     occasional caffiene   Vaping Use    Vaping status: Never Used   Substance and Sexual Activity    Alcohol use: Yes     Alcohol/week: 3.0 standard drinks of alcohol     Types: 3 Cans of beer per week     Comment: Moderate    Drug use: Never    Sexual activity: Yes     Partners: Male     Birth control/protection:  Vasectomy, Hysterectomy           Objective   Physical Exam  Vitals and nursing note reviewed.   Constitutional:       General: She is in acute distress.      Appearance: She is well-developed. She is not ill-appearing, toxic-appearing or diaphoretic.   HENT:      Head: Normocephalic and atraumatic.   Eyes:      Extraocular Movements: Extraocular movements intact.      Pupils: Pupils are equal, round, and reactive to light.   Cardiovascular:      Rate and Rhythm: Normal rate and regular rhythm.   Pulmonary:      Effort: Pulmonary effort is normal.      Breath sounds: Normal breath sounds.   Abdominal:      General: Bowel sounds are decreased.      Palpations: Abdomen is soft.      Tenderness: There is abdominal tenderness in the epigastric area and left lower quadrant.   Skin:     General: Skin is warm and dry.      Capillary Refill: Capillary refill takes less than 2 seconds.   Neurological:      General: No focal deficit present.      Mental Status: She is alert and oriented to person, place, and time.   Psychiatric:         Mood and Affect: Mood normal.         Procedures           ED Course  ED Course as of 05/30/24 0814   u May 30, 2024   0715 Care assumed from Dr. JOHNS  Awaiting repeat lactic. [KZ]   0805 Patient sodium was noted by me to be a little bit low at 125.  She has received almost 2 and half liters of fluid which should have corrected that to the 130 range. [KZ]   0806 Still awaiting repeat lactic.  She is ordered additional nausea medication. [KZ]   0813 Patient's repeat lactic is increased.  I have suggested admission.  The patient adamantly declines.  She states that she has been here before and is feeling better.  She request discharge.  I have advised her about her abnormal lab results and need for close follow-up.  She is in agreement. [KZ]      ED Course User Index  [KZ] Rodolfo Paredes MD                                           Medical Decision Making  Although the white count was normal  and the patient's of vital signs afebrile with normal vital signs I am giving the patient a 30 cc/kg bolus they are going to get their second liter CT scans been ordered and originally and I am currently awaiting that result.  The abdominal CT and pelvis CT are negative for any intra abdominal pelvic process.  The patient will be endorsed to the next physician we are waiting for the 6 repeat lactic acid 2-1/2 L will have been in.  Patient's laboratories are otherwise normal.    Problems Addressed:  Generalized abdominal pain: complicated acute illness or injury  Hyponatremia: complicated acute illness or injury  Lactic acidosis: complicated acute illness or injury  Nausea and vomiting, unspecified vomiting type: complicated acute illness or injury    Amount and/or Complexity of Data Reviewed  Labs: ordered.     Details: Patient's lactic acid was elevated at 3.1 the white count was only 6.46 and normal the hemoglobin was 14.5 and normal electrolytes sodium was low at 125 and chloride was low at 88.  Radiology: ordered.     Details: Abdominal CT is negative for any pathology    Risk  Prescription drug management.        Final diagnoses:   Generalized abdominal pain   Nausea and vomiting, unspecified vomiting type   Hyponatremia   Lactic acidosis       ED Disposition  ED Disposition       ED Disposition   Discharge    Condition   Stable    Comment   --               Lydia Major, APRN  [address]  [place] [id number] [phone]    Schedule an appointment as soon as possible for a visit   As needed, For repeat evaluation         Medication List        New Prescriptions      promethazine 25 MG tablet  Commonly known as: PHENERGAN  Take 1 tablet by mouth Every 6 (Six) Hours As Needed for Nausea or Vomiting.               Where to Get Your Medications        These medications were sent to [id number] [place], [place] - [address] AT [address] - [phone] PH -  [phone] FX  [address] [place] [id number]      Phone: [phone]   promethazine 25 MG tablet

## 2024-05-30 NOTE — DISCHARGE INSTRUCTIONS
Take medication as prescribed for nausea with vomiting.  We hydrate yourself using electrolyte containing fluids.  It is very important that the fluids have salt content.  Return to the emergency room for any concerns.  Follow-up with PCP.  Return if not improved.

## 2024-05-30 NOTE — ED NOTES
Pt co generalized and pain started from epigastric area, N/V/D  for a few days , none currently .

## 2024-06-07 ENCOUNTER — OFFICE VISIT (OUTPATIENT)
Dept: GASTROENTEROLOGY | Facility: CLINIC | Age: 69
End: 2024-06-07
Payer: MEDICARE

## 2024-06-07 ENCOUNTER — PREP FOR SURGERY (OUTPATIENT)
Dept: SURGERY | Facility: SURGERY CENTER | Age: 69
End: 2024-06-07
Payer: MEDICARE

## 2024-06-07 VITALS
HEART RATE: 77 BPM | WEIGHT: 185 LBS | BODY MASS INDEX: 25.8 KG/M2 | SYSTOLIC BLOOD PRESSURE: 148 MMHG | TEMPERATURE: 98 F | DIASTOLIC BLOOD PRESSURE: 90 MMHG | OXYGEN SATURATION: 100 %

## 2024-06-07 DIAGNOSIS — K21.9 GASTROESOPHAGEAL REFLUX DISEASE WITHOUT ESOPHAGITIS: Primary | ICD-10-CM

## 2024-06-07 DIAGNOSIS — Z12.11 ENCOUNTER FOR SCREENING COLONOSCOPY: ICD-10-CM

## 2024-06-07 DIAGNOSIS — Z87.11 HISTORY OF GASTRIC ULCER: ICD-10-CM

## 2024-06-07 DIAGNOSIS — Z86.010 PERSONAL HISTORY OF COLONIC POLYPS: Primary | ICD-10-CM

## 2024-06-07 DIAGNOSIS — K86.81 EXOCRINE PANCREATIC INSUFFICIENCY: ICD-10-CM

## 2024-06-07 DIAGNOSIS — K21.9 GASTROESOPHAGEAL REFLUX DISEASE WITHOUT ESOPHAGITIS: ICD-10-CM

## 2024-06-07 DIAGNOSIS — K86.81 EXOCRINE PANCREATIC INSUFFICIENCY: Primary | ICD-10-CM

## 2024-06-07 DIAGNOSIS — Z86.010 PERSONAL HISTORY OF COLONIC POLYPS: ICD-10-CM

## 2024-06-07 PROBLEM — Z86.0100 PERSONAL HISTORY OF COLONIC POLYPS: Status: ACTIVE | Noted: 2024-06-07

## 2024-06-07 RX ORDER — BUPRENORPHINE HYDROCHLORIDE 75 UG/1
FILM, SOLUBLE BUCCAL
COMMUNITY
Start: 2024-05-14

## 2024-06-07 RX ORDER — SODIUM CHLORIDE 0.9 % (FLUSH) 0.9 %
3 SYRINGE (ML) INJECTION EVERY 12 HOURS SCHEDULED
OUTPATIENT
Start: 2024-06-07

## 2024-06-07 RX ORDER — SODIUM CHLORIDE, SODIUM LACTATE, POTASSIUM CHLORIDE, CALCIUM CHLORIDE 600; 310; 30; 20 MG/100ML; MG/100ML; MG/100ML; MG/100ML
30 INJECTION, SOLUTION INTRAVENOUS CONTINUOUS PRN
OUTPATIENT
Start: 2024-06-07

## 2024-06-07 RX ORDER — SODIUM CHLORIDE 0.9 % (FLUSH) 0.9 %
10 SYRINGE (ML) INJECTION AS NEEDED
OUTPATIENT
Start: 2024-06-07

## 2024-06-07 RX ORDER — LATANOPROST 50 UG/ML
1 SOLUTION/ DROPS OPHTHALMIC DAILY
COMMUNITY
Start: 2024-05-16

## 2024-06-07 NOTE — PROGRESS NOTES
Chief Complaint   Patient presents with    Diarrhea     Discuss colon cancer screening            History of Present Illness    Patient is a 69 y.o. who presents to the office for follow up evaluation.  Last in office visit was on  5/8/24 as a new patient to me. Patient has a significant past medical history of alcohol abuse, fibromyalgia, Marialuisa Barré syndrome, IBS, and cholecystectomy with Dr. Harrell at [facility] in December 2023.  Denies previous liver serologies for elevated liver enzymes     Last colonoscopy performed on 10/9/2019 noted diverticula in sigmoid and descending colon, 4 mm polyp in sigmoid colon, moderately torturous colon, and internal hemorrhoids.     Next colonoscopy due 10/9/2024      Since December 2023 cholecystectomy she has required 2 separate hospitalizations the first of which on 1/15/2024 with a subsequent admission to [facility] on 1/24/2024.     During the first admission she was diagnosed with acute toxic and metabolic encephalopathy, EtOH intoxication, right upper quadrant seroma, and acute on chronic hyponatremia.     EGD on 12/18/2023 with Dr. Feliz at [facility]:     Many non-bleeding cratered duodenal ulcers with no stigmata of bleeding  were found in the first portion of the duodenum.    largest lesion 8 mm in largest dimension.   Many non-bleeding superficial gastric ulcers with no stigmata of  bleeding were found in the gastric antrum.             Largest 3 mm in dimension.   Path: consistent with Chronic inactive gastritis, negative for intestinal metaplasia or Barretts      Since last in office visit patient sought further evaluation in the ER for worsening abdominal pain and nausea after nonresponse to Zofran 4 mg.    She denies current use of pantoprazole at time of today's visit.  Denies heartburn, nausea, vomiting, or dysphagia since seeking further evaluation in the ER approximately 1 week ago.    Since last in office visit she started Banatrol 1  scoop every a.m. and Konsyl in the evenings as well as Creon 2 capsules with the first bite of every meal and 1 capsule with the first bite of every snack.      After this starting this regimen bowel habits are now occurring 1-2 times a day that are increasing in consistency without urgency.  Denies melena or hematochezia.  Additionally, she reports absence of subsequent fecal incontinence episodes since starting this regimen.    Denies abdominal pain.  Weight has also improved by 5 pounds since 5/30.  Overall she describes her GI health as controlled.    Patient denies known family history of colon polyps, colon cancer, or IBD.       Result Review :       Progress Notes by Allison Lopez APRN (05/08/2024 13:00)   CT Abdomen Pelvis With Contrast (04/22/2024 17:51) :  Hepatic scattered calcified granulomata throughout with a diffuse hypodense appearance, prior cholecystectomy, unremarkable pancreas, splenic calcified granulomatas, appendicolith without stranding.   CT Abdomen Pelvis With Contrast (05/30/2024 05:36) :  For further evaluation of generalized abdominal pain and nausea.  Liver is enlarged measuring up to 18.2 cm, unremarkable stomach and duodenum, adrenal glands, and pancreas, some prominence of common bile duct thought to be secondary to postcholecystectomy state,  CBC & Differential (05/30/2024 04:19)  Comprehensive Metabolic Panel (05/30/2024 04:19)  Lipase (05/30/2024 04:19)  Lactic Acid, Plasma (05/30/2024 04:19)  Urinalysis With Microscopic If Indicated (No Culture) - Urine, Clean Catch (05/30/2024 06:56)  -Pancreatic Elastase, Fecal - Stool, Per Rectum (05/09/2024 12:38) - 149 consistent with EPI   Vital Signs:   /90   Pulse 77   Temp 98 °F (36.7 °C)   Wt 83.9 kg (185 lb)   SpO2 100%   BMI 25.80 kg/m²     Body mass index is 25.8 kg/m².     Physical Exam  Vitals reviewed.   Constitutional:       General: She is not in acute distress.     Appearance: Normal appearance. She is not  ill-appearing.   Eyes:      General: No scleral icterus.  Pulmonary:      Effort: Pulmonary effort is normal. No respiratory distress.   Abdominal:      General: Bowel sounds are normal. There is no distension.      Palpations: Abdomen is soft. Abdomen is not rigid. There is no mass or pulsatile mass.      Tenderness: There is no abdominal tenderness. There is no guarding or rebound.      Hernia: No hernia is present.   Skin:     Coloration: Skin is not jaundiced.   Neurological:      Mental Status: She is alert and oriented to person, place, and time.   Psychiatric:         Thought Content: Thought content normal.         Judgment: Judgment normal.           Assessment and Plan    Diagnoses and all orders for this visit:    1. Exocrine pancreatic insufficiency (Primary)    2. Gastroesophageal reflux disease without esophagitis           Discussion:    Patient is a pleasant 69-year-old female who presents today for management of EPI, GERD, and chronic pancreatitis.  For GERD, she is agreeable to restarting pantoprazole 40 mg daily.      We will also plan to proceed with EGD evaluation at time of October 2024 colonoscopy For colon cancer screening to ensure healing of witnessed gastric ulcerations on 12/2023.    For EPI, she will continue with current Creon capsule supplementation with 2 prior to meals and 1 with snacks.  She will also continue with Banatrol and Konsyl supplementation as this has been helpful in improving stool consistency and preventing subsequent fecal incontinence episodes.    I have encouraged her to continue outpatient follow-up with nephrology for continued monitoring of  chronic hyponatremia.  I would like for her to follow-up in 12 weeks prior to undergoing endoscopic evaluation for symptom reassessment prior to evaluation.    Patient is agreeable to the outlined above treatment plan.  Verbalizes understanding and will contact office for any new or worsening concerns.  All questions answered  and support provided.        Patient Instructions   Next EGD and colonoscopy due 10/9/2024 - Orders placed     For GERD:    Follow antireflux precautions:    Restart pantoprazole 40 mg once daily prior to first meal of the day  Avoiding eating within 3 to 4 hours of bedtime.    Avoid foods that can trigger symptoms which may include:  citrus fruits  spicy foods,  Tomatoes  Red sauces   Chocolate  coffee/tea  caffeinated or carbonated beverages  alcohol    For Diarrhea:    Continue Banatrol powder 1 scoop daily in the morning  Continue current Konsyl fiber supplementation in the evening  Continue Creon 2 capsules with meals and 1 with snacks  Can also continue taking dicyclomine 10 mg up to 3 times daily as needed for abdominal cramping or urgency    For nausea:    Can continue taking Zofran 4 mg every 8 hours as needed        EMR Dragon/Transcription Disclaimer:  This document has been Dictated utilizing Dragon dictation.

## 2024-06-07 NOTE — PATIENT INSTRUCTIONS
Next EGD and colonoscopy due 10/9/2024 - Orders placed     For GERD:    Follow antireflux precautions:    Restart pantoprazole 40 mg once daily prior to first meal of the day  Avoiding eating within 3 to 4 hours of bedtime.    Avoid foods that can trigger symptoms which may include:  citrus fruits  spicy foods,  Tomatoes  Red sauces   Chocolate  coffee/tea  caffeinated or carbonated beverages  alcohol    For Diarrhea:    Continue Banatrol powder 1 scoop daily in the morning  Continue current Konsyl fiber supplementation in the evening  Continue Creon 2 capsules with meals and 1 with snacks  Can also continue taking dicyclomine 10 mg up to 3 times daily as needed for abdominal cramping or urgency    For nausea:    Can continue taking Zofran 4 mg every 8 hours as needed

## 2024-06-13 ENCOUNTER — TELEPHONE (OUTPATIENT)
Dept: GASTROENTEROLOGY | Facility: CLINIC | Age: 69
End: 2024-06-13
Payer: MEDICARE

## 2024-06-13 DIAGNOSIS — G47.00 INSOMNIA, UNSPECIFIED TYPE: ICD-10-CM

## 2024-06-13 RX ORDER — ZOLPIDEM TARTRATE 12.5 MG/1
12.5 TABLET, FILM COATED, EXTENDED RELEASE ORAL NIGHTLY PRN
Qty: 30 TABLET | Refills: 0 | Status: SHIPPED | OUTPATIENT
Start: 2024-06-13

## 2024-06-13 NOTE — TELEPHONE ENCOUNTER
Patient left a Voice Message asking if Allison BOO sent/faxed letter to Pain Medicine saying: OK     Pain Medicine, office of Dr Mitchell ([place]?) Fax:[phone]

## 2024-06-13 NOTE — TELEPHONE ENCOUNTER
Please let her know that I have a call placed to Dr. Le's office to request additional information.     RAJEEV Solis   Home

## 2024-06-13 NOTE — TELEPHONE ENCOUNTER
Pt stated that KP told her that she was fine, and now she has been waiting for this to be done, also she is out of medications ramo. This situation is upsetting for her.

## 2024-06-14 NOTE — TELEPHONE ENCOUNTER
Can you please contact Dr. Le's office and obtain additional information on patient's request for GI clearance to receive pain management treatment.  Per patient request.    RAJEEV Solis

## 2024-06-14 NOTE — TELEPHONE ENCOUNTER
RN called Dr. Le with [facility] and spoke with the practice manager, Neha ([phone]). VIRGINIE and Neha discussed the patient's request to our office for a letter written by our provider stating that the patient was cleared for a prescription for pain medication or that we recommended pain medication. Neha, spoke with Dr. Le and reports that he would prefer any prescriptions for pain medication related to GI issues come from GI.   Virginie and Neha discussed the patient contract that she has with their practice, that she will be forthcoming about any other sources of prescription for pain medication.   Per patient's [facility] note, no discussion regarding pain medication or reports that patient was in GI distress. Please advise. EL

## 2024-06-14 NOTE — TELEPHONE ENCOUNTER
Thank you for the update.  Can you please contact Ms. Malhotra and let her know that we have communicated to Dr. Maradiaga no specific contraindication from GI standpoint for her to receive pain management treatment.      RAJEEV Solis

## 2024-06-14 NOTE — TELEPHONE ENCOUNTER
RN called and spoke with patient regarding our recommendations for pain management. Pt did not have any other concerns or questions at this time. EL

## 2024-06-21 DIAGNOSIS — M79.7 FIBROMYALGIA: ICD-10-CM

## 2024-06-21 RX ORDER — PREGABALIN 150 MG/1
150 CAPSULE ORAL 2 TIMES DAILY
Qty: 180 CAPSULE | Refills: 0 | Status: SHIPPED | OUTPATIENT
Start: 2024-06-21

## 2024-07-03 ENCOUNTER — APPOINTMENT (OUTPATIENT)
Dept: GENERAL RADIOLOGY | Facility: HOSPITAL | Age: 69
End: 2024-07-03
Payer: MEDICARE

## 2024-07-03 ENCOUNTER — APPOINTMENT (OUTPATIENT)
Dept: CT IMAGING | Facility: HOSPITAL | Age: 69
End: 2024-07-03
Payer: MEDICARE

## 2024-07-03 ENCOUNTER — HOSPITAL ENCOUNTER (EMERGENCY)
Facility: HOSPITAL | Age: 69
Discharge: LEFT BEFORE DISCHARGE | End: 2024-07-03
Attending: EMERGENCY MEDICINE | Admitting: INTERNAL MEDICINE
Payer: MEDICARE

## 2024-07-03 VITALS
TEMPERATURE: 97.8 F | BODY MASS INDEX: 25.2 KG/M2 | WEIGHT: 180 LBS | RESPIRATION RATE: 17 BRPM | DIASTOLIC BLOOD PRESSURE: 87 MMHG | HEART RATE: 75 BPM | SYSTOLIC BLOOD PRESSURE: 144 MMHG | HEIGHT: 71 IN | OXYGEN SATURATION: 98 %

## 2024-07-03 DIAGNOSIS — F10.10 ALCOHOL ABUSE: Primary | ICD-10-CM

## 2024-07-03 DIAGNOSIS — F10.929 ALCOHOLIC INTOXICATION WITH COMPLICATION: ICD-10-CM

## 2024-07-03 DIAGNOSIS — E87.1 LOW SODIUM LEVELS: ICD-10-CM

## 2024-07-03 DIAGNOSIS — R74.02 ELEVATED SERUM LACTATE DEHYDROGENASE: ICD-10-CM

## 2024-07-03 LAB
ALBUMIN SERPL-MCNC: 4.7 G/DL (ref 3.5–5.2)
ALBUMIN/GLOB SERPL: 2 G/DL
ALP SERPL-CCNC: 89 U/L (ref 39–117)
ALT SERPL W P-5'-P-CCNC: 36 U/L (ref 1–33)
ANION GAP SERPL CALCULATED.3IONS-SCNC: 18.8 MMOL/L (ref 5–15)
AST SERPL-CCNC: 68 U/L (ref 1–32)
BACTERIA UR QL AUTO: NORMAL /HPF
BASOPHILS # BLD AUTO: 0.04 10*3/MM3 (ref 0–0.2)
BASOPHILS NFR BLD AUTO: 0.6 % (ref 0–1.5)
BILIRUB SERPL-MCNC: 0.5 MG/DL (ref 0–1.2)
BILIRUB UR QL STRIP: NEGATIVE
BUN SERPL-MCNC: 10 MG/DL (ref 8–23)
BUN/CREAT SERPL: 17.9 (ref 7–25)
CALCIUM SPEC-SCNC: 8.7 MG/DL (ref 8.6–10.5)
CHLORIDE SERPL-SCNC: 89 MMOL/L (ref 98–107)
CLARITY UR: CLEAR
CO2 SERPL-SCNC: 17.2 MMOL/L (ref 22–29)
COLOR UR: YELLOW
CREAT SERPL-MCNC: 0.56 MG/DL (ref 0.57–1)
D-LACTATE SERPL-SCNC: 3.2 MMOL/L (ref 0.5–2)
D-LACTATE SERPL-SCNC: 3.6 MMOL/L (ref 0.5–2)
DEPRECATED RDW RBC AUTO: 47.8 FL (ref 37–54)
EGFRCR SERPLBLD CKD-EPI 2021: 99.6 ML/MIN/1.73
EOSINOPHIL # BLD AUTO: 0.09 10*3/MM3 (ref 0–0.4)
EOSINOPHIL NFR BLD AUTO: 1.3 % (ref 0.3–6.2)
ERYTHROCYTE [DISTWIDTH] IN BLOOD BY AUTOMATED COUNT: 12.5 % (ref 12.3–15.4)
ETHANOL BLD-MCNC: 136 MG/DL (ref 0–10)
ETHANOL UR QL: 0.14 %
GLOBULIN UR ELPH-MCNC: 2.4 GM/DL
GLUCOSE BLDC GLUCOMTR-MCNC: 129 MG/DL (ref 70–130)
GLUCOSE SERPL-MCNC: 64 MG/DL (ref 65–99)
GLUCOSE UR STRIP-MCNC: ABNORMAL MG/DL
HCT VFR BLD AUTO: 40.7 % (ref 34–46.6)
HGB BLD-MCNC: 14.1 G/DL (ref 12–15.9)
HGB UR QL STRIP.AUTO: ABNORMAL
HOLD SPECIMEN: NORMAL
HYALINE CASTS UR QL AUTO: NORMAL /LPF
IMM GRANULOCYTES # BLD AUTO: 0.01 10*3/MM3 (ref 0–0.05)
IMM GRANULOCYTES NFR BLD AUTO: 0.1 % (ref 0–0.5)
KETONES UR QL STRIP: ABNORMAL
LEUKOCYTE ESTERASE UR QL STRIP.AUTO: NEGATIVE
LIPASE SERPL-CCNC: 39 U/L (ref 13–60)
LYMPHOCYTES # BLD AUTO: 1.58 10*3/MM3 (ref 0.7–3.1)
LYMPHOCYTES NFR BLD AUTO: 22.9 % (ref 19.6–45.3)
MAGNESIUM SERPL-MCNC: 2.2 MG/DL (ref 1.6–2.4)
MCH RBC QN AUTO: 35.1 PG (ref 26.6–33)
MCHC RBC AUTO-ENTMCNC: 34.6 G/DL (ref 31.5–35.7)
MCV RBC AUTO: 101.2 FL (ref 79–97)
MONOCYTES # BLD AUTO: 0.44 10*3/MM3 (ref 0.1–0.9)
MONOCYTES NFR BLD AUTO: 6.4 % (ref 5–12)
NEUTROPHILS NFR BLD AUTO: 4.75 10*3/MM3 (ref 1.7–7)
NEUTROPHILS NFR BLD AUTO: 68.7 % (ref 42.7–76)
NITRITE UR QL STRIP: NEGATIVE
OSMOLALITY SERPL: 294 MOSM/KG (ref 280–301)
PH UR STRIP.AUTO: 6 [PH] (ref 5–8)
PLATELET # BLD AUTO: 230 10*3/MM3 (ref 140–450)
PMV BLD AUTO: 8.3 FL (ref 6–12)
POTASSIUM SERPL-SCNC: 4.6 MMOL/L (ref 3.5–5.2)
PROT SERPL-MCNC: 7.1 G/DL (ref 6–8.5)
PROT UR QL STRIP: NEGATIVE
QT INTERVAL: 400 MS
QTC INTERVAL: 456 MS
RBC # BLD AUTO: 4.02 10*6/MM3 (ref 3.77–5.28)
RBC # UR STRIP: NORMAL /HPF
REF LAB TEST METHOD: NORMAL
SODIUM SERPL-SCNC: 125 MMOL/L (ref 136–145)
SP GR UR STRIP: 1.01 (ref 1–1.03)
SQUAMOUS #/AREA URNS HPF: NORMAL /HPF
UROBILINOGEN UR QL STRIP: ABNORMAL
WBC # UR STRIP: NORMAL /HPF
WBC NRBC COR # BLD AUTO: 6.91 10*3/MM3 (ref 3.4–10.8)

## 2024-07-03 PROCEDURE — 25010000002 MAGNESIUM SULFATE 2 GM/50ML SOLUTION: Performed by: EMERGENCY MEDICINE

## 2024-07-03 PROCEDURE — 85025 COMPLETE CBC W/AUTO DIFF WBC: CPT | Performed by: EMERGENCY MEDICINE

## 2024-07-03 PROCEDURE — 83735 ASSAY OF MAGNESIUM: CPT | Performed by: EMERGENCY MEDICINE

## 2024-07-03 PROCEDURE — 82948 REAGENT STRIP/BLOOD GLUCOSE: CPT

## 2024-07-03 PROCEDURE — 36415 COLL VENOUS BLD VENIPUNCTURE: CPT

## 2024-07-03 PROCEDURE — 83605 ASSAY OF LACTIC ACID: CPT | Performed by: EMERGENCY MEDICINE

## 2024-07-03 PROCEDURE — 71045 X-RAY EXAM CHEST 1 VIEW: CPT

## 2024-07-03 PROCEDURE — 96366 THER/PROPH/DIAG IV INF ADDON: CPT

## 2024-07-03 PROCEDURE — 99283 EMERGENCY DEPT VISIT LOW MDM: CPT | Performed by: EMERGENCY MEDICINE

## 2024-07-03 PROCEDURE — 25010000002 PIPERACILLIN SOD-TAZOBACTAM PER 1 G: Performed by: EMERGENCY MEDICINE

## 2024-07-03 PROCEDURE — 25810000003 SODIUM CHLORIDE 0.9 % SOLUTION: Performed by: EMERGENCY MEDICINE

## 2024-07-03 PROCEDURE — 70450 CT HEAD/BRAIN W/O DYE: CPT

## 2024-07-03 PROCEDURE — 93010 ELECTROCARDIOGRAM REPORT: CPT | Performed by: INTERNAL MEDICINE

## 2024-07-03 PROCEDURE — 83930 ASSAY OF BLOOD OSMOLALITY: CPT | Performed by: EMERGENCY MEDICINE

## 2024-07-03 PROCEDURE — 96368 THER/DIAG CONCURRENT INF: CPT

## 2024-07-03 PROCEDURE — 96361 HYDRATE IV INFUSION ADD-ON: CPT

## 2024-07-03 PROCEDURE — 99284 EMERGENCY DEPT VISIT MOD MDM: CPT

## 2024-07-03 PROCEDURE — 96375 TX/PRO/DX INJ NEW DRUG ADDON: CPT

## 2024-07-03 PROCEDURE — 83690 ASSAY OF LIPASE: CPT | Performed by: EMERGENCY MEDICINE

## 2024-07-03 PROCEDURE — 93005 ELECTROCARDIOGRAM TRACING: CPT | Performed by: EMERGENCY MEDICINE

## 2024-07-03 PROCEDURE — 81001 URINALYSIS AUTO W/SCOPE: CPT | Performed by: EMERGENCY MEDICINE

## 2024-07-03 PROCEDURE — 87040 BLOOD CULTURE FOR BACTERIA: CPT | Performed by: EMERGENCY MEDICINE

## 2024-07-03 PROCEDURE — 80053 COMPREHEN METABOLIC PANEL: CPT | Performed by: EMERGENCY MEDICINE

## 2024-07-03 PROCEDURE — 82077 ASSAY SPEC XCP UR&BREATH IA: CPT | Performed by: EMERGENCY MEDICINE

## 2024-07-03 PROCEDURE — 96365 THER/PROPH/DIAG IV INF INIT: CPT

## 2024-07-03 RX ORDER — DEXTROSE MONOHYDRATE 25 G/50ML
25 INJECTION, SOLUTION INTRAVENOUS ONCE
Status: COMPLETED | OUTPATIENT
Start: 2024-07-03 | End: 2024-07-03

## 2024-07-03 RX ORDER — MAGNESIUM SULFATE HEPTAHYDRATE 40 MG/ML
2 INJECTION, SOLUTION INTRAVENOUS ONCE
Status: COMPLETED | OUTPATIENT
Start: 2024-07-03 | End: 2024-07-03

## 2024-07-03 RX ADMIN — PIPERACILLIN AND TAZOBACTAM 4.5 G: 4; .5 INJECTION, POWDER, FOR SOLUTION INTRAVENOUS at 19:29

## 2024-07-03 RX ADMIN — SODIUM CHLORIDE 1000 ML: 9 INJECTION, SOLUTION INTRAVENOUS at 19:12

## 2024-07-03 RX ADMIN — MAGNESIUM SULFATE HEPTAHYDRATE 2 G: 2 INJECTION, SOLUTION INTRAVENOUS at 19:12

## 2024-07-03 RX ADMIN — SODIUM CHLORIDE 1000 ML: 9 INJECTION, SOLUTION INTRAVENOUS at 17:34

## 2024-07-03 RX ADMIN — DEXTROSE MONOHYDRATE 25 G: 25 INJECTION, SOLUTION INTRAVENOUS at 19:11

## 2024-07-03 NOTE — Clinical Note
Level of Care: Telemetry [5]   Diagnosis: Alcohol intoxication [013900]   Admitting Physician: SAIDA NAIDU [[id number]]   Attending Physician: SAIDA NAIDU [[id number]]   Certification: I Certify That Inpatient Hospital Services Are Medically Necessary For Greater Than 2 Midnights

## 2024-07-04 NOTE — ED NOTES
PT SIGNED ALL PAPERWORK AND ALL EDUCATION GIVEN REGARDING LEAVING AMA. PT AND  VERBALIZED UNDERSTANDING. PT ABLE TO AMBULATE OUT OF THE ER IN STABLE CONDITION AND WITH THE ASSISTANCE OF HER .

## 2024-07-08 LAB — BACTERIA SPEC AEROBE CULT: NORMAL

## 2024-07-11 DIAGNOSIS — G47.00 INSOMNIA, UNSPECIFIED TYPE: ICD-10-CM

## 2024-07-11 RX ORDER — ZOLPIDEM TARTRATE 12.5 MG/1
12.5 TABLET, FILM COATED, EXTENDED RELEASE ORAL NIGHTLY PRN
Qty: 30 TABLET | Refills: 0 | OUTPATIENT
Start: 2024-07-11

## 2024-07-15 ENCOUNTER — OFFICE VISIT (OUTPATIENT)
Dept: INTERNAL MEDICINE | Facility: CLINIC | Age: 69
End: 2024-07-15
Payer: MEDICARE

## 2024-07-15 VITALS
HEIGHT: 71 IN | OXYGEN SATURATION: 98 % | SYSTOLIC BLOOD PRESSURE: 140 MMHG | DIASTOLIC BLOOD PRESSURE: 90 MMHG | HEART RATE: 86 BPM | BODY MASS INDEX: 25.2 KG/M2 | WEIGHT: 180 LBS

## 2024-07-15 DIAGNOSIS — G47.00 INSOMNIA, UNSPECIFIED TYPE: ICD-10-CM

## 2024-07-15 PROCEDURE — 99213 OFFICE O/P EST LOW 20 MIN: CPT | Performed by: NURSE PRACTITIONER

## 2024-07-15 PROCEDURE — 1125F AMNT PAIN NOTED PAIN PRSNT: CPT | Performed by: NURSE PRACTITIONER

## 2024-07-15 PROCEDURE — 1159F MED LIST DOCD IN RCRD: CPT | Performed by: NURSE PRACTITIONER

## 2024-07-15 PROCEDURE — 3080F DIAST BP >= 90 MM HG: CPT | Performed by: NURSE PRACTITIONER

## 2024-07-15 PROCEDURE — 3077F SYST BP >= 140 MM HG: CPT | Performed by: NURSE PRACTITIONER

## 2024-07-15 PROCEDURE — 1160F RVW MEDS BY RX/DR IN RCRD: CPT | Performed by: NURSE PRACTITIONER

## 2024-07-15 RX ORDER — TRAZODONE HYDROCHLORIDE 100 MG/1
100 TABLET ORAL NIGHTLY
Qty: 30 TABLET | Refills: 3 | Status: SHIPPED | OUTPATIENT
Start: 2024-07-15 | End: 2024-07-26

## 2024-07-15 RX ORDER — ZOLPIDEM TARTRATE 12.5 MG/1
12.5 TABLET, FILM COATED, EXTENDED RELEASE ORAL NIGHTLY PRN
Qty: 30 TABLET | Refills: 2 | Status: SHIPPED | OUTPATIENT
Start: 2024-07-15 | End: 2024-07-26

## 2024-07-15 RX ORDER — PREGABALIN 300 MG/1
300 CAPSULE ORAL DAILY
COMMUNITY
Start: 2024-07-03 | End: 2024-08-02

## 2024-07-15 RX ORDER — NALTREXONE HYDROCHLORIDE 50 MG/1
3 TABLET, FILM COATED ORAL DAILY
COMMUNITY

## 2024-07-15 NOTE — PROGRESS NOTES
Subjective   Maggie Malhotra is a 69 y.o. female. Patient is here today for   Chief Complaint   Patient presents with    Med Refill    Insomnia          .    History of Present Illness   Patient is here to follow up on insomnia. She only gets a few hours of sleep when she takes ambien. She does sleep hardly at all when she doesn't take anything. She has tried several medications in the past    The following portions of the patient's history were reviewed and updated as appropriate: allergies, current medications, past family history, past medical history, past social history, past surgical history and problem list.    Review of Systems    Objective   Vitals:    07/15/24 1033   BP: 140/90   Pulse: 86   SpO2: 98%     Body mass index is 25.12 kg/m².  Physical Exam  Vitals and nursing note reviewed.   Constitutional:       Appearance: Normal appearance. She is well-developed.   Cardiovascular:      Rate and Rhythm: Normal rate and regular rhythm.      Heart sounds: Normal heart sounds.   Pulmonary:      Effort: Pulmonary effort is normal.      Breath sounds: Normal breath sounds.   Skin:     General: Skin is warm and dry.   Neurological:      Mental Status: She is alert and oriented to person, place, and time.      Motor: Tremors: slight.   Psychiatric:         Speech: Speech normal.         Behavior: Behavior normal.         Thought Content: Thought content normal.         Assessment & Plan   Diagnoses and all orders for this visit:    1. Insomnia, unspecified type  -     Discontinue: zolpidem CR (Ambien CR) 12.5 MG CR tablet; Take 1 tablet by mouth At Night As Needed for Sleep.  Dispense: 30 tablet; Refill: 2  -     Discontinue: traZODone (DESYREL) 100 MG tablet; Take 1 tablet by mouth Every Night.  Dispense: 30 tablet; Refill: 3      Patient will continue with Ambien CR 12.5 mg nightly. Will add trazodone to help patient to stay asleep. She had tried trazodone in the past, but had taken it without Ambien. Patient  has a follow up appointment scheduled in 2 months.

## 2024-07-26 ENCOUNTER — OFFICE VISIT (OUTPATIENT)
Dept: OBSTETRICS AND GYNECOLOGY | Facility: CLINIC | Age: 69
End: 2024-07-26
Payer: MEDICARE

## 2024-07-26 VITALS
BODY MASS INDEX: 25.79 KG/M2 | HEIGHT: 71 IN | SYSTOLIC BLOOD PRESSURE: 148 MMHG | DIASTOLIC BLOOD PRESSURE: 88 MMHG | WEIGHT: 184.2 LBS

## 2024-07-26 DIAGNOSIS — L08.9 SKIN INFECTION: Primary | ICD-10-CM

## 2024-07-26 DIAGNOSIS — N90.89 VULVAR IRRITATION: ICD-10-CM

## 2024-07-26 PROCEDURE — 3077F SYST BP >= 140 MM HG: CPT | Performed by: NURSE PRACTITIONER

## 2024-07-26 PROCEDURE — 99213 OFFICE O/P EST LOW 20 MIN: CPT | Performed by: NURSE PRACTITIONER

## 2024-07-26 PROCEDURE — 3079F DIAST BP 80-89 MM HG: CPT | Performed by: NURSE PRACTITIONER

## 2024-07-26 PROCEDURE — 1160F RVW MEDS BY RX/DR IN RCRD: CPT | Performed by: NURSE PRACTITIONER

## 2024-07-26 PROCEDURE — 1159F MED LIST DOCD IN RCRD: CPT | Performed by: NURSE PRACTITIONER

## 2024-07-26 RX ORDER — CEPHALEXIN 500 MG/1
500 CAPSULE ORAL 4 TIMES DAILY
Qty: 40 CAPSULE | Refills: 0 | Status: SHIPPED | OUTPATIENT
Start: 2024-07-26 | End: 2024-08-05

## 2024-07-26 RX ORDER — FLUCONAZOLE 150 MG/1
150 TABLET ORAL ONCE
Qty: 1 TABLET | Refills: 0 | Status: SHIPPED | OUTPATIENT
Start: 2024-07-26 | End: 2024-07-26

## 2024-07-26 RX ORDER — NYSTATIN AND TRIAMCINOLONE ACETONIDE 100000; 1 [USP'U]/G; MG/G
1 OINTMENT TOPICAL 2 TIMES DAILY
Qty: 120 G | Refills: 1 | Status: SHIPPED | OUTPATIENT
Start: 2024-07-26 | End: 2024-08-02

## 2024-07-26 NOTE — PROGRESS NOTES
"Subjective     Chief Complaint   Patient presents with    Vaginitis       Maggie Malhotra is a 69 y.o.  whose LMP is Patient's last menstrual period was 2006 (lmp unknown).. This patient is new to me- yes, but is an established patient of this practice.  She presents with vulvar irritation    HPI    Developed itching on outside of vagina 2 weeks ago.  Applied Vagisil, Neosporin, cortisone cream and eventually dermaplast.  Now has bumps in her groin and perineal area that extend to her buttocks.          The following portions of the patient's history were reviewed and updated as appropriate:vital signs, allergies, current medications, past medical history, past social history, past surgical history, and problem list      Review of Systems     Review of Systems   Genitourinary:  Positive for vaginal pain. Negative for vaginal discharge.       Objective      /88   Ht 180.3 cm (71\")   Wt 83.6 kg (184 lb 3.2 oz)   LMP 2006 (LMP Unknown)   BMI 25.69 kg/m²     Physical Exam    Physical Exam  Vitals reviewed.   Constitutional:       General: She is awake. She is not in acute distress.     Appearance: She is not ill-appearing.   Eyes:      Conjunctiva/sclera: Conjunctivae normal.   Pulmonary:      Effort: Pulmonary effort is normal. No respiratory distress.   Genitourinary:     Exam position: Supine.      Labia:         Right: Rash present. No lesion.         Left: Rash present. No lesion.           Comments: Moderate amount of erythema/swelling bilateral vulva that extends into both groin; ? Satellite lesions in rectal areas  Musculoskeletal:      Cervical back: Neck supple. No rigidity.   Skin:     General: Skin is warm and dry.      Capillary Refill: Capillary refill takes less than 2 seconds.   Neurological:      Mental Status: She is alert and oriented to person, place, and time.   Psychiatric:         Mood and Affect: Mood and affect normal.         Behavior: Behavior normal. "           Lab Review   Labs: No data reviewed     Imaging: No data reviewed      Assessment/Plan  Diagnoses and all orders for this visit:    1. Skin infection (Primary)  -     cephalexin (Keflex) 500 MG capsule; Take 1 capsule by mouth 4 (Four) Times a Day for 10 days.  Dispense: 40 capsule; Refill: 0  -     nystatin-triamcinolone (MYCOLOG) 026413-2.1 UNIT/GM-% ointment; Apply 1 Application topically to the appropriate area as directed 2 (Two) Times a Day for 7 days.  Dispense: 120 g; Refill: 1  -     fluconazole (DIFLUCAN) 150 MG tablet; Take 1 tablet by mouth 1 (One) Time for 1 dose.  Dispense: 1 tablet; Refill: 0    2. Vulvar irritation  -     Genital Mycoplasmas ALEXIS, Swab - Swab, Vagina  -     NuSwab BV & Candida - Swab, Vagina  -     fluconazole (DIFLUCAN) 150 MG tablet; Take 1 tablet by mouth 1 (One) Time for 1 dose.  Dispense: 1 tablet; Refill: 0            Return in about 1 week (around 8/2/2024), or if symptoms worsen or fail to improve, for re-evaluation.    I spent 15 minutes caring for Maggie on this date of service. This time includes time spent by me in the following activities: preparing for the visit, performing a medically appropriate examination and/or evaluation, counseling and educating the patient/family/caregiver, documenting information in the medical record, ordering medications, and ordering test(s).     Ana Yeh, APRN  7/26/2024  12:33 EDT

## 2024-07-30 LAB
A VAGINAE DNA VAG QL NAA+PROBE: NORMAL SCORE
BVAB2 DNA VAG QL NAA+PROBE: NORMAL SCORE
C ALBICANS DNA VAG QL NAA+PROBE: NEGATIVE
C GLABRATA DNA VAG QL NAA+PROBE: NEGATIVE
M GENITALIUM DNA SPEC QL NAA+PROBE: NEGATIVE
M HOMINIS DNA SPEC QL NAA+PROBE: NEGATIVE
MEGA1 DNA VAG QL NAA+PROBE: NORMAL SCORE
UREAPLASMA DNA SPEC QL NAA+PROBE: NEGATIVE

## 2024-08-02 ENCOUNTER — OFFICE VISIT (OUTPATIENT)
Dept: OBSTETRICS AND GYNECOLOGY | Facility: CLINIC | Age: 69
End: 2024-08-02
Payer: MEDICARE

## 2024-08-02 VITALS
BODY MASS INDEX: 25.37 KG/M2 | DIASTOLIC BLOOD PRESSURE: 88 MMHG | WEIGHT: 181.2 LBS | HEIGHT: 71 IN | SYSTOLIC BLOOD PRESSURE: 138 MMHG

## 2024-08-02 DIAGNOSIS — L08.9 SKIN INFECTION: Primary | ICD-10-CM

## 2024-08-02 DIAGNOSIS — N90.89 VULVAR IRRITATION: ICD-10-CM

## 2024-08-02 NOTE — PROGRESS NOTES
"Subjective     Chief Complaint   Patient presents with    1WK ROSITA       Maggie Malhotra is a 69 y.o.  whose LMP is Patient's last menstrual period was 2006 (lmp unknown).. This patient is new to me- no.  She presents with f/u infection    HPI    Treated for secondary bacterial infection of vulva last Friday.  Started on Keflex with Diflucan and Mycolog cream.  Trying to keep area dry as possible.  Feels better today; still having some itching and possible swelling.  Has 3 more days left of Keflex.         The following portions of the patient's history were reviewed and updated as appropriate:vital signs, allergies, current medications, past medical history, past social history, past surgical history, and problem list      Review of Systems     Review of Systems   Gastrointestinal:  Constipation: mild itching.   Genitourinary:  Positive for vaginal pain.       Objective      /88   Ht 180.3 cm (71\")   Wt 82.2 kg (181 lb 3.2 oz)   LMP 2006 (LMP Unknown)   BMI 25.27 kg/m²     Physical Exam    Physical Exam  Vitals reviewed.   Constitutional:       General: She is awake. She is not in acute distress.     Appearance: She is not ill-appearing.   Eyes:      Conjunctiva/sclera: Conjunctivae normal.   Pulmonary:      Effort: Pulmonary effort is normal. No respiratory distress.   Genitourinary:     Exam position: Supine.      Labia:         Right: Rash (mild erythema) present. No tenderness or lesion.         Left: Rash (mild erythema) present. No tenderness or lesion.       Comments: Mild hyperinflammatory pigmentation in healing process; satellite lesions not longer present  Musculoskeletal:      Cervical back: Neck supple. No rigidity.   Lymphadenopathy:      Lower Body: No right inguinal adenopathy. No left inguinal adenopathy.   Skin:     General: Skin is warm and dry.      Capillary Refill: Capillary refill takes less than 2 seconds.   Neurological:      Mental Status: She is alert and " oriented to person, place, and time.   Psychiatric:         Mood and Affect: Mood and affect normal.         Behavior: Behavior normal.           Lab Review   Labs: No data reviewed     Imaging: No data reviewed      Assessment/Plan  Diagnoses and all orders for this visit:    1. Skin infection (Primary)    2. Vulvar irritation      Much improvement noted since last week; in healing process.  Instructed to finish oral abx and continue applying vaginal cream until full resolution.       Return if symptoms worsen or fail to improve.    I spent 10 minutes caring for Maggie on this date of service. This time includes time spent by me in the following activities: preparing for the visit, performing a medically appropriate examination and/or evaluation, counseling and educating the patient/family/caregiver, and documenting information in the medical record.     Ana Yeh, APRN  8/2/2024  11:08 EDT

## 2024-08-13 ENCOUNTER — OFFICE VISIT (OUTPATIENT)
Dept: OBSTETRICS AND GYNECOLOGY | Facility: CLINIC | Age: 69
End: 2024-08-13
Payer: MEDICARE

## 2024-08-13 VITALS
HEIGHT: 71 IN | SYSTOLIC BLOOD PRESSURE: 122 MMHG | DIASTOLIC BLOOD PRESSURE: 86 MMHG | WEIGHT: 180.6 LBS | BODY MASS INDEX: 25.28 KG/M2

## 2024-08-13 DIAGNOSIS — L08.9 SKIN INFECTION: Primary | ICD-10-CM

## 2024-08-13 DIAGNOSIS — Z78.0 POSTMENOPAUSAL: ICD-10-CM

## 2024-08-13 DIAGNOSIS — Z79.890 HORMONE REPLACEMENT THERAPY (HRT): ICD-10-CM

## 2024-08-13 DIAGNOSIS — N90.89 VULVAR IRRITATION: ICD-10-CM

## 2024-08-13 DIAGNOSIS — Z01.419 ROUTINE GYNECOLOGICAL EXAMINATION: ICD-10-CM

## 2024-08-13 DIAGNOSIS — Z01.419 CERVICAL SMEAR, AS PART OF ROUTINE GYNECOLOGICAL EXAMINATION: ICD-10-CM

## 2024-08-13 DIAGNOSIS — N39.3 STRESS INCONTINENCE OF URINE: ICD-10-CM

## 2024-08-13 DIAGNOSIS — Z12.31 BREAST CANCER SCREENING BY MAMMOGRAM: ICD-10-CM

## 2024-08-13 RX ORDER — PANCRELIPASE 36000; 180000; 114000 [USP'U]/1; [USP'U]/1; [USP'U]/1
CAPSULE, DELAYED RELEASE PELLETS ORAL
COMMUNITY
Start: 2024-08-09

## 2024-08-13 RX ORDER — NYSTATIN AND TRIAMCINOLONE ACETONIDE 100000; 1 [USP'U]/G; MG/G
OINTMENT TOPICAL
COMMUNITY
Start: 2024-08-06

## 2024-08-13 RX ORDER — CLOBETASOL PROPIONATE 0.5 MG/G
1 OINTMENT TOPICAL 2 TIMES DAILY
Qty: 60 G | Refills: 1 | Status: SHIPPED | OUTPATIENT
Start: 2024-08-13

## 2024-08-13 RX ORDER — SULFAMETHOXAZOLE AND TRIMETHOPRIM 800; 160 MG/1; MG/1
1 TABLET ORAL 2 TIMES DAILY
Qty: 20 TABLET | Refills: 0 | Status: SHIPPED | OUTPATIENT
Start: 2024-08-13 | End: 2024-08-23

## 2024-08-13 RX ORDER — FLUCONAZOLE 150 MG/1
150 TABLET ORAL
Qty: 2 TABLET | Refills: 0 | Status: SHIPPED | OUTPATIENT
Start: 2024-08-13

## 2024-08-13 NOTE — PROGRESS NOTES
"Subjective     Chief Complaint   Patient presents with    VULVAR IRRITATION    Gynecologic Exam     ANNUAL       Maggie Malhotra is a 69 y.o.  whose LMP is Patient's last menstrual period was 2006 (lmp unknown).. This patient is new to me- no.  She presents with f/u vulvar irritation    HPI    Treated for secondary bacterial infection of vulva 2.5 weeks ago with Keflex and Diflucan.  Area was clearing up at her f/u appt.  Once she finished oral Keflex, symptoms started to flare up again.  Has redness/irritation and itching that extends to bilateral groin area.  Experiences urinary incontinence when sneezing- wears a pad daily.  Changes the pad frequently when it is soiled.      Thinks she is due for routine GYN; unsure when her last mammogram was.  Denies hx of abnormal MMG.  Per chart, last MMG in 2022- neg.  Last DEXA in 2020 was normal.  S/p hysterectomy in .  On Progesterone 200 mg daily and applies a bio identical hormone cream to vaginal area that includes testosterone for general well-being- asking for refills.          The following portions of the patient's history were reviewed and updated as appropriate:vital signs, allergies, current medications, past medical history, past social history, past surgical history, and problem list      Review of Systems     Review of Systems   Genitourinary:  Positive for urinary incontinence and vaginal pain. Negative for breast discharge, breast lump, breast pain and vaginal bleeding.       Objective      /86   Ht 180.3 cm (71\")   Wt 81.9 kg (180 lb 9.6 oz)   LMP 2006 (LMP Unknown)   BMI 25.19 kg/m²     Physical Exam    Physical Exam  Vitals reviewed.   Constitutional:       General: She is awake. She is not in acute distress.     Appearance: She is not ill-appearing.   HENT:      Head: Normocephalic and atraumatic.   Eyes:      Conjunctiva/sclera: Conjunctivae normal.   Pulmonary:      Effort: Pulmonary effort is normal. No " respiratory distress.   Chest:      Comments: deferred  Abdominal:      Palpations: Abdomen is soft. There is no mass.      Tenderness: There is no abdominal tenderness.   Genitourinary:     Exam position: Supine.      Pubic Area: No rash.       Labia:         Right: Rash (erythematous with swelling) present. No tenderness, lesion or injury.         Left: Rash (erythematous with swelling) present. No tenderness, lesion or injury.       Urethra: Prolapse present. No urethral lesion.      Vagina: Normal. Prolapsed vaginal walls present.      Cervix: Normal.      Uterus: Normal.       Adnexa: Right adnexa normal and left adnexa normal.       Musculoskeletal:      Cervical back: Neck supple. No rigidity.   Lymphadenopathy:      Lower Body: No right inguinal adenopathy. No left inguinal adenopathy.   Skin:     General: Skin is warm and dry.      Capillary Refill: Capillary refill takes less than 2 seconds.   Neurological:      Mental Status: She is alert and oriented to person, place, and time.   Psychiatric:         Mood and Affect: Mood and affect normal.         Behavior: Behavior normal.           Lab Review   Labs: No data reviewed     Imaging: No data reviewed      Assessment/Plan  Diagnoses and all orders for this visit:    1. Skin infection (Primary)  -     sulfamethoxazole-trimethoprim (BACTRIM DS,SEPTRA DS) 800-160 MG per tablet; Take 1 tablet by mouth 2 (Two) Times a Day for 10 days.  Dispense: 20 tablet; Refill: 0  -     fluconazole (DIFLUCAN) 150 MG tablet; Take 1 tablet by mouth Every 3 (Three) Days.  Dispense: 2 tablet; Refill: 0    2. Vulvar irritation  -     fluconazole (DIFLUCAN) 150 MG tablet; Take 1 tablet by mouth Every 3 (Three) Days.  Dispense: 2 tablet; Refill: 0  -     clobetasol (TEMOVATE) 0.05 % ointment; Apply 1 Application topically to the appropriate area as directed 2 (Two) Times a Day.  Dispense: 60 g; Refill: 1    3. Breast cancer screening by mammogram  -     Mammo Screening Digital  Tomosynthesis Bilateral With CAD; Future    4. Postmenopausal  -     DEXA Bone Density Axial; Future    5. Hormone replacement therapy (HRT)    6. Cervical smear, as part of routine gynecological examination  -     Pap IG (Image Guided)    7. Routine gynecological examination  -     Pap IG (Image Guided)    8. Stress incontinence of urine      Bacterial infection has returned.  ERX Bactrim with Diflucan and clobetasol cream.  Discussed keeping area dry as possible.      Due for routine GYN HM- mammogram screening and routine DEXA ordered.  Pap performed today.  She is UTD with colonoscopy.  Discouraged use of bio-identical hormones.  Patient was counseled that evidence is lacking to support superiority claims of compounded bioidentical hormones over conventional menopausal hormone therapy.  Compounded or “customized” hormones pose additional risks above those known for FDA approved therapies, as compounded hormones have variable purity and potency.  This variability and lack of regulation in these therapies make both under dosage and over dosage possible.  Despite claims to the contrary, evidence is inadequate to support increased efficacy or safety for individualized hormone therapy regimen based on salivary, serum or urinary testing.  Any side effects such as vaginal bleeding or pain should be reported immediately.         Return in 2 weeks (on 8/27/2024), or if symptoms worsen or fail to improve, for re-evaluation.    I spent 40 minutes caring for Maggie on this date of service. This time includes time spent by me in the following activities: preparing for the visit, reviewing tests, performing a medically appropriate examination and/or evaluation, counseling and educating the patient/family/caregiver, documenting information in the medical record, ordering medications, ordering test(s), and ordering procedure(s).     Ana Yeh, RAJEEV  8/13/2024  14:12 EDT

## 2024-08-16 LAB
CYTOLOGIST CVX/VAG CYTO: NORMAL
CYTOLOGY CVX/VAG DOC CYTO: NORMAL
CYTOLOGY CVX/VAG DOC THIN PREP: NORMAL
DX ICD CODE: NORMAL
Lab: NORMAL
OTHER STN SPEC: NORMAL
STAT OF ADQ CVX/VAG CYTO-IMP: NORMAL

## 2024-08-27 ENCOUNTER — OFFICE VISIT (OUTPATIENT)
Dept: OBSTETRICS AND GYNECOLOGY | Facility: CLINIC | Age: 69
End: 2024-08-27
Payer: MEDICARE

## 2024-08-27 VITALS
SYSTOLIC BLOOD PRESSURE: 136 MMHG | HEIGHT: 71 IN | WEIGHT: 181.6 LBS | DIASTOLIC BLOOD PRESSURE: 88 MMHG | BODY MASS INDEX: 25.42 KG/M2

## 2024-08-27 DIAGNOSIS — L08.9 SKIN INFECTION: ICD-10-CM

## 2024-08-27 DIAGNOSIS — N90.89 VULVAR IRRITATION: Primary | ICD-10-CM

## 2024-08-27 PROCEDURE — 1160F RVW MEDS BY RX/DR IN RCRD: CPT | Performed by: NURSE PRACTITIONER

## 2024-08-27 PROCEDURE — 3075F SYST BP GE 130 - 139MM HG: CPT | Performed by: NURSE PRACTITIONER

## 2024-08-27 PROCEDURE — 1159F MED LIST DOCD IN RCRD: CPT | Performed by: NURSE PRACTITIONER

## 2024-08-27 PROCEDURE — 99213 OFFICE O/P EST LOW 20 MIN: CPT | Performed by: NURSE PRACTITIONER

## 2024-08-27 PROCEDURE — 3079F DIAST BP 80-89 MM HG: CPT | Performed by: NURSE PRACTITIONER

## 2024-08-27 RX ORDER — ZOLPIDEM TARTRATE 12.5 MG/1
TABLET, FILM COATED, EXTENDED RELEASE ORAL
COMMUNITY
Start: 2024-08-14

## 2024-08-27 RX ORDER — TRAZODONE HYDROCHLORIDE 100 MG/1
TABLET ORAL
COMMUNITY
Start: 2024-08-14

## 2024-08-27 NOTE — PROGRESS NOTES
"Subjective     Chief Complaint   Patient presents with    Follow-up       Maggie Malhotra is a 69 y.o.  whose LMP is Patient's last menstrual period was 2006 (lmp unknown).. This patient is new to me- no.  She presents with f/u vulvar infection/irritation    HPI    Continues to have vulvar itching/redness/swelling; symptoms started over a month ago.  Has been treated with Keflex and Bactrim for secondary bacterial infection with some improvement.  Took Diflucan with Mycolog cream, then switched to clobetasol ointment.  Trying to keep area dry as possible.  Underwear rubs around the area.  She does have urinary incontinence- wears a pad        The following portions of the patient's history were reviewed and updated as appropriate:vital signs, allergies, current medications, past medical history, past social history, past surgical history, and problem list      Review of Systems     Review of Systems   Genitourinary:  Positive for urinary incontinence and vaginal pain.       Objective      /88   Ht 180.3 cm (71\")   Wt 82.4 kg (181 lb 9.6 oz)   LMP 2006 (LMP Unknown)   BMI 25.33 kg/m²     Physical Exam    Physical Exam  Vitals reviewed.   Constitutional:       General: She is awake. She is not in acute distress.     Appearance: She is not ill-appearing.   Eyes:      Conjunctiva/sclera: Conjunctivae normal.   Pulmonary:      Effort: Pulmonary effort is normal. No respiratory distress.   Genitourinary:     Exam position: Supine.      Pubic Area: No rash.       Labia:         Right: Rash present. No lesion or injury.         Left: Rash present. No lesion or injury.       Urethra: Prolapse present.          Comments: Postinflammatory hyperpigmentation with mild swelling; no satellite lesions or streaking noted   Musculoskeletal:      Cervical back: Neck supple. No rigidity.   Lymphadenopathy:      Lower Body: No right inguinal adenopathy. No left inguinal adenopathy.   Skin:     General: " Skin is warm and dry.      Capillary Refill: Capillary refill takes less than 2 seconds.   Neurological:      Mental Status: She is alert and oriented to person, place, and time.   Psychiatric:         Mood and Affect: Mood and affect normal.         Behavior: Behavior normal.           Lab Review   Labs: No data reviewed     Imaging: No data reviewed      Assessment/Plan  Diagnoses and all orders for this visit:    1. Vulvar irritation (Primary)    2. Skin infection    Bacterial infection appears to have resolved but continues to have slight swelling with redness.  Offered/declined biopsy of area today.  Recommend to make appt with her dermatologist- will fax recent OV notes.        Return as needed.    I spent 20 minutes caring for Maggie on this date of service. This time includes time spent by me in the following activities: preparing for the visit, performing a medically appropriate examination and/or evaluation, counseling and educating the patient/family/caregiver, and documenting information in the medical record.     Ana Yeh, APRN  8/27/2024  14:06 EDT

## 2024-08-30 DIAGNOSIS — I10 HYPERTENSION, UNSPECIFIED TYPE: Primary | ICD-10-CM

## 2024-08-30 DIAGNOSIS — E78.5 HYPERLIPIDEMIA, UNSPECIFIED HYPERLIPIDEMIA TYPE: ICD-10-CM

## 2024-08-30 DIAGNOSIS — R73.03 PREDIABETES: ICD-10-CM

## 2024-08-30 DIAGNOSIS — E55.9 VITAMIN D DEFICIENCY: ICD-10-CM

## 2024-09-09 ENCOUNTER — TELEPHONE (OUTPATIENT)
Dept: INTERNAL MEDICINE | Facility: CLINIC | Age: 69
End: 2024-09-09
Payer: MEDICARE

## 2024-09-10 LAB
25(OH)D3+25(OH)D2 SERPL-MCNC: 48.1 NG/ML (ref 30–100)
ALBUMIN SERPL-MCNC: 4.2 G/DL (ref 3.5–5.2)
ALBUMIN/GLOB SERPL: 1.9 G/DL
ALP SERPL-CCNC: 64 U/L (ref 39–117)
ALT SERPL-CCNC: 24 U/L (ref 1–33)
AST SERPL-CCNC: 23 U/L (ref 1–32)
BASOPHILS # BLD AUTO: 0.05 10*3/MM3 (ref 0–0.2)
BASOPHILS NFR BLD AUTO: 0.8 % (ref 0–1.5)
BILIRUB SERPL-MCNC: 0.4 MG/DL (ref 0–1.2)
BUN SERPL-MCNC: 9 MG/DL (ref 8–23)
BUN/CREAT SERPL: 13 (ref 7–25)
CALCIUM SERPL-MCNC: 9.6 MG/DL (ref 8.6–10.5)
CHLORIDE SERPL-SCNC: 96 MMOL/L (ref 98–107)
CHOLEST SERPL-MCNC: 125 MG/DL (ref 0–200)
CO2 SERPL-SCNC: 27.3 MMOL/L (ref 22–29)
CREAT SERPL-MCNC: 0.69 MG/DL (ref 0.57–1)
EGFRCR SERPLBLD CKD-EPI 2021: 94.7 ML/MIN/1.73
EOSINOPHIL # BLD AUTO: 0.38 10*3/MM3 (ref 0–0.4)
EOSINOPHIL NFR BLD AUTO: 6.1 % (ref 0.3–6.2)
ERYTHROCYTE [DISTWIDTH] IN BLOOD BY AUTOMATED COUNT: 11.5 % (ref 12.3–15.4)
GLOBULIN SER CALC-MCNC: 2.2 GM/DL
GLUCOSE SERPL-MCNC: 107 MG/DL (ref 65–99)
HBA1C MFR BLD: 5.1 % (ref 4.8–5.6)
HCT VFR BLD AUTO: 42.5 % (ref 34–46.6)
HDLC SERPL-MCNC: 66 MG/DL (ref 40–60)
HGB BLD-MCNC: 14 G/DL (ref 12–15.9)
IMM GRANULOCYTES # BLD AUTO: 0.01 10*3/MM3 (ref 0–0.05)
IMM GRANULOCYTES NFR BLD AUTO: 0.2 % (ref 0–0.5)
LDLC SERPL CALC-MCNC: 45 MG/DL (ref 0–100)
LDLC/HDLC SERPL: 0.69 {RATIO}
LYMPHOCYTES # BLD AUTO: 1.98 10*3/MM3 (ref 0.7–3.1)
LYMPHOCYTES NFR BLD AUTO: 31.8 % (ref 19.6–45.3)
MCH RBC QN AUTO: 34.2 PG (ref 26.6–33)
MCHC RBC AUTO-ENTMCNC: 32.9 G/DL (ref 31.5–35.7)
MCV RBC AUTO: 103.9 FL (ref 79–97)
MONOCYTES # BLD AUTO: 0.78 10*3/MM3 (ref 0.1–0.9)
MONOCYTES NFR BLD AUTO: 12.5 % (ref 5–12)
NEUTROPHILS # BLD AUTO: 3.02 10*3/MM3 (ref 1.7–7)
NEUTROPHILS NFR BLD AUTO: 48.6 % (ref 42.7–76)
NRBC BLD AUTO-RTO: 0 /100 WBC (ref 0–0.2)
PLATELET # BLD AUTO: 256 10*3/MM3 (ref 140–450)
POTASSIUM SERPL-SCNC: 4.5 MMOL/L (ref 3.5–5.2)
PROT SERPL-MCNC: 6.4 G/DL (ref 6–8.5)
RBC # BLD AUTO: 4.09 10*6/MM3 (ref 3.77–5.28)
SODIUM SERPL-SCNC: 133 MMOL/L (ref 136–145)
TRIGL SERPL-MCNC: 67 MG/DL (ref 0–150)
VLDLC SERPL CALC-MCNC: 14 MG/DL (ref 5–40)
WBC # BLD AUTO: 6.22 10*3/MM3 (ref 3.4–10.8)

## 2024-09-16 ENCOUNTER — OFFICE VISIT (OUTPATIENT)
Dept: INTERNAL MEDICINE | Facility: CLINIC | Age: 69
End: 2024-09-16
Payer: MEDICARE

## 2024-09-16 VITALS
SYSTOLIC BLOOD PRESSURE: 120 MMHG | DIASTOLIC BLOOD PRESSURE: 80 MMHG | WEIGHT: 184 LBS | BODY MASS INDEX: 25.76 KG/M2 | HEIGHT: 71 IN | HEART RATE: 84 BPM | TEMPERATURE: 98 F | OXYGEN SATURATION: 98 %

## 2024-09-16 DIAGNOSIS — M79.7 FIBROMYALGIA: ICD-10-CM

## 2024-09-16 DIAGNOSIS — G47.00 INSOMNIA, UNSPECIFIED TYPE: ICD-10-CM

## 2024-09-16 DIAGNOSIS — I10 HYPERTENSION, UNSPECIFIED TYPE: Primary | ICD-10-CM

## 2024-09-16 PROCEDURE — 1125F AMNT PAIN NOTED PAIN PRSNT: CPT | Performed by: NURSE PRACTITIONER

## 2024-09-16 PROCEDURE — 1160F RVW MEDS BY RX/DR IN RCRD: CPT | Performed by: NURSE PRACTITIONER

## 2024-09-16 PROCEDURE — 3074F SYST BP LT 130 MM HG: CPT | Performed by: NURSE PRACTITIONER

## 2024-09-16 PROCEDURE — 99214 OFFICE O/P EST MOD 30 MIN: CPT | Performed by: NURSE PRACTITIONER

## 2024-09-16 PROCEDURE — 1159F MED LIST DOCD IN RCRD: CPT | Performed by: NURSE PRACTITIONER

## 2024-09-16 PROCEDURE — 3079F DIAST BP 80-89 MM HG: CPT | Performed by: NURSE PRACTITIONER

## 2024-09-30 ENCOUNTER — TELEPHONE (OUTPATIENT)
Dept: INTERNAL MEDICINE | Facility: CLINIC | Age: 69
End: 2024-09-30
Payer: MEDICARE

## 2024-09-30 DIAGNOSIS — M79.7 FIBROMYALGIA: Primary | ICD-10-CM

## 2024-09-30 RX ORDER — PREGABALIN 300 MG/1
300 CAPSULE ORAL 2 TIMES DAILY
Qty: 180 CAPSULE | Refills: 0 | Status: SHIPPED | OUTPATIENT
Start: 2024-09-30

## 2024-09-30 NOTE — TELEPHONE ENCOUNTER
----- Message from Gloria SMALL sent at 9/30/2024  9:51 AM EDT -----  Regarding: FW: Lyrica Prescription  Contact: [phone]  She is requesting 300mg  lyrica to be sent in for twice daily.  ----- Message -----  From: Maggie Malhotra  Sent: 9/29/2024   5:29 PM EDT  To: [facility] [place] [id number] [facility]  Subject: Lyrica Prescription                              Lydia,  I am requesting a refill on my Lyrica 300mg twice a day as we discussed.  I have enough through Thursday.  If you have any questions please give me a call.    Thank you  Maggie  [phone]

## 2024-10-07 ENCOUNTER — ANESTHESIA (OUTPATIENT)
Dept: SURGERY | Facility: SURGERY CENTER | Age: 69
End: 2024-10-07
Payer: MEDICARE

## 2024-10-07 ENCOUNTER — HOSPITAL ENCOUNTER (OUTPATIENT)
Facility: SURGERY CENTER | Age: 69
Setting detail: HOSPITAL OUTPATIENT SURGERY
Discharge: HOME | End: 2024-10-07
Attending: INTERNAL MEDICINE | Admitting: INTERNAL MEDICINE
Payer: MEDICARE

## 2024-10-07 ENCOUNTER — ANESTHESIA EVENT (OUTPATIENT)
Dept: SURGERY | Facility: SURGERY CENTER | Age: 69
End: 2024-10-07
Payer: MEDICARE

## 2024-10-07 VITALS
OXYGEN SATURATION: 96 % | RESPIRATION RATE: 17 BRPM | BODY MASS INDEX: 25.2 KG/M2 | SYSTOLIC BLOOD PRESSURE: 150 MMHG | HEIGHT: 71 IN | TEMPERATURE: 97.8 F | WEIGHT: 180 LBS | HEART RATE: 85 BPM | DIASTOLIC BLOOD PRESSURE: 91 MMHG

## 2024-10-07 DIAGNOSIS — K86.81 EXOCRINE PANCREATIC INSUFFICIENCY: ICD-10-CM

## 2024-10-07 DIAGNOSIS — Z86.0100 HISTORY OF COLONIC POLYPS: ICD-10-CM

## 2024-10-07 DIAGNOSIS — Z87.11 HISTORY OF GASTRIC ULCER: ICD-10-CM

## 2024-10-07 DIAGNOSIS — K21.9 GASTROESOPHAGEAL REFLUX DISEASE WITHOUT ESOPHAGITIS: ICD-10-CM

## 2024-10-07 DIAGNOSIS — Z12.11 ENCOUNTER FOR SCREENING COLONOSCOPY: ICD-10-CM

## 2024-10-07 DIAGNOSIS — Z86.0100 PERSONAL HISTORY OF COLONIC POLYPS: ICD-10-CM

## 2024-10-07 PROCEDURE — 25810000003 LACTATED RINGERS PER 1000 ML

## 2024-10-07 PROCEDURE — G0105 COLORECTAL SCRN; HI RISK IND: HCPCS | Performed by: INTERNAL MEDICINE

## 2024-10-07 PROCEDURE — 88305 TISSUE EXAM BY PATHOLOGIST: CPT | Performed by: INTERNAL MEDICINE

## 2024-10-07 PROCEDURE — 25010000002 PROPOFOL 1000 MG/100ML EMULSION: Performed by: NURSE ANESTHETIST, CERTIFIED REGISTERED

## 2024-10-07 PROCEDURE — 43239 EGD BIOPSY SINGLE/MULTIPLE: CPT | Performed by: INTERNAL MEDICINE

## 2024-10-07 PROCEDURE — 25010000002 PROPOFOL 10 MG/ML EMULSION: Performed by: NURSE ANESTHETIST, CERTIFIED REGISTERED

## 2024-10-07 PROCEDURE — 25010000002 LIDOCAINE 2% SOLUTION: Performed by: NURSE ANESTHETIST, CERTIFIED REGISTERED

## 2024-10-07 RX ORDER — SODIUM CHLORIDE 0.9 % (FLUSH) 0.9 %
3 SYRINGE (ML) INJECTION EVERY 12 HOURS SCHEDULED
Status: DISCONTINUED | OUTPATIENT
Start: 2024-10-07 | End: 2024-10-07 | Stop reason: HOSPADM

## 2024-10-07 RX ORDER — LIDOCAINE HYDROCHLORIDE 20 MG/ML
INJECTION, SOLUTION INFILTRATION; PERINEURAL AS NEEDED
Status: DISCONTINUED | OUTPATIENT
Start: 2024-10-07 | End: 2024-10-07 | Stop reason: SURG

## 2024-10-07 RX ORDER — PROPOFOL 10 MG/ML
INJECTION, EMULSION INTRAVENOUS AS NEEDED
Status: DISCONTINUED | OUTPATIENT
Start: 2024-10-07 | End: 2024-10-07 | Stop reason: SURG

## 2024-10-07 RX ORDER — SODIUM CHLORIDE 0.9 % (FLUSH) 0.9 %
10 SYRINGE (ML) INJECTION AS NEEDED
Status: DISCONTINUED | OUTPATIENT
Start: 2024-10-07 | End: 2024-10-07 | Stop reason: HOSPADM

## 2024-10-07 RX ORDER — PANTOPRAZOLE SODIUM 40 MG/1
40 TABLET, DELAYED RELEASE ORAL DAILY
Qty: 30 TABLET | Refills: 5 | Status: SHIPPED | OUTPATIENT
Start: 2024-10-07

## 2024-10-07 RX ORDER — ESTRADIOL 1 MG/1
1 TABLET ORAL DAILY
COMMUNITY
Start: 2024-10-01

## 2024-10-07 RX ORDER — LIDOCAINE HYDROCHLORIDE 10 MG/ML
0.5 INJECTION, SOLUTION INFILTRATION; PERINEURAL ONCE AS NEEDED
Status: DISCONTINUED | OUTPATIENT
Start: 2024-10-07 | End: 2024-10-07 | Stop reason: HOSPADM

## 2024-10-07 RX ORDER — ECONAZOLE NITRATE 10 MG/G
CREAM TOPICAL
COMMUNITY
Start: 2024-10-04

## 2024-10-07 RX ORDER — SODIUM CHLORIDE, SODIUM LACTATE, POTASSIUM CHLORIDE, CALCIUM CHLORIDE 600; 310; 30; 20 MG/100ML; MG/100ML; MG/100ML; MG/100ML
30 INJECTION, SOLUTION INTRAVENOUS CONTINUOUS PRN
Status: DISCONTINUED | OUTPATIENT
Start: 2024-10-07 | End: 2024-10-07 | Stop reason: HOSPADM

## 2024-10-07 RX ADMIN — PROPOFOL 120 MG: 10 INJECTION, EMULSION INTRAVENOUS at 11:03

## 2024-10-07 RX ADMIN — PROPOFOL 250 MCG/KG/MIN: 10 INJECTION, EMULSION INTRAVENOUS at 11:03

## 2024-10-07 RX ADMIN — SODIUM CHLORIDE, POTASSIUM CHLORIDE, SODIUM LACTATE AND CALCIUM CHLORIDE 30 ML/HR: 600; 310; 30; 20 INJECTION, SOLUTION INTRAVENOUS at 10:29

## 2024-10-07 RX ADMIN — PROPOFOL 80 MG: 10 INJECTION, EMULSION INTRAVENOUS at 11:19

## 2024-10-07 RX ADMIN — LIDOCAINE HYDROCHLORIDE 60 MG: 20 INJECTION, SOLUTION INFILTRATION; PERINEURAL at 11:03

## 2024-10-07 NOTE — ANESTHESIA PREPROCEDURE EVALUATION
Anesthesia Evaluation     Patient summary reviewed and Nursing notes reviewed     NPO Liquid Status: > 2 hours           Airway   Mallampati: II  TM distance: >3 FB  Neck ROM: full  No difficulty expected  Dental - normal exam     Pulmonary - negative pulmonary ROS and normal exam   Cardiovascular - normal exam  Exercise tolerance: good (4-7 METS)    ECG reviewed    (+) hypertension      Neuro/Psych  (+) headaches, dizziness/light headedness, psychiatric history Anxiety and Depression  GI/Hepatic/Renal/Endo    (+) GERD    Musculoskeletal     (+) myalgias  Abdominal    Substance History   (+) alcohol use     OB/GYN negative ob/gyn ROS         Other   arthritis,                 Anesthesia Plan    ASA 3     MAC     intravenous induction     Anesthetic plan, risks, benefits, and alternatives have been provided, discussed and informed consent has been obtained with: patient.    CODE STATUS:

## 2024-10-07 NOTE — H&P
No chief complaint on file.      HPI  GERD  H/o polyps         Problem List:    Patient Active Problem List   Diagnosis    Fibromyalgia    Irritable bowel syndrome with diarrhea    Vitamin D deficiency    Insomnia    Allergic rhinitis    Fibromyalgia, primary    Essential hypertension    Anxiety    Weakness    Increased MCV    Guillain Barré syndrome    Monopolar depression    Transaminitis    Dyspepsia    Diarrhea    Gastroesophageal reflux disease    Arthritis of right hip    Right hip pain    Right thigh pain    Bilious vomiting with nausea    Generalized abdominal pain    Gallstones    Sinus tachycardia    Abnormal liver CT    Esophageal dysphagia    History of Guillain-[place] syndrome    Impairment of balance    Cervical spondylosis without myelopathy    Neuropathic pain    Polyneuropathy, unspecified    Alcohol abuse    Elevated liver enzymes    Fecal urgency    Personal history of colonic polyps    Encounter for screening colonoscopy    History of gastric ulcer    Exocrine pancreatic insufficiency    Alcohol intoxication       Medical History:    Past Medical History:   Diagnosis Date    Alcohol abuse 5/17/2023    Allergic rhinitis     Anxiety     Backache     Blood pressure elevated without history of HTN     Broken foot 06/01/2002    LT    Chronic pain     Chronic pain disorder     Depression     Dizziness     Extremity pain     Fatigue     Fibromyalgia, primary     Guillain Barré syndrome     H/O mammogram 01/01/2012    Dr. Rendon    H/O: pneumonia     History of broken leg 01/01/1998    LT    Hypertension 2019    Hypotension     IBS (irritable bowel syndrome)     Insomnia     Memory disorder     Migraine     Mood disorder     Multiple joint pain     Night sweat     Syncope     Vitamin D deficiency         Social History:    Social History     Socioeconomic History    Marital status:    Tobacco Use    Smoking status: Never     Passive exposure: Never    Smokeless tobacco: Never    Tobacco comments:      occasional caffiene   Vaping Use    Vaping status: Never Used   Substance and Sexual Activity    Alcohol use: Yes     Alcohol/week: 3.0 standard drinks of alcohol     Types: 3 Cans of beer per week     Comment: Moderate    Drug use: Never    Sexual activity: Yes     Partners: Male     Birth control/protection: Vasectomy, Hysterectomy       Family History:   Family History   Problem Relation Age of Onset    Arthritis Mother     Heart failure Father     Other Father         Lung failure    Breast cancer Neg Hx     Ovarian cancer Neg Hx     Uterine cancer Neg Hx     Colon cancer Neg Hx     Colon polyps Neg Hx     Crohn's disease Neg Hx     Irritable bowel syndrome Neg Hx     Ulcerative colitis Neg Hx        Surgical History:   Past Surgical History:   Procedure Laterality Date    BREAST AUGMENTATION BILATERAL MASTOPEXY Bilateral 01/01/1998    CHOLECYSTECTOMY      COLONOSCOPY N/A 05/18/2010    Dr. Gurmeet Justice    COLONOSCOPY  10/09/2019    Non-thrombosed external hemorrhoids found on perianal exam, diverticulosis in the sigmoid colon and in the descending colon, One 4 mm polyp in the mid sigmoid colon, tortuous colon, IH. Path: Tubular adenoma.       COLONOSCOPY W/ BIOPSIES  06/27/2014    Non-thrombosed external hemorrhoids, Diverticulosis in the sigmoid colon, two 4 to 5 mm polyps in the sigmoid colon and the descending colon (pathology: Hyperplastic polyp), Tortuous colon, Internal hemorrhoids    ENDOSCOPY N/A 06/27/2014    Z-line irregular, 38 cm from the incisors, Gastritis, Gastric polyps, Duodenal erosions without bleeding     ENDOSCOPY N/A 05/18/2010    Dr. Gurmeet Justice    ENDOSCOPY  10/09/2019    Z-line irregular, gastritis, bilious gastric fluid. Path: Reflux pattern esophagitis, chronic inflammation.     ENDOSCOPY N/A 08/02/2022    Procedure: egd with esophageal dilatation 12-15 balloon with cold biopsies;  Surgeon: Michael Matos MD;  Location: [facility] [place] MAIN OR;  Service: Gastroenterology;   "Laterality: N/A;  hiatal hernia, gastric polyp, gastrtitis    FRACTURE SURGERY  1997    Right Leg    HYSTERECTOMY N/A 02/01/2006    JOINT REPLACEMENT  Right Hip    LIPOSUCTION N/A 04/01/2001    OOPHORECTOMY      PAP SMEAR N/A 07/10/2013    TOE SURGERY Right 01/01/1995    TONSILLECTOMY N/A 01/01/1964    TRACHEAL SURGERY N/A 01/01/1986    [facility]    TRACHEOSTOMY      UPPER GASTROINTESTINAL ENDOSCOPY         No current facility-administered medications for this encounter.    Current Outpatient Medications:     carvedilol CR (Coreg CR) 40 MG 24 hr capsule, Take 1 capsule by mouth Daily., Disp: 90 capsule, Rfl: 3    clobetasol (TEMOVATE) 0.05 % ointment, Apply 1 Application topically to the appropriate area as directed 2 (Two) Times a Day., Disp: 60 g, Rfl: 1    Creon 79042-950469 units capsule delayed-release particles capsule, , Disp: , Rfl:     Estrad-Estriol-Testost-Progest (Bi-Est Progest-Testosterone) cream, Place  on the skin as directed by provider., Disp: , Rfl:     fluconazole (DIFLUCAN) 150 MG tablet, Take 1 tablet by mouth Every 3 (Three) Days., Disp: 2 tablet, Rfl: 0    multivitamin (DAILY ES) tablet tablet, Take 1 tablet by mouth Daily., Disp: , Rfl:     naltrexone (DEPADE) 50 MG tablet, Take 3 mg by mouth Daily., Disp: , Rfl:     nystatin-triamcinolone (MYCOLOG) 022720-0.1 UNIT/GM-% ointment, , Disp: , Rfl:     pregabalin (LYRICA) 300 MG capsule, Take 1 capsule by mouth 2 (Two) Times a Day., Disp: 180 capsule, Rfl: 0    Progesterone (PROMETRIUM) 200 MG capsule, Take 300 mg by mouth Daily. Patient takes 300 mg, Disp: , Rfl:     traZODone (DESYREL) 100 MG tablet, , Disp: , Rfl:     zolpidem CR (AMBIEN CR) 12.5 MG CR tablet, , Disp: , Rfl:     Allergies:   Allergies   Allergen Reactions    Influenza Vaccines Unknown - Low Severity    Midazolam Itching     Other reaction(s): Other (See Comments)  \"makes me very hyper\"        The following portions of the patient's history were reviewed by me and updated " as appropriate: review of systems, allergies, current medications, past family history, past medical history, past social history, past surgical history and problem list.    There were no vitals filed for this visit.    PHYSICAL EXAM:    CONSTITUTIONAL:  today's vital signs reviewed by me  GASTROINTESTINAL: abdomen is soft nontender nondistended with normal active bowel sounds, no masses are appreciated    Assessment/ Plan  GERD  H/o polyps    Egd and colonoscopy    Risks and benefits as well as alternatives to endoscopic evaluation were explained to the patient and they voiced understanding and wish to proceed.  These risks include but are not limited to the risk of bleeding, perforation, adverse reaction to sedation, and missed lesions.  The patient was given the opportunity to ask questions prior to the endoscopic procedure.

## 2024-10-07 NOTE — ANESTHESIA POSTPROCEDURE EVALUATION
"Patient: Maggie Malhotra    Procedure Summary       Date: 10/07/24 Room / Location: [facility]  / [facility]    Anesthesia Start: 1059 Anesthesia Stop: 1134    Procedures:       ESOPHAGOGASTRODUODENOSCOPY      COLONOSCOPY FOR SCREENING Diagnosis:       Gastroesophageal reflux disease without esophagitis      History of gastric ulcer      Exocrine pancreatic insufficiency      Personal history of colonic polyps      Encounter for screening colonoscopy      (Gastroesophageal reflux disease without esophagitis [K21.9])      (History of gastric ulcer [Z87.11])      (Exocrine pancreatic insufficiency [K86.81])      (Personal history of colonic polyps [Z86.010])      (Encounter for screening colonoscopy [Z12.11])    Surgeons: Michael Matos MD Provider: Yo Mercado MD    Anesthesia Type: MAC ASA Status: 3            Anesthesia Type: MAC    Vitals  Vitals Value Taken Time   /92 10/07/24 1138   Temp 36.6 °C (97.8 °F) 10/07/24 1131   Pulse 92 10/07/24 1138   Resp 20 10/07/24 1131   SpO2 96 % 10/07/24 1138   Vitals shown include unfiled device data.        Post Anesthesia Care and Evaluation    Patient location during evaluation: PACU  Level of consciousness: awake  Pain management: adequate    Airway patency: patent  Anesthetic complications: No anesthetic complications  PONV Status: controlled  Cardiovascular status: acceptable  Respiratory status: acceptable  Hydration status: acceptable    Comments: /92   Pulse 95   Temp 36.6 °C (97.8 °F)   Resp 20   Ht 180.3 cm (71\")   Wt 81.6 kg (180 lb)   LMP 01/01/2006 (LMP Unknown)   SpO2 95%   BMI 25.10 kg/m²       "

## 2024-10-08 ENCOUNTER — HOSPITAL ENCOUNTER (OUTPATIENT)
Dept: MAMMOGRAPHY | Facility: HOSPITAL | Age: 69
Discharge: HOME | End: 2024-10-08
Payer: MEDICARE

## 2024-10-08 ENCOUNTER — HOSPITAL ENCOUNTER (OUTPATIENT)
Dept: BONE DENSITY | Facility: HOSPITAL | Age: 69
Discharge: HOME | End: 2024-10-08
Payer: MEDICARE

## 2024-10-08 DIAGNOSIS — Z12.31 BREAST CANCER SCREENING BY MAMMOGRAM: ICD-10-CM

## 2024-10-08 DIAGNOSIS — Z78.0 POSTMENOPAUSAL: ICD-10-CM

## 2024-10-08 LAB
CYTO UR: NORMAL
LAB AP CASE REPORT: NORMAL
PATH REPORT.FINAL DX SPEC: NORMAL
PATH REPORT.GROSS SPEC: NORMAL

## 2024-10-08 PROCEDURE — 77080 DXA BONE DENSITY AXIAL: CPT

## 2024-10-08 PROCEDURE — 77063 BREAST TOMOSYNTHESIS BI: CPT

## 2024-10-08 PROCEDURE — 77067 SCR MAMMO BI INCL CAD: CPT

## 2024-10-10 DIAGNOSIS — G47.00 INSOMNIA, UNSPECIFIED TYPE: Primary | ICD-10-CM

## 2024-10-10 RX ORDER — ZOLPIDEM TARTRATE 12.5 MG/1
12.5 TABLET, FILM COATED, EXTENDED RELEASE ORAL NIGHTLY PRN
Qty: 30 TABLET | Refills: 0 | Status: SHIPPED | OUTPATIENT
Start: 2024-10-10

## 2024-10-10 NOTE — TELEPHONE ENCOUNTER
Caller: Maggie Malhotra    Relationship: Self    Best call back number: [phone]     Requested Prescriptions: zolpidem CR (AMBIEN CR) 12.5 MG CR tablet   Requested Prescriptions      No prescriptions requested or ordered in this encounter      [id number] - [place], [place] - [address] AT [facility] - [phone]  - [phone]  [phone]   Pharmacy where request should be sent:      Last office visit with prescribing clinician: 9/16/2024   Last telemedicine visit with prescribing clinician: Visit date not found   Next office visit with prescribing clinician: 3/13/2025     Does the patient have less than a 3 day supply:  [x] Yes  [] No    Would you like a call back once the refill request has been completed: [] Yes [] No    If the office needs to give you a call back, can they leave a voicemail: [] Yes [] No    Prudence Harvey Rep   10/10/24 11:14 EDT     PLEASE ADVISE.

## 2024-10-23 RX ORDER — DICYCLOMINE HYDROCHLORIDE 10 MG/1
10 CAPSULE ORAL 3 TIMES DAILY PRN
Qty: 90 CAPSULE | Refills: 5 | Status: SHIPPED | OUTPATIENT
Start: 2024-10-23

## 2024-10-25 RX ORDER — PANCRELIPASE 36000; 180000; 114000 [USP'U]/1; [USP'U]/1; [USP'U]/1
CAPSULE, DELAYED RELEASE PELLETS ORAL
Qty: 720 CAPSULE | Refills: 2 | Status: SHIPPED | OUTPATIENT
Start: 2024-10-25

## 2024-11-07 ENCOUNTER — OFFICE VISIT (OUTPATIENT)
Dept: GASTROENTEROLOGY | Facility: CLINIC | Age: 69
End: 2024-11-07
Payer: MEDICARE

## 2024-11-07 VITALS
SYSTOLIC BLOOD PRESSURE: 130 MMHG | BODY MASS INDEX: 26.31 KG/M2 | TEMPERATURE: 96.1 F | WEIGHT: 187.9 LBS | HEART RATE: 89 BPM | OXYGEN SATURATION: 99 % | HEIGHT: 71 IN | DIASTOLIC BLOOD PRESSURE: 80 MMHG

## 2024-11-07 DIAGNOSIS — K29.70 GASTRITIS WITHOUT BLEEDING, UNSPECIFIED CHRONICITY, UNSPECIFIED GASTRITIS TYPE: Primary | ICD-10-CM

## 2024-11-07 DIAGNOSIS — K86.81 EXOCRINE PANCREATIC INSUFFICIENCY: ICD-10-CM

## 2024-11-07 DIAGNOSIS — K29.80 DUODENITIS: ICD-10-CM

## 2024-11-07 PROCEDURE — 99214 OFFICE O/P EST MOD 30 MIN: CPT | Performed by: NURSE PRACTITIONER

## 2024-11-07 PROCEDURE — 1160F RVW MEDS BY RX/DR IN RCRD: CPT | Performed by: NURSE PRACTITIONER

## 2024-11-07 PROCEDURE — 3075F SYST BP GE 130 - 139MM HG: CPT | Performed by: NURSE PRACTITIONER

## 2024-11-07 PROCEDURE — 1159F MED LIST DOCD IN RCRD: CPT | Performed by: NURSE PRACTITIONER

## 2024-11-07 PROCEDURE — 3079F DIAST BP 80-89 MM HG: CPT | Performed by: NURSE PRACTITIONER

## 2024-11-07 NOTE — PROGRESS NOTES
"Chief Complaint   Patient presents with    GI Problem         History of Present Illness  Patient is a 69-year-old female who presents today for Follow-up.  She has a history of irritable bowel syndrome, colon polyps, duodenal and gastric ulcers, exocrine pancreatic insufficiency, chronic pancreatitis, and GERD.  She had an EGD and colonoscopy completed October 2024.  EGD showed a 1 cm hiatal hernia, gastritis, duodenitis.  Colonoscopy with diverticulosis and internal hemorrhoids.    Patient presents today for follow-up.  She has a past history of Guillain-Barré syndrome and has had persistent issues with neuropathic pain.  She has had some issues with dysphagia.  This overall is stable, she feels it may be related to neuropathy.  Questions if there is anything seen on her EGD to explain this.    She has been experiencing issues with diarrhea since her previous cholecystectomy.  She reports symptoms are significantly improved since starting Creon and currently bowels are well-controlled.    She has been taking pantoprazole for gastritis and duodenitis, she does take NSAIDs regularly for chronic pain.     Result Review :       CT Abdomen Pelvis With Contrast (05/30/2024 05:36)    Tissue Pathology Exam (10/07/2024 11:08)    Upper GI Endoscopy (10/07/2024 10:52)    Colonoscopy (10/07/2024 10:52)    Office Visit with Allison Lopez APRN (06/07/2024)    Vital Signs:   /80   Pulse 89   Temp 96.1 °F (35.6 °C)   Ht 180.3 cm (70.98\")   Wt 85.2 kg (187 lb 14.4 oz)   SpO2 99%   BMI 26.22 kg/m²     Body mass index is 26.22 kg/m².     Physical Exam  Vitals reviewed.   Constitutional:       General: She is not in acute distress.     Appearance: She is well-developed.   HENT:      Head: Normocephalic and atraumatic.   Pulmonary:      Effort: Pulmonary effort is normal. No respiratory distress.   Abdominal:      General: Abdomen is flat. Bowel sounds are normal. There is no distension.      Palpations: Abdomen is " soft.      Tenderness: There is no abdominal tenderness.   Skin:     General: Skin is dry.      Coloration: Skin is not pale.   Neurological:      Mental Status: She is alert and oriented to person, place, and time.   Psychiatric:         Thought Content: Thought content normal.           Assessment and Plan    Diagnoses and all orders for this visit:    1. Gastritis without bleeding, unspecified chronicity, unspecified gastritis type (Primary)    2. Duodenitis    3. Exocrine pancreatic insufficiency         Discussion  Patient presents today for follow-up after EGD and colonoscopy.  EGD with evidence of gastritis and duodenitis, likely secondary to NSAIDs.  Recommended limiting NSAIDs and continue pantoprazole to allow for healing.  Colonoscopy with diverticulosis for which high-fiber diet is recommended.    Diarrhea currently well-controlled with Creon.  Patient was diarrhea did not begin since her previous cholecystectomy.  She feels she is starting to adjust somewhat following this and questions if it would be okay to discontinue the Creon.  Discussed with her today if symptoms remain well-controlled it would be okay to try to discontinue Creon.  If diarrhea worsened despite Creon, could consider addition of bile acid sequestrant.    Regarding concerns about dysphagia, no structural abnormality seen on recent EGD.  Suspect this is likely motility related.  If symptoms worsen, could consider esophageal manometry.  Currently this is stable and mild.          Follow Up   Return in about 6 months (around 5/7/2025).    Patient Instructions   For gastritis and duodenitis, continue pantoprazole daily as prescribed.    Limit NSAIDs as much as possible.    For exocrine pancreatic insufficiency, continue Creon as prescribed.

## 2024-11-07 NOTE — PATIENT INSTRUCTIONS
For gastritis and duodenitis, continue pantoprazole daily as prescribed.    Limit NSAIDs as much as possible.    For exocrine pancreatic insufficiency, continue Creon as prescribed.

## 2024-11-11 DIAGNOSIS — G47.00 INSOMNIA, UNSPECIFIED TYPE: ICD-10-CM

## 2024-11-12 RX ORDER — TRAZODONE HYDROCHLORIDE 100 MG/1
100 TABLET ORAL NIGHTLY
Qty: 90 TABLET | Refills: 3 | Status: SHIPPED | OUTPATIENT
Start: 2024-11-12

## 2024-11-12 RX ORDER — ZOLPIDEM TARTRATE 12.5 MG/1
12.5 TABLET, FILM COATED, EXTENDED RELEASE ORAL NIGHTLY PRN
Qty: 30 TABLET | Refills: 2 | Status: SHIPPED | OUTPATIENT
Start: 2024-11-12

## 2024-12-31 ENCOUNTER — TELEPHONE (OUTPATIENT)
Dept: INTERNAL MEDICINE | Facility: CLINIC | Age: 69
End: 2024-12-31
Payer: MEDICARE

## 2024-12-31 DIAGNOSIS — M79.7 FIBROMYALGIA: ICD-10-CM

## 2024-12-31 NOTE — TELEPHONE ENCOUNTER
Caller: Maggie Malhotra    Relationship: Self    Best call back number: [phone]     Who are you requesting to speak with (clinical staff, provider,  specific staff member): RAJEEV JON     What was the call regarding: PATIENT STATED HER LYRICA PRESCRIPTION IS DUE FOR REFILL ON  01/04/25 AND PATIENT WOULD LIKE TO DISCUSS HER LYRICA DOSAGE. PLEASE ADVISE.

## 2025-01-03 RX ORDER — PREGABALIN 150 MG/1
150 CAPSULE ORAL 3 TIMES DAILY
Qty: 90 CAPSULE | Refills: 1 | Status: SHIPPED | OUTPATIENT
Start: 2025-01-03

## 2025-01-03 NOTE — TELEPHONE ENCOUNTER
Patient would like to titrate down on Lyrica, she would like the doses to be at 150, 4 times a day, please advise

## 2025-01-24 ENCOUNTER — TELEPHONE (OUTPATIENT)
Dept: INTERNAL MEDICINE | Facility: CLINIC | Age: 70
End: 2025-01-24
Payer: MEDICARE

## 2025-01-24 DIAGNOSIS — M79.7 FIBROMYALGIA: Primary | ICD-10-CM

## 2025-01-24 RX ORDER — HYDROCODONE BITARTRATE AND ACETAMINOPHEN 7.5; 325 MG/1; MG/1
1 TABLET ORAL EVERY 12 HOURS PRN
Qty: 30 TABLET | Refills: 0 | Status: SHIPPED | OUTPATIENT
Start: 2025-01-24

## 2025-02-11 DIAGNOSIS — G47.00 INSOMNIA, UNSPECIFIED TYPE: ICD-10-CM

## 2025-02-11 RX ORDER — ZOLPIDEM TARTRATE 12.5 MG/1
12.5 TABLET, FILM COATED, EXTENDED RELEASE ORAL NIGHTLY PRN
Qty: 30 TABLET | Refills: 2 | Status: SHIPPED | OUTPATIENT
Start: 2025-02-11

## 2025-02-11 NOTE — TELEPHONE ENCOUNTER
Caller: Maggie Malhotra    Relationship: Self    Best call back number: [phone]     Requested Prescriptions:   Requested Prescriptions     Pending Prescriptions Disp Refills    zolpidem CR (AMBIEN CR) 12.5 MG CR tablet 30 tablet 2     Sig: Take 1 tablet by mouth At Night As Needed for Sleep.        Pharmacy where request should be sent: [id number] [place], [place] - [address] [place] [address] AT [facility] - [phone]  - [phone]     Last office visit with prescribing clinician: 9/16/2024   Last telemedicine visit with prescribing clinician: Visit date not found   Next office visit with prescribing clinician: 3/13/2025     Additional details provided by patient:     Does the patient have less than a 3 day supply:  [x] Yes  [] No    Would you like a call back once the refill request has been completed: [] Yes [x] No    If the office needs to give you a call back, can they leave a voicemail: [x] Yes [] No    Prudence Patel Rep   02/11/25 12:56 EST

## 2025-02-14 ENCOUNTER — HOSPITAL ENCOUNTER (OUTPATIENT)
Dept: GENERAL RADIOLOGY | Facility: HOSPITAL | Age: 70
Discharge: HOME | End: 2025-02-14
Payer: MEDICARE

## 2025-02-14 ENCOUNTER — OFFICE VISIT (OUTPATIENT)
Dept: INTERNAL MEDICINE | Facility: CLINIC | Age: 70
End: 2025-02-14
Payer: MEDICARE

## 2025-02-14 VITALS
DIASTOLIC BLOOD PRESSURE: 92 MMHG | WEIGHT: 193.7 LBS | BODY MASS INDEX: 27.12 KG/M2 | SYSTOLIC BLOOD PRESSURE: 138 MMHG | OXYGEN SATURATION: 97 % | HEIGHT: 71 IN | HEART RATE: 75 BPM | TEMPERATURE: 98.4 F

## 2025-02-14 DIAGNOSIS — R23.4 FISSURE IN SKIN OF BOTH HANDS: ICD-10-CM

## 2025-02-14 DIAGNOSIS — L03.031 CELLULITIS OF TOE OF RIGHT FOOT: Primary | ICD-10-CM

## 2025-02-14 DIAGNOSIS — T14.8XXA OPEN WOUND: ICD-10-CM

## 2025-02-14 DIAGNOSIS — L03.031 CELLULITIS OF TOE OF RIGHT FOOT: ICD-10-CM

## 2025-02-14 PROCEDURE — 73660 X-RAY EXAM OF TOE(S): CPT

## 2025-02-14 RX ORDER — DOXYCYCLINE 100 MG/1
100 CAPSULE ORAL 2 TIMES DAILY
Qty: 20 CAPSULE | Refills: 0 | Status: SHIPPED | OUTPATIENT
Start: 2025-02-14

## 2025-02-14 RX ORDER — MOMETASONE FUROATE 1 MG/G
CREAM TOPICAL
COMMUNITY
Start: 2025-01-30

## 2025-02-14 NOTE — PROGRESS NOTES
Subjective   Maggie Malhotra is a 69 y.o. female. Patient is here today for   Chief Complaint   Patient presents with    Nail Problem     Right foot, fourth toe; possible infection    Hand Problem     Bilateral hands splitting/cracking on fingers   .    History of Present Illness   Patient is here today with concerns about dry skin.  She also has an open wound on her right 4th toe. She saw dermatologist on 1/29/25 Dr. Doll; prescribed Bactrim x 7 days for toe.  She has had the pain in her toe since beginning of January. Describes it as a throbbing pain. Uses dermaplast. Won't heal.    She also has dry skin on her hands and splitting cracks on her fingers. Started in November. She has been using Cetaphil soap, mometasone cream twice daily.    The following portions of the patient's history were reviewed and updated as appropriate: allergies, current medications, past family history, past medical history, past social history, past surgical history and problem list.    Review of Systems    Objective   Vitals:    02/14/25 1507   BP: 138/92   Pulse: 75   Temp: 98.4 °F (36.9 °C)   SpO2: 97%     Body mass index is 27.03 kg/m².  Physical Exam  Vitals and nursing note reviewed.   Constitutional:       Appearance: Normal appearance. She is well-developed.   Cardiovascular:      Rate and Rhythm: Normal rate and regular rhythm.      Heart sounds: Normal heart sounds.   Pulmonary:      Effort: Pulmonary effort is normal.      Breath sounds: Normal breath sounds.   Skin:     General: Skin is warm and dry.             Comments: Multiple fissures on fingers of both hands; dry skin   Neurological:      Mental Status: She is alert and oriented to person, place, and time.   Psychiatric:         Speech: Speech normal.         Behavior: Behavior normal.         Thought Content: Thought content normal.         Assessment & Plan   Diagnoses and all orders for this visit:    1. Cellulitis of toe of right foot (Primary)  -      doxycycline (MONODOX) 100 MG capsule; Take 1 capsule by mouth 2 (Two) Times a Day.  Dispense: 20 capsule; Refill: 0  -     Ambulatory Referral to Wound Clinic  -     XR toe 2+ vw right; Future    2. Open wound  -     Ambulatory Referral to Wound Clinic    3. Fissure in skin of both hands    Cellulitis of right toe - will treat with doxycycline. Will check an x-ray to rule out osteomyelitis. Will refer to wound clinic.    Fissure in skin of both hands - use eucerin cream. May need to follow up with dermatology. Will get wound care referral

## 2025-02-14 NOTE — PATIENT INSTRUCTIONS
Send message next month for refill on Ambien. Will send in 60 day script since you are going out of town.    Try Eucerin cream on hands twice daily

## 2025-02-27 ENCOUNTER — OFFICE VISIT (OUTPATIENT)
Dept: WOUND CARE | Facility: HOSPITAL | Age: 70
End: 2025-02-27
Payer: MEDICARE

## 2025-02-27 PROCEDURE — 97602 WOUND(S) CARE NON-SELECTIVE: CPT

## 2025-02-27 PROCEDURE — G0463 HOSPITAL OUTPT CLINIC VISIT: HCPCS

## 2025-03-03 DIAGNOSIS — Z00.00 ENCOUNTER FOR SUBSEQUENT ANNUAL WELLNESS VISIT (AWV) IN MEDICARE PATIENT: Primary | ICD-10-CM

## 2025-03-03 DIAGNOSIS — E78.5 HYPERLIPIDEMIA, UNSPECIFIED HYPERLIPIDEMIA TYPE: ICD-10-CM

## 2025-03-03 DIAGNOSIS — I10 HYPERTENSION, UNSPECIFIED TYPE: ICD-10-CM

## 2025-03-03 DIAGNOSIS — R73.03 PREDIABETES: ICD-10-CM

## 2025-03-06 DIAGNOSIS — M79.7 FIBROMYALGIA: ICD-10-CM

## 2025-03-06 LAB
ALBUMIN SERPL-MCNC: 4.1 G/DL (ref 3.5–5.2)
ALBUMIN/GLOB SERPL: 1.6 G/DL
ALP SERPL-CCNC: 69 U/L (ref 39–117)
ALT SERPL-CCNC: 22 U/L (ref 1–33)
AST SERPL-CCNC: 27 U/L (ref 1–32)
BASOPHILS # BLD AUTO: 0.06 10*3/MM3 (ref 0–0.2)
BASOPHILS NFR BLD AUTO: 1.2 % (ref 0–1.5)
BILIRUB SERPL-MCNC: 0.5 MG/DL (ref 0–1.2)
BUN SERPL-MCNC: 10 MG/DL (ref 8–23)
BUN/CREAT SERPL: 13.2 (ref 7–25)
CALCIUM SERPL-MCNC: 9.5 MG/DL (ref 8.6–10.5)
CHLORIDE SERPL-SCNC: 95 MMOL/L (ref 98–107)
CHOLEST SERPL-MCNC: 156 MG/DL (ref 0–200)
CHOLEST/HDLC SERPL: 2 {RATIO}
CO2 SERPL-SCNC: 27.1 MMOL/L (ref 22–29)
CREAT SERPL-MCNC: 0.76 MG/DL (ref 0.57–1)
EGFRCR SERPLBLD CKD-EPI 2021: 85.5 ML/MIN/1.73
EOSINOPHIL # BLD AUTO: 0.26 10*3/MM3 (ref 0–0.4)
EOSINOPHIL NFR BLD AUTO: 5.1 % (ref 0.3–6.2)
ERYTHROCYTE [DISTWIDTH] IN BLOOD BY AUTOMATED COUNT: 12.4 % (ref 12.3–15.4)
GLOBULIN SER CALC-MCNC: 2.6 GM/DL
GLUCOSE SERPL-MCNC: 99 MG/DL (ref 65–99)
HBA1C MFR BLD: 5.3 % (ref 4.8–5.6)
HCT VFR BLD AUTO: 41.5 % (ref 34–46.6)
HDLC SERPL-MCNC: 78 MG/DL (ref 40–60)
HGB BLD-MCNC: 14.1 G/DL (ref 12–15.9)
IMM GRANULOCYTES # BLD AUTO: 0.01 10*3/MM3 (ref 0–0.05)
IMM GRANULOCYTES NFR BLD AUTO: 0.2 % (ref 0–0.5)
LDLC SERPL CALC-MCNC: 60 MG/DL (ref 0–100)
LYMPHOCYTES # BLD AUTO: 1.98 10*3/MM3 (ref 0.7–3.1)
LYMPHOCYTES NFR BLD AUTO: 39.1 % (ref 19.6–45.3)
MCH RBC QN AUTO: 34.8 PG (ref 26.6–33)
MCHC RBC AUTO-ENTMCNC: 34 G/DL (ref 31.5–35.7)
MCV RBC AUTO: 102.5 FL (ref 79–97)
MONOCYTES # BLD AUTO: 0.59 10*3/MM3 (ref 0.1–0.9)
MONOCYTES NFR BLD AUTO: 11.7 % (ref 5–12)
NEUTROPHILS # BLD AUTO: 2.16 10*3/MM3 (ref 1.7–7)
NEUTROPHILS NFR BLD AUTO: 42.7 % (ref 42.7–76)
NRBC BLD AUTO-RTO: 0 /100 WBC (ref 0–0.2)
PLATELET # BLD AUTO: 287 10*3/MM3 (ref 140–450)
POTASSIUM SERPL-SCNC: 4.6 MMOL/L (ref 3.5–5.2)
PROT SERPL-MCNC: 6.7 G/DL (ref 6–8.5)
RBC # BLD AUTO: 4.05 10*6/MM3 (ref 3.77–5.28)
SODIUM SERPL-SCNC: 133 MMOL/L (ref 136–145)
TRIGL SERPL-MCNC: 101 MG/DL (ref 0–150)
VLDLC SERPL CALC-MCNC: 18 MG/DL (ref 5–40)
WBC # BLD AUTO: 5.06 10*3/MM3 (ref 3.4–10.8)

## 2025-03-06 RX ORDER — PREGABALIN 150 MG/1
150 CAPSULE ORAL 3 TIMES DAILY
Qty: 90 CAPSULE | Refills: 1 | Status: SHIPPED | OUTPATIENT
Start: 2025-03-06

## 2025-03-10 ENCOUNTER — OFFICE VISIT (OUTPATIENT)
Dept: WOUND CARE | Facility: HOSPITAL | Age: 70
End: 2025-03-10
Payer: MEDICARE

## 2025-03-13 ENCOUNTER — OFFICE VISIT (OUTPATIENT)
Dept: INTERNAL MEDICINE | Facility: CLINIC | Age: 70
End: 2025-03-13
Payer: MEDICARE

## 2025-03-13 VITALS
HEART RATE: 89 BPM | BODY MASS INDEX: 27.19 KG/M2 | TEMPERATURE: 98.1 F | SYSTOLIC BLOOD PRESSURE: 142 MMHG | DIASTOLIC BLOOD PRESSURE: 82 MMHG | OXYGEN SATURATION: 96 % | WEIGHT: 194.2 LBS | HEIGHT: 71 IN

## 2025-03-13 DIAGNOSIS — R25.1 TREMORS OF NERVOUS SYSTEM: ICD-10-CM

## 2025-03-13 DIAGNOSIS — Z00.00 ENCOUNTER FOR SUBSEQUENT ANNUAL WELLNESS VISIT (AWV) IN MEDICARE PATIENT: Primary | ICD-10-CM

## 2025-03-13 DIAGNOSIS — R45.1 RESTLESSNESS: ICD-10-CM

## 2025-03-13 DIAGNOSIS — E66.3 OVERWEIGHT (BMI 25.0-29.9): ICD-10-CM

## 2025-03-13 RX ORDER — MUPIROCIN 20 MG/G
OINTMENT TOPICAL
COMMUNITY
Start: 2025-02-27

## 2025-03-13 RX ORDER — PHENTERMINE AND TOPIRAMATE 3.75; 23 MG/1; MG/1
1 CAPSULE, EXTENDED RELEASE ORAL DAILY
Qty: 30 CAPSULE | Refills: 0 | Status: SHIPPED | OUTPATIENT
Start: 2025-03-13

## 2025-03-13 RX ORDER — ROPINIROLE 0.25 MG/1
0.25 TABLET, FILM COATED ORAL NIGHTLY
Qty: 30 TABLET | Refills: 1 | Status: SHIPPED | OUTPATIENT
Start: 2025-03-13

## 2025-03-13 NOTE — PROGRESS NOTES
Subjective   The ABCs of the Annual Wellness Visit  Medicare Wellness Visit      Maggie Malhotra is a 69 y.o. patient who presents for a Medicare Wellness Visit.    The following portions of the patient's history were reviewed and   updated as appropriate: allergies, current medications, past family history, past medical history, past social history, past surgical history, and problem list.    Compared to one year ago, the patient's physical   health is worse.  Compared to one year ago, the patient's mental   health is the same. frustrated    Recent Hospitalizations:  She was not admitted to the hospital during the last year.     Current Medical Providers:  Patient Care Team:  Lydia Major APRN as PCP - General (Family Medicine)  Gurmeet Justice MD as Consulting Physician (Gastroenterology)  Lanre Solis MD as Consulting Physician (Allergy)  Olegario Serrano MD as Consulting Physician (Urology)  Kenney Reyes MD (Inactive) as Consulting Physician (Neurology)  Tong Hayes MD as Consulting Physician (Neurology)  Adriana Maravilla MD as Consulting Physician (Cardiology)  Jessica Rendon MD as Consulting Physician (Obstetrics and Gynecology)  Xi Spann MD as Consulting Physician (Dermatology)  Dianna Garnica MD as Consulting Physician (Orthopedic Surgery)  Mone Mcpherson MD (Neurology)  Nancy Bai APRN as Nurse Practitioner (Nurse Practitioner)  Allison Lopez APRN as Nurse Practitioner (Gastroenterology)  Ana Yeh APRN as Nurse Practitioner (Obstetrics and Gynecology)    Outpatient Medications Prior to Visit   Medication Sig Dispense Refill    carvedilol CR (Coreg CR) 40 MG 24 hr capsule Take 1 capsule by mouth Daily. 90 capsule 3    Creon 31585-213881 units capsule delayed-release particles capsule Take 2 capsules with meals and 1 capsule with snacks. 720 capsule 2    dicyclomine (BENTYL) 10 MG capsule Take 1 capsule by mouth 3 (Three)  Times a Day As Needed (abdominal pain/diarrhea). 90 capsule 5    HYDROcodone-acetaminophen (NORCO) 7.5-325 MG per tablet Take 1 tablet by mouth Every 12 (Twelve) Hours As Needed for Moderate Pain. 30 tablet 0    multivitamin (DAILY ES) tablet tablet Take 1 tablet by mouth Daily.      mupirocin (BACTROBAN) 2 % ointment       pantoprazole (PROTONIX) 40 MG EC tablet Take 1 tablet by mouth Daily. 30 tablet 5    pregabalin (LYRICA) 150 MG capsule Take 1 capsule by mouth 3 (Three) Times a Day. 90 capsule 1    traZODone (DESYREL) 100 MG tablet Take 1 tablet by mouth Every Night. 90 tablet 3    zolpidem CR (AMBIEN CR) 12.5 MG CR tablet Take 1 tablet by mouth At Night As Needed for Sleep. 30 tablet 2    PROGESTERONE PO Take 400 mg by mouth Daily. (Patient not taking: Reported on 3/13/2025)      clobetasol (TEMOVATE) 0.05 % ointment Apply 1 Application topically to the appropriate area as directed 2 (Two) Times a Day. (Patient not taking: Reported on 3/13/2025) 60 g 1    doxycycline (MONODOX) 100 MG capsule Take 1 capsule by mouth 2 (Two) Times a Day. (Patient not taking: Reported on 3/13/2025) 20 capsule 0    econazole nitrate (SPECTAZOLE) 1 % cream  (Patient not taking: Reported on 3/13/2025)      estradiol (ESTRACE) 1 MG tablet Take 1 tablet by mouth Daily. (Patient not taking: Reported on 3/13/2025)      mometasone (ELOCON) 0.1 % cream  (Patient not taking: Reported on 3/13/2025)      nystatin-triamcinolone (MYCOLOG) 422260-9.1 UNIT/GM-% ointment  (Patient not taking: Reported on 3/13/2025)       No facility-administered medications prior to visit.     Opioid medication/s are on active medication list.  and I have evaluated her active treatment plan and pain score trends (see table).  Vitals:    03/13/25 1307   PainSc: 2      I have reviewed the chart for potential of high risk medication and harmful drug interactions in the elderly.        Aspirin is not on active medication list.  Aspirin use is not indicated based on  "review of current medical condition/s. Risk of harm outweighs potential benefits.  .    Patient Active Problem List   Diagnosis    Fibromyalgia    Irritable bowel syndrome with diarrhea    Vitamin D deficiency    Insomnia    Allergic rhinitis    Fibromyalgia, primary    Essential hypertension    Anxiety    Weakness    Increased MCV    Guillain Barré syndrome    Monopolar depression    Transaminitis    Dyspepsia    Diarrhea    Gastroesophageal reflux disease    Arthritis of right hip    Right hip pain    Right thigh pain    Bilious vomiting with nausea    Generalized abdominal pain    Gallstones    Sinus tachycardia    Abnormal liver CT    Esophageal dysphagia    History of Guillain-Worthington syndrome    Impairment of balance    Cervical spondylosis without myelopathy    Neuropathic pain    Polyneuropathy, unspecified    Alcohol abuse    Elevated liver enzymes    Fecal urgency    Personal history of colonic polyps    Encounter for screening colonoscopy    History of gastric ulcer    Exocrine pancreatic insufficiency    Alcohol intoxication     Advance Care Planning Advance Directive is not on file.  ACP discussion was held with the patient during this visit. Patient has an advance directive (not in EMR), copy requested.            Objective   Vitals:    03/13/25 1307   BP: 142/82   BP Location: Left arm   Patient Position: Sitting   Pulse: 89   Temp: 98.1 °F (36.7 °C)   TempSrc: Oral   SpO2: 96%   Weight: 88.1 kg (194 lb 3.2 oz)   Height: 180.3 cm (70.98\")   PainSc: 2        Estimated body mass index is 27.1 kg/m² as calculated from the following:    Height as of this encounter: 180.3 cm (70.98\").    Weight as of this encounter: 88.1 kg (194 lb 3.2 oz).    BMI is >= 25 and <30. (Overweight) The following options were offered after discussion;: nutrition counseling/recommendations           Does the patient have evidence of cognitive impairment? Yes  Lab Results   Component Value Date    CHLPL 156 03/05/2025    TRIG 101 " 03/05/2025    HDL 78 (H) 03/05/2025    LDL 60 03/05/2025    VLDL 18 03/05/2025    HGBA1C 5.30 03/05/2025                                                                                                Health  Risk Assessment    Smoking Status:  Social History     Tobacco Use   Smoking Status Never    Passive exposure: Never   Smokeless Tobacco Never   Tobacco Comments    occasional caffiene     Alcohol Consumption:  Social History     Substance and Sexual Activity   Alcohol Use Yes    Alcohol/week: 3.0 standard drinks of alcohol    Types: 3 Cans of beer per week    Comment: Moderate       Fall Risk Screen  STEADI Fall Risk Assessment was completed, and patient is at LOW risk for falls.Assessment completed on:3/13/2025    Depression Screening   Little interest or pleasure in doing things? Several days   Feeling down, depressed, or hopeless? Not at all   PHQ-2 Total Score 1      Health Habits and Functional and Cognitive Screening:      3/13/2025    12:00 PM   Functional & Cognitive Status   Do you have difficulty preparing food and eating? No   Do you have difficulty bathing yourself, getting dressed or grooming yourself? No   Do you have difficulty using the toilet? No   Do you have difficulty moving around from place to place? No   Do you have trouble with steps or getting out of a bed or a chair? No   Current Diet Well Balanced Diet   Dental Exam Up to date   Eye Exam Up to date   Exercise (times per week) 1 times per week   Current Exercises Include House Cleaning   Do you need help using the phone?  No   Are you deaf or do you have serious difficulty hearing?  No   Do you need help to go to places out of walking distance? No   Do you need help shopping? No   Do you need help preparing meals?  No   Do you need help with housework?  No   Do you need help with laundry? No   Do you need help taking your medications? No   Do you need help managing money? No   Do you ever drive or ride in a car without wearing a seat  belt? No   Have you felt unusual stress, anger or loneliness in the last month? No   Who do you live with? Spouse   If you need help, do you have trouble finding someone available to you? No   Have you been bothered in the last four weeks by sexual problems? No   Do you have difficulty concentrating, remembering or making decisions? Yes           Age-appropriate Screening Schedule:  Refer to the list below for future screening recommendations based on patient's age, sex and/or medical conditions. Orders for these recommended tests are listed in the plan section. The patient has been provided with a written plan.    Health Maintenance List  Health Maintenance   Topic Date Due    COVID-19 Vaccine (4 - 2024-25 season) 06/02/2025 (Originally 9/1/2024)    GASTROSCOPY (EGD)  10/07/2025    ANNUAL WELLNESS VISIT  03/13/2026    BMI FOLLOWUP  03/13/2026    MAMMOGRAM  10/08/2026    DXA SCAN  10/08/2026    PAP SMEAR  08/13/2027    COLORECTAL CANCER SCREENING  10/07/2029    HEPATITIS C SCREENING  Completed    Pneumococcal Vaccine 50+  Completed    TDAP/TD VACCINES  Discontinued    ZOSTER VACCINE  Discontinued                                                                                                                                                CMS Preventative Services Quick Reference  Risk Factors Identified During Encounter  Chronic Pain: Follow up in 6 months.  Depression/Dysphoria: Current medication is effective, no change recommended  Polypharmacy: Medication List reviewed    The above risks/problems have been discussed with the patient.  Pertinent information has been shared with the patient in the After Visit Summary.  An After Visit Summary and PPPS were made available to the patient.    Follow Up:   Next Medicare Wellness visit to be scheduled in 1 year.         Additional E&M Note during same encounter follows:  Patient has additional, significant, and separately identifiable condition(s)/problem(s) that require  "work above and beyond the Medicare Wellness Visit     Chief Complaint  Extremity Weakness (Neuropathy in legs/shaky) and Medicare Wellness-subsequent    Subjective   HPI  Maggie is also being seen today for additional medical problem/s.    C/o not being able to sit still. She has had this for awhile, but it seems to be getting worse. She has tremors in her hands. She is wanting a referral to neuro.     Patient is here to follow up on insomnia. She is doing okay on ambien and trazodone. She will need to refill ambien early due to going out of town.     Patient has gained weight and she is having trouble losing weight. She doesn't exercise due to her pain. States that she doesn't overeat.               Objective   Vital Signs:  /82 (BP Location: Left arm, Patient Position: Sitting)   Pulse 89   Temp 98.1 °F (36.7 °C) (Oral)   Ht 180.3 cm (70.98\")   Wt 88.1 kg (194 lb 3.2 oz)   SpO2 96%   BMI 27.10 kg/m²   Physical Exam  Vitals and nursing note reviewed.   Constitutional:       Appearance: Normal appearance. She is well-developed.   Cardiovascular:      Rate and Rhythm: Normal rate and regular rhythm.      Heart sounds: Normal heart sounds.   Pulmonary:      Effort: Pulmonary effort is normal.      Breath sounds: Normal breath sounds.   Skin:     General: Skin is warm and dry.   Neurological:      Mental Status: She is alert and oriented to person, place, and time.   Psychiatric:         Speech: Speech normal.         Behavior: Behavior normal.         Thought Content: Thought content normal.         The following data was reviewed by: RAJEEV Calix on 03/13/2025:    Common labs          7/3/2024    17:32 9/9/2024    14:05 3/5/2025    10:18   Common Labs   Glucose 64  107  99    BUN 10  9  10    Creatinine 0.56  0.69  0.76    Sodium 125  133  133    Potassium 4.6  4.5  4.6    Chloride 89  96  95    Calcium 8.7  9.6  9.5    Albumin 4.7  4.2  4.1    Total Bilirubin 0.5  0.4  0.5    Alkaline Phosphatase " 89  64  69    AST (SGOT) 68  23  27    ALT (SGPT) 36  24  22    WBC 6.91  6.22  5.06    Hemoglobin 14.1  14.0  14.1    Hematocrit 40.7  42.5  41.5    Platelets 230  256  287    Total Cholesterol  125  156    Triglycerides  67  101    HDL Cholesterol  66  78    LDL Cholesterol   45  60    Hemoglobin A1C  5.10  5.30           Assessment and Plan      Encounter for subsequent annual wellness visit (AWV) in Medicare patient         Tremors of nervous system    Orders:    Ambulatory Referral to Neurology    Restlessness    Orders:    rOPINIRole (REQUIP) 0.25 MG tablet; Take 1 tablet by mouth Every Night. Take 1 hour before bedtime. Can increase to 2 tablets after 2 weeks.    Overweight (BMI 25.0-29.9)  Patient's (Body mass index is 27.1 kg/m².) indicates that they are overweight with health conditions that include GERD . Weight is worsening. BMI is above average; BMI management plan is completed. We discussed portion control, increasing exercise, and pharmacologic options including Qsymia .     Orders:    Phentermine-Topiramate (Qsymia) 3.75-23 MG capsule sustained-release 24 hr; Take 1 capsule by mouth Daily.  Will try Qsymia.  Lyrica has a side effect of weight gain, but that is helping with her chronic pain, so do not want a make any changes to that at this time          Follow Up   No follow-ups on file.  Patient was given instructions and counseling regarding her condition or for health maintenance advice. Please see specific information pulled into the AVS if appropriate.

## 2025-03-14 ENCOUNTER — PRIOR AUTHORIZATION (OUTPATIENT)
Dept: INTERNAL MEDICINE | Facility: CLINIC | Age: 70
End: 2025-03-14
Payer: MEDICARE

## 2025-03-20 ENCOUNTER — OFFICE VISIT (OUTPATIENT)
Dept: WOUND CARE | Facility: HOSPITAL | Age: 70
End: 2025-03-20
Payer: MEDICARE

## 2025-03-25 ENCOUNTER — TELEPHONE (OUTPATIENT)
Dept: INTERNAL MEDICINE | Facility: CLINIC | Age: 70
End: 2025-03-25
Payer: MEDICARE

## 2025-03-25 NOTE — TELEPHONE ENCOUNTER
Caller: Maggie Malhotra    Relationship: Self    Best call back number: [phone]     Which medication are you concerned about: Phentermine-Topiramate (Qsymia) 3.75-23 MG capsule sustained-release 24 hr DIET AID    Who prescribed you this medication: FREDDIE JON    What are your concerns: PATIENT STATED SHE HAS NOT PICKED UP THIS MEDICATION YET.    PATIENT STATED SHE HAS TROUBLE SLEEPING, AND THE PHARMACIST SAID SOMETHING ABOUT THIS MEDICATION BEING A STIMULANT.    PATIENT STATED SHE IS LEAVING TOWN SOON AND WOULD LIKE TO WAIT UNTIL SHE IS BACK TO TRY THIS MEDICATION.    PATIENT SATED SHE WILL ALSO BE HAVING CATARACT SURGERY ONCE SHE IS BACK, AND WOULD LIKE TO KNOW IF THIS WOULD INTERFERE WITH THE SURGERY.    PLEASE CALL PATIENT TO DISCUSS AND ADVISE

## 2025-03-27 ENCOUNTER — OFFICE VISIT (OUTPATIENT)
Dept: CARDIOLOGY | Facility: CLINIC | Age: 70
End: 2025-03-27
Payer: MEDICARE

## 2025-03-27 VITALS
HEART RATE: 82 BPM | SYSTOLIC BLOOD PRESSURE: 136 MMHG | BODY MASS INDEX: 27.77 KG/M2 | WEIGHT: 194 LBS | DIASTOLIC BLOOD PRESSURE: 80 MMHG | OXYGEN SATURATION: 98 % | RESPIRATION RATE: 16 BRPM | HEIGHT: 70 IN

## 2025-03-27 DIAGNOSIS — R00.0 SINUS TACHYCARDIA: ICD-10-CM

## 2025-03-27 DIAGNOSIS — I10 ESSENTIAL HYPERTENSION: Primary | ICD-10-CM

## 2025-03-27 DIAGNOSIS — Z86.69 HISTORY OF GUILLAIN-BARRE SYNDROME: ICD-10-CM

## 2025-03-27 PROCEDURE — 1160F RVW MEDS BY RX/DR IN RCRD: CPT | Performed by: INTERNAL MEDICINE

## 2025-03-27 PROCEDURE — 93000 ELECTROCARDIOGRAM COMPLETE: CPT | Performed by: INTERNAL MEDICINE

## 2025-03-27 PROCEDURE — 3075F SYST BP GE 130 - 139MM HG: CPT | Performed by: INTERNAL MEDICINE

## 2025-03-27 PROCEDURE — 1159F MED LIST DOCD IN RCRD: CPT | Performed by: INTERNAL MEDICINE

## 2025-03-27 PROCEDURE — 99213 OFFICE O/P EST LOW 20 MIN: CPT | Performed by: INTERNAL MEDICINE

## 2025-03-27 PROCEDURE — 3079F DIAST BP 80-89 MM HG: CPT | Performed by: INTERNAL MEDICINE

## 2025-03-27 RX ORDER — CARVEDILOL PHOSPHATE 40 MG/1
40 CAPSULE, EXTENDED RELEASE ORAL DAILY
Qty: 90 CAPSULE | Refills: 3 | Status: SHIPPED | OUTPATIENT
Start: 2025-03-27

## 2025-03-27 NOTE — PROGRESS NOTES
"      CARDIOLOGY    Adriana Maravilla MD    ENCOUNTER DATE:  03/27/2025    Maggie Malhotra / 69 y.o. / female        CHIEF COMPLAINT / REASON FOR OFFICE VISIT     Follow-up (Yearly follow up/Sinus tachycardia leg edema/)      HISTORY OF PRESENT ILLNESS       HPI    Maggie Malhotra is a 69 y.o. 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 She has no known heart disease. She had a stress echo in 2006 which showed normal LV systolic function without significant valvular heart disease, and she had no evidence of ischemia. She had an exercise stress test in 2012 which was normal. In 09/2018, she was at work. She had just eaten breakfast. She had sudden onset of feeling unwell. She was in a cold sweat. Co-workers said she was really pale. She was dizzy. She felt a little short of breath but did not have any palpitations. She went to the emergency room where her troponin was negative. Basic metabolic panel showed a blood sugar of 140 but was otherwise pretty unremarkable, and CBC was unremarkable. D-dimer was mildly elevated. She was discharged with a Zio patch which was normal. She had carotid artery ultrasound which was also normal.  In December 2018 she had an echocardiogram which showed normal LV systolic function with ejection fraction of 67% and grade 1 diastolic dysfunction.     I saw her in 2021 and 2022 for hypertension.  In July 2022 she had a normal lower extremity venous Doppler.  In November 2022 she had an echocardiogram showing normal LV function, normal diastolic function and no valve disease.    She is stable.  She has been having a lot of issues that are neurological with shaking, worsening neuropathy.  No chest pain, shortness of breath or palpitations.    VITAL SIGNS     Visit Vitals  /80   Pulse 82   Resp 16   Ht 177.8 cm (70\")   Wt 88 kg (194 lb)   LMP 01/01/2006 (LMP Unknown)   SpO2 98% "   BMI 27.84 kg/m²         Wt Readings from Last 3 Encounters:   03/27/25 88 kg (194 lb)   03/13/25 88.1 kg (194 lb 3.2 oz)   02/14/25 87.9 kg (193 lb 11.2 oz)     Body mass index is 27.84 kg/m².      PHYSICAL EXAMINATION     Constitutional:       General: Not in acute distress.  Neck:      Vascular: No carotid bruit or JVD.   Pulmonary:      Effort: Pulmonary effort is normal.      Breath sounds: Normal breath sounds.   Cardiovascular:      Normal rate. Regular rhythm.      Murmurs: There is no murmur.   Psychiatric:         Mood and Affect: Mood and affect normal.           REVIEWED DATA       ECG 12 Lead    Date/Time: 3/27/2025 2:07 PM  Performed by: Adriana Maravilla MD    Authorized by: Adriana Maravilla MD  Comparison: compared with previous ECG from 7/3/2024  Similar to previous ECG  Rhythm: sinus rhythm  BPM: 82  Conduction: conduction normal  ST Segments: ST segments normal  T Waves: T waves normal    Clinical impression: normal ECG            Lipid Panel          9/9/2024    14:05 3/5/2025    10:18   Lipid Panel   Total Cholesterol 125  156    Triglycerides 67  101    HDL Cholesterol 66  78    VLDL Cholesterol 14  18    LDL Cholesterol  45  60    LDL/HDL Ratio 0.69         Lab Results   Component Value Date    GLUCOSE 99 03/05/2025    BUN 10 03/05/2025    CREATININE 0.76 03/05/2025     (L) 03/05/2025    K 4.6 03/05/2025    CL 95 (L) 03/05/2025    CALCIUM 9.5 03/05/2025    PROTEINTOT 6.7 03/05/2025    ALBUMIN 4.1 03/05/2025    ALT 22 03/05/2025    AST 27 03/05/2025    ALKPHOS 69 03/05/2025    BILITOT 0.5 03/05/2025    GLOB 2.6 03/05/2025    AGRATIO 1.6 03/05/2025    BCR 13.2 03/05/2025    ANIONGAP 18.8 (H) 07/03/2024    EGFR 85.5 03/05/2025       ASSESSMENT & PLAN      Diagnosis Plan   1. Essential hypertension        2. Sinus tachycardia        3. History of Guillain-Kirby syndrome            1.  Hypertension.  She thinks edema correlated with changing from carvedilol to Bystolic.  She is now on Coreg  CR 40 mg a day and she is done really well with it for a while.  2.  History of sinus tachycardia.  Normal TSH in April 2021.  3.  Balance problems.   4.  Anxiety/depression.   5.  Edema.  Normal lower extremity Doppler March 2022.  Normal echo in November 2022.     Follow-up in a year.  Medications refilled.      Orders Placed This Encounter   Procedures    ECG 12 Lead     This order was created via procedure documentation     Release to patient:   Routine Release [[id number]]           MEDICATIONS         Discharge Medications            Accurate as of March 27, 2025  2:08 PM. If you have any questions, ask your nurse or doctor.                Continue These Medications        Instructions Start Date   carvedilol CR 40 MG 24 hr capsule  Commonly known as: Coreg CR   40 mg, Oral, Daily      Creon [id number] units capsule delayed-release particles capsule  Generic drug: Pancrelipase (Lip-Prot-Amyl)   Take 2 capsules with meals and 1 capsule with snacks.      dicyclomine 10 MG capsule  Commonly known as: BENTYL   10 mg, Oral, 3 Times Daily PRN      HYDROcodone-acetaminophen 7.5-325 MG per tablet  Commonly known as: NORCO   1 tablet, Oral, Every 12 Hours PRN      multivitamin tablet tablet  Generic drug: multivitamin   1 tablet, Daily      mupirocin 2 % ointment  Commonly known as: BACTROBAN       pantoprazole 40 MG EC tablet  Commonly known as: PROTONIX   40 mg, Oral, Daily      pregabalin 150 MG capsule  Commonly known as: LYRICA   150 mg, Oral, 3 Times Daily      PROGESTERONE PO   400 mg, Daily      Qsymia 3.75-23 MG capsule sustained-release 24 hr  Generic drug: Phentermine-Topiramate   1 capsule, Oral, Daily      rOPINIRole 0.25 MG tablet  Commonly known as: REQUIP   0.25 mg, Oral, Nightly, Take 1 hour before bedtime. Can increase to 2 tablets after 2 weeks.      traZODone 100 MG tablet  Commonly known as: DESYREL   100 mg, Oral, Nightly      zolpidem CR 12.5 MG CR tablet  Commonly known as: AMBIEN CR   12.5  mg, Oral, Nightly PRN                 Adriana Maravilla MD  03/27/25  14:08 EDT    Part of this note may be an electronic transcription/translation of spoken language to printed text using the Dragon dictation system.

## 2025-03-31 ENCOUNTER — OFFICE VISIT (OUTPATIENT)
Dept: WOUND CARE | Facility: HOSPITAL | Age: 70
End: 2025-03-31
Payer: MEDICARE

## 2025-03-31 PROCEDURE — G0463 HOSPITAL OUTPT CLINIC VISIT: HCPCS

## 2025-04-02 DIAGNOSIS — G47.00 INSOMNIA, UNSPECIFIED TYPE: ICD-10-CM

## 2025-04-03 RX ORDER — ZOLPIDEM TARTRATE 12.5 MG/1
12.5 TABLET, FILM COATED, EXTENDED RELEASE ORAL NIGHTLY PRN
Qty: 30 TABLET | Refills: 0 | Status: SHIPPED | OUTPATIENT
Start: 2025-04-03

## 2025-04-07 RX ORDER — PANCRELIPASE 36000; 180000; 114000 [USP'U]/1; [USP'U]/1; [USP'U]/1
CAPSULE, DELAYED RELEASE PELLETS ORAL
Qty: 900 CAPSULE | Refills: 3 | Status: SHIPPED | OUTPATIENT
Start: 2025-04-07

## 2025-04-16 ENCOUNTER — TELEPHONE (OUTPATIENT)
Dept: INTERNAL MEDICINE | Facility: CLINIC | Age: 70
End: 2025-04-16
Payer: MEDICARE

## 2025-05-05 DIAGNOSIS — G47.00 INSOMNIA, UNSPECIFIED TYPE: ICD-10-CM

## 2025-05-05 RX ORDER — ZOLPIDEM TARTRATE 12.5 MG/1
12.5 TABLET, FILM COATED, EXTENDED RELEASE ORAL NIGHTLY PRN
Qty: 30 TABLET | Refills: 0 | Status: SHIPPED | OUTPATIENT
Start: 2025-05-05

## 2025-05-06 ENCOUNTER — APPOINTMENT (OUTPATIENT)
Dept: GENERAL RADIOLOGY | Facility: HOSPITAL | Age: 70
End: 2025-05-06
Payer: MEDICARE

## 2025-05-06 ENCOUNTER — HOSPITAL ENCOUNTER (INPATIENT)
Facility: HOSPITAL | Age: 70
LOS: 1 days | Discharge: LEFT BEFORE DISCHARGE | End: 2025-05-08
Attending: EMERGENCY MEDICINE | Admitting: INTERNAL MEDICINE
Payer: MEDICARE

## 2025-05-06 DIAGNOSIS — R10.9 ABDOMINAL PAIN, UNSPECIFIED ABDOMINAL LOCATION: ICD-10-CM

## 2025-05-06 DIAGNOSIS — E87.1 HYPONATREMIA: Primary | ICD-10-CM

## 2025-05-06 DIAGNOSIS — R74.8 ELEVATED LIVER ENZYMES: ICD-10-CM

## 2025-05-06 DIAGNOSIS — R82.4 URINE KETONES: ICD-10-CM

## 2025-05-06 DIAGNOSIS — R07.9 CHEST PAIN, UNSPECIFIED TYPE: ICD-10-CM

## 2025-05-06 LAB
BASOPHILS # BLD AUTO: 0.02 10*3/MM3 (ref 0–0.2)
BASOPHILS NFR BLD AUTO: 0.4 % (ref 0–1.5)
DEPRECATED RDW RBC AUTO: 43.1 FL (ref 37–54)
EOSINOPHIL # BLD AUTO: 0.01 10*3/MM3 (ref 0–0.4)
EOSINOPHIL NFR BLD AUTO: 0.2 % (ref 0.3–6.2)
ERYTHROCYTE [DISTWIDTH] IN BLOOD BY AUTOMATED COUNT: 12.2 % (ref 12.3–15.4)
HCT VFR BLD AUTO: 45.7 % (ref 34–46.6)
HGB BLD-MCNC: 16.2 G/DL (ref 12–15.9)
IMM GRANULOCYTES # BLD AUTO: 0.01 10*3/MM3 (ref 0–0.05)
IMM GRANULOCYTES NFR BLD AUTO: 0.2 % (ref 0–0.5)
LYMPHOCYTES # BLD AUTO: 1.64 10*3/MM3 (ref 0.7–3.1)
LYMPHOCYTES NFR BLD AUTO: 30 % (ref 19.6–45.3)
MCH RBC QN AUTO: 33.5 PG (ref 26.6–33)
MCHC RBC AUTO-ENTMCNC: 35.4 G/DL (ref 31.5–35.7)
MCV RBC AUTO: 94.6 FL (ref 79–97)
MONOCYTES # BLD AUTO: 0.44 10*3/MM3 (ref 0.1–0.9)
MONOCYTES NFR BLD AUTO: 8 % (ref 5–12)
NEUTROPHILS NFR BLD AUTO: 3.35 10*3/MM3 (ref 1.7–7)
NEUTROPHILS NFR BLD AUTO: 61.2 % (ref 42.7–76)
PLATELET # BLD AUTO: 182 10*3/MM3 (ref 140–450)
PMV BLD AUTO: 9.2 FL (ref 6–12)
RBC # BLD AUTO: 4.83 10*6/MM3 (ref 3.77–5.28)
WBC NRBC COR # BLD AUTO: 5.47 10*3/MM3 (ref 3.4–10.8)

## 2025-05-06 PROCEDURE — 93005 ELECTROCARDIOGRAM TRACING: CPT | Performed by: EMERGENCY MEDICINE

## 2025-05-06 PROCEDURE — 99284 EMERGENCY DEPT VISIT MOD MDM: CPT | Performed by: EMERGENCY MEDICINE

## 2025-05-06 PROCEDURE — 71045 X-RAY EXAM CHEST 1 VIEW: CPT

## 2025-05-06 PROCEDURE — 25010000002 ONDANSETRON PER 1 MG: Performed by: EMERGENCY MEDICINE

## 2025-05-06 PROCEDURE — 85025 COMPLETE CBC W/AUTO DIFF WBC: CPT | Performed by: EMERGENCY MEDICINE

## 2025-05-06 PROCEDURE — 99285 EMERGENCY DEPT VISIT HI MDM: CPT

## 2025-05-06 RX ORDER — SODIUM CHLORIDE 0.9 % (FLUSH) 0.9 %
10 SYRINGE (ML) INJECTION AS NEEDED
Status: DISCONTINUED | OUTPATIENT
Start: 2025-05-06 | End: 2025-05-08 | Stop reason: HOSPADM

## 2025-05-06 RX ORDER — ONDANSETRON 2 MG/ML
4 INJECTION INTRAMUSCULAR; INTRAVENOUS ONCE
Status: COMPLETED | OUTPATIENT
Start: 2025-05-07 | End: 2025-05-06

## 2025-05-06 RX ORDER — ASPIRIN 325 MG
325 TABLET ORAL ONCE
Status: COMPLETED | OUTPATIENT
Start: 2025-05-06 | End: 2025-05-06

## 2025-05-06 RX ORDER — IPRATROPIUM BROMIDE AND ALBUTEROL SULFATE 2.5; .5 MG/3ML; MG/3ML
3 SOLUTION RESPIRATORY (INHALATION) ONCE
Status: COMPLETED | OUTPATIENT
Start: 2025-05-07 | End: 2025-05-06

## 2025-05-06 RX ADMIN — IPRATROPIUM BROMIDE AND ALBUTEROL SULFATE 3 ML: .5; 3 SOLUTION RESPIRATORY (INHALATION) at 23:43

## 2025-05-06 RX ADMIN — ONDANSETRON 4 MG: 2 INJECTION, SOLUTION INTRAMUSCULAR; INTRAVENOUS at 23:43

## 2025-05-06 RX ADMIN — ALUMINUM HYDROXIDE, MAGNESIUM HYDROXIDE, AND DIMETHICONE: 400; 400; 40 SUSPENSION ORAL at 23:43

## 2025-05-06 RX ADMIN — ASPIRIN 325 MG ORAL TABLET 325 MG: 325 PILL ORAL at 23:38

## 2025-05-06 NOTE — Clinical Note
Level of Care: Critical Care [6]   Admitting Physician: SID BARCLAY [[id number]]   Attending Physician: SID BARCLAY [[id number]]

## 2025-05-07 ENCOUNTER — APPOINTMENT (OUTPATIENT)
Dept: CT IMAGING | Facility: HOSPITAL | Age: 70
End: 2025-05-07
Payer: MEDICARE

## 2025-05-07 PROBLEM — E87.1 HYPONATREMIA: Status: ACTIVE | Noted: 2025-05-07

## 2025-05-07 LAB
ALBUMIN SERPL-MCNC: 4.2 G/DL (ref 3.5–5.2)
ALBUMIN/GLOB SERPL: 1.4 G/DL
ALP SERPL-CCNC: 132 U/L (ref 39–117)
ALT SERPL W P-5'-P-CCNC: 95 U/L (ref 1–33)
ANION GAP SERPL CALCULATED.3IONS-SCNC: 12 MMOL/L (ref 5–15)
ANION GAP SERPL CALCULATED.3IONS-SCNC: 14.4 MMOL/L (ref 5–15)
ANION GAP SERPL CALCULATED.3IONS-SCNC: 14.6 MMOL/L (ref 5–15)
ANION GAP SERPL CALCULATED.3IONS-SCNC: 17 MMOL/L (ref 5–15)
ANION GAP SERPL CALCULATED.3IONS-SCNC: 19 MMOL/L (ref 5–15)
ANION GAP SERPL CALCULATED.3IONS-SCNC: 8 MMOL/L (ref 5–15)
AST SERPL-CCNC: 161 U/L (ref 1–32)
BACTERIA UR QL AUTO: ABNORMAL /HPF
BASOPHILS # BLD AUTO: 0.01 10*3/MM3 (ref 0–0.2)
BASOPHILS # BLD AUTO: 0.01 10*3/MM3 (ref 0–0.2)
BASOPHILS NFR BLD AUTO: 0.2 % (ref 0–1.5)
BASOPHILS NFR BLD AUTO: 0.2 % (ref 0–1.5)
BILIRUB SERPL-MCNC: 1.7 MG/DL (ref 0–1.2)
BILIRUB UR QL STRIP: NEGATIVE
BUN SERPL-MCNC: 10 MG/DL (ref 8–23)
BUN SERPL-MCNC: 10 MG/DL (ref 8–23)
BUN SERPL-MCNC: 7 MG/DL (ref 8–23)
BUN SERPL-MCNC: 9 MG/DL (ref 8–23)
BUN/CREAT SERPL: 11.9 (ref 7–25)
BUN/CREAT SERPL: 12.9 (ref 7–25)
BUN/CREAT SERPL: 13.8 (ref 7–25)
BUN/CREAT SERPL: 14.9 (ref 7–25)
BUN/CREAT SERPL: 15.3 (ref 7–25)
BUN/CREAT SERPL: 15.4 (ref 7–25)
CALCIUM SPEC-SCNC: 7.6 MG/DL (ref 8.6–10.5)
CALCIUM SPEC-SCNC: 7.7 MG/DL (ref 8.6–10.5)
CALCIUM SPEC-SCNC: 7.7 MG/DL (ref 8.6–10.5)
CALCIUM SPEC-SCNC: 8 MG/DL (ref 8.6–10.5)
CALCIUM SPEC-SCNC: 8.2 MG/DL (ref 8.6–10.5)
CALCIUM SPEC-SCNC: 9 MG/DL (ref 8.6–10.5)
CHLORIDE SERPL-SCNC: 82 MMOL/L (ref 98–107)
CHLORIDE SERPL-SCNC: 82 MMOL/L (ref 98–107)
CHLORIDE SERPL-SCNC: 83 MMOL/L (ref 98–107)
CHLORIDE SERPL-SCNC: 87 MMOL/L (ref 98–107)
CHLORIDE SERPL-SCNC: 87 MMOL/L (ref 98–107)
CHLORIDE SERPL-SCNC: 91 MMOL/L (ref 98–107)
CLARITY UR: CLEAR
CO2 SERPL-SCNC: 18 MMOL/L (ref 22–29)
CO2 SERPL-SCNC: 19 MMOL/L (ref 22–29)
CO2 SERPL-SCNC: 20.6 MMOL/L (ref 22–29)
CO2 SERPL-SCNC: 21.4 MMOL/L (ref 22–29)
CO2 SERPL-SCNC: 23 MMOL/L (ref 22–29)
CO2 SERPL-SCNC: 25 MMOL/L (ref 22–29)
COLOR UR: YELLOW
CORTIS SERPL-MCNC: 16.2 MCG/DL
CREAT SERPL-MCNC: 0.59 MG/DL (ref 0.57–1)
CREAT SERPL-MCNC: 0.59 MG/DL (ref 0.57–1)
CREAT SERPL-MCNC: 0.65 MG/DL (ref 0.57–1)
CREAT SERPL-MCNC: 0.65 MG/DL (ref 0.57–1)
CREAT SERPL-MCNC: 0.67 MG/DL (ref 0.57–1)
CREAT SERPL-MCNC: 0.7 MG/DL (ref 0.57–1)
D DIMER PPP FEU-MCNC: 0.57 MCGFEU/ML (ref 0–0.69)
DEPRECATED RDW RBC AUTO: 45.5 FL (ref 37–54)
DEPRECATED RDW RBC AUTO: 46.3 FL (ref 37–54)
EGFRCR SERPLBLD CKD-EPI 2021: 93.8 ML/MIN/1.73
EGFRCR SERPLBLD CKD-EPI 2021: 94.7 ML/MIN/1.73
EGFRCR SERPLBLD CKD-EPI 2021: 95.4 ML/MIN/1.73
EGFRCR SERPLBLD CKD-EPI 2021: 95.4 ML/MIN/1.73
EGFRCR SERPLBLD CKD-EPI 2021: 97.7 ML/MIN/1.73
EGFRCR SERPLBLD CKD-EPI 2021: 97.7 ML/MIN/1.73
EOSINOPHIL # BLD AUTO: 0.01 10*3/MM3 (ref 0–0.4)
EOSINOPHIL # BLD AUTO: 0.02 10*3/MM3 (ref 0–0.4)
EOSINOPHIL NFR BLD AUTO: 0.2 % (ref 0.3–6.2)
EOSINOPHIL NFR BLD AUTO: 0.4 % (ref 0.3–6.2)
ERYTHROCYTE [DISTWIDTH] IN BLOOD BY AUTOMATED COUNT: 12.3 % (ref 12.3–15.4)
ERYTHROCYTE [DISTWIDTH] IN BLOOD BY AUTOMATED COUNT: 12.5 % (ref 12.3–15.4)
ETHANOL BLD-MCNC: <10 MG/DL (ref 0–10)
ETHANOL UR QL: <0.01 %
GEN 5 1HR TROPONIN T REFLEX: 10 NG/L
GLOBULIN UR ELPH-MCNC: 2.9 GM/DL
GLUCOSE BLDC GLUCOMTR-MCNC: 149 MG/DL (ref 70–130)
GLUCOSE SERPL-MCNC: 102 MG/DL (ref 65–99)
GLUCOSE SERPL-MCNC: 104 MG/DL (ref 65–99)
GLUCOSE SERPL-MCNC: 119 MG/DL (ref 65–99)
GLUCOSE SERPL-MCNC: 122 MG/DL (ref 65–99)
GLUCOSE SERPL-MCNC: 93 MG/DL (ref 65–99)
GLUCOSE SERPL-MCNC: 97 MG/DL (ref 65–99)
GLUCOSE UR STRIP-MCNC: NEGATIVE MG/DL
HCT VFR BLD AUTO: 41.9 % (ref 34–46.6)
HCT VFR BLD AUTO: 42.6 % (ref 34–46.6)
HGB BLD-MCNC: 14.3 G/DL (ref 12–15.9)
HGB BLD-MCNC: 14.5 G/DL (ref 12–15.9)
HGB UR QL STRIP.AUTO: ABNORMAL
HOLD SPECIMEN: NORMAL
HYALINE CASTS UR QL AUTO: ABNORMAL /LPF
IMM GRANULOCYTES # BLD AUTO: 0.01 10*3/MM3 (ref 0–0.05)
IMM GRANULOCYTES # BLD AUTO: 0.01 10*3/MM3 (ref 0–0.05)
IMM GRANULOCYTES NFR BLD AUTO: 0.2 % (ref 0–0.5)
IMM GRANULOCYTES NFR BLD AUTO: 0.2 % (ref 0–0.5)
KETONES UR QL STRIP: ABNORMAL
LEUKOCYTE ESTERASE UR QL STRIP.AUTO: ABNORMAL
LIPASE SERPL-CCNC: 25 U/L (ref 13–60)
LYMPHOCYTES # BLD AUTO: 1.44 10*3/MM3 (ref 0.7–3.1)
LYMPHOCYTES # BLD AUTO: 1.47 10*3/MM3 (ref 0.7–3.1)
LYMPHOCYTES NFR BLD AUTO: 32.4 % (ref 19.6–45.3)
LYMPHOCYTES NFR BLD AUTO: 34.9 % (ref 19.6–45.3)
MAGNESIUM SERPL-MCNC: 1.8 MG/DL (ref 1.6–2.4)
MAGNESIUM SERPL-MCNC: 1.8 MG/DL (ref 1.6–2.4)
MCH RBC QN AUTO: 34.3 PG (ref 26.6–33)
MCH RBC QN AUTO: 34.5 PG (ref 26.6–33)
MCHC RBC AUTO-ENTMCNC: 34 G/DL (ref 31.5–35.7)
MCHC RBC AUTO-ENTMCNC: 34.1 G/DL (ref 31.5–35.7)
MCV RBC AUTO: 100.7 FL (ref 79–97)
MCV RBC AUTO: 101 FL (ref 79–97)
MONOCYTES # BLD AUTO: 0.54 10*3/MM3 (ref 0.1–0.9)
MONOCYTES # BLD AUTO: 0.55 10*3/MM3 (ref 0.1–0.9)
MONOCYTES NFR BLD AUTO: 12.4 % (ref 5–12)
MONOCYTES NFR BLD AUTO: 12.8 % (ref 5–12)
NEUTROPHILS NFR BLD AUTO: 2.17 10*3/MM3 (ref 1.7–7)
NEUTROPHILS NFR BLD AUTO: 2.42 10*3/MM3 (ref 1.7–7)
NEUTROPHILS NFR BLD AUTO: 51.7 % (ref 42.7–76)
NEUTROPHILS NFR BLD AUTO: 54.4 % (ref 42.7–76)
NITRITE UR QL STRIP: NEGATIVE
NRBC BLD AUTO-RTO: 0 /100 WBC (ref 0–0.2)
NRBC BLD AUTO-RTO: 0 /100 WBC (ref 0–0.2)
OSMOLALITY SERPL: 257 MOSM/KG (ref 280–301)
OSMOLALITY UR: 608 MOSM/KG
PH UR STRIP.AUTO: 6 [PH] (ref 5–8)
PLATELET # BLD AUTO: 153 10*3/MM3 (ref 140–450)
PLATELET # BLD AUTO: 159 10*3/MM3 (ref 140–450)
PMV BLD AUTO: 8.8 FL (ref 6–12)
PMV BLD AUTO: 9 FL (ref 6–12)
POTASSIUM SERPL-SCNC: 3.4 MMOL/L (ref 3.5–5.2)
POTASSIUM SERPL-SCNC: 3.6 MMOL/L (ref 3.5–5.2)
POTASSIUM SERPL-SCNC: 4.3 MMOL/L (ref 3.5–5.2)
POTASSIUM SERPL-SCNC: 4.4 MMOL/L (ref 3.5–5.2)
PROT SERPL-MCNC: 7.1 G/DL (ref 6–8.5)
PROT UR QL STRIP: NEGATIVE
QT INTERVAL: 361 MS
QTC INTERVAL: 451 MS
RBC # BLD AUTO: 4.15 10*6/MM3 (ref 3.77–5.28)
RBC # BLD AUTO: 4.23 10*6/MM3 (ref 3.77–5.28)
RBC # UR STRIP: ABNORMAL /HPF
REF LAB TEST METHOD: ABNORMAL
SODIUM SERPL-SCNC: 117 MMOL/L (ref 136–145)
SODIUM SERPL-SCNC: 118 MMOL/L (ref 136–145)
SODIUM SERPL-SCNC: 119 MMOL/L (ref 136–145)
SODIUM SERPL-SCNC: 122 MMOL/L (ref 136–145)
SODIUM SERPL-SCNC: 124 MMOL/L (ref 136–145)
SODIUM SERPL-SCNC: 124 MMOL/L (ref 136–145)
SODIUM UR-SCNC: <20 MMOL/L
SP GR UR STRIP: 1.01 (ref 1–1.03)
SQUAMOUS #/AREA URNS HPF: ABNORMAL /HPF
T4 FREE SERPL-MCNC: 1.62 NG/DL (ref 0.92–1.68)
TROPONIN T NUMERIC DELTA: -1 NG/L
TROPONIN T SERPL HS-MCNC: 11 NG/L
TSH SERPL DL<=0.05 MIU/L-ACNC: 5.69 UIU/ML (ref 0.27–4.2)
UROBILINOGEN UR QL STRIP: ABNORMAL
WBC # UR STRIP: ABNORMAL /HPF
WBC NRBC COR # BLD AUTO: 4.21 10*3/MM3 (ref 3.4–10.8)
WBC NRBC COR # BLD AUTO: 4.45 10*3/MM3 (ref 3.4–10.8)

## 2025-05-07 PROCEDURE — 84443 ASSAY THYROID STIM HORMONE: CPT

## 2025-05-07 PROCEDURE — 25010000002 MORPHINE PER 10 MG: Performed by: EMERGENCY MEDICINE

## 2025-05-07 PROCEDURE — 25010000002 ONDANSETRON PER 1 MG: Performed by: EMERGENCY MEDICINE

## 2025-05-07 PROCEDURE — 84300 ASSAY OF URINE SODIUM: CPT

## 2025-05-07 PROCEDURE — 25010000002 ONDANSETRON PER 1 MG: Performed by: INTERNAL MEDICINE

## 2025-05-07 PROCEDURE — 36415 COLL VENOUS BLD VENIPUNCTURE: CPT

## 2025-05-07 PROCEDURE — 81001 URINALYSIS AUTO W/SCOPE: CPT | Performed by: EMERGENCY MEDICINE

## 2025-05-07 PROCEDURE — 84484 ASSAY OF TROPONIN QUANT: CPT | Performed by: EMERGENCY MEDICINE

## 2025-05-07 PROCEDURE — 25010000003 DEXTROSE 5 % SOLUTION: Performed by: STUDENT IN AN ORGANIZED HEALTH CARE EDUCATION/TRAINING PROGRAM

## 2025-05-07 PROCEDURE — 82533 TOTAL CORTISOL: CPT

## 2025-05-07 PROCEDURE — 83735 ASSAY OF MAGNESIUM: CPT

## 2025-05-07 PROCEDURE — 84439 ASSAY OF FREE THYROXINE: CPT

## 2025-05-07 PROCEDURE — 80053 COMPREHEN METABOLIC PANEL: CPT | Performed by: EMERGENCY MEDICINE

## 2025-05-07 PROCEDURE — 74177 CT ABD & PELVIS W/CONTRAST: CPT

## 2025-05-07 PROCEDURE — 25010000002 PROCHLORPERAZINE 10 MG/2ML SOLUTION

## 2025-05-07 PROCEDURE — 85379 FIBRIN DEGRADATION QUANT: CPT | Performed by: EMERGENCY MEDICINE

## 2025-05-07 PROCEDURE — 82948 REAGENT STRIP/BLOOD GLUCOSE: CPT

## 2025-05-07 PROCEDURE — 85025 COMPLETE CBC W/AUTO DIFF WBC: CPT

## 2025-05-07 PROCEDURE — 82077 ASSAY SPEC XCP UR&BREATH IA: CPT | Performed by: EMERGENCY MEDICINE

## 2025-05-07 PROCEDURE — 83690 ASSAY OF LIPASE: CPT | Performed by: EMERGENCY MEDICINE

## 2025-05-07 PROCEDURE — 25010000002 PROMETHAZINE PER 50 MG

## 2025-05-07 PROCEDURE — 25810000003 SODIUM CHLORIDE 0.9 % SOLUTION: Performed by: EMERGENCY MEDICINE

## 2025-05-07 PROCEDURE — 83935 ASSAY OF URINE OSMOLALITY: CPT

## 2025-05-07 PROCEDURE — 99285 EMERGENCY DEPT VISIT HI MDM: CPT

## 2025-05-07 PROCEDURE — 83930 ASSAY OF BLOOD OSMOLALITY: CPT

## 2025-05-07 PROCEDURE — 25010000002 HYDROMORPHONE 1 MG/ML SOLUTION: Performed by: EMERGENCY MEDICINE

## 2025-05-07 PROCEDURE — 25510000001 IOPAMIDOL PER 1 ML: Performed by: EMERGENCY MEDICINE

## 2025-05-07 RX ORDER — PROCHLORPERAZINE EDISYLATE 5 MG/ML
5 INJECTION INTRAMUSCULAR; INTRAVENOUS EVERY 6 HOURS PRN
Status: DISCONTINUED | OUTPATIENT
Start: 2025-05-07 | End: 2025-05-08 | Stop reason: HOSPADM

## 2025-05-07 RX ORDER — MORPHINE SULFATE 4 MG/ML
4 INJECTION, SOLUTION INTRAMUSCULAR; INTRAVENOUS ONCE
Status: COMPLETED | OUTPATIENT
Start: 2025-05-07 | End: 2025-05-07

## 2025-05-07 RX ORDER — IOPAMIDOL 755 MG/ML
100 INJECTION, SOLUTION INTRAVASCULAR
Status: COMPLETED | OUTPATIENT
Start: 2025-05-07 | End: 2025-05-07

## 2025-05-07 RX ORDER — TRAZODONE HYDROCHLORIDE 50 MG/1
50 TABLET ORAL NIGHTLY
Status: DISCONTINUED | OUTPATIENT
Start: 2025-05-07 | End: 2025-05-08 | Stop reason: HOSPADM

## 2025-05-07 RX ORDER — ZOLPIDEM TARTRATE 5 MG/1
10 TABLET ORAL NIGHTLY
Status: DISCONTINUED | OUTPATIENT
Start: 2025-05-07 | End: 2025-05-08 | Stop reason: HOSPADM

## 2025-05-07 RX ORDER — IBUPROFEN 400 MG/1
400 TABLET, FILM COATED ORAL ONCE
Status: COMPLETED | OUTPATIENT
Start: 2025-05-07 | End: 2025-05-07

## 2025-05-07 RX ORDER — SODIUM CHLORIDE 0.9 % (FLUSH) 0.9 %
10 SYRINGE (ML) INJECTION AS NEEDED
Status: DISCONTINUED | OUTPATIENT
Start: 2025-05-07 | End: 2025-05-08 | Stop reason: HOSPADM

## 2025-05-07 RX ORDER — NITROGLYCERIN 0.4 MG/1
0.4 TABLET SUBLINGUAL
Status: DISCONTINUED | OUTPATIENT
Start: 2025-05-07 | End: 2025-05-08 | Stop reason: HOSPADM

## 2025-05-07 RX ORDER — DEXTROSE MONOHYDRATE 50 MG/ML
250 INJECTION, SOLUTION INTRAVENOUS CONTINUOUS
Status: ACTIVE | OUTPATIENT
Start: 2025-05-07 | End: 2025-05-07

## 2025-05-07 RX ORDER — SODIUM CHLORIDE 0.9 % (FLUSH) 0.9 %
10 SYRINGE (ML) INJECTION EVERY 12 HOURS SCHEDULED
Status: DISCONTINUED | OUTPATIENT
Start: 2025-05-07 | End: 2025-05-08 | Stop reason: HOSPADM

## 2025-05-07 RX ORDER — SODIUM CHLORIDE 9 MG/ML
40 INJECTION, SOLUTION INTRAVENOUS AS NEEDED
Status: DISCONTINUED | OUTPATIENT
Start: 2025-05-07 | End: 2025-05-08 | Stop reason: HOSPADM

## 2025-05-07 RX ORDER — AMOXICILLIN 250 MG
2 CAPSULE ORAL 2 TIMES DAILY
Status: DISCONTINUED | OUTPATIENT
Start: 2025-05-07 | End: 2025-05-08 | Stop reason: HOSPADM

## 2025-05-07 RX ORDER — BISACODYL 10 MG
10 SUPPOSITORY, RECTAL RECTAL DAILY PRN
Status: DISCONTINUED | OUTPATIENT
Start: 2025-05-07 | End: 2025-05-08 | Stop reason: HOSPADM

## 2025-05-07 RX ORDER — BISACODYL 5 MG/1
5 TABLET, DELAYED RELEASE ORAL DAILY PRN
Status: DISCONTINUED | OUTPATIENT
Start: 2025-05-07 | End: 2025-05-08 | Stop reason: HOSPADM

## 2025-05-07 RX ORDER — POLYETHYLENE GLYCOL 3350 17 G/17G
17 POWDER, FOR SOLUTION ORAL DAILY PRN
Status: DISCONTINUED | OUTPATIENT
Start: 2025-05-07 | End: 2025-05-08 | Stop reason: HOSPADM

## 2025-05-07 RX ORDER — ONDANSETRON 2 MG/ML
4 INJECTION INTRAMUSCULAR; INTRAVENOUS ONCE
Status: COMPLETED | OUTPATIENT
Start: 2025-05-07 | End: 2025-05-07

## 2025-05-07 RX ORDER — ONDANSETRON 2 MG/ML
4 INJECTION INTRAMUSCULAR; INTRAVENOUS EVERY 6 HOURS PRN
Status: DISCONTINUED | OUTPATIENT
Start: 2025-05-07 | End: 2025-05-08 | Stop reason: HOSPADM

## 2025-05-07 RX ADMIN — MUPIROCIN 1 APPLICATION: 20 OINTMENT TOPICAL at 06:32

## 2025-05-07 RX ADMIN — Medication 10 ML: at 08:13

## 2025-05-07 RX ADMIN — IOPAMIDOL 85 ML: 755 INJECTION, SOLUTION INTRAVENOUS at 00:54

## 2025-05-07 RX ADMIN — HYDROMORPHONE HYDROCHLORIDE 1 MG: 1 INJECTION, SOLUTION INTRAMUSCULAR; INTRAVENOUS; SUBCUTANEOUS at 03:22

## 2025-05-07 RX ADMIN — SODIUM CHLORIDE 1000 ML: 9 INJECTION, SOLUTION INTRAVENOUS at 00:00

## 2025-05-07 RX ADMIN — IBUPROFEN 400 MG: 400 TABLET, FILM COATED ORAL at 02:02

## 2025-05-07 RX ADMIN — ONDANSETRON 4 MG: 2 INJECTION, SOLUTION INTRAMUSCULAR; INTRAVENOUS at 00:32

## 2025-05-07 RX ADMIN — MORPHINE SULFATE 4 MG: 4 INJECTION, SOLUTION INTRAMUSCULAR; INTRAVENOUS at 01:18

## 2025-05-07 RX ADMIN — ZOLPIDEM TARTRATE 10 MG: 5 TABLET, FILM COATED ORAL at 20:05

## 2025-05-07 RX ADMIN — PROCHLORPERAZINE EDISYLATE 5 MG: 5 INJECTION INTRAMUSCULAR; INTRAVENOUS at 05:03

## 2025-05-07 RX ADMIN — PROMETHAZINE HYDROCHLORIDE 12.5 MG: 25 INJECTION, SOLUTION INTRAMUSCULAR; INTRAVENOUS at 10:57

## 2025-05-07 RX ADMIN — ONDANSETRON 4 MG: 2 INJECTION, SOLUTION INTRAMUSCULAR; INTRAVENOUS at 06:32

## 2025-05-07 RX ADMIN — TRAZODONE HYDROCHLORIDE 50 MG: 50 TABLET ORAL at 20:05

## 2025-05-07 RX ADMIN — Medication 10 ML: at 20:05

## 2025-05-07 RX ADMIN — DEXTROSE MONOHYDRATE 250 ML: 50 INJECTION, SOLUTION INTRAVENOUS at 15:21

## 2025-05-07 NOTE — PROGRESS NOTES
Per chart review, patient was seen by [facility] in the past, will defer consult to their group for continuity of care. Thank you for the consideration.

## 2025-05-07 NOTE — ED NOTES
"Nursing report ED to floor  Maggie Malhotra  70 y.o.  female    HPI :  HPI  Stated Reason for Visit: Patient presents to the ER with 1 day history of nausea and vomiting. \"Only able to keep crackers and small sips of gadorade down\". Patient reporting midsternal chest pressure that has increased in intesity \"since the afternoon\". Right eye cataract surgery the am of 5/5/25.  History Obtained From: patient    Chief Complaint  Chief Complaint   Patient presents with    Chest Pain    Vomiting    Nausea       Admitting doctor:   Julien Mccormack MD    Admitting diagnosis:   The primary encounter diagnosis was Hyponatremia. Diagnoses of Elevated liver enzymes, Chest pain, unspecified type, Abdominal pain, unspecified abdominal location, and Urine ketones were also pertinent to this visit.    Code status:   Current Code Status       Date Active Code Status Order ID Comments User Context       Not on file            Allergies:   Influenza vaccines and Midazolam    Isolation:   No active isolations    Intake and Output  No intake or output data in the 24 hours ending 05/07/25 0252    Weight:       05/06/25  2315   Weight: 87.1 kg (192 lb)       Most recent vitals:   Vitals:    05/06/25 2315 05/07/25 0119 05/07/25 0130   BP: 144/94 154/76 134/85   BP Location: Right arm Right arm    Patient Position: Sitting Lying    Pulse: 95 92 92   Resp: 20 17    Temp: 98.2 °F (36.8 °C)     TempSrc: Oral     SpO2: 97% 98% 97%   Weight: 87.1 kg (192 lb)     Height: 177.8 cm (70\")         Active LDAs/IV Access:   Lines, Drains & Airways       Active LDAs       Name Placement date Placement time Site Days    Peripheral IV 05/06/25 2340 18 G Left;Posterior Hand 05/06/25  2340  Hand  less than 1                    Labs (abnormal labs have a star):   Labs Reviewed   COMPREHENSIVE METABOLIC PANEL - Abnormal; Notable for the following components:       Result Value    Glucose 104 (*)     Sodium 117 (*)     Chloride 82 (*)     CO2 20.6 (*)     ALT " (SGPT) 95 (*)     AST (SGOT) 161 (*)     Alkaline Phosphatase 132 (*)     Total Bilirubin 1.7 (*)     All other components within normal limits    Narrative:     GFR Categories in Chronic Kidney Disease (CKD)              GFR Category          GFR (mL/min/1.73)    Interpretation  G1                    90 or greater        Normal or high (1)  G2                    60-89                Mild decrease (1)  G3a                   45-59                Mild to moderate decrease  G3b                   30-44                Moderate to severe decrease  G4                    15-29                Severe decrease  G5                    14 or less           Kidney failure    (1)In the absence of evidence of kidney disease, neither GFR category G1 or G2 fulfill the criteria for CKD.    eGFR calculation 2021 CKD-EPI creatinine equation, which does not include race as a factor   CBC WITH AUTO DIFFERENTIAL - Abnormal; Notable for the following components:    Hemoglobin 16.2 (*)     MCH 33.5 (*)     RDW 12.2 (*)     Eosinophil % 0.2 (*)     All other components within normal limits   URINALYSIS W/ CULTURE IF INDICATED - Abnormal; Notable for the following components:    Ketones, UA >=160 mg/dL (4+) (*)     Blood, UA Moderate (2+) (*)     Leuk Esterase, UA Trace (*)     All other components within normal limits    Narrative:     In absence of clinical symptoms, the presence of pyuria, bacteria, and/or nitrites on the urinalysis result does not correlate with infection.   BASIC METABOLIC PANEL - Abnormal; Notable for the following components:    Glucose 102 (*)     Sodium 118 (*)     Chloride 82 (*)     CO2 21.4 (*)     Calcium 8.2 (*)     All other components within normal limits    Narrative:     GFR Categories in Chronic Kidney Disease (CKD)              GFR Category          GFR (mL/min/1.73)    Interpretation  G1                    90 or greater        Normal or high (1)  G2                    60-89                Mild decrease  "(1)  G3a                   45-59                Mild to moderate decrease  G3b                   30-44                Moderate to severe decrease  G4                    15-29                Severe decrease  G5                    14 or less           Kidney failure    (1)In the absence of evidence of kidney disease, neither GFR category G1 or G2 fulfill the criteria for CKD.    eGFR calculation 2021 CKD-EPI creatinine equation, which does not include race as a factor   TROPONIN - Normal    Narrative:     High Sensitive Troponin T Reference Range:  <14.0 ng/L- Negative Female for AMI  <22.0 ng/L- Negative Male for AMI  >=14 - Abnormal Female indicating possible myocardial injury.  >=22 - Abnormal Male indicating possible myocardial injury.   Clinicians would have to utilize clinical acumen, EKG, Troponin, and serial changes to determine if it is an Acute Myocardial Infarction or myocardial injury due to an underlying chronic condition.        D-DIMER, QUANTITATIVE - Normal    Narrative:     According to the assay 's published package insert, a normal (<0.50 MCGFEU/mL) D-dimer result in conjunction with a non-high clinical probability assessment, excludes deep vein thrombosis (DVT) and pulmonary embolism (PE) with high sensitivity.    D-dimer values increase with age and this can make VTE exclusion of an older population difficult. To address this, the [employer], based on best available evidence and recent guidelines, recommends that clinicians use age-adjusted D-dimer thresholds in patients greater than 50 years of age with: a) a low probability of PE who do not meet all Pulmonary Embolism Rule Out Criteria, or b) in those with intermediate probability of PE.   The formula for an age-adjusted D-dimer cut-off is \"age/100\".  For example, a 60 year old patient would have an age-adjusted cut-off of 0.60 MCGFEU/mL and an 80 year old 0.80 MCGFEU/mL.   LIPASE - Normal   HIGH SENSITIVITIY " TROPONIN T 1HR - Normal    Narrative:     High Sensitive Troponin T Reference Range:  <14.0 ng/L- Negative Female for AMI  <22.0 ng/L- Negative Male for AMI  >=14 - Abnormal Female indicating possible myocardial injury.  >=22 - Abnormal Male indicating possible myocardial injury.   Clinicians would have to utilize clinical acumen, EKG, Troponin, and serial changes to determine if it is an Acute Myocardial Infarction or myocardial injury due to an underlying chronic condition.        ETHANOL   URINALYSIS, MICROSCOPIC ONLY   RAINBOW URINE CULTURE TUBE   CBC AND DIFFERENTIAL    Narrative:     The following orders were created for panel order CBC & Differential.  Procedure                               Abnormality         Status                     ---------                               -----------         ------                     CBC Auto Differential[[id number]]        Abnormal            Final result                 Please view results for these tests on the individual orders.       EKG:   ECG 12 Lead Chest Pain   Preliminary Result   HEART RATE=94  bpm   RR Hxnaclbs=703  ms   MD Dxcaluyj=844  ms   P Horizontal Axis=-13  deg   P Front Axis=77  deg   QRSD Interval=86  ms   QT Ubaianba=856  ms   YNfJ=187  ms   QRS Axis=-31  deg   T Wave Axis=24  deg   - ABNORMAL ECG -   Sinus rhythm   Left atrial enlargement   Inferior infarct, old   Date and Time of Study:2025-05-06 23:39:50          Meds given in ED:   Medications   sodium chloride 0.9 % flush 10 mL (has no administration in time range)   sodium chloride 0.9 % flush 10 mL (has no administration in time range)   aspirin tablet 325 mg (325 mg Oral Given 5/6/25 2338)   sodium chloride 0.9 % bolus 1,000 mL ( Intravenous Rate/Dose Change 5/7/25 0106)   ondansetron (ZOFRAN) injection 4 mg (4 mg Intravenous Given 5/6/25 2343)   GI Cocktail (aluminum-magnesium hydroxide-simethicone 400-40 MG/5ML suspension 30 ML) ( Oral Given 5/6/25 2343)   ipratropium-albuterol (DUO-NEB)  nebulizer solution 3 mL (3 mL Nebulization Given 5/6/25 6712)   ondansetron (ZOFRAN) injection 4 mg (4 mg Intravenous Given 5/7/25 0032)   iopamidol (ISOVUE-370) 76 % injection 100 mL (85 mL Intravenous Given 5/7/25 0054)   Morphine sulfate (PF) injection 4 mg (4 mg Intravenous Given 5/7/25 0118)   ibuprofen (ADVIL,MOTRIN) tablet 400 mg (400 mg Oral Given 5/7/25 0202)       Imaging results:  CT Abdomen Pelvis With Contrast  Result Date: 5/7/2025   Fatty infiltration of the liver, no acute abnormality, no evidence of renal or bowel or biliary obstruction.     This report was finalized on 5/7/2025 1:21 AM by Dr. Jairo Malcolm M.D on Workstation: [employer]      XR Chest 1 View  Result Date: 5/7/2025   The heart size is within normal limits.  The lungs are normally aerated. There is no pleural effusion or pneumothorax.    This report was finalized on 5/7/2025 12:05 AM by Dr. Jairo Malcolm M.D on Workstation: [id number]        Ambulatory status:   - up with assist    Social issues:   Social History     Socioeconomic History    Marital status:    Tobacco Use    Smoking status: Never     Passive exposure: Never    Smokeless tobacco: Never    Tobacco comments:     occasional caffiene   Vaping Use    Vaping status: Never Used   Substance and Sexual Activity    Alcohol use: Yes     Alcohol/week: 3.0 standard drinks of alcohol     Types: 3 Cans of beer per week     Comment: Moderate    Drug use: Never    Sexual activity: Yes     Partners: Male     Birth control/protection: Vasectomy, Hysterectomy       Peripheral Neurovascular  Peripheral Neurovascular (Adult)  Peripheral Neurovascular WDL: capillary refill, pulse assessment  Capillary Refill, General: less than/equal to 3 secs  Pulse Assessment: radial    Neuro Cognitive  Neuro Cognitive (Adult)  Cognitive/Neuro/Behavioral WDL: level of consciousness, mood/behavior, speech, orientation  Level of Consciousness: Alert  Orientation: oriented x 4  Speech:  clear  Mood/Behavior: anxious  Sedation Group  POSS (Pasero Opioid-Induced Sed Scale): 1 - Awake and alert    Learning  Learning Assessment  Learning Readiness and Ability: no barriers identified  Education Provided  Person Taught: patient  Teaching Method: verbal instruction  Teaching Focus: symptom/problem overview, diet modification, medical device/equipment use  Education Outcome Evaluation: acceptance expressed    Respiratory  Respiratory  Additional Documentation: Breath Sounds (Group)  Respiratory WDL  Respiratory WDL: rhythm/pattern  Rhythm/Pattern, Respiratory: shortness of breath, tachypneic  Breath Sounds  All Lung Fields Breath Sounds: All Fields  All Lung Fields Breath Sounds: clear    Abdominal Pain       Pain Assessments  Pain (Adult)  (0-10) Pain Rating: Rest: 5  (0-10) Pain Rating: Activity: 5  Pain Location: head  Pain Side/Orientation: other (see comments) (frontal)  Pain Description: constant  Response to Pain Interventions: interventions effective per patient    NIH Stroke Scale       Cate Cabezas RN  05/07/25 02:52 EDT

## 2025-05-07 NOTE — PLAN OF CARE
Nephrology    Consult received for acute on chronic hyponatremia, neurologically intact, patient with known prior history of hyponatremia.  Admitted with nausea and vomiting.  Also history of EtOH use.  Serum sodium rolf 117 mmol/L on admission and has risen to 124 mmol/L in about 14 hours.  Received some NSAIDs, some antiemetics/GI cocktail and also received IVF bolus.  Per conversation with RN, patient is alert and oriented at this time.    Given chronicity >48 hours presumed, will have to be cautious not to cause hyponatremia overcorrection by greater than 4 to 6 mEq/L per 24-hour interval Especially given prior history of liver disease, EtOH use etc..  Will give IV D5W 250 mL bolus now stat and trend BMP every 4 hours.  Try to keep serum sodium no higher than 122 to 123 mmol/L for the next 24 hours. Use DDAVP as needed.  Agree with workup as ordered.  Cont to monitor in ICU.    Plan was discussed with ICU RN.  Complete consult to follow.    Thanks for consultation.

## 2025-05-07 NOTE — H&P
Group: [facility]        HISTORY AND PHYSICAL    Patient Identification:  Maggie Malhotra  70 y.o.  female  1955  [id number]              CC: nausea,vomiting    History of Present Illness:  Maggie Malhotra is a 70 year old female with a past medical history including: Insomnia, chronic pain/fibromyalgia, fatty liver, and history of alcohol abuse (abstinent for several months).  Surgical history including cholecystectomy and hysterectomy.     She presented to the [facility] at [facility] with complaints of nausea and vomiting.  Patient reports that she had cataract surgery on Monday (day prior to ED presentation), she felt a little nauseous on the evening of surgery however woke up on Tuesday morning with the inability to keep any food or drink down.  Patient vomited multiple times prior to ED presentation.  She endorses associated shortness of breath, epigastric pain, muscle cramping and chest tightness.  ED evaluation included: WBC 5.47 with 61.2% neutrophils, sodium 177, chloride 82, , ALT 95, total bilirubin 1.7, lipase 25, ethanol level was negative.  CT abdomen pelvis with contrast showed fatty infiltration of the liver, no other acute abnormality.    Patient does endorse a history of hyponatremia after gallbladder surgery several years ago.   She denies recent alcohol use and is not on any diuretic therapy-only medication currently used on Lyrica, Coreg, Ambien, and trazodone.    Review of Systems:  CONSTITUTIONAL:  Denies fevers or chills  EYE:  No new vision changes  EAR:  No change in hearing  CARDIAC:  +epigastric discomfort  PULMONARY:  No productive cough, +shortness of breath with emesis  GI:  No diarrhea, hematemesis or hematochezia, +persistent nausea, vomiting  RENAL:  No dysuria or urinary frequency  MUSCULOSKELETAL:  +BLE muscle cramping  ENDOCRINE:  No heat or cold intolerance  INTEGUMENTARY: No skin rashes  NEUROLOGICAL:  No confusion.  No seizure activity,  +dizziness, lightheadedness  PSYCHIATRIC:  No new anxiety or depression  12 system review of systems performed and all else negative     Past Medical History:  Past Medical History:   Diagnosis Date    Alcohol abuse 05/17/2023    Allergic Food    Allergic rhinitis     Anxiety     Backache     Blood pressure elevated without history of HTN     Broken foot 06/01/2002    LT    Cataract Feb 2025    Cholelithiasis Removed Dec 21 2023    Chronic pain     Chronic pain disorder     Colon polyp 2021    Depression     Diverticulosis     Dizziness     Extremity pain     Fatigue     Fatty liver Discovered Dec 2023    Fibromyalgia, primary     GERD (gastroesophageal reflux disease)     Guillain Barré syndrome     H/O mammogram 01/01/2012    Dr. Rendon    H/O: pneumonia     History of broken leg 01/01/1998    LT    Hypertension 2019    Hypotension     IBS (irritable bowel syndrome)     Insomnia     Memory disorder     Migraine     Mood disorder     Multiple joint pain     Night sweat     Syncope     Ulcer Dec 2023    Vitamin D deficiency        Past Surgical History:  Past Surgical History:   Procedure Laterality Date    BREAST AUGMENTATION BILATERAL MASTOPEXY Bilateral 01/01/1998    CHOLECYSTECTOMY      COLONOSCOPY N/A 05/18/2010    Dr. Gurmeet Justice    COLONOSCOPY  10/09/2019    Non-thrombosed external hemorrhoids found on perianal exam, diverticulosis in the sigmoid colon and in the descending colon, One 4 mm polyp in the mid sigmoid colon, tortuous colon, IH. Path: Tubular adenoma.       COLONOSCOPY N/A 10/7/2024    Procedure: COLONOSCOPY FOR SCREENING;  Surgeon: Michael Matos MD;  Location: [facility] MAIN OR;  Service: Gastroenterology;  Laterality: N/A;  hemorrhoids, diverticulosis, fair prep    COLONOSCOPY W/ BIOPSIES  06/27/2014    Non-thrombosed external hemorrhoids, Diverticulosis in the sigmoid colon, two 4 to 5 mm polyps in the sigmoid colon and the descending colon (pathology: Hyperplastic polyp), Tortuous colon,  "Internal hemorrhoids    ENDOSCOPY N/A 06/27/2014    Z-line irregular, 38 cm from the incisors, Gastritis, Gastric polyps, Duodenal erosions without bleeding     ENDOSCOPY N/A 05/18/2010    Dr. Gurmeet Justice    ENDOSCOPY  10/09/2019    Z-line irregular, gastritis, bilious gastric fluid. Path: Reflux pattern esophagitis, chronic inflammation.     ENDOSCOPY N/A 08/02/2022    Procedure: egd with esophageal dilatation 12-15 balloon with cold biopsies;  Surgeon: Michael Matos MD;  Location: [facility] MAIN OR;  Service: Gastroenterology;  Laterality: N/A;  hiatal hernia, gastric polyp, gastrtitis    ENDOSCOPY N/A 10/7/2024    Procedure: ESOPHAGOGASTRODUODENOSCOPY;  Surgeon: Michael Matos MD;  Location: [facility] MAIN OR;  Service: Gastroenterology;  Laterality: N/A;  duodenal bulb, gastritis    FRACTURE SURGERY  1997    Right Leg    HYSTERECTOMY N/A 02/01/2006    JOINT REPLACEMENT  Right Hip    LIPOSUCTION N/A 04/01/2001    OOPHORECTOMY      PAP SMEAR N/A 07/10/2013    TOE SURGERY Right 01/01/1995    TONSILLECTOMY N/A 01/01/1964    TRACHEAL SURGERY N/A 01/01/1986    Scar Repair    TRACHEOSTOMY      UPPER GASTROINTESTINAL ENDOSCOPY          Home Meds:  (Not in a hospital admission)      Allergies:  Allergies   Allergen Reactions    Influenza Vaccines Unknown - Low Severity    Midazolam Itching     Other reaction(s): Other (See Comments)  \"makes me very hyper\"       Social History:   Social History     Socioeconomic History    Marital status:    Tobacco Use    Smoking status: Never     Passive exposure: Never    Smokeless tobacco: Never    Tobacco comments:     occasional caffiene   Vaping Use    Vaping status: Never Used   Substance and Sexual Activity    Alcohol use: Yes     Alcohol/week: 3.0 standard drinks of alcohol     Types: 3 Cans of beer per week     Comment: Moderate    Drug use: Never    Sexual activity: Yes     Partners: Male     Birth control/protection: Vasectomy, Hysterectomy       Family " "History:  Family History   Problem Relation Age of Onset    Arthritis Mother     Heart failure Father     Breast cancer Neg Hx     Ovarian cancer Neg Hx     Uterine cancer Neg Hx     Colon cancer Neg Hx     Colon polyps Neg Hx     Crohn's disease Neg Hx     Irritable bowel syndrome Neg Hx     Ulcerative colitis Neg Hx        Physical Exam:  /83   Pulse 83   Temp 98.2 °F (36.8 °C) (Oral)   Resp 16   Ht 177.8 cm (70\")   Wt 87.1 kg (192 lb)   LMP 01/01/2006 (LMP Unknown)   SpO2 95%   BMI 27.55 kg/m²  Body mass index is 27.55 kg/m². 95% 87.1 kg (192 lb)  Constitutional: Middle aged female pt in bed, No acute respiratory distress, no accessory muscle use  Head: - NCAT  Eyes: No pallor, Anicteric conjunctiva, EOMI.  ENMT:  Mallampati 3, no oral thrush. Dry MM.   NECK: Trachea midline, No thyromegaly, no palpable cervical LNpathy  Heart: RRR, no murmur. No pedal edema   Lungs: OBED +, No wheezes/ crackles heard    Abdomen: Soft. No tenderness, guarding or rigidity. No palpable masses  Extremities: Extremities warm and well perfused. No cyanosis/ clubbing  Neuro: Conscious, answers appropriately, no gross focal neuro deficits  Psych: Mood and affect appropriate    PPE recommended per [facility] infectious disease Isolation protocol for the current clinical scenario(as mentioned below) was followed.      LABS:  COVID19   Date Value Ref Range Status   07/30/2022 Not Detected Not Detected - Ref. Range Final       Lab Results   Component Value Date    CALCIUM 8.2 (L) 05/07/2025     Results from last 7 days   Lab Units 05/07/25  0104 05/07/25  0003 05/07/25  0003 05/06/25  2338   SODIUM mmol/L 118*  --  117*  --    POTASSIUM mmol/L 4.3  --  4.4  --    CHLORIDE mmol/L 82*  --  82*  --    CO2 mmol/L 21.4*  --  20.6*  --    BUN mg/dL 10  --  10  --    CREATININE mg/dL 0.65  --  0.67  --    GLUCOSE mg/dL 102*   < > 104*  --    CALCIUM mg/dL 8.2*  --  9.0  --    WBC 10*3/mm3  --   --   --  5.47   HEMOGLOBIN g/dL  " --   --   --  16.2*   PLATELETS 10*3/mm3  --   --   --  182   ALT (SGPT) U/L  --   --  95*  --    AST (SGOT) U/L  --   --  161*  --     < > = values in this interval not displayed.     Lab Results   Component Value Date    CKTOTAL 218 (H) 05/23/2023    TROPONINI <0.010 01/11/2024    TROPONINT 10 05/07/2025     Results from last 7 days   Lab Units 05/07/25  0104 05/07/25  0003   HSTROP T ng/L 10 11                             Lab Results   Component Value Date    TSH 2.400 02/16/2023     Estimated Creatinine Clearance: 97.9 mL/min (by C-G formula based on SCr of 0.65 mg/dL).  Results from last 7 days   Lab Units 05/07/25  0056   NITRITE UA  Negative     Microbiology Results (last 10 days)       ** No results found for the last 240 hours. **               Imaging: I personally visualized the images of scans/x-rays performed within last 3 days.      Assessment:  Hyponatremia  Nausea/vomiting  Recent cataract surgery  Chronic pain  Hypertension  GERD  Fibromyalgia  Elevated liver enzymes  Fatty liver disease  History of alcohol abuse    Recommendations:  Patient admitted to ICU for hyponatremia.  Patient does have a history of hyponatremia after cholecystectomy several years ago.  Cataract surgery day prior to ED presentation.  Sodium initially 117, repeat 118.  Did receive 1 L of normal saline-recheck of sodium ordered now.  Nephrology consult to aid in management of hyponatremia.  Serum osmole, urine osmolality, urine sodium ordered.  TSH (reflex T4 if abnormal) ordered.  Check cortisol.  Patient's liver enzymes are elevated compared to baseline with an , ALT 95, bilirubin 1.7.    CT abdomen pelvis with contrast was performed which showed pronounced fatty infiltration of the liver, no hepatic mass or biliary obstruction, absent gallbladder noted.   Antiemetics ordered for nausea/vomiting.  Routine ICU orders.    Clear liquid diet  SCDs  Full code    Patient was placed in face mask upon entering room and kept  mask on throughout our encounter. I wore full protective equipment throughout this patient encounter including a face mask, gown and gloves. Hand hygiene was performed before donning protective equipment and after removal when leaving the room.    Ana Laura Cortez, APRN  5/7/2025  05:19 EDT      Much of this encounter note is an electronic transcription/translation of spoken language to printed text using Dragon Software.

## 2025-05-07 NOTE — NURSING NOTE
1110: Report received from Domingo RN in ED.    1140: TOA to ICU.    1230: Attempted to obtain 2nd IV x2. 2nd nurse to try.    1900: Report given to KINDRA Escobar.

## 2025-05-07 NOTE — PLAN OF CARE
Problem: Adult Inpatient Plan of Care  Goal: Plan of Care Review  Outcome: Progressing  Goal: Patient-Specific Goal (Individualized)  Outcome: Progressing  Goal: Absence of Hospital-Acquired Illness or Injury  Outcome: Progressing  Intervention: Identify and Manage Fall Risk  Recent Flowsheet Documentation  Taken 5/7/2025 1800 by Adriana Vale RN  Safety Promotion/Fall Prevention:   activity supervised   assistive device/personal items within reach   clutter free environment maintained   fall prevention program maintained   lighting adjusted   nonskid shoes/slippers when out of bed   room organization consistent   safety round/check completed  Taken 5/7/2025 1700 by Adriana Vale RN  Safety Promotion/Fall Prevention:   activity supervised   assistive device/personal items within reach   clutter free environment maintained   fall prevention program maintained   lighting adjusted   nonskid shoes/slippers when out of bed   room organization consistent   safety round/check completed  Taken 5/7/2025 1600 by Adriana Vale RN  Safety Promotion/Fall Prevention:   activity supervised   assistive device/personal items within reach   clutter free environment maintained   fall prevention program maintained   lighting adjusted   nonskid shoes/slippers when out of bed   room organization consistent   safety round/check completed  Taken 5/7/2025 1500 by Adriana Vale RN  Safety Promotion/Fall Prevention:   activity supervised   assistive device/personal items within reach   clutter free environment maintained   fall prevention program maintained   lighting adjusted   nonskid shoes/slippers when out of bed   room organization consistent   safety round/check completed  Taken 5/7/2025 1400 by Adriana Vale RN  Safety Promotion/Fall Prevention:   activity supervised   assistive device/personal items within reach   clutter free environment maintained   fall prevention program maintained   lighting  adjusted   nonskid shoes/slippers when out of bed   room organization consistent   safety round/check completed  Taken 5/7/2025 1300 by Adriana Vale RN  Safety Promotion/Fall Prevention:   activity supervised   assistive device/personal items within reach   clutter free environment maintained   fall prevention program maintained   lighting adjusted   nonskid shoes/slippers when out of bed   room organization consistent   safety round/check completed  Taken 5/7/2025 1200 by Adriana Vale RN  Safety Promotion/Fall Prevention:   activity supervised   assistive device/personal items within reach   clutter free environment maintained   fall prevention program maintained   lighting adjusted   nonskid shoes/slippers when out of bed   room organization consistent   safety round/check completed  Intervention: Prevent Skin Injury  Recent Flowsheet Documentation  Taken 5/7/2025 1800 by Adriana Vale RN  Body Position: position changed independently  Taken 5/7/2025 1600 by Adriana Vale RN  Body Position: position changed independently  Taken 5/7/2025 1400 by Adriana Vale RN  Body Position: position changed independently  Taken 5/7/2025 1200 by Adriana Vale RN  Body Position: position changed independently  Skin Protection: incontinence pads utilized  Intervention: Prevent Infection  Recent Flowsheet Documentation  Taken 5/7/2025 1800 by Adriana Vale RN  Infection Prevention:   environmental surveillance performed   equipment surfaces disinfected   hand hygiene promoted   rest/sleep promoted   single patient room provided  Taken 5/7/2025 1700 by Adriana Vale RN  Infection Prevention:   environmental surveillance performed   equipment surfaces disinfected   hand hygiene promoted   rest/sleep promoted   single patient room provided  Taken 5/7/2025 1600 by Adriana Vale RN  Infection Prevention:   environmental surveillance performed   equipment surfaces disinfected   hand  hygiene promoted   rest/sleep promoted   single patient room provided  Taken 5/7/2025 1500 by Adriana Vale RN  Infection Prevention:   environmental surveillance performed   equipment surfaces disinfected   hand hygiene promoted   rest/sleep promoted   single patient room provided  Taken 5/7/2025 1400 by Adriana Vale RN  Infection Prevention:   environmental surveillance performed   equipment surfaces disinfected   hand hygiene promoted   rest/sleep promoted   single patient room provided  Taken 5/7/2025 1300 by Adriana Vale RN  Infection Prevention:   environmental surveillance performed   equipment surfaces disinfected   hand hygiene promoted   rest/sleep promoted   single patient room provided  Taken 5/7/2025 1200 by Adriana Vale RN  Infection Prevention:   environmental surveillance performed   equipment surfaces disinfected   hand hygiene promoted   rest/sleep promoted   single patient room provided  Goal: Optimal Comfort and Wellbeing  Outcome: Progressing  Intervention: Provide Person-Centered Care  Recent Flowsheet Documentation  Taken 5/7/2025 1600 by Adriana Vale RN  Trust Relationship/Rapport:   care explained   choices provided   questions answered   questions encouraged  Taken 5/7/2025 1200 by Adriana Vale RN  Trust Relationship/Rapport:   care explained   choices provided   questions answered   questions encouraged  Goal: Readiness for Transition of Care  Outcome: Progressing  Intervention: Mutually Develop Transition Plan  Recent Flowsheet Documentation  Taken 5/7/2025 1613 by Adriana Vale RN  Equipment Currently Used at Home: none  Taken 5/7/2025 1350 by Adriana Vale RN  Transportation Anticipated: family or friend will provide  Patient/Family Anticipated Services at Transition: none  Patient/Family Anticipates Transition to: home     Problem: Comorbidity Management  Goal: Blood Pressure in Desired Range  Outcome: Progressing  Intervention:  Maintain Blood Pressure Management  Recent Flowsheet Documentation  Taken 5/7/2025 1800 by Adriana Vale RN  Medication Review/Management: medications reviewed  Taken 5/7/2025 1700 by Adriana Vale RN  Medication Review/Management: medications reviewed  Taken 5/7/2025 1600 by Adriana Vale RN  Medication Review/Management: medications reviewed  Taken 5/7/2025 1500 by Adriana Vale RN  Medication Review/Management: medications reviewed  Taken 5/7/2025 1400 by Adriana Vale RN  Medication Review/Management: medications reviewed  Taken 5/7/2025 1300 by Adriana Vale RN  Medication Review/Management: medications reviewed  Taken 5/7/2025 1200 by Adriana Vale RN  Medication Review/Management: medications reviewed   Goal Outcome Evaluation:

## 2025-05-07 NOTE — ED NOTES
"Nursing report ED to floor  Maggie Malhotra  70 y.o.  female    HPI :  HPI  Stated Reason for Visit: Patient presents to the ER with 1 day history of nausea and vomiting. \"Only able to keep crackers and small sips of gadorade down\". Patient reporting midsternal chest pressure that has increased in intesity \"since the afternoon\". Right eye cataract surgery the am of 5/5/25.  History Obtained From: patient    Chief Complaint  Chief Complaint   Patient presents with    Chest Pain    Vomiting    Nausea       Admitting doctor:   Julien Mccormack MD    Admitting diagnosis:   The primary encounter diagnosis was Hyponatremia. Diagnoses of Elevated liver enzymes, Chest pain, unspecified type, Abdominal pain, unspecified abdominal location, and Urine ketones were also pertinent to this visit.    Code status:   Current Code Status       Date Active Code Status Order ID Comments User Context       5/7/2025 0515 CPR (Attempt to Resuscitate) [id number]  Ana Laura Cortez, [facility]        Question Answer    Code Status (Patient has no pulse and is not breathing) CPR (Attempt to Resuscitate)    Medical Interventions (Patient has pulse or is breathing) Full Support                    Allergies:   Influenza vaccines and Midazolam    Isolation:   No active isolations    Intake and Output  No intake or output data in the 24 hours ending 05/07/25 1053    Weight:       05/06/25  2315   Weight: 87.1 kg (192 lb)       Most recent vitals:   Vitals:    05/07/25 0929 05/07/25 0931 05/07/25 1001 05/07/25 1028   BP: 105/68 115/68 110/66 120/72   BP Location:  Right arm Right arm Right arm   Patient Position:  Lying Lying Lying   Pulse: 81 79 79 78   Resp:  18 18 16   Temp:       TempSrc:       SpO2: 91% 95% 93% 93%   Weight:       Height:           Active LDAs/IV Access:   Lines, Drains & Airways       Active LDAs       Name Placement date Placement time Site Days    Peripheral IV 05/06/25 2340 18 G Left;Posterior Hand 05/06/25  2340  Hand  less " than 1                    Labs (abnormal labs have a star):   Labs Reviewed   COMPREHENSIVE METABOLIC PANEL - Abnormal; Notable for the following components:       Result Value    Glucose 104 (*)     Sodium 117 (*)     Chloride 82 (*)     CO2 20.6 (*)     ALT (SGPT) 95 (*)     AST (SGOT) 161 (*)     Alkaline Phosphatase 132 (*)     Total Bilirubin 1.7 (*)     All other components within normal limits    Narrative:     GFR Categories in Chronic Kidney Disease (CKD)              GFR Category          GFR (mL/min/1.73)    Interpretation  G1                    90 or greater        Normal or high (1)  G2                    60-89                Mild decrease (1)  G3a                   45-59                Mild to moderate decrease  G3b                   30-44                Moderate to severe decrease  G4                    15-29                Severe decrease  G5                    14 or less           Kidney failure    (1)In the absence of evidence of kidney disease, neither GFR category G1 or G2 fulfill the criteria for CKD.    eGFR calculation 2021 CKD-EPI creatinine equation, which does not include race as a factor   CBC WITH AUTO DIFFERENTIAL - Abnormal; Notable for the following components:    Hemoglobin 16.2 (*)     MCH 33.5 (*)     RDW 12.2 (*)     Eosinophil % 0.2 (*)     All other components within normal limits   URINALYSIS W/ CULTURE IF INDICATED - Abnormal; Notable for the following components:    Ketones, UA >=160 mg/dL (4+) (*)     Blood, UA Moderate (2+) (*)     Leuk Esterase, UA Trace (*)     All other components within normal limits    Narrative:     In absence of clinical symptoms, the presence of pyuria, bacteria, and/or nitrites on the urinalysis result does not correlate with infection.   BASIC METABOLIC PANEL - Abnormal; Notable for the following components:    Glucose 102 (*)     Sodium 118 (*)     Chloride 82 (*)     CO2 21.4 (*)     Calcium 8.2 (*)     All other components within normal limits     Narrative:     GFR Categories in Chronic Kidney Disease (CKD)              GFR Category          GFR (mL/min/1.73)    Interpretation  G1                    90 or greater        Normal or high (1)  G2                    60-89                Mild decrease (1)  G3a                   45-59                Mild to moderate decrease  G3b                   30-44                Moderate to severe decrease  G4                    15-29                Severe decrease  G5                    14 or less           Kidney failure    (1)In the absence of evidence of kidney disease, neither GFR category G1 or G2 fulfill the criteria for CKD.    eGFR calculation 2021 CKD-EPI creatinine equation, which does not include race as a factor   BASIC METABOLIC PANEL - Abnormal; Notable for the following components:    Sodium 119 (*)     Chloride 83 (*)     CO2 19.0 (*)     Calcium 8.0 (*)     Anion Gap 17.0 (*)     All other components within normal limits    Narrative:     GFR Categories in Chronic Kidney Disease (CKD)              GFR Category          GFR (mL/min/1.73)    Interpretation  G1                    90 or greater        Normal or high (1)  G2                    60-89                Mild decrease (1)  G3a                   45-59                Mild to moderate decrease  G3b                   30-44                Moderate to severe decrease  G4                    15-29                Severe decrease  G5                    14 or less           Kidney failure    (1)In the absence of evidence of kidney disease, neither GFR category G1 or G2 fulfill the criteria for CKD.    eGFR calculation 2021 CKD-EPI creatinine equation, which does not include race as a factor   BASIC METABOLIC PANEL - Abnormal; Notable for the following components:    Sodium 124 (*)     Potassium 3.4 (*)     Chloride 87 (*)     CO2 18.0 (*)     Calcium 7.7 (*)     Anion Gap 19.0 (*)     All other components within normal limits    Narrative:     GFR Categories in  Chronic Kidney Disease (CKD)              GFR Category          GFR (mL/min/1.73)    Interpretation  G1                    90 or greater        Normal or high (1)  G2                    60-89                Mild decrease (1)  G3a                   45-59                Mild to moderate decrease  G3b                   30-44                Moderate to severe decrease  G4                    15-29                Severe decrease  G5                    14 or less           Kidney failure    (1)In the absence of evidence of kidney disease, neither GFR category G1 or G2 fulfill the criteria for CKD.    eGFR calculation 2021 CKD-EPI creatinine equation, which does not include race as a factor   CBC WITH AUTO DIFFERENTIAL - Abnormal; Notable for the following components:    .7 (*)     MCH 34.3 (*)     Monocyte % 12.8 (*)     Eosinophil % 0.2 (*)     All other components within normal limits   CBC WITH AUTO DIFFERENTIAL - Abnormal; Notable for the following components:    .0 (*)     MCH 34.5 (*)     Monocyte % 12.4 (*)     All other components within normal limits   OSMOLALITY, SERUM - Abnormal; Notable for the following components:    Osmolality 257 (*)     All other components within normal limits   TSH RFX ON ABNORMAL TO FREE T4 - Abnormal; Notable for the following components:    TSH 5.690 (*)     All other components within normal limits   TROPONIN - Normal    Narrative:     High Sensitive Troponin T Reference Range:  <14.0 ng/L- Negative Female for AMI  <22.0 ng/L- Negative Male for AMI  >=14 - Abnormal Female indicating possible myocardial injury.  >=22 - Abnormal Male indicating possible myocardial injury.   Clinicians would have to utilize clinical acumen, EKG, Troponin, and serial changes to determine if it is an Acute Myocardial Infarction or myocardial injury due to an underlying chronic condition.        D-DIMER, QUANTITATIVE - Normal    Narrative:     According to the assay 's published  "package insert, a normal (<0.50 MCGFEU/mL) D-dimer result in conjunction with a non-high clinical probability assessment, excludes deep vein thrombosis (DVT) and pulmonary embolism (PE) with high sensitivity.    D-dimer values increase with age and this can make VTE exclusion of an older population difficult. To address this, the [employer], based on best available evidence and recent guidelines, recommends that clinicians use age-adjusted D-dimer thresholds in patients greater than 50 years of age with: a) a low probability of PE who do not meet all Pulmonary Embolism Rule Out Criteria, or b) in those with intermediate probability of PE.   The formula for an age-adjusted D-dimer cut-off is \"age/100\".  For example, a 60 year old patient would have an age-adjusted cut-off of 0.60 MCGFEU/mL and an 80 year old 0.80 MCGFEU/mL.   LIPASE - Normal   HIGH SENSITIVITIY TROPONIN T 1HR - Normal    Narrative:     High Sensitive Troponin T Reference Range:  <14.0 ng/L- Negative Female for AMI  <22.0 ng/L- Negative Male for AMI  >=14 - Abnormal Female indicating possible myocardial injury.  >=22 - Abnormal Male indicating possible myocardial injury.   Clinicians would have to utilize clinical acumen, EKG, Troponin, and serial changes to determine if it is an Acute Myocardial Infarction or myocardial injury due to an underlying chronic condition.        MAGNESIUM - Normal   MAGNESIUM - Normal   T4, FREE - Normal   ETHANOL   OSMOLALITY, URINE    Narrative:     Osmo Normal Reference Ranges:    Random:  mOsm/kg H2O, depending on fluid intake.  Random: >850 mOsm/kg H20, after 12 hour fluid restriction.    24 Hour: 300-900 mOsm/kg H2O.   SODIUM, URINE, RANDOM    Narrative:     Reference intervals for random urine have not been established.  Clinical usage is dependent upon physician's interpretation in combination with other laboratory tests.      CORTISOL    Narrative:     Cortisol Reference " Ranges:    Cortisol 6AM - 10AM Range: 6.02-18.40 mcg/dl  Cortisol 4PM - 8PM Range: 2.68-10.50 mcg/dl      Results may be falsely increased if patient taking Biotin.     URINALYSIS, MICROSCOPIC ONLY   RAINBOW URINE CULTURE TUBE   CBC AND DIFFERENTIAL    Narrative:     The following orders were created for panel order CBC & Differential.  Procedure                               Abnormality         Status                     ---------                               -----------         ------                     CBC Auto Differential[[id number]]        Abnormal            Final result                 Please view results for these tests on the individual orders.   CBC AND DIFFERENTIAL    Narrative:     The following orders were created for panel order CBC & Differential.  Procedure                               Abnormality         Status                     ---------                               -----------         ------                     CBC Auto Differential[[id number]]        Abnormal            Final result                 Please view results for these tests on the individual orders.   CBC AND DIFFERENTIAL    Narrative:     The following orders were created for panel order CBC & Differential.  Procedure                               Abnormality         Status                     ---------                               -----------         ------                     CBC Auto Differential[[id number]]        Abnormal            Final result                 Please view results for these tests on the individual orders.       EKG:   ECG 12 Lead Chest Pain   Preliminary Result   HEART RATE=94  bpm   RR Otrwzzzm=592  ms   KY Ujiagzpe=569  ms   P Horizontal Axis=-13  deg   P Front Axis=77  deg   QRSD Interval=86  ms   QT Oudmpbat=572  ms   YLcQ=599  ms   QRS Axis=-31  deg   T Wave Axis=24  deg   - ABNORMAL ECG -   Sinus rhythm   Left atrial enlargement   Inferior infarct, old   Date and Time of Study:2025-05-06 23:39:50           Meds given in ED:   Medications   sodium chloride 0.9 % flush 10 mL (has no administration in time range)   sodium chloride 0.9 % flush 10 mL (has no administration in time range)   prochlorperazine (COMPAZINE) injection 5 mg (5 mg Intravenous Given 5/7/25 0503)   nitroglycerin (NITROSTAT) SL tablet 0.4 mg (has no administration in time range)   sodium chloride 0.9 % flush 10 mL (10 mL Intravenous Given 5/7/25 0813)   sodium chloride 0.9 % flush 10 mL (has no administration in time range)   sodium chloride 0.9 % infusion 40 mL (has no administration in time range)   mupirocin (BACTROBAN) 2 % nasal ointment 1 Application (1 Application Each Nare Given 5/7/25 0632)   sennosides-docusate (PERICOLACE) 8.6-50 MG per tablet 2 tablet (0 tablets Oral Hold 5/7/25 0813)     And   polyethylene glycol (MIRALAX) packet 17 g (has no administration in time range)     And   bisacodyl (DULCOLAX) EC tablet 5 mg (has no administration in time range)     And   bisacodyl (DULCOLAX) suppository 10 mg (has no administration in time range)   ondansetron (ZOFRAN) injection 4 mg (4 mg Intravenous Given 5/7/25 0632)   promethazine (PHENERGAN) 12.5 mg in sodium chloride 0.9 % 50 mL (has no administration in time range)   traZODone (DESYREL) tablet 50 mg (50 mg Oral Not Given 5/7/25 0819)   aspirin tablet 325 mg (325 mg Oral Given 5/6/25 2338)   sodium chloride 0.9 % bolus 1,000 mL (0 mL Intravenous Stopped 5/7/25 0350)   ondansetron (ZOFRAN) injection 4 mg (4 mg Intravenous Given 5/6/25 2343)   GI Cocktail (aluminum-magnesium hydroxide-simethicone 400-40 MG/5ML suspension 30 ML) ( Oral Given 5/6/25 2343)   ipratropium-albuterol (DUO-NEB) nebulizer solution 3 mL (3 mL Nebulization Given 5/6/25 2343)   ondansetron (ZOFRAN) injection 4 mg (4 mg Intravenous Given 5/7/25 0032)   iopamidol (ISOVUE-370) 76 % injection 100 mL (85 mL Intravenous Given 5/7/25 0054)   Morphine sulfate (PF) injection 4 mg (4 mg Intravenous Given 5/7/25 0118)    ibuprofen (ADVIL,MOTRIN) tablet 400 mg (400 mg Oral Given 5/7/25 0202)   HYDROmorphone (DILAUDID) injection 1 mg (1 mg Intravenous Given 5/7/25 0322)       Imaging results:  CT Abdomen Pelvis With Contrast  Result Date: 5/7/2025   Fatty infiltration of the liver, no acute abnormality, no evidence of renal or bowel or biliary obstruction.     This report was finalized on 5/7/2025 1:21 AM by Dr. Jairo Malcolm M.D on Workstation: [id number]      XR Chest 1 View  Result Date: 5/7/2025   The heart size is within normal limits.  The lungs are normally aerated. There is no pleural effusion or pneumothorax.    This report was finalized on 5/7/2025 12:05 AM by Dr. Jairo Malcolm M.D on Workstation: [id number]        Ambulatory status:   - assist    Social issues:   Social History     Socioeconomic History    Marital status:    Tobacco Use    Smoking status: Never     Passive exposure: Never    Smokeless tobacco: Never    Tobacco comments:     occasional caffiene   Vaping Use    Vaping status: Never Used   Substance and Sexual Activity    Alcohol use: Yes     Alcohol/week: 3.0 standard drinks of alcohol     Types: 3 Cans of beer per week     Comment: Moderate    Drug use: Never    Sexual activity: Yes     Partners: Male     Birth control/protection: Vasectomy, Hysterectomy       Peripheral Neurovascular  Peripheral Neurovascular (Adult)  Peripheral Neurovascular WDL: WDL  Capillary Refill, General: less than/equal to 3 secs  Pulse Assessment: radial    Neuro Cognitive  Neuro Cognitive (Adult)  Cognitive/Neuro/Behavioral WDL: WDL  Level of Consciousness: Alert  Orientation: oriented x 4  Speech: clear, spontaneous, logical  Mood/Behavior: calm, cooperative, behavior appropriate to situation, anxious  Sedation Group  POSS (Pasero Opioid-Induced Sed Scale): 1 - Awake and alert    Learning  Learning Assessment  Learning Readiness and Ability: no barriers identified  Education Provided  Person Taught:  patient  Teaching Method: verbal instruction  Teaching Focus: symptom/problem overview, diet modification, medical device/equipment use  Education Outcome Evaluation: acceptance expressed    Respiratory  Respiratory  Additional Documentation: Breath Sounds (Group)  Respiratory WDL  Respiratory WDL: WDL  Rhythm/Pattern, Respiratory: no shortness of breath reported, depth regular, pattern regular, unlabored  Expansion/Accessory Muscles/Retractions: no retractions, no use of accessory muscles  Mucous Membranes: pink, intact, moist  Cough Frequency: no cough  Breath Sounds  All Lung Fields Breath Sounds: All Fields  All Lung Fields Breath Sounds: clear    Abdominal Pain       Pain Assessments  Pain (Adult)  (0-10) Pain Rating: Rest: 7  (0-10) Pain Rating: Activity: 7  Patient requested Medication Prescribed for Lower Pain Scale: Yes  Pain Location: other (see comments) (generalized)  Pain Side/Orientation: medial  Pain Description: constant  Response to Pain Interventions: interventions effective per patient, intervention not effective per patient    NIH Stroke Scale       Domingo Cm RN  05/07/25 10:53 EDT

## 2025-05-07 NOTE — FSED PROVIDER NOTE
"Subjective   History of Present Illness  70-year-old female patient with chief complaint of vomiting.  Patient admits to having cataract surgery of the right eye yesterday.  Patient states after surgery yesterday she vomited multiple times.  Patient states she vomited multiple times today.  Admits to normal stools.  Patient admits to epigastric pain and chest tightness.  Patient states she feels short of breath.  Patient has history of chronic pain syndrome, anxiety, hypertension, GERD, fibromyalgia, alcohol abuse.  Admits to history of cholecystectomy and hysterectomy.  Denies previous cardiac history.  Denies history of DVT or PE.        Review of Systems   Cardiovascular:  Positive for chest pain.   Gastrointestinal:  Positive for abdominal pain and vomiting.   All other systems reviewed and are negative.      Past Medical History:   Diagnosis Date    Alcohol abuse 05/17/2023    Allergic Food    Allergic rhinitis     Anxiety     Backache     Blood pressure elevated without history of HTN     Broken foot 06/01/2002    LT    Cataract Feb 2025    Cholelithiasis Removed Dec 21 2023    Chronic pain     Chronic pain disorder     Colon polyp 2021    Depression     Diverticulosis     Dizziness     Extremity pain     Fatigue     Fatty liver Discovered Dec 2023    Fibromyalgia, primary     GERD (gastroesophageal reflux disease)     Guillain Barré syndrome     H/O mammogram 01/01/2012    Dr. Rendon    H/O: pneumonia     History of broken leg 01/01/1998    LT    Hypertension 2019    Hypotension     IBS (irritable bowel syndrome)     Insomnia     Memory disorder     Migraine     Mood disorder     Multiple joint pain     Night sweat     Syncope     Ulcer Dec 2023    Vitamin D deficiency        Allergies   Allergen Reactions    Influenza Vaccines Unknown - Low Severity    Midazolam Itching     Other reaction(s): Other (See Comments)  \"makes me very hyper\"       Past Surgical History:   Procedure Laterality Date    BREAST " AUGMENTATION BILATERAL MASTOPEXY Bilateral 01/01/1998    CHOLECYSTECTOMY      COLONOSCOPY N/A 05/18/2010    Dr. Gurmeet Justice    COLONOSCOPY  10/09/2019    Non-thrombosed external hemorrhoids found on perianal exam, diverticulosis in the sigmoid colon and in the descending colon, One 4 mm polyp in the mid sigmoid colon, tortuous colon, IH. Path: Tubular adenoma.       COLONOSCOPY N/A 10/7/2024    Procedure: COLONOSCOPY FOR SCREENING;  Surgeon: Michael Matos MD;  Location: [facility];  Service: Gastroenterology;  Laterality: N/A;  hemorrhoids, diverticulosis, fair prep    COLONOSCOPY W/ BIOPSIES  06/27/2014    Non-thrombosed external hemorrhoids, Diverticulosis in the sigmoid colon, two 4 to 5 mm polyps in the sigmoid colon and the descending colon (pathology: Hyperplastic polyp), Tortuous colon, Internal hemorrhoids    ENDOSCOPY N/A 06/27/2014    Z-line irregular, 38 cm from the incisors, Gastritis, Gastric polyps, Duodenal erosions without bleeding     ENDOSCOPY N/A 05/18/2010    Dr. Gurmeet Justice    ENDOSCOPY  10/09/2019    Z-line irregular, gastritis, bilious gastric fluid. Path: Reflux pattern esophagitis, chronic inflammation.     ENDOSCOPY N/A 08/02/2022    Procedure: egd with esophageal dilatation 12-15 balloon with cold biopsies;  Surgeon: Michael Matos MD;  Location: [facility];  Service: Gastroenterology;  Laterality: N/A;  hiatal hernia, gastric polyp, gastrtitis    ENDOSCOPY N/A 10/7/2024    Procedure: ESOPHAGOGASTRODUODENOSCOPY;  Surgeon: Michael Matos MD;  Location: [facility];  Service: Gastroenterology;  Laterality: N/A;  duodenal bulb, gastritis    FRACTURE SURGERY  1997    Right Leg    HYSTERECTOMY N/A 02/01/2006    JOINT REPLACEMENT  Right Hip    LIPOSUCTION N/A 04/01/2001    OOPHORECTOMY      PAP SMEAR N/A 07/10/2013    TOE SURGERY Right 01/01/1995    TONSILLECTOMY N/A 01/01/1964    TRACHEAL SURGERY N/A 01/01/1986    Scar Repair    TRACHEOSTOMY      UPPER  GASTROINTESTINAL ENDOSCOPY         Family History   Problem Relation Age of Onset    Arthritis Mother     Heart failure Father     Breast cancer Neg Hx     Ovarian cancer Neg Hx     Uterine cancer Neg Hx     Colon cancer Neg Hx     Colon polyps Neg Hx     Crohn's disease Neg Hx     Irritable bowel syndrome Neg Hx     Ulcerative colitis Neg Hx        Social History     Socioeconomic History    Marital status:    Tobacco Use    Smoking status: Never     Passive exposure: Never    Smokeless tobacco: Never    Tobacco comments:     occasional caffiene   Vaping Use    Vaping status: Never Used   Substance and Sexual Activity    Alcohol use: Yes     Alcohol/week: 3.0 standard drinks of alcohol     Types: 3 Cans of beer per week     Comment: Moderate    Drug use: Never    Sexual activity: Yes     Partners: Male     Birth control/protection: Vasectomy, Hysterectomy           Objective   Physical Exam  Vitals and nursing note reviewed.   Constitutional:       General: She is in acute distress.      Appearance: Normal appearance. She is not toxic-appearing.   HENT:      Head: Normocephalic and atraumatic.      Right Ear: Tympanic membrane normal.      Left Ear: Tympanic membrane normal.      Nose: Nose normal.      Mouth/Throat:      Mouth: Mucous membranes are moist.   Eyes:      Extraocular Movements: Extraocular movements intact.      Conjunctiva/sclera: Conjunctivae normal.   Cardiovascular:      Rate and Rhythm: Normal rate and regular rhythm.      Heart sounds: Normal heart sounds.   Pulmonary:      Breath sounds: Normal breath sounds.   Abdominal:      Palpations: Abdomen is soft.      Tenderness: There is abdominal tenderness (Moderate epigastric tenderness). There is no guarding or rebound.   Musculoskeletal:         General: Normal range of motion.      Cervical back: Normal range of motion. No tenderness.   Skin:     General: Skin is warm.   Neurological:      General: No focal deficit present.      Mental  Status: She is alert and oriented to person, place, and time.   Psychiatric:         Mood and Affect: Mood normal.         Behavior: Behavior normal.         Judgment: Judgment normal.         Procedures           ED Course                                           Medical Decision Making  Patient sodium is 118.  Patient admits to history of hyponatremia in the past.  Patient denies being on a diuretic.  Patient has been seen in the ER in the past for alcohol abuse.  Patient states she has not drank alcohol since April.  Patient's liver enzymes are elevated.  D-dimer negative.  CT scan shows fatty liver.  Patient will need ICU admission for her hyponatremia.  I spoke with Lila who agrees to accept the patient to Dr. Ksenia landa.  [facility] called back and stated that patient be transferred as ER to ER transfer.  Dr. Morales agrees to accept the patient    Problems Addressed:  Abdominal pain, unspecified abdominal location: complicated acute illness or injury  Chest pain, unspecified type: complicated acute illness or injury  Elevated liver enzymes: complicated acute illness or injury  Hyponatremia: complicated acute illness or injury  Urine ketones: complicated acute illness or injury    Amount and/or Complexity of Data Reviewed  Labs: ordered.     Details: Labs Reviewed  COMPREHENSIVE METABOLIC PANEL - Abnormal; Notable for the following components:     Glucose                       104 (*)                Sodium                        117 (*)                Chloride                      82 (*)                 CO2                           20.6 (*)               ALT (SGPT)                    95 (*)                 AST (SGOT)                    161 (*)                Alkaline Phosphatase          132 (*)                Total Bilirubin               1.7 (*)             All other components within normal limits         Narrative: GFR Categories in Chronic Kidney Disease (CKD)                                               GFR Category          GFR (mL/min/1.73)    Interpretation                  G1                    90 or greater        Normal or high (1)                  G2                    60-89                Mild decrease (1)                  G3a                   45-59                Mild to moderate decrease                  G3b                   30-44                Moderate to severe decrease                  G4                    15-29                Severe decrease                  G5                    14 or less           Kidney failure                                    (1)In the absence of evidence of kidney disease, neither GFR category G1 or G2 fulfill the criteria for CKD.                                    eGFR calculation 2021 CKD-EPI creatinine equation, which does not include race as a factor  CBC WITH AUTO DIFFERENTIAL - Abnormal; Notable for the following components:     Hemoglobin                    16.2 (*)               MCH                           33.5 (*)               RDW                           12.2 (*)               Eosinophil %                  0.2 (*)             All other components within normal limits  URINALYSIS W/ CULTURE IF INDICATED - Abnormal; Notable for the following components:     Ketones, UA                     (*)                  Blood, UA                       (*)                  Leuk Esterase, UA             Trace (*)            All other components within normal limits         Narrative: In absence of clinical symptoms, the presence of pyuria, bacteria, and/or nitrites on the urinalysis result does not correlate with infection.  BASIC METABOLIC PANEL - Abnormal; Notable for the following components:     Glucose                       102 (*)                Sodium                        118 (*)                Chloride                      82 (*)                 CO2                           21.4 (*)               Calcium                       8.2 (*)             All  other components within normal limits         Narrative: GFR Categories in Chronic Kidney Disease (CKD)                                              GFR Category          GFR (mL/min/1.73)    Interpretation                  G1                    90 or greater        Normal or high (1)                  G2                    60-89                Mild decrease (1)                  G3a                   45-59                Mild to moderate decrease                  G3b                   30-44                Moderate to severe decrease                  G4                    15-29                Severe decrease                  G5                    14 or less           Kidney failure                                    (1)In the absence of evidence of kidney disease, neither GFR category G1 or G2 fulfill the criteria for CKD.                                    eGFR calculation 2021 CKD-EPI creatinine equation, which does not include race as a factor  TROPONIN - Normal         Narrative: High Sensitive Troponin T Reference Range:                  <14.0 ng/L- Negative Female for AMI                  <22.0 ng/L- Negative Male for AMI                  >=14 - Abnormal Female indicating possible myocardial injury.                  >=22 - Abnormal Male indicating possible myocardial injury.                   Clinicians would have to utilize clinical acumen, EKG, Troponin, and serial changes to determine if it is an Acute Myocardial Infarction or myocardial injury due to an underlying chronic condition.                                       D-DIMER, QUANTITATIVE - Normal         Narrative: According to the assay 's published package insert, a normal (<0.50 MCGFEU/mL) D-dimer result in conjunction with a non-high clinical probability assessment, excludes deep vein thrombosis (DVT) and pulmonary embolism (PE) with high sensitivity.                                    D-dimer values increase with age and this can make  "VTE exclusion of an older population difficult. To address this, the [employer], based on best available evidence and recent guidelines, recommends that clinicians use age-adjusted D-dimer thresholds in patients greater than 50 years of age with: a) a low probability of PE who do not meet all Pulmonary Embolism Rule Out Criteria, or b) in those with intermediate probability of PE.                   The formula for an age-adjusted D-dimer cut-off is \"age/100\".                  For example, a 60 year old patient would have an age-adjusted cut-off of 0.60 MCGFEU/mL and an 80 year old 0.80 MCGFEU/mL.  LIPASE - Normal  HIGH SENSITIVITIY TROPONIN T 1HR - Normal         Narrative: High Sensitive Troponin T Reference Range:                  <14.0 ng/L- Negative Female for AMI                  <22.0 ng/L- Negative Male for AMI                  >=14 - Abnormal Female indicating possible myocardial injury.                  >=22 - Abnormal Male indicating possible myocardial injury.                   Clinicians would have to utilize clinical acumen, EKG, Troponin, and serial changes to determine if it is an Acute Myocardial Infarction or myocardial injury due to an underlying chronic condition.                                       ETHANOL  URINALYSIS, MICROSCOPIC ONLY  RAINBOW URINE CULTURE TUBE  CBC AND DIFFERENTIAL    Radiology: ordered.     Details: CT Abdomen Pelvis With Contrast  Result Date: 5/7/2025  Narrative: CT  ABDOMEN & PELVIS WITH IV CONTRAST  HISTORY: abdominal pain  TECHNIQUE: CT of the abdomen and pelvis with  IV contrast. Coronal and sagittal reconstructions were obtained.  Radiation dose reduction techniques were utilized, including automated exposure control, and exposure modulation based on body size.  COMPARISON: CT abdomen/pelvis 5/30/2024  FINDINGS:  Lung bases: Visualized lung bases are unremarkable.  Abdomen: There is pronounced fatty infiltration of the liver, but there is no " hepatic mass or biliary obstruction. The gallbladder has been removed. The spleen and pancreas appear normal.  Both adrenal glands are normal.  Both kidneys are normal.  The aorta is normal in caliber.   There is no abdominal or retroperitoneal adenopathy or other mass. There is no abdominal or retroperitoneal inflammatory change, or abnormal fluid or air collection.  There is no evidence of bowel obstruction.   Pelvis:  The appendix is clearly identified, and is normal.  There is diverticulosis, but there is no CT evidence of diverticulitis.  There is no pelvic adenopathy or other soft tissue mass.  There is no CT evidence of hernia or bowel obstruction.  There are spinal degenerative changes, but there is no acute bony abnormality.      Impression:  Fatty infiltration of the liver, no acute abnormality, no evidence of renal or bowel or biliary obstruction.     This report was finalized on 5/7/2025 1:21 AM by Dr. Jairo Malcolm M.D on Workstation: [id number]      XR Chest 1 View  Result Date: 5/7/2025  Narrative: CXR ONE VIEW  HISTORY: cp  COMPARISON: 7/3/2024  TECHNIQUE: single portable AP      Impression:  The heart size is within normal limits.  The lungs are normally aerated. There is no pleural effusion or pneumothorax.    This report was finalized on 5/7/2025 12:05 AM by Dr. Jairo Malcolm M.D on Workstation: [id number]        ECG/medicine tests: ordered.     Details: Normal sinus rhythm, heart rate 98 bpm    Risk  OTC drugs.  Prescription drug management.  Decision regarding hospitalization.        Final diagnoses:   Hyponatremia   Elevated liver enzymes   Chest pain, unspecified type   Abdominal pain, unspecified abdominal location   Urine ketones       ED Disposition  ED Disposition       ED Disposition   Decision to Admit    Condition   --    Comment   Level of Care: Critical Care [6]   Admitting Physician: SID BARCLAY [[id number]]   Attending Physician: SID BARCLAY [[id number]]                 No  follow-up provider specified.       Medication List      No changes were made to your prescriptions during this visit.

## 2025-05-07 NOTE — PROGRESS NOTES
Clinical Pharmacy Services: Medication History    Maggie Malhotra is a 70 y.o. female presenting to [facility] for   Chief Complaint   Patient presents with    Chest Pain    Vomiting    Nausea       She  has a past medical history of Alcohol abuse (05/17/2023), Allergic (Food), Allergic rhinitis, Anxiety, Backache, Blood pressure elevated without history of HTN, Broken foot (06/01/2002), Cataract (Feb 2025), Cholelithiasis (Removed Dec 21 2023), Chronic pain, Chronic pain disorder, Colon polyp (2021), Depression, Diverticulosis, Dizziness, Extremity pain, Fatigue, Fatty liver (Discovered Dec 2023), Fibromyalgia, primary, GERD (gastroesophageal reflux disease), Guillain Barré syndrome, H/O mammogram (01/01/2012), H/O: pneumonia, History of broken leg (01/01/1998), Hypertension (2019), Hypotension, IBS (irritable bowel syndrome), Insomnia, Memory disorder, Migraine, Mood disorder, Multiple joint pain, Night sweat, Syncope, Ulcer (Dec 2023), and Vitamin D deficiency.    Allergies as of 05/06/2025 - Reviewed 05/06/2025   Allergen Reaction Noted    Influenza vaccines Unknown - Low Severity 04/16/2016    Midazolam Itching 06/27/2013       Medication information was obtained from: Patient   Pharmacy and Phone Number:     Prior to Admission Medications       Prescriptions Last Dose Informant Patient Reported? Taking?    carvedilol CR (Coreg CR) 40 MG 24 hr capsule Past Week Pharmacy No Yes    Take 1 capsule by mouth Daily.    Creon [id number] units capsule delayed-release particles capsule 5/6/2025 Pharmacy No Yes    TAKE 2 CAPSULES WITH MEALS AND 1 CAPSULE WITH SNACKS    Patient taking differently:  Take 2 capsules by mouth 3 (Three) Times a Day With Meals. Take 2 capsules with meals and 1 capsule with snacks.    pregabalin (LYRICA) 150 MG capsule 5/6/2025 Pharmacy, Self No Yes    Take 1 capsule by mouth 3 (Three) Times a Day.    thiamine (VITAMIN B-1) 100 MG tablet  tablet 5/6/2025 Pharmacy, Self Yes  Yes    Take 1 tablet by mouth Daily.    traZODone (DESYREL) 100 MG tablet Past Week Pharmacy No Yes    Take 1 tablet by mouth Every Night.    zolpidem CR (AMBIEN CR) 12.5 MG CR tablet Past Week Pharmacy No Yes    Take 1 tablet by mouth At Night As Needed for Sleep.    dicyclomine (BENTYL) 10 MG capsule  Self No Yes    Take 1 capsule by mouth 3 (Three) Times a Day As Needed (abdominal pain/diarrhea).    HYDROcodone-acetaminophen (NORCO) 7.5-325 MG per tablet Past Month Self No Yes    Take 1 tablet by mouth Every 12 (Twelve) Hours As Needed for Moderate Pain.    multivitamin (DAILY ES) tablet tablet 5/6/2025 Self Yes Yes    Take 1 tablet by mouth Daily.    pantoprazole (PROTONIX) 40 MG EC tablet 5/6/2025 Self No Yes    Take 1 tablet by mouth Daily.    Phentermine-Topiramate (Qsymia) 3.75-23 MG capsule sustained-release 24 hr  Self No No    Take 1 capsule by mouth Daily.    Patient taking differently:  Take 1 capsule by mouth Daily. **Not Started** HOLDING**              Medication notes:     This medication list is complete to the best of my knowledge as of 5/7/2025    Please call if questions.    Sean Willingham  Medication History Technician  [phone]    5/7/2025 09:00 EDT

## 2025-05-08 VITALS
DIASTOLIC BLOOD PRESSURE: 139 MMHG | HEIGHT: 70 IN | OXYGEN SATURATION: 97 % | BODY MASS INDEX: 27.62 KG/M2 | HEART RATE: 80 BPM | SYSTOLIC BLOOD PRESSURE: 169 MMHG | RESPIRATION RATE: 18 BRPM | WEIGHT: 192.9 LBS | TEMPERATURE: 97.6 F

## 2025-05-08 LAB
ANION GAP SERPL CALCULATED.3IONS-SCNC: 11 MMOL/L (ref 5–15)
ANION GAP SERPL CALCULATED.3IONS-SCNC: 9.5 MMOL/L (ref 5–15)
BASOPHILS # BLD AUTO: 0.02 10*3/MM3 (ref 0–0.2)
BASOPHILS NFR BLD AUTO: 0.6 % (ref 0–1.5)
BUN SERPL-MCNC: 5 MG/DL (ref 8–23)
BUN SERPL-MCNC: 6 MG/DL (ref 8–23)
BUN/CREAT SERPL: 10.2 (ref 7–25)
BUN/CREAT SERPL: 9.3 (ref 7–25)
CALCIUM SPEC-SCNC: 7.8 MG/DL (ref 8.6–10.5)
CALCIUM SPEC-SCNC: 7.8 MG/DL (ref 8.6–10.5)
CHLORIDE SERPL-SCNC: 88 MMOL/L (ref 98–107)
CHLORIDE SERPL-SCNC: 93 MMOL/L (ref 98–107)
CO2 SERPL-SCNC: 24 MMOL/L (ref 22–29)
CO2 SERPL-SCNC: 25.5 MMOL/L (ref 22–29)
CREAT SERPL-MCNC: 0.54 MG/DL (ref 0.57–1)
CREAT SERPL-MCNC: 0.59 MG/DL (ref 0.57–1)
DEPRECATED RDW RBC AUTO: 43.3 FL (ref 37–54)
EGFRCR SERPLBLD CKD-EPI 2021: 97.7 ML/MIN/1.73
EGFRCR SERPLBLD CKD-EPI 2021: 99.8 ML/MIN/1.73
EOSINOPHIL # BLD AUTO: 0.07 10*3/MM3 (ref 0–0.4)
EOSINOPHIL NFR BLD AUTO: 1.9 % (ref 0.3–6.2)
ERYTHROCYTE [DISTWIDTH] IN BLOOD BY AUTOMATED COUNT: 12 % (ref 12.3–15.4)
GLUCOSE SERPL-MCNC: 111 MG/DL (ref 65–99)
GLUCOSE SERPL-MCNC: 127 MG/DL (ref 65–99)
HCT VFR BLD AUTO: 36.2 % (ref 34–46.6)
HGB BLD-MCNC: 12.6 G/DL (ref 12–15.9)
IMM GRANULOCYTES # BLD AUTO: 0.01 10*3/MM3 (ref 0–0.05)
IMM GRANULOCYTES NFR BLD AUTO: 0.3 % (ref 0–0.5)
LYMPHOCYTES # BLD AUTO: 1.15 10*3/MM3 (ref 0.7–3.1)
LYMPHOCYTES NFR BLD AUTO: 32 % (ref 19.6–45.3)
MAGNESIUM SERPL-MCNC: 1.8 MG/DL (ref 1.6–2.4)
MCH RBC QN AUTO: 34.4 PG (ref 26.6–33)
MCHC RBC AUTO-ENTMCNC: 34.8 G/DL (ref 31.5–35.7)
MCV RBC AUTO: 98.9 FL (ref 79–97)
MONOCYTES # BLD AUTO: 0.49 10*3/MM3 (ref 0.1–0.9)
MONOCYTES NFR BLD AUTO: 13.6 % (ref 5–12)
NEUTROPHILS NFR BLD AUTO: 1.85 10*3/MM3 (ref 1.7–7)
NEUTROPHILS NFR BLD AUTO: 51.6 % (ref 42.7–76)
NRBC BLD AUTO-RTO: 0 /100 WBC (ref 0–0.2)
PLATELET # BLD AUTO: 150 10*3/MM3 (ref 140–450)
PMV BLD AUTO: 8.7 FL (ref 6–12)
POTASSIUM SERPL-SCNC: 3.5 MMOL/L (ref 3.5–5.2)
POTASSIUM SERPL-SCNC: 3.7 MMOL/L (ref 3.5–5.2)
RBC # BLD AUTO: 3.66 10*6/MM3 (ref 3.77–5.28)
SODIUM SERPL-SCNC: 123 MMOL/L (ref 136–145)
SODIUM SERPL-SCNC: 128 MMOL/L (ref 136–145)
WBC NRBC COR # BLD AUTO: 3.59 10*3/MM3 (ref 3.4–10.8)

## 2025-05-08 PROCEDURE — 25010000002 HALOPERIDOL LACTATE PER 5 MG

## 2025-05-08 PROCEDURE — 83735 ASSAY OF MAGNESIUM: CPT

## 2025-05-08 PROCEDURE — 85025 COMPLETE CBC W/AUTO DIFF WBC: CPT

## 2025-05-08 PROCEDURE — 80048 BASIC METABOLIC PNL TOTAL CA: CPT | Performed by: STUDENT IN AN ORGANIZED HEALTH CARE EDUCATION/TRAINING PROGRAM

## 2025-05-08 RX ORDER — HYDROXYZINE HYDROCHLORIDE 25 MG/1
25 TABLET, FILM COATED ORAL ONCE
Status: COMPLETED | OUTPATIENT
Start: 2025-05-08 | End: 2025-05-08

## 2025-05-08 RX ORDER — HALOPERIDOL 5 MG/ML
5 INJECTION INTRAMUSCULAR ONCE
Status: COMPLETED | OUTPATIENT
Start: 2025-05-08 | End: 2025-05-08

## 2025-05-08 RX ADMIN — HYDROXYZINE HYDROCHLORIDE 25 MG: 25 TABLET, FILM COATED ORAL at 01:56

## 2025-05-08 RX ADMIN — HALOPERIDOL LACTATE 5 MG: 5 INJECTION, SOLUTION INTRAMUSCULAR at 03:55

## 2025-05-08 NOTE — NURSING NOTE
Spoke with Dr. Juarez with nephrology regarding 2000 sodium lab result. Okay for Na parameter of 122-126.

## 2025-05-08 NOTE — NURSING NOTE
Pt seen at bedside by nurse practitioner and has chosen to leave against medical advice. Verbalized understanding and signed AMA paperwork. Paperwork placed in pt's chart. IV's removed prior to pt leaving.

## 2025-05-08 NOTE — DISCHARGE SUMMARY
Pulm/ CC Note    Called multiple times overnight with patient's complaints of agitation and untreated anxiety.    Initially, patient's home ambien was restarted.  This only gave patient two hours of rest.    Hydroxyzine one-time dose added for sleep/anxiety.  This, too, did not help patient rest.    At this point, patient is agitated and wanting to leave AGAINST MEDICAL ADVICE.  She is frustrated about not being able to sleep and wants to leave in the morning after what with or without discharge orders.  She is willing to stay until she is evaluated by nephrology and intensivist on dayshift, however she wants something now to help her relax.    Discussed use of one-time dose of benzodiazepine (given patient's alcoholism history), however patient reports that she has a paradoxical effect to benzodiazepines making her very hyper.  One-time dose of 5 mg IV Haldol ordered, with her consent, to attempt to keep patient comfortable until able to be seen medication providers.    Given patient's desire to leave AGAINST MEDICAL ADVICE, patient was able to verbalize knowledge of risks/benefits of leaving hospital.  She is aware that low sodium can cause seizures, coma, obtundation, which can subsequently lead to respiratory failure and cardiac arrest.  She is aware and is willing to provide informed refusal of medical care if she is not discharged today.    Addendum:  Approximately thirty minutes after our long conversation, patient insisted that she was not going to wait any longer to leave.  She is alert and oriented x 4. Appropriate insight and judgement.  Verbalized risks of leaving against medical advise.  Informed refusal given- AMA paperwork signed.

## 2025-05-08 NOTE — CASE MANAGEMENT/SOCIAL WORK
Case Management Discharge Note      Final Note: AMA per private vehicle         Selected Continued Care - Discharged on 5/8/2025 Admission date: 5/6/2025 - Discharge disposition: Left Against Medical Advice      Destination    No services have been selected for the patient.                Durable Medical Equipment    No services have been selected for the patient.                Dialysis/Infusion    No services have been selected for the patient.                Home Medical Care    No services have been selected for the patient.                Therapy    No services have been selected for the patient.                Community Resources    No services have been selected for the patient.                Community & DME    No services have been selected for the patient.                    Selected Continued Care - Episodes Includes continued care and service providers with selected services from the active episodes listed below          Transportation Services  Private: Car    Final Discharge Disposition Code: 07 - left AMA

## 2025-05-19 DIAGNOSIS — M79.7 FIBROMYALGIA: ICD-10-CM

## 2025-05-19 RX ORDER — PREGABALIN 150 MG/1
150 CAPSULE ORAL 3 TIMES DAILY
Qty: 90 CAPSULE | Refills: 0 | Status: SHIPPED | OUTPATIENT
Start: 2025-05-19

## 2025-05-19 NOTE — TELEPHONE ENCOUNTER
Caller: Maggie Malhotra    Relationship: Self    Best call back number: [phone]    Requested Prescriptions:   Requested Prescriptions     Pending Prescriptions Disp Refills    pregabalin (LYRICA) 150 MG capsule 90 capsule 1     Sig: Take 1 capsule by mouth 3 (Three) Times a Day.        Pharmacy where request should be sent: [id number] [place], [place] - [address] [place] [address] AT [facility] - [phone]  - [phone]     Last office visit with prescribing clinician: 3/13/2025   Last telemedicine visit with prescribing clinician: Visit date not found   Next office visit with prescribing clinician: Visit date not found     Additional details provided by patient: STATED THAT THEY ARE COMPLETELY OUT OF THE MEDICATION     Does the patient have less than a 3 day supply:  [x] Yes  [] No    Would you like a call back once the refill request has been completed: [] Yes [x] No    If the office needs to give you a call back, can they leave a voicemail: [] Yes [x] No    Prudence Perry Rep   05/19/25 08:06 EDT

## 2025-05-20 ENCOUNTER — OFFICE VISIT (OUTPATIENT)
Dept: INTERNAL MEDICINE | Facility: CLINIC | Age: 70
End: 2025-05-20
Payer: MEDICARE

## 2025-05-20 VITALS
WEIGHT: 194 LBS | SYSTOLIC BLOOD PRESSURE: 118 MMHG | OXYGEN SATURATION: 97 % | DIASTOLIC BLOOD PRESSURE: 80 MMHG | HEIGHT: 70 IN | TEMPERATURE: 98.2 F | HEART RATE: 94 BPM | BODY MASS INDEX: 27.77 KG/M2

## 2025-05-20 DIAGNOSIS — F41.9 ANXIETY: Primary | ICD-10-CM

## 2025-05-20 DIAGNOSIS — M79.2 NEUROPATHIC PAIN: ICD-10-CM

## 2025-05-20 DIAGNOSIS — F33.0 MILD EPISODE OF RECURRENT MAJOR DEPRESSIVE DISORDER: ICD-10-CM

## 2025-05-20 DIAGNOSIS — Z79.899 HIGH RISK MEDICATION USE: ICD-10-CM

## 2025-05-20 DIAGNOSIS — E87.1 HYPONATREMIA: ICD-10-CM

## 2025-05-20 RX ORDER — BUSPIRONE HYDROCHLORIDE 10 MG/1
10 TABLET ORAL 3 TIMES DAILY
Qty: 90 TABLET | Refills: 1 | Status: SHIPPED | OUTPATIENT
Start: 2025-05-20

## 2025-05-20 RX ORDER — SUZETRIGINE 50 MG/1
50 TABLET, FILM COATED ORAL 2 TIMES DAILY
Qty: 60 TABLET | Refills: 1 | Status: SHIPPED | OUTPATIENT
Start: 2025-05-20

## 2025-05-20 NOTE — PROGRESS NOTES
Subjective   Maggie Malhotra is a 70 y.o. female. Patient is here today for   Chief Complaint   Patient presents with    Anxiety   .    History of Present Illness   Patient would like to discuss treatment for anxiety.  She was on Lexapro in the past which caused her sodium to decrease, although she does have low sodium chronically.  She was on buspirone in the past, but does not remember if it helped or not.  She feels like she can't sit still. Can't concentrate.  She is also asking about whether or not she may have bipolar disorder because her mood swings seem to be extreme.    Patient is here to follow-up on fibromyalgia and peripheral neuropathy.  She is on Lyrica 150 mg twice daily which helps her symptoms for the most part, but she does occasionally take hydrocodone for her pain.  She is requesting to try a new medication called Journavx instead of the hydrocodone since it is not a controlled substance.    Patient was recently hospitalized due to hyponatremia.  She was seen by nephrology and was instructed to follow up with them,  but has not made an appointment.      The following portions of the patient's history were reviewed and updated as appropriate: allergies, current medications, past family history, past medical history, past social history, past surgical history and problem list.    Review of Systems    Objective   Vitals:    05/20/25 1538   BP: 118/80   Pulse: 94   Temp: 98.2 °F (36.8 °C)   SpO2: 97%     Body mass index is 27.84 kg/m².  Physical Exam  Vitals and nursing note reviewed.   Constitutional:       Appearance: Normal appearance. She is well-developed.   Cardiovascular:      Rate and Rhythm: Normal rate and regular rhythm.      Heart sounds: Normal heart sounds.   Pulmonary:      Effort: Pulmonary effort is normal.      Breath sounds: Normal breath sounds.   Skin:     General: Skin is warm and dry.   Neurological:      Mental Status: She is alert and oriented to person, place, and time.    Psychiatric:         Speech: Speech normal.         Behavior: Behavior normal.         Thought Content: Thought content normal.         Assessment & Plan   Diagnoses and all orders for this visit:    1. Anxiety (Primary)  -     Ambulatory Referral to Psychiatry  -     busPIRone (BUSPAR) 10 MG tablet; Take 1 tablet by mouth 3 (Three) Times a Day.  Dispense: 90 tablet; Refill: 1    2. Mild episode of recurrent major depressive disorder  -     Ambulatory Referral to Psychiatry    3. Neuropathic pain  -     Suzetrigine (Journavx) 50 MG tablet; Take 1 tablet by mouth 2 (Two) Times a Day.  Dispense: 60 tablet; Refill: 1    4. Hyponatremia  -     Ambulatory Referral to Nephrology    5. High risk medication use  -     [id number] 8+Oxycodone+Crt-Unbund - Urine, Clean Catch    Anxiety -will try a higher dose of buspirone 3 times daily.  Patient will also be referred to psychiatry.  She has tried a couple of different medications and continues to have anxiety.  She is also on 2 different medications to help her with sleep.    Neuropathic pain -will try Journavx.  Explained that this is used for short-term acute pain. It is not something that should be taken on a daily basis, but would be used in place of the hydrocodone.  Patient will continue on Lyrica as prescribed    Hyponatremia - patient will be referred to nephrology    I would like patient to follow-up in 2 months to reassess the above conditions.

## 2025-05-22 LAB
ACCEPTABLE CREAT UR QL: 75.1 MG/DL (ref 20–300)
AMPHETAMINES UR QL SCN: NEGATIVE NG/ML
BARBITURATES UR QL SCN: NEGATIVE NG/ML
BENZODIAZ UR QL SCN: NEGATIVE NG/ML
COCAINE UR QL SCN: NEGATIVE NG/ML
METHADONE UR QL SCN: NEGATIVE NG/ML
OPIATES UR QL SCN: NEGATIVE NG/ML
OXYCODONE+OXYMORPHONE UR QL SCN: NEGATIVE NG/ML
PCP UR QL SCN: NEGATIVE NG/ML
PH UR: 5.7 [PH] (ref 4.5–8.9)
PROPOXYPH UR QL SCN: NEGATIVE NG/ML

## 2025-06-03 DIAGNOSIS — G47.00 INSOMNIA, UNSPECIFIED TYPE: ICD-10-CM

## 2025-06-03 RX ORDER — ZOLPIDEM TARTRATE 12.5 MG/1
12.5 TABLET, FILM COATED, EXTENDED RELEASE ORAL NIGHTLY PRN
Qty: 30 TABLET | Refills: 0 | Status: SHIPPED | OUTPATIENT
Start: 2025-06-03

## 2025-06-11 ENCOUNTER — TELEPHONE (OUTPATIENT)
Dept: INTERNAL MEDICINE | Facility: CLINIC | Age: 70
End: 2025-06-11

## 2025-06-11 NOTE — TELEPHONE ENCOUNTER
Caller: Maggie Malhotra    Relationship to patient: Self      Best call back number: [phone]     Provider: RAJEEV OLSON     Medication PA needed:     Suzetrigine (Journavx) 50 MG tablet       Reason for call/Prior Auth: PATIENT CALLED AND HER INSURANCE NEEDS MEDICAL NECESSITY FOR THIS MEDICATION AND PLEASE CONTACT HER INSURANCE.  NEED TO CALL [employer] [phone] AND THEY NEED TO TALK DIRECTLY TO RAJEEV OLSON.  IF ANY QUESTIONS PLEASE CALL PATIENT.

## 2025-06-16 DIAGNOSIS — M79.7 FIBROMYALGIA: ICD-10-CM

## 2025-06-16 RX ORDER — PREGABALIN 150 MG/1
150 CAPSULE ORAL 3 TIMES DAILY
Qty: 90 CAPSULE | Refills: 0 | Status: SHIPPED | OUTPATIENT
Start: 2025-06-16

## 2025-07-11 DIAGNOSIS — M79.7 FIBROMYALGIA: ICD-10-CM

## 2025-07-11 RX ORDER — HYDROCODONE BITARTRATE AND ACETAMINOPHEN 7.5; 325 MG/1; MG/1
1 TABLET ORAL EVERY 12 HOURS PRN
Qty: 30 TABLET | Refills: 0 | Status: SHIPPED | OUTPATIENT
Start: 2025-07-11

## 2025-07-14 DIAGNOSIS — F41.9 ANXIETY: ICD-10-CM

## 2025-07-14 DIAGNOSIS — M79.7 FIBROMYALGIA: ICD-10-CM

## 2025-07-14 RX ORDER — PREGABALIN 150 MG/1
150 CAPSULE ORAL 3 TIMES DAILY
Qty: 90 CAPSULE | Refills: 2 | Status: SHIPPED | OUTPATIENT
Start: 2025-07-14

## 2025-07-14 RX ORDER — BUSPIRONE HYDROCHLORIDE 10 MG/1
10 TABLET ORAL 3 TIMES DAILY
Qty: 270 TABLET | Refills: 1 | Status: SHIPPED | OUTPATIENT
Start: 2025-07-14

## 2025-07-22 ENCOUNTER — TELEPHONE (OUTPATIENT)
Dept: INTERNAL MEDICINE | Facility: CLINIC | Age: 70
End: 2025-07-22

## 2025-08-02 DIAGNOSIS — G47.00 INSOMNIA, UNSPECIFIED TYPE: ICD-10-CM

## 2025-08-02 DIAGNOSIS — L03.031 CELLULITIS OF TOE OF RIGHT FOOT: ICD-10-CM

## 2025-08-04 RX ORDER — DOXYCYCLINE 100 MG/1
100 CAPSULE ORAL 2 TIMES DAILY
Qty: 20 CAPSULE | Refills: 0 | OUTPATIENT
Start: 2025-08-04

## 2025-08-04 RX ORDER — ZOLPIDEM TARTRATE 12.5 MG/1
12.5 TABLET, FILM COATED, EXTENDED RELEASE ORAL NIGHTLY PRN
Qty: 30 TABLET | Refills: 2 | Status: SHIPPED | OUTPATIENT
Start: 2025-08-04

## 2025-08-22 ENCOUNTER — OFFICE VISIT (OUTPATIENT)
Dept: INTERNAL MEDICINE | Facility: CLINIC | Age: 70
End: 2025-08-22
Payer: MEDICARE

## 2025-08-22 VITALS
WEIGHT: 192 LBS | DIASTOLIC BLOOD PRESSURE: 88 MMHG | HEIGHT: 70 IN | BODY MASS INDEX: 27.49 KG/M2 | OXYGEN SATURATION: 98 % | TEMPERATURE: 98.4 F | HEART RATE: 84 BPM | SYSTOLIC BLOOD PRESSURE: 142 MMHG

## 2025-08-22 DIAGNOSIS — E66.3 OVERWEIGHT (BMI 25.0-29.9): ICD-10-CM

## 2025-08-22 DIAGNOSIS — F41.9 ANXIETY: Primary | ICD-10-CM

## 2025-08-22 DIAGNOSIS — M79.7 FIBROMYALGIA: ICD-10-CM

## 2025-08-22 DIAGNOSIS — G47.00 INSOMNIA, UNSPECIFIED TYPE: ICD-10-CM

## 2025-08-22 RX ORDER — PREDNISOLONE ACETATE 10 MG/ML
SUSPENSION/ DROPS OPHTHALMIC
COMMUNITY
Start: 2025-07-15 | End: 2025-10-01

## 2025-08-22 RX ORDER — BUSPIRONE HYDROCHLORIDE 15 MG/1
15 TABLET ORAL 3 TIMES DAILY
Qty: 90 TABLET | Refills: 3 | Status: SHIPPED | OUTPATIENT
Start: 2025-08-22

## 2025-08-22 RX ORDER — HYDROCODONE BITARTRATE AND ACETAMINOPHEN 7.5; 325 MG/1; MG/1
1 TABLET ORAL EVERY 12 HOURS PRN
Qty: 30 TABLET | Refills: 0 | Status: SHIPPED | OUTPATIENT
Start: 2025-08-22

## (undated) DEVICE — BLCK/BITE BLOX W/DENTL/RIM W/STRAP 54F

## (undated) DEVICE — BITEBLOCK OMNI BLOC

## (undated) DEVICE — KT ORCA ORCAPOD DISP STRL

## (undated) DEVICE — FLEX ADVANTAGE 1500CC: Brand: FLEX ADVANTAGE

## (undated) DEVICE — DEV INFL ALLIANCE2 SYS

## (undated) DEVICE — MSK ENDO PORT O2 POM ELITE CURAPLEX A/

## (undated) DEVICE — VIAL FORMLN CAP 10PCT 20ML

## (undated) DEVICE — Device

## (undated) DEVICE — ADAPT CLN SCPE ENDO PORPOISE BX/50 DISP

## (undated) DEVICE — GOWN PROC ENDOARMOR GI LVL3 HY/SHLD UNIV

## (undated) DEVICE — SINGLE-USE BIOPSY FORCEPS: Brand: RADIAL JAW 4

## (undated) DEVICE — ESOPHAGEAL BALLOON DILATATION CATHETER: Brand: CRE FIXED WIRE